# Patient Record
Sex: MALE | Race: BLACK OR AFRICAN AMERICAN | NOT HISPANIC OR LATINO | Employment: PART TIME | URBAN - METROPOLITAN AREA
[De-identification: names, ages, dates, MRNs, and addresses within clinical notes are randomized per-mention and may not be internally consistent; named-entity substitution may affect disease eponyms.]

---

## 2018-02-10 ENCOUNTER — HOSPITAL ENCOUNTER (EMERGENCY)
Facility: HOSPITAL | Age: 68
Discharge: HOME | End: 2018-02-10
Attending: EMERGENCY MEDICINE
Payer: MEDICARE

## 2018-02-10 VITALS
TEMPERATURE: 98 F | DIASTOLIC BLOOD PRESSURE: 67 MMHG | SYSTOLIC BLOOD PRESSURE: 150 MMHG | OXYGEN SATURATION: 100 % | WEIGHT: 220 LBS | RESPIRATION RATE: 19 BRPM | BODY MASS INDEX: 30.8 KG/M2 | HEART RATE: 62 BPM | HEIGHT: 71 IN

## 2018-02-10 DIAGNOSIS — M54.50 LUMBAR BACK PAIN: Primary | ICD-10-CM

## 2018-02-10 PROCEDURE — 63600175 PHARM REV CODE 636 W HCPCS: Performed by: NURSE PRACTITIONER

## 2018-02-10 PROCEDURE — 99283 EMERGENCY DEPT VISIT LOW MDM: CPT | Mod: 25

## 2018-02-10 PROCEDURE — 96372 THER/PROPH/DIAG INJ SC/IM: CPT

## 2018-02-10 RX ORDER — ORPHENADRINE CITRATE 30 MG/ML
60 INJECTION INTRAMUSCULAR; INTRAVENOUS
Status: COMPLETED | OUTPATIENT
Start: 2018-02-10 | End: 2018-02-10

## 2018-02-10 RX ORDER — METHOCARBAMOL 500 MG/1
1000 TABLET, FILM COATED ORAL 3 TIMES DAILY
Qty: 30 TABLET | Refills: 0 | Status: SHIPPED | OUTPATIENT
Start: 2018-02-10 | End: 2018-02-15

## 2018-02-10 RX ORDER — NAPROXEN 500 MG/1
500 TABLET ORAL 2 TIMES DAILY WITH MEALS
Qty: 14 TABLET | Refills: 0 | Status: SHIPPED | OUTPATIENT
Start: 2018-02-10 | End: 2018-02-21 | Stop reason: ALTCHOICE

## 2018-02-10 RX ORDER — KETOROLAC TROMETHAMINE 30 MG/ML
30 INJECTION, SOLUTION INTRAMUSCULAR; INTRAVENOUS
Status: COMPLETED | OUTPATIENT
Start: 2018-02-10 | End: 2018-02-10

## 2018-02-10 RX ADMIN — KETOROLAC TROMETHAMINE 30 MG: 30 INJECTION, SOLUTION INTRAMUSCULAR at 02:02

## 2018-02-10 RX ADMIN — ORPHENADRINE CITRATE 60 MG: 30 INJECTION INTRAMUSCULAR; INTRAVENOUS at 02:02

## 2018-02-10 NOTE — ED PROVIDER NOTES
Encounter Date: 2/10/2018       History     Chief Complaint   Patient presents with    Back Pain     back pain x 1 week. denies recent injury and urinary s/s.      67-year-old male with past medical history of hypertension presents to the ER for right lower back pain for about one week.  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  He denies chest pain, abdominal pain, urinary symptoms, weakness, numbness/tingling, loss of bowel or bladder control, saddle anesthesia, gait disturbance.  He has tried 1 ibuprofen for his pain without relief.  He reports that certain positions and bending forward make his pain worse.  Nothing seems to help.  He denies previous back injury and surgery.  The patient drove himself and was ambulatory into the ED.  No other complaints this time.  No history of cancer or IV drug use.  He denies history of renal disease.      The history is provided by the patient.   Back Pain    This is a new problem. The current episode started several days ago. The problem occurs constantly. The problem has been unchanged. The pain is associated with no known injury. The pain is present in the lumbar spine. The quality of the pain is described as aching. The pain is at a severity of 8/10. The symptoms are aggravated by certain positions, twisting and bending. The pain is the same all the time. Pertinent negatives include no chest pain, no fever, no numbness, no abdominal pain, no bowel incontinence, no perianal numbness, no bladder incontinence, no dysuria, no pelvic pain, no leg pain, no paresthesias, no paresis, no tingling and no weakness. He has tried NSAIDs for the symptoms. The treatment provided no relief.     Review of patient's allergies indicates:  No Known Allergies  Past Medical History:   Diagnosis Date    Hypertension      Past Surgical History:   Procedure Laterality Date    NO PAST SURGERIES       Family History   Problem Relation Age of Onset     Hypertension Mother     Cancer Father      stomach    Hypertension Father     Cancer Sister      breast    Hypertension Brother      Social History   Substance Use Topics    Smoking status: Never Smoker    Smokeless tobacco: Never Used    Alcohol use 1.2 oz/week     2 Cans of beer per week     Review of Systems   Constitutional: Negative for activity change, chills and fever.   HENT: Negative for congestion.    Respiratory: Negative for cough.    Cardiovascular: Negative for chest pain.   Gastrointestinal: Negative for abdominal pain, bowel incontinence, diarrhea and vomiting.   Genitourinary: Negative for bladder incontinence, decreased urine volume, difficulty urinating, dysuria, hematuria and pelvic pain.   Musculoskeletal: Positive for back pain. Negative for gait problem, neck pain and neck stiffness.   Skin: Negative for rash.   Allergic/Immunologic: Negative for immunocompromised state.   Neurological: Negative for tingling, weakness, numbness and paresthesias.   Hematological: Does not bruise/bleed easily.   Psychiatric/Behavioral: Negative for confusion.       Physical Exam     Initial Vitals [02/10/18 1210]   BP Pulse Resp Temp SpO2   (!) 150/67 62 19 97.9 °F (36.6 °C) 100 %      MAP       94.67         Physical Exam    Nursing note and vitals reviewed.  Constitutional: Vital signs are normal. He appears well-developed and well-nourished. He is active and cooperative. He is easily aroused.  Non-toxic appearance. He does not have a sickly appearance. He does not appear ill. No distress.   HENT:   Head: Normocephalic and atraumatic.   Eyes: Conjunctivae are normal.   Neck: Normal range of motion.   Cardiovascular: Normal rate, regular rhythm and normal heart sounds.   Pulses:       Dorsalis pedis pulses are 2+ on the right side, and 2+ on the left side.   Pulmonary/Chest: Effort normal and breath sounds normal.   Abdominal: Soft. Normal appearance and bowel sounds are normal. He exhibits no  distension, no ascites, no pulsatile midline mass and no mass. There is no tenderness. There is no rigidity, no rebound, no guarding and no CVA tenderness.   Musculoskeletal:        Right hip: Normal.        Cervical back: Normal.        Thoracic back: Normal.        Lumbar back: He exhibits tenderness, pain and spasm. He exhibits normal range of motion, no bony tenderness, no swelling, no edema, no deformity, no laceration and normal pulse.        Back:         Right upper leg: Normal.   Neurological: He is alert, oriented to person, place, and time and easily aroused. He has normal strength. No sensory deficit. GCS eye subscore is 4. GCS verbal subscore is 5. GCS motor subscore is 6.   Reflex Scores:       Patellar reflexes are 2+ on the right side and 2+ on the left side.       Achilles reflexes are 2+ on the right side and 2+ on the left side.  SLR POSITIVE on right.  Normal heel-to-toe.  5/5 strength in flexion, external rotation, internal rotation,  and extension of foot and leg.  Pt walks with steady gait.     Skin: Skin is warm, dry and intact. No bruising and no rash noted. No erythema. No pallor.   Psychiatric: He has a normal mood and affect. His speech is normal and behavior is normal. Judgment and thought content normal. Cognition and memory are normal.         ED Course   Procedures  Labs Reviewed - No data to display          Medical Decision Making:   History:   Old Medical Records: I decided to obtain old medical records.  Old Records Summarized: other records.       <> Summary of Records: 9/5/17- creat 1.1  Initial Assessment:   67-year-old male with past medical history of hypertension is here for right lower back pain.  The patient denies trauma.  Denies symptoms of cauda equina.  Patient appears well, nontoxic.  Vitals stable.  He has right paraspinal lumbar tenderness and spasm.  No midline bony tenderness, crepitus, or step-off.  No rash or signs of trauma.  Sensation and strength intact to  bilateral lower extremities.  SLR positive on the right.  DP 2+ bilaterally by palpation.  Differential Diagnosis:   Strain, sprain, spasm, discogenic pain, lumbar radiculopathy, UTI, kidney stone, AAA  ED Management:  IM Toradol, IM Norflex  There is no indication for labs or imaging at this time.  He has no signs or symptoms of cauda equina.  I do not suspect AAA.  I feel the patient's symptoms are due to lumbar radiculopathy.  I reviewed strict return precautions including weakness, numbness/tingling, loss of bowel or bladder control, saddle anesthesia.  He is to follow-up with his PCP within 3 days, return to the ER if his condition changes, progresses, or if there are any concerns.  The patient verbalized understanding, compliance, and agreement with the treatment plan.  Rx Naprosyn and Robaxin.  He reports improvement of pain after IM medications                   ED Course      Clinical Impression:   The encounter diagnosis was Lumbar back pain.                           Ligia Alonso, WENDY  02/10/18 1524

## 2018-02-10 NOTE — DISCHARGE INSTRUCTIONS
Return to the ED if your condition changes, progresses, or if you have any concerns.  No additional Ibuprofen/Motrin while using Naprosyn.

## 2018-06-15 ENCOUNTER — PES CALL (OUTPATIENT)
Dept: ADMINISTRATIVE | Facility: CLINIC | Age: 68
End: 2018-06-15

## 2018-07-03 ENCOUNTER — HOSPITAL ENCOUNTER (EMERGENCY)
Facility: HOSPITAL | Age: 68
Discharge: HOME | End: 2018-07-03
Attending: EMERGENCY MEDICINE
Payer: MEDICARE

## 2018-07-03 VITALS
HEART RATE: 65 BPM | HEIGHT: 71 IN | BODY MASS INDEX: 29.54 KG/M2 | SYSTOLIC BLOOD PRESSURE: 187 MMHG | WEIGHT: 211 LBS | DIASTOLIC BLOOD PRESSURE: 86 MMHG | OXYGEN SATURATION: 97 % | TEMPERATURE: 98 F | RESPIRATION RATE: 16 BRPM

## 2018-07-03 DIAGNOSIS — R60.9 EDEMA, UNSPECIFIED TYPE: ICD-10-CM

## 2018-07-03 DIAGNOSIS — S39.012A BACK STRAIN, INITIAL ENCOUNTER: Primary | ICD-10-CM

## 2018-07-03 LAB
ALBUMIN SERPL BCP-MCNC: 3 G/DL
ALP SERPL-CCNC: 68 U/L
ALT SERPL W/O P-5'-P-CCNC: 8 U/L
ANION GAP SERPL CALC-SCNC: 8 MMOL/L
ANISOCYTOSIS BLD QL SMEAR: SLIGHT
AST SERPL-CCNC: 11 U/L
BASOPHILS # BLD AUTO: 0.02 K/UL
BASOPHILS NFR BLD: 0.3 %
BILIRUB SERPL-MCNC: 0.3 MG/DL
BILIRUB UR QL STRIP: NEGATIVE
BUN SERPL-MCNC: 19 MG/DL
CALCIUM SERPL-MCNC: 9.3 MG/DL
CHLORIDE SERPL-SCNC: 107 MMOL/L
CLARITY UR: CLEAR
CO2 SERPL-SCNC: 24 MMOL/L
COLOR UR: YELLOW
CREAT SERPL-MCNC: 1.1 MG/DL
DIFFERENTIAL METHOD: ABNORMAL
EOSINOPHIL # BLD AUTO: 0.2 K/UL
EOSINOPHIL NFR BLD: 3 %
ERYTHROCYTE [DISTWIDTH] IN BLOOD BY AUTOMATED COUNT: 16.3 %
EST. GFR  (AFRICAN AMERICAN): >60 ML/MIN/1.73 M^2
EST. GFR  (NON AFRICAN AMERICAN): >60 ML/MIN/1.73 M^2
GLUCOSE SERPL-MCNC: 99 MG/DL
GLUCOSE UR QL STRIP: NEGATIVE
HCT VFR BLD AUTO: 28.2 %
HGB BLD-MCNC: 8.7 G/DL
HGB UR QL STRIP: NEGATIVE
HYPOCHROMIA BLD QL SMEAR: ABNORMAL
KETONES UR QL STRIP: NEGATIVE
LEUKOCYTE ESTERASE UR QL STRIP: NEGATIVE
LIPASE SERPL-CCNC: 23 U/L
LYMPHOCYTES # BLD AUTO: 1.9 K/UL
LYMPHOCYTES NFR BLD: 25.5 %
MCH RBC QN AUTO: 22.3 PG
MCHC RBC AUTO-ENTMCNC: 30.9 G/DL
MCV RBC AUTO: 72 FL
MONOCYTES # BLD AUTO: 0.8 K/UL
MONOCYTES NFR BLD: 11.2 %
NEUTROPHILS # BLD AUTO: 4.4 K/UL
NEUTROPHILS NFR BLD: 60 %
NITRITE UR QL STRIP: NEGATIVE
OVALOCYTES BLD QL SMEAR: ABNORMAL
PH UR STRIP: 6 [PH] (ref 5–8)
PLATELET # BLD AUTO: 194 K/UL
PLATELET BLD QL SMEAR: ABNORMAL
PMV BLD AUTO: 8.6 FL
POIKILOCYTOSIS BLD QL SMEAR: SLIGHT
POLYCHROMASIA BLD QL SMEAR: ABNORMAL
POTASSIUM SERPL-SCNC: 4.1 MMOL/L
PROT SERPL-MCNC: 7 G/DL
PROT UR QL STRIP: NEGATIVE
RBC # BLD AUTO: 3.9 M/UL
SODIUM SERPL-SCNC: 139 MMOL/L
SP GR UR STRIP: 1.02 (ref 1–1.03)
URN SPEC COLLECT METH UR: NORMAL
UROBILINOGEN UR STRIP-ACNC: NEGATIVE EU/DL
WBC # BLD AUTO: 7.4 K/UL

## 2018-07-03 PROCEDURE — 83690 ASSAY OF LIPASE: CPT

## 2018-07-03 PROCEDURE — 80053 COMPREHEN METABOLIC PANEL: CPT

## 2018-07-03 PROCEDURE — 96374 THER/PROPH/DIAG INJ IV PUSH: CPT

## 2018-07-03 PROCEDURE — 25000003 PHARM REV CODE 250: Performed by: EMERGENCY MEDICINE

## 2018-07-03 PROCEDURE — 63600175 PHARM REV CODE 636 W HCPCS: Performed by: EMERGENCY MEDICINE

## 2018-07-03 PROCEDURE — 85025 COMPLETE CBC W/AUTO DIFF WBC: CPT

## 2018-07-03 PROCEDURE — 96361 HYDRATE IV INFUSION ADD-ON: CPT

## 2018-07-03 PROCEDURE — 99284 EMERGENCY DEPT VISIT MOD MDM: CPT | Mod: 25

## 2018-07-03 PROCEDURE — 81003 URINALYSIS AUTO W/O SCOPE: CPT

## 2018-07-03 RX ORDER — DIAZEPAM 2 MG/1
2 TABLET ORAL
Status: COMPLETED | OUTPATIENT
Start: 2018-07-03 | End: 2018-07-03

## 2018-07-03 RX ORDER — SODIUM CHLORIDE 9 MG/ML
1000 INJECTION, SOLUTION INTRAVENOUS
Status: COMPLETED | OUTPATIENT
Start: 2018-07-03 | End: 2018-07-03

## 2018-07-03 RX ORDER — CYCLOBENZAPRINE HCL 10 MG
5 TABLET ORAL 3 TIMES DAILY PRN
Qty: 15 TABLET | Refills: 0 | Status: SHIPPED | OUTPATIENT
Start: 2018-07-03 | End: 2018-07-08

## 2018-07-03 RX ORDER — NAPROXEN 500 MG/1
250 TABLET ORAL 2 TIMES DAILY WITH MEALS
Qty: 10 TABLET | Refills: 0 | Status: SHIPPED | OUTPATIENT
Start: 2018-07-03 | End: 2018-09-13

## 2018-07-03 RX ORDER — KETOROLAC TROMETHAMINE 30 MG/ML
30 INJECTION, SOLUTION INTRAMUSCULAR; INTRAVENOUS
Status: DISCONTINUED | OUTPATIENT
Start: 2018-07-03 | End: 2018-07-03

## 2018-07-03 RX ORDER — KETOROLAC TROMETHAMINE 30 MG/ML
15 INJECTION, SOLUTION INTRAMUSCULAR; INTRAVENOUS
Status: COMPLETED | OUTPATIENT
Start: 2018-07-03 | End: 2018-07-03

## 2018-07-03 RX ADMIN — DIAZEPAM 2 MG: 2 TABLET ORAL at 11:07

## 2018-07-03 RX ADMIN — SODIUM CHLORIDE 1000 ML: 0.9 INJECTION, SOLUTION INTRAVENOUS at 11:07

## 2018-07-03 RX ADMIN — KETOROLAC TROMETHAMINE 15 MG: 30 INJECTION, SOLUTION INTRAMUSCULAR at 11:07

## 2018-07-03 NOTE — ED PROVIDER NOTES
Encounter Date: 7/3/2018    SCRIBE #1 NOTE: I, Kell Sarita, am scribing for, and in the presence of,  Dr. Ruffin. I have scribed the entire note.       History     Chief Complaint   Patient presents with    Back Pain     lower back pain radiating to lower abdomen for over one week.  Also, complaints of bilateral feet swelling.  Patient requesting Lyrica for pain     Time seen by provider: 10:04 AM    This is a 68 y.o. male with PMHx of HTN who presents to the ED with a cc of lower back pain since last week. The pain has worsened this week and radiates to his lower abdomen. Pt reports associated leg swelling x 1 week. Pt denies fever, nausea, vomiting, hematuria, dysuria, urinary or bowel incontinence, extremity weakness/numbness, chest pain, or shortness of breath or leg pain associated with leg swelling. Pt states back pain symptoms are exacerbated by sitting, lying down, or lifting either leg. Symptoms are somewhat alleviated by walking. Pt reports no prior history of similar symptoms. 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        The history is provided by the patient.     Review of patient's allergies indicates:  No Known Allergies  Past Medical History:   Diagnosis Date    Hypertension      Past Surgical History:   Procedure Laterality Date    NO PAST SURGERIES       Family History   Problem Relation Age of Onset    Hypertension Mother     Cancer Father         stomach    Hypertension Father     Cancer Sister         breast    Hypertension Brother      Social History   Substance Use Topics    Smoking status: Never Smoker    Smokeless tobacco: Never Used    Alcohol use 1.2 oz/week     2 Cans of beer per week     Review of Systems   Constitutional: Negative.  Negative for fever.   HENT: Negative.  Negative for sore throat.    Eyes: Negative.    Respiratory: Negative.  Negative for cough, chest tightness and shortness of breath.    Cardiovascular: Positive for leg swelling. Negative for chest pain.    Gastrointestinal: Positive for abdominal pain. Negative for diarrhea, nausea and vomiting.   Endocrine: Negative.    Genitourinary: Negative.  Negative for dysuria, flank pain and hematuria.        Negative for urinary or bowel incontinence.   Musculoskeletal: Positive for back pain.   Skin: Negative.  Negative for rash.   Allergic/Immunologic: Negative.    Neurological: Negative.  Negative for weakness.   Hematological: Does not bruise/bleed easily.   Psychiatric/Behavioral: Negative.  Negative for confusion and hallucinations.   All other systems reviewed and are negative.      Physical Exam     Initial Vitals [07/03/18 0917]   BP Pulse Resp Temp SpO2   (!) 187/86 65 16 98.3 °F (36.8 °C) 97 %      MAP       --         Physical Exam    Nursing note and vitals reviewed.  Constitutional: He appears well-developed and well-nourished.   HENT:   Head: Normocephalic and atraumatic.   Eyes: EOM are normal. Pupils are equal, round, and reactive to light.   Neck: Normal range of motion. Neck supple.   Cardiovascular: Normal rate, regular rhythm, normal heart sounds and intact distal pulses.   Pulmonary/Chest: Breath sounds normal. He has no rales.   Abdominal: Soft. Bowel sounds are normal. He exhibits no mass. There is no tenderness.   Musculoskeletal: He exhibits edema.   Back:  (+) LROM with rotation L>R, Positive reproduction of pain with rotation to the left weighted into the right, also reproduction of pain with flexion of right hip and left hip.  Patient with normal gait, normal strength 5/5 upper extremity lower extremity bilaterally  LE's: 1+ pitting edema bilaterally   Neurological: He is alert and oriented to person, place, and time. He has normal strength and normal reflexes.   Skin: Skin is warm. Capillary refill takes less than 2 seconds.   Psychiatric: He has a normal mood and affect. His behavior is normal. Judgment and thought content normal.         ED Course   Procedures  Labs Reviewed   URINALYSIS    CBC W/ AUTO DIFFERENTIAL   COMPREHENSIVE METABOLIC PANEL   LIPASE   URINALYSIS          Imaging Results          CT Abdomen Pelvis  Without Contrast (Final result)  Result time 07/03/18 09:52:31    Final result by Kwesi Gaitan MD (07/03/18 09:52:31)                 Impression:      1. Bilateral renal cysts.  2. Moderate amount of colonic stool burden possibly from constipation.  3. No acute intra-abdominal processes.  4. Spondylotic changes in the lumbar spine as above.      Electronically signed by: Kwesi Gaitan MD  Date:    07/03/2018  Time:    09:52             Narrative:    EXAMINATION:  CT ABDOMEN PELVIS WITHOUT CONTRAST    CLINICAL HISTORY:  Kidney stone, known, follow up;    TECHNIQUE:  Low dose axial images, sagittal and coronal reformations were obtained from the lung bases to the pubic symphysis.    COMPARISON:  None    FINDINGS:  Lung bases are clear.  There is no pleural effusion.  Heart size is within normal limits.    There is limited evaluation of the solid organs of the abdomen without intravenous contrast.  There is no intrahepatic biliary ductal dilatation or hepatic masses.    Noncontrast images of the spleen, the pancreas, the adrenal glands demonstrate no masses.  Gallbladder is partially contracted.  Within the gallbladder no abnormal calcifications to suggest cholelithiasis.    There are at least 2 low-attenuation cortical and subserosal cystic lesions in the left kidney the larger 1 measuring approximately 3.7 cm in its maximum diameter.  Also in the right kidney there are at least 2 cortical low-attenuation cysts noted.  There is no hydronephrosis or renal calculi or abnormal perinephric fluid collections or stranding of the perinephric fat.  Along the expected course of the ureters there are no abnormal calcifications.  Urinary bladder is unremarkable.    There is prostatic calcification is.  No periprostatic masses or infiltration.  There is no ascites.    There is moderate amount of  retained colonic stool.  No definite signs for inflammatory processes of the colon.  In the right lower quadrant the appendix is visualized without definite signs for acute appendicitis.  There is no small bowel obstruction or perforation or ileus.    There is dextroscoliosis of the lumbar spine associated with multilevel spondylotic changes.  Moderately prominent spondylotic osteophytes or spurs associated with DISH in the lumbar spine noted.  No osteoblastic or lytic lesions of the lumbar spine.    There is normal tapering of the abdominal aorta without aneurysmal dilatation.  No retroperitoneal or pelvic adenopathy.  There is nonspecific couple of inguinal nodes noted.  Ischiorectal fossa are symmetric and within normal limits.                              X-Rays:   Independently Interpreted Readings:   Other Readings:  EXAMINATION:  CT ABDOMEN PELVIS WITHOUT CONTRAST    CLINICAL HISTORY:  Kidney stone, known, follow up;    TECHNIQUE:  Low dose axial images, sagittal and coronal reformations were obtained from the lung bases to the pubic symphysis.    COMPARISON:  None    FINDINGS:  Lung bases are clear.  There is no pleural effusion.  Heart size is within normal limits.    There is limited evaluation of the solid organs of the abdomen without intravenous contrast.  There is no intrahepatic biliary ductal dilatation or hepatic masses.    Noncontrast images of the spleen, the pancreas, the adrenal glands demonstrate no masses.  Gallbladder is partially contracted.  Within the gallbladder no abnormal calcifications to suggest cholelithiasis.    There are at least 2 low-attenuation cortical and subserosal cystic lesions in the left kidney the larger 1 measuring approximately 3.7 cm in its maximum diameter.  Also in the right kidney there are at least 2 cortical low-attenuation cysts noted.  There is no hydronephrosis or renal calculi or abnormal perinephric fluid collections or stranding of the perinephric fat.   Along the expected course of the ureters there are no abnormal calcifications.  Urinary bladder is unremarkable.    There is prostatic calcification is.  No periprostatic masses or infiltration.  There is no ascites.    There is moderate amount of retained colonic stool.  No definite signs for inflammatory processes of the colon.  In the right lower quadrant the appendix is visualized without definite signs for acute appendicitis.  There is no small bowel obstruction or perforation or ileus.    There is dextroscoliosis of the lumbar spine associated with multilevel spondylotic changes.  Moderately prominent spondylotic osteophytes or spurs associated with DISH in the lumbar spine noted.  No osteoblastic or lytic lesions of the lumbar spine.    There is normal tapering of the abdominal aorta without aneurysmal dilatation.  No retroperitoneal or pelvic adenopathy.  There is nonspecific couple of inguinal nodes noted.  Ischiorectal fossa are symmetric and within normal limits.  Impression       1. Bilateral renal cysts.  2. Moderate amount of colonic stool burden possibly from constipation.  3. No acute intra-abdominal processes.  4. Spondylotic changes in the lumbar spine as above.          Medical Decision Making:   Initial Assessment:   Time seen by provider: 10:04 AM    This is a 68 y.o. male with PMHx of HTN who presents to the ED with a cc of lower back pain since last week. The pain has worsened this week and radiates to his lower abdomen. Pt reports associated leg swelling x 1 week. Pt denies fever, nausea, vomiting, hematuria, dysuria, urinary or bowel incontinence, chest pain, or shortness of breath. Symptoms are exacerbated by sitting, lying down, or lifting either leg. Symptoms are somewhat alleviated by walking. Pt reports no prior history of similar symptoms. 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      Differential Diagnosis:   Kidney stones  uti  AAA  Cauda equina  Central cord  Clinical Tests:   Lab Tests:  Ordered and Reviewed  Radiological Study: Ordered and Reviewed  ED Management:  Pt given IV Toradol and Valium with resolution of pain.  Pt CT w/o AAA, ureteral stone, and UA normal. Patient also neurovascularly intact, so no longer have concern for central cord or cauda equina.  With patient follow up with primary care provider in 2 days for further evaluation.  Patient also instructed to return to ER for any acute change.  The pt does have mild edema on exam but do the patient not reporting any associated symptoms, or signs such as rales on exam, denying CP, SOB, or previous hx of CHF will have pt follow up with PCP for further evaluation.                      Clinical Impression:     1. Back strain, initial encounter    2. Edema, unspecified type            Disposition:   Disposition: Discharged  Condition: Stable                        Last Ruffin MD  07/03/18 1246       Last Ruffin MD  07/03/18 1321       Last Ruffin MD  07/03/18 1355

## 2018-07-03 NOTE — ED TRIAGE NOTES
Pt presents to ED with C/O R leg pain that radiated to lower back and around waist line. Pt states pain 8/10  Pt states he had taken naproxen and muscle relaxer with no relief. Pt also noted with bilat lower leg swelling and feet. Pedal pulses present, comfort and safety measures provided. Will continue to monitor.

## 2018-09-11 PROBLEM — R19.00 ABDOMINAL MASS: Status: ACTIVE | Noted: 2018-09-11

## 2018-09-11 PROBLEM — R10.9 ABDOMINAL PAIN: Status: ACTIVE | Noted: 2018-09-11

## 2018-09-13 ENCOUNTER — TELEPHONE (OUTPATIENT)
Dept: ENDOSCOPY | Facility: HOSPITAL | Age: 68
End: 2018-09-13

## 2018-09-13 ENCOUNTER — OFFICE VISIT (OUTPATIENT)
Dept: SURGERY | Facility: CLINIC | Age: 68
End: 2018-09-13
Payer: MEDICARE

## 2018-09-13 VITALS
HEIGHT: 71 IN | WEIGHT: 206.81 LBS | SYSTOLIC BLOOD PRESSURE: 163 MMHG | DIASTOLIC BLOOD PRESSURE: 71 MMHG | BODY MASS INDEX: 28.95 KG/M2 | HEART RATE: 80 BPM

## 2018-09-13 DIAGNOSIS — R19.01 RIGHT UPPER QUADRANT ABDOMINAL MASS: ICD-10-CM

## 2018-09-13 DIAGNOSIS — Z12.11 SPECIAL SCREENING FOR MALIGNANT NEOPLASMS, COLON: Primary | ICD-10-CM

## 2018-09-13 DIAGNOSIS — K63.89 COLONIC MASS: Primary | ICD-10-CM

## 2018-09-13 DIAGNOSIS — R10.11 RIGHT UPPER QUADRANT ABDOMINAL PAIN: ICD-10-CM

## 2018-09-13 PROCEDURE — 1101F PT FALLS ASSESS-DOCD LE1/YR: CPT | Mod: CPTII,,, | Performed by: COLON & RECTAL SURGERY

## 2018-09-13 PROCEDURE — 99999 PR PBB SHADOW E&M-EST. PATIENT-LVL V: CPT | Mod: PBBFAC,,, | Performed by: COLON & RECTAL SURGERY

## 2018-09-13 PROCEDURE — 3077F SYST BP >= 140 MM HG: CPT | Mod: CPTII,,, | Performed by: COLON & RECTAL SURGERY

## 2018-09-13 PROCEDURE — 99215 OFFICE O/P EST HI 40 MIN: CPT | Mod: PBBFAC | Performed by: COLON & RECTAL SURGERY

## 2018-09-13 PROCEDURE — 3078F DIAST BP <80 MM HG: CPT | Mod: CPTII,,, | Performed by: COLON & RECTAL SURGERY

## 2018-09-13 PROCEDURE — 99205 OFFICE O/P NEW HI 60 MIN: CPT | Mod: S$PBB,,, | Performed by: COLON & RECTAL SURGERY

## 2018-09-13 RX ORDER — POLYETHYLENE GLYCOL 3350, SODIUM SULFATE ANHYDROUS, SODIUM BICARBONATE, SODIUM CHLORIDE, POTASSIUM CHLORIDE 236; 22.74; 6.74; 5.86; 2.97 G/4L; G/4L; G/4L; G/4L; G/4L
4 POWDER, FOR SOLUTION ORAL ONCE
Qty: 4000 ML | Refills: 0 | Status: SHIPPED | OUTPATIENT
Start: 2018-09-13 | End: 2018-09-14

## 2018-09-13 RX ORDER — METRONIDAZOLE 500 MG/100ML
500 INJECTION, SOLUTION INTRAVENOUS
Status: CANCELLED | OUTPATIENT
Start: 2018-09-13

## 2018-09-13 RX ORDER — MUPIROCIN 20 MG/G
OINTMENT TOPICAL
Status: CANCELLED | OUTPATIENT
Start: 2018-09-13

## 2018-09-13 RX ORDER — SODIUM CHLORIDE 9 MG/ML
INJECTION, SOLUTION INTRAVENOUS CONTINUOUS
Status: CANCELLED | OUTPATIENT
Start: 2018-09-13

## 2018-09-13 RX ORDER — ACETAMINOPHEN 10 MG/ML
1000 INJECTION, SOLUTION INTRAVENOUS
Status: CANCELLED | OUTPATIENT
Start: 2018-09-13 | End: 2018-09-13

## 2018-09-13 RX ORDER — HYDROCODONE BITARTRATE AND ACETAMINOPHEN 5; 325 MG/1; MG/1
1 TABLET ORAL EVERY 6 HOURS PRN
Qty: 20 TABLET | Refills: 0 | Status: ON HOLD | OUTPATIENT
Start: 2018-09-13 | End: 2018-09-18

## 2018-09-13 NOTE — PROGRESS NOTES
Patient ID:  Noah Nichole is a 68 y.o. male     Chief Complaint:   Chief Complaint   Patient presents with    Colon Cancer        HPI: Referred from ED. Had abdominal pain and anemia. FAmily history of colon cancer. Has mass at hepatic flexure on CT scan. No evidence of mets. Never had c-scope.     ROS:        Constitutional: No fever, chills, activity or appetite change.      HENT: No hearing loss, facial swelling, neck pain or stiffness.       Eyes: No discharge, itching and visual disturbance.      Respiratory: No apnea, cough, choking or shortness of breath.       Cardiovascular: No leg swelling or chest pain      Gastrointestinal: No abdominal distention or change in bowel habbits     Genitourinary: No dysuria, frequency or flank pain.      Musculoskeletal: No arthralgias or gait problem.      Neurological: No dizziness, seizures or weakness.      Hematological: No adenopathy.      Psychiatric/Behavioral: No hallucinations or behavioral problems.       PE:    APPEARANCE: Well nourished, well developed, in no acute distress.   CHEST: Lungs clear. Normal respiratory effort.  CARDIOVASCULAR: Normal rhythm. No edema.  ABDOMEN: Soft. No tenderness or masses.  Rectum:  Normal skin, NST, no masses or tenderness    Musculoskeletal: Symmetric, normal range of motion and strength.   Neurological: Alert and oriented to person, place, and time. Normal reflexes.   Skin: Skin is warm and dry.   Psychiatric: Normal mood and affect. Behavior is normal and appropriate.     Impression:    Encounter Diagnosis   Name Primary?    Colonic mass Yes       PLAN: c-scope tomorrow. If mass confirmed will operate Tuesday.

## 2018-09-14 ENCOUNTER — ANESTHESIA (OUTPATIENT)
Dept: ENDOSCOPY | Facility: HOSPITAL | Age: 68
End: 2018-09-14
Payer: MEDICARE

## 2018-09-14 ENCOUNTER — HOSPITAL ENCOUNTER (OUTPATIENT)
Facility: HOSPITAL | Age: 68
Discharge: HOME | End: 2018-09-14
Attending: COLON & RECTAL SURGERY | Admitting: COLON & RECTAL SURGERY
Payer: MEDICARE

## 2018-09-14 ENCOUNTER — TELEPHONE (OUTPATIENT)
Dept: SURGERY | Facility: CLINIC | Age: 68
End: 2018-09-14

## 2018-09-14 ENCOUNTER — ANESTHESIA EVENT (OUTPATIENT)
Dept: ENDOSCOPY | Facility: HOSPITAL | Age: 68
End: 2018-09-14
Payer: MEDICARE

## 2018-09-14 VITALS
TEMPERATURE: 98 F | HEIGHT: 71 IN | OXYGEN SATURATION: 100 % | RESPIRATION RATE: 18 BRPM | SYSTOLIC BLOOD PRESSURE: 155 MMHG | HEART RATE: 75 BPM | DIASTOLIC BLOOD PRESSURE: 78 MMHG | WEIGHT: 205 LBS | BODY MASS INDEX: 28.7 KG/M2

## 2018-09-14 DIAGNOSIS — K63.89 COLONIC MASS: ICD-10-CM

## 2018-09-14 DIAGNOSIS — Z12.11 COLON CANCER SCREENING: ICD-10-CM

## 2018-09-14 DIAGNOSIS — R19.01 RIGHT UPPER QUADRANT ABDOMINAL MASS: Primary | ICD-10-CM

## 2018-09-14 PROCEDURE — 27201089 HC SNARE, DISP (ANY): Performed by: COLON & RECTAL SURGERY

## 2018-09-14 PROCEDURE — 45385 COLONOSCOPY W/LESION REMOVAL: CPT | Mod: 52,GC,, | Performed by: COLON & RECTAL SURGERY

## 2018-09-14 PROCEDURE — 37000008 HC ANESTHESIA 1ST 15 MINUTES: Performed by: COLON & RECTAL SURGERY

## 2018-09-14 PROCEDURE — 45385 COLONOSCOPY W/LESION REMOVAL: CPT | Performed by: COLON & RECTAL SURGERY

## 2018-09-14 PROCEDURE — E9220 PRA ENDO ANESTHESIA: HCPCS | Mod: ,,, | Performed by: NURSE ANESTHETIST, CERTIFIED REGISTERED

## 2018-09-14 PROCEDURE — 37000009 HC ANESTHESIA EA ADD 15 MINS: Performed by: COLON & RECTAL SURGERY

## 2018-09-14 PROCEDURE — 63600175 PHARM REV CODE 636 W HCPCS: Performed by: NURSE ANESTHETIST, CERTIFIED REGISTERED

## 2018-09-14 PROCEDURE — 88305 TISSUE EXAM BY PATHOLOGIST: CPT | Mod: 26,,, | Performed by: PATHOLOGY

## 2018-09-14 PROCEDURE — 25000003 PHARM REV CODE 250: Performed by: COLON & RECTAL SURGERY

## 2018-09-14 PROCEDURE — 88305 TISSUE EXAM BY PATHOLOGIST: CPT | Performed by: PATHOLOGY

## 2018-09-14 RX ORDER — SODIUM CHLORIDE 0.9 % (FLUSH) 0.9 %
3 SYRINGE (ML) INJECTION
Status: DISCONTINUED | OUTPATIENT
Start: 2018-09-14 | End: 2018-09-14 | Stop reason: HOSPADM

## 2018-09-14 RX ORDER — POLYETHYLENE GLYCOL 3350 17 G/17G
17 POWDER, FOR SOLUTION ORAL DAILY
Qty: 10 EACH | Refills: 0 | Status: ON HOLD | OUTPATIENT
Start: 2018-09-14 | End: 2018-09-21 | Stop reason: HOSPADM

## 2018-09-14 RX ORDER — METRONIDAZOLE 500 MG/1
TABLET ORAL
Qty: 3 TABLET | Refills: 0 | Status: ON HOLD | OUTPATIENT
Start: 2018-09-14 | End: 2018-09-18

## 2018-09-14 RX ORDER — PROPOFOL 10 MG/ML
VIAL (ML) INTRAVENOUS CONTINUOUS PRN
Status: DISCONTINUED | OUTPATIENT
Start: 2018-09-14 | End: 2018-09-14

## 2018-09-14 RX ORDER — SODIUM CHLORIDE 9 MG/ML
INJECTION, SOLUTION INTRAVENOUS CONTINUOUS
Status: CANCELLED | OUTPATIENT
Start: 2018-09-14

## 2018-09-14 RX ORDER — BISACODYL 5 MG
5 TABLET, DELAYED RELEASE (ENTERIC COATED) ORAL ONCE
Qty: 2 TABLET | Refills: 0 | COMMUNITY
Start: 2018-09-14 | End: 2018-09-14

## 2018-09-14 RX ORDER — NEOMYCIN SULFATE 500 MG/1
TABLET ORAL
Qty: 6 TABLET | Refills: 0 | Status: ON HOLD | OUTPATIENT
Start: 2018-09-14 | End: 2018-09-18

## 2018-09-14 RX ORDER — PROPOFOL 10 MG/ML
VIAL (ML) INTRAVENOUS
Status: DISCONTINUED | OUTPATIENT
Start: 2018-09-14 | End: 2018-09-14

## 2018-09-14 RX ORDER — SODIUM CHLORIDE 9 MG/ML
INJECTION, SOLUTION INTRAVENOUS CONTINUOUS
Status: DISCONTINUED | OUTPATIENT
Start: 2018-09-14 | End: 2018-09-14 | Stop reason: HOSPADM

## 2018-09-14 RX ORDER — LIDOCAINE HCL/PF 100 MG/5ML
SYRINGE (ML) INTRAVENOUS
Status: DISCONTINUED | OUTPATIENT
Start: 2018-09-14 | End: 2018-09-14

## 2018-09-14 RX ADMIN — PROPOFOL 200 MCG/KG/MIN: 10 INJECTION, EMULSION INTRAVENOUS at 11:09

## 2018-09-14 RX ADMIN — LIDOCAINE HYDROCHLORIDE 50 MG: 20 INJECTION, SOLUTION INTRAVENOUS at 11:09

## 2018-09-14 RX ADMIN — SODIUM CHLORIDE: 0.9 INJECTION, SOLUTION INTRAVENOUS at 11:09

## 2018-09-14 RX ADMIN — PROPOFOL 120 MG: 10 INJECTION, EMULSION INTRAVENOUS at 11:09

## 2018-09-14 NOTE — ANESTHESIA PREPROCEDURE EVALUATION
09/14/2018  Noah Nichole is a 68 y.o., male.  Past Medical History:   Diagnosis Date    Hypertension      Past Surgical History:   Procedure Laterality Date    NO PAST SURGERIES           Anesthesia Evaluation    I have reviewed the Patient Summary Reports.     I have reviewed the Medications.     Review of Systems  Anesthesia Hx:  Denies Hx of Anesthetic complications  Neg history of prior surgery. Denies Family Hx of Anesthesia complications.   Denies Personal Hx of Anesthesia complications.   Cardiovascular:   Exercise tolerance: good        Physical Exam  General:  Well nourished    Airway/Jaw/Neck:  Airway Findings: Mouth Opening: Normal Tongue: Normal  General Airway Assessment: Adult  Mallampati: II  TM Distance: Normal, at least 6 cm         Dental:  DENTAL FINDINGS: Normal   Chest/Lungs:  Chest/Lungs Clear    Heart/Vascular:  Heart Findings: Normal       Mental Status:  Mental Status Findings:  Alert and Oriented         Anesthesia Plan  Type of Anesthesia, risks & benefits discussed:  Anesthesia Type:  general  Patient's Preference:   Intra-op Monitoring Plan: standard ASA monitors  Intra-op Monitoring Plan Comments:   Post Op Pain Control Plan:   Post Op Pain Control Plan Comments:   Induction:   IV  Beta Blocker:  Patient is not currently on a Beta-Blocker (No further documentation required).       Informed Consent: Patient understands risks and agrees with Anesthesia plan.  Questions answered. Anesthesia consent signed with patient.  ASA Score: 2     Day of Surgery Review of History & Physical:    H&P update referred to the provider.         Ready For Surgery From Anesthesia Perspective.

## 2018-09-14 NOTE — ANESTHESIA POSTPROCEDURE EVALUATION
"Anesthesia Post Evaluation    Patient: Noah Nichole    Procedure(s) Performed: Procedure(s) (LRB):  COLONOSCOPY (N/A)    Final Anesthesia Type: general  Patient location during evaluation: PACU  Patient participation: Yes- Able to Participate  Level of consciousness: awake and alert and oriented  Post-procedure vital signs: reviewed and stable  Pain management: adequate  Airway patency: patent  PONV status at discharge: No PONV  Anesthetic complications: no      Cardiovascular status: blood pressure returned to baseline  Respiratory status: unassisted  Hydration status: euvolemic  Follow-up not needed.        Visit Vitals  BP (!) 155/78 (BP Location: Left arm, Patient Position: Lying)   Pulse 75   Temp 36.7 °C (98.1 °F)   Resp 18   Ht 5' 11" (1.803 m)   Wt 93 kg (205 lb)   SpO2 100%   BMI 28.59 kg/m²       Pain/Mary Score: Pain Assessment Performed: Yes (9/14/2018 12:44 PM)  Presence of Pain: denies (9/14/2018 12:44 PM)  Mary Score: 8 (9/14/2018 12:12 PM)        "

## 2018-09-14 NOTE — TELEPHONE ENCOUNTER
----- Message from Keshawn Gutierrez sent at 9/14/2018 11:53 AM CDT -----  MRN#__[id number]_, Surgery is not cleared for __9/18_______.  In the event the procedure is not been cleared, is the surgery deemed Medically Urgent (the MD will carry on with the surgery without the authorization)?      If the surgery is deemed Non Medically Urgent will the MD cancel or reschedule the surgery until authorization has been obtained?    Thanks

## 2018-09-14 NOTE — TRANSFER OF CARE
"Anesthesia Transfer of Care Note    Patient: Noah Nichole    Procedure(s) Performed: Procedure(s) (LRB):  COLONOSCOPY (N/A)    Patient location: GI    Anesthesia Type: general    Transport from OR: Transported from OR on room air with adequate spontaneous ventilation    Post pain: adequate analgesia    Post assessment: no apparent anesthetic complications    Post vital signs: stable    Level of consciousness: awake    Nausea/Vomiting: no nausea/vomiting    Complications: none    Transfer of care protocol was followed      Last vitals:   Visit Vitals  /77 (BP Location: Left arm, Patient Position: Lying)   Pulse 62   Temp 36.6 °C (97.9 °F) (Temporal)   Resp 18   Ht 5' 11" (1.803 m)   Wt 93 kg (205 lb)   SpO2 100%   BMI 28.59 kg/m²     "

## 2018-09-14 NOTE — DISCHARGE INSTRUCTIONS
Colonoscopy     A camera attached to a flexible tube with a viewing lens is used to take video pictures.     Colonoscopy is a test to view the inside of your lower digestive tract (colon and rectum). Sometimes it can show the last part of the small intestine (ileum). During the test, small pieces of tissue may be removed for testing. This is called a biopsy. Small growths, such as polyps, may also be removed.   Why is colonoscopy done?  The test is done to help look for colon cancer. And it can help find the source of abdominal pain, bleeding, and changes in bowel habits. It may be needed once a year, depending on factors such as your:  · Age  · Health history  · Family health history  · Symptoms  · Results from any prior colonoscopy  Risks and possible complications  These include:  · Bleeding               · A puncture or tear in the colon   · Risks of anesthesia  · A cancer lesion not being seen  Getting ready   To prepare for the test:  · Talk with your healthcare provider about the risks of the test (see below). Also ask your healthcare provider about alternatives to the test.  · Tell your healthcare provider about any medicines you take. Also tell him or her about any health conditions you may have.  · Make sure your rectum and colon are empty for the test. Follow the diet and bowel prep instructions exactly. If you dont, the test may need to be rescheduled.  · Plan for a friend or family member to drive you home after the test.     Colonoscopy provides an inside view of the entire colon.     You may discuss the results with your doctor right away or at a future visit.  During the test   The test is usually done in the hospital on an outpatient basis. This means you go home the same day. The procedure takes about 30 minutes. During that time:  · You are given relaxing (sedating) medicine through an IV line. You may be drowsy, or fully asleep.  · The healthcare provider will first give you a physical exam to  check for anal and rectal problems.  · Then the anus is lubricated and the scope inserted.  · If you are awake, you may have a feeling similar to needing to have a bowel movement. You may also feel pressure as air is pumped into the colon. Its OK to pass gas during the procedure.  · Biopsy, polyp removal, or other treatments may be done during the test.  After the test   You may have gas right after the test. It can help to try to pass it to help prevent later bloating. Your healthcare provider may discuss the results with you right away. Or you may need to schedule a follow-up visit to talk about the results. After the test, you can go back to your normal eating and other activities. You may be tired from the sedation and need to rest for a few hours.  Date Last Reviewed: 11/1/2016 © 2000-2017 [employer]. [address] [place] [address], [place] [address]. All rights reserved. This information is not intended as a substitute for professional medical care. Always follow your healthcare professional's instructions.

## 2018-09-14 NOTE — H&P (VIEW-ONLY)
Endoscopy H&P    Procedure : Colonoscopy      Abnormal imaging/colonic mass      Past Medical History:   Diagnosis Date    Hypertension              Review of Systems -ROS:  GENERAL: No fever, chills, fatigability or weight loss.  CHEST: Denies AN, cyanosis, wheezing, cough and sputum production.  CARDIOVASCULAR: Denies chest pain, PND, orthopnea or reduced exercise tolerance.   Musculoskeletal ROS: negative for - gait disturbance or joint pain  Neurological ROS: negative for - confusion or memory loss        Physical Exam:  General: well developed, well nourished, no distress  Head: normocephalic  Neck: supple, symmetrical, trachea midline  Lungs:  clear to auscultation bilaterally and normal respiratory effort  Heart: regular rate and rhythm, S1, S2 normal, no murmur, rub or gallop and regular rate and rhythm  Abdomen: soft, non-tender non-distented; bowel sounds normal; no masses,  no organomegaly  Extremities: no cyanosis or edema, or clubbing       Moderate Sedation (choice): Mallampati Score 1    ASA : II    IMP: Abnormal imaging/colonic mass    Plan: Colonoscopy with Moderate sedation.  I have explained the procedure including indications, alternatives, expected outcomes and potential complications. The patient appears to understand and gives informed consent. The patient is medically ready for surgery.

## 2018-09-14 NOTE — [employer] PATIENT INSTRUCTIONS
Discharge Summary/Instructions after an Endoscopic Procedure  Patient Name: Noah Nichole  Patient MRN: [id number]  Patient YOB: 1950  Friday, September 14, 2018  Adrian Cheung MD  RESTRICTIONS:  During your procedure today, you received medications for sedation.  These   medications may affect your judgment, balance and coordination.  Therefore,   for 24 hours, you have the following restrictions:   - DO NOT drive a car, operate machinery, make legal/financial decisions,   sign important papers or drink alcohol.    ACTIVITY:  Today: no heavy lifting, straining or running due to procedural   sedation/anesthesia.  The following day: return to full activity including work.  DIET:  Eat and drink normally unless instructed otherwise.     TREATMENT FOR COMMON SIDE EFFECTS:  - Mild abdominal pain, nausea, belching, bloating or excessive gas:  rest,   eat lightly and use a heating pad.  - Sore Throat: treat with throat lozenges and/or gargle with warm salt   water.  - Because air was used during the procedure, expelling large amounts of air   from your rectum or belching is normal.  - If a bowel prep was taken, you may not have a bowel movement for 1-3 days.    This is normal.  SYMPTOMS TO WATCH FOR AND REPORT TO YOUR PHYSICIAN:  1. Abdominal pain or bloating, other than gas cramps.  2. Chest pain.  3. Back pain.  4. Signs of infection such as: chills or fever occurring within 24 hours   after the procedure.  5. Rectal bleeding, which would show as bright red, maroon, or black stools.   (A tablespoon of blood from the rectum is not serious, especially if   hemorrhoids are present.)  6. Vomiting.  7. Weakness or dizziness.  GO DIRECTLY TO THE NEAREST EMERGENCY ROOM IF YOU HAVE ANY OF THE FOLLOWING:      Difficulty breathing              Chills and/or fever over 101 F   Persistent vomiting and/or vomiting blood   Severe abdominal pain   Severe chest pain   Black, tarry stools   Bleeding- more than one  tablespoon   Any other symptom or condition that you feel may need urgent attention  Your doctor recommends these additional instructions:  If any biopsies were taken, your doctors clinic will contact you in 1 to 2   weeks with any results.  - Discharge patient to home (ambulatory).   - Await pathology results.   - Refer to a surgeon.   - Repeat colonoscopy is recommended.  The colonoscopy date will be   determined after pathology results from today's exam become available for   review.  For questions, problems or results please call your physician - Adrian Cheung MD at Work:  [phone].  [facility], EMERGENCY ROOM PHONE NUMBER: [phone]  IF A COMPLICATION OR EMERGENCY SITUATION ARISES AND YOU ARE UNABLE TO REACH   YOUR PHYSICIAN - GO DIRECTLY TO THE EMERGENCY ROOM.  Adrian Cheung MD  9/14/2018 12:07:02 PM  This report has been verified and signed electronically.  [employer]

## 2018-09-17 ENCOUNTER — ANESTHESIA EVENT (OUTPATIENT)
Dept: SURGERY | Facility: HOSPITAL | Age: 68
DRG: 331 | End: 2018-09-17
Payer: MEDICARE

## 2018-09-17 ENCOUNTER — TELEPHONE (OUTPATIENT)
Dept: SURGERY | Facility: CLINIC | Age: 68
End: 2018-09-17

## 2018-09-17 NOTE — ANESTHESIA PREPROCEDURE EVALUATION
[facility]  Anesthesia Pre-Operative Evaluation         Patient Name: Noah Nichole  YOB: 1950  MRN: [id number]    SUBJECTIVE:     Pre-operative evaluation for Procedure(s) (LRB):  HEMICOLECTOMY, RIGHT, extended (N/A)     09/17/2018    Noah Nichole is a 68 y.o. male w/ a significant PMHx of HTN who was referred to the colorectal clinic from the ED for abdominal pain and anemia. Has mass at hepatic flexure on CT scan. 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    Patient now presents for the above procedure(s).      LDA: None documented.    Prev airway: None documented.    Drips: None documented.    Patient Active Problem List   Diagnosis    HTN (hypertension)    Abdominal pain    Abdominal mass    Colonic mass    Colon cancer screening       Review of patient's allergies indicates:  No Known Allergies    Current Outpatient Medications:  No current facility-administered medications for this encounter.     Current Outpatient Medications:     ferrous sulfate 324 mg (65 mg iron) TbEC, Take 1 tablet (325 mg total) by mouth once daily., Disp: 100 tablet, Rfl: 3    lisinopril-hydrochlorothiazide (PRINZIDE,ZESTORETIC) 20-12.5 mg per tablet, Take 1 tablet by mouth once daily., Disp: 90 tablet, Rfl: 3    metroNIDAZOLE (FLAGYL) 500 MG tablet, On the day before your surgery, take 1 tablet (500 mg) by mouth at 1pm, 2pm and 11pm., Disp: 3 tablet, Rfl: 0    neomycin (MYCIFRADIN) 500 mg Tab, On the day before your surgery, take 2 tablets (1,000 mg) by mouth at 1pm, 2pm and 11pm., Disp: 6 tablet, Rfl: 0    polyethylene glycol (GLYCOLAX) 17 gram PwPk, Take 17 g by mouth once daily. Take 8 capfuls in 64oz of clear liquids, Disp: 10 each, Rfl: 0    HYDROcodone-acetaminophen (NORCO) 5-325 mg per tablet, Take 1 tablet by mouth every 6 (six) hours as needed for Pain., Disp: 20 tablet, Rfl: 0    Past Surgical History:   Procedure Laterality Date     COLONOSCOPY N/A 9/14/2018    Procedure: COLONOSCOPY;  Surgeon: Adrian Cheung MD;  [facility]  Service: Endoscopy;  Laterality: N/A;  pt ate at 1:30 on 9/13/18-Dr Cheung informed and he stated ok to do colonoscopy.    COLONOSCOPY N/A 9/14/2018    [facility] by [facility]    NO PAST SURGERIES         Social History     Socioeconomic History    Marital status: Single     Spouse name: Not on file    Number of children: Not on file    Years of education: Not on file    Highest education level: Not on file   Social Needs    Financial resource strain: Not on file    Food insecurity - worry: Not on file    Food insecurity - inability: Not on file    Transportation needs - medical: Not on file    Transportation needs - non-medical: Not on file   Occupational History    Not on file   Tobacco Use    Smoking status: Never Smoker    Smokeless tobacco: Never Used   Substance and Sexual Activity    Alcohol use: Yes     Alcohol/week: 1.2 oz     Types: 2 Cans of beer per week    Drug use: No    Sexual activity: Not on file   Other Topics Concern    Not on file   Social History Narrative    Not on file       OBJECTIVE:     Vital Signs Range (Last 24H):         Significant Labs:  Lab Results   Component Value Date    WBC 8.19 09/11/2018    HGB 8.3 (L) 09/11/2018    HCT 27.2 (L) 09/11/2018     09/11/2018    CHOL 177 05/04/2018    TRIG 66 05/04/2018    HDL 38 (L) 05/04/2018    ALT 21 09/11/2018    AST 17 09/11/2018     09/11/2018    K 4.2 09/11/2018     09/11/2018    CREATININE 0.91 09/11/2018    BUN 18 09/11/2018    CO2 24 09/11/2018    PSA 1.1 05/11/2015       Diagnostic Studies:   CT Abdomen Pelvis  Without Contrast  Mass originating off the hepatic flexure of the colon consistent with neoplasm.      EKG:  Vent. Rate : 058 BPM     Atrial Rate : 058 BPM     P-R Int : 210 ms          QRS Dur : 112 ms      QT Int : 448 ms       P-R-T Axes : 068 028  "064 degrees     QTc Int : 439 ms    Sinus bradycardia with marked sinus arrythmia with 1st degree A-V block  Incomplete right bundle branch block  Borderline Abnormal ECG  No previous ECGs available  Confirmed by Jes Goldstein MD ([id number]) on 9/11/2018 1:32:38 PM     2D ECHO:  No results found for this or any previous visit.     Last 3 sets of Vitals    Vitals - 1 value per visit 9/13/2018 9/14/2018 9/18/2018   SYSTOLIC 163 155 136   DIASTOLIC 71 78 63   PULSE 80 75 66   TEMPERATURE - 98.1 98.4   RESPIRATIONS - 18 18   SPO2 - 100 -   Weight (lb) 206.79 205 203   Weight (kg) 93.8 92.987 92.08   HEIGHT 5' 11" 5' 11" 5' 11"   BODY MASS INDEX 28.84 28.59 28.31   VISIT REPORT - - -   Waist Measurements - - -   Pain Score  0 - -           ASSESSMENT/PLAN:       Anesthesia Evaluation    I have reviewed the Patient Summary Reports.     I have reviewed the Medications.     Review of Systems  Anesthesia Hx:  No problems with previous Anesthesia  Neg history of prior surgery.  Denies Personal Hx of Anesthesia complications.   Social:  Social Alcohol Use, Non-Smoker    Hematology/Oncology:         -- Anemia:   EENT/Dental:EENT/Dental Normal   Cardiovascular:   Exercise tolerance: good Hypertension    Pulmonary:  Pulmonary Normal    Renal/:  Renal/ Normal     Hepatic/GI:  Hepatic/GI Normal    Musculoskeletal:  Musculoskeletal Normal    Neurological:  Neurology Normal    Endocrine:  Endocrine Normal        Physical Exam  General:  Well nourished    Airway/Jaw/Neck:  Airway Findings: Mouth Opening: Small, but > 3cm Tongue: Large  General Airway Assessment: Adult  Mallampati: III  TM Distance: Normal, at least 6 cm  Jaw/Neck Findings:  Neck ROM: Normal ROM     Eyes/Ears/Nose:  EYES/EARS/NOSE FINDINGS: Normal   Dental:  Dental Findings: upper front caps    Chest/Lungs:  Chest/Lungs Findings: Clear to auscultation, Normal Respiratory Rate     Heart/Vascular:  Heart Findings: Rate: Normal  Rhythm: Regular Rhythm    "   Musculoskeletal:  Musculoskeletal Findings: Normal    Mental Status:  Mental Status Findings:  Cooperative, Alert and Oriented         Anesthesia Plan  Type of Anesthesia, risks & benefits discussed:  Anesthesia Type:  general  Patient's Preference: same   Intra-op Monitoring Plan: standard ASA monitors and arterial line  Intra-op Monitoring Plan Comments:   Post Op Pain Control Plan: multimodal analgesia, IV/PO Opioids PRN and per primary service following discharge from PACU  Post Op Pain Control Plan Comments:   Induction:   IV  Beta Blocker:  Patient is not currently on a Beta-Blocker (No further documentation required).       Informed Consent: Patient understands risks and agrees with Anesthesia plan.  Questions answered. Anesthesia consent signed with patient.  ASA Score: 2     Day of Surgery Review of History & Physical:    H&P update referred to the surgeon.     Anesthesia Plan Notes: Plan for arterial line for Hct monitoring.  H&H starting low.  Will make sure type and screened.         Ready For Surgery From Anesthesia Perspective.

## 2018-09-17 NOTE — TELEPHONE ENCOUNTER
----- Message from Cat Newell sent at 9/17/2018 10:44 AM CDT -----  Contact: Pt:[phone]  .Needs Advice    Reason for call:Pt states he would like to know if he has to drink the prep whole solution.        Communication PreferencePt:[phone]:    Additional Information:

## 2018-09-17 NOTE — TELEPHONE ENCOUNTER
----- Message from Cat Newell sent at 9/17/2018  8:19 AM CDT -----  Contact: Pt:[phone]  .Needs Advice    Reason for call:Pt called and states he has some questions in regards to his surgery on tomorrow.         Communication Preference:Pt:[phone]    Additional Information:

## 2018-09-17 NOTE — TELEPHONE ENCOUNTER
----- Message from Holland Monroy sent at 9/17/2018 10:20 AM CDT -----  Contact: SELF  Pt is requesting a call back at [phone] he has questions and concerns about his upcoming visit.

## 2018-09-17 NOTE — PRE-PROCEDURE INSTRUCTIONS
PreOp Instructions given:   - Verbal medication information (what to hold and what to take)   - NPO guidelines   - Arrival place directions given; time to be given the day before procedure by the   Surgeon's Office   - Bathing with antibacterial soap   - Don't wear any jewelry or bring any valuables AM of surgery   - No makeup or moisturizer to face   - No perfume/cologne, powder, lotions or aftershave   Pt. verbalized understanding.   Denies any family history of side effects or issues with anesthesia or sedation.    THIS WILL BE PT'S 1ST SURGERY

## 2018-09-18 ENCOUNTER — ANESTHESIA (OUTPATIENT)
Dept: SURGERY | Facility: HOSPITAL | Age: 68
DRG: 331 | End: 2018-09-18
Payer: MEDICARE

## 2018-09-18 ENCOUNTER — HOSPITAL ENCOUNTER (INPATIENT)
Facility: HOSPITAL | Age: 68
LOS: 4 days | Discharge: HOME | DRG: 331 | End: 2018-09-22
Attending: COLON & RECTAL SURGERY | Admitting: COLON & RECTAL SURGERY
Payer: MEDICARE

## 2018-09-18 DIAGNOSIS — R19.01 RIGHT UPPER QUADRANT ABDOMINAL MASS: ICD-10-CM

## 2018-09-18 DIAGNOSIS — R10.11 RIGHT UPPER QUADRANT ABDOMINAL PAIN: ICD-10-CM

## 2018-09-18 DIAGNOSIS — K63.89 COLONIC MASS: Primary | ICD-10-CM

## 2018-09-18 LAB
ABO + RH BLD: NORMAL
BLD GP AB SCN CELLS X3 SERPL QL: NORMAL

## 2018-09-18 PROCEDURE — 27000221 HC OXYGEN, UP TO 24 HOURS

## 2018-09-18 PROCEDURE — 63600175 PHARM REV CODE 636 W HCPCS: Performed by: SURGERY

## 2018-09-18 PROCEDURE — D9220A PRA ANESTHESIA: Mod: ,,, | Performed by: ANESTHESIOLOGY

## 2018-09-18 PROCEDURE — 27201423 OPTIME MED/SURG SUP & DEVICES STERILE SUPPLY: Performed by: COLON & RECTAL SURGERY

## 2018-09-18 PROCEDURE — 25000003 PHARM REV CODE 250: Performed by: COLON & RECTAL SURGERY

## 2018-09-18 PROCEDURE — 36000709 HC OR TIME LEV III EA ADD 15 MIN: Performed by: COLON & RECTAL SURGERY

## 2018-09-18 PROCEDURE — 0DTF0ZZ RESECTION OF RIGHT LARGE INTESTINE, OPEN APPROACH: ICD-10-PCS | Performed by: COLON & RECTAL SURGERY

## 2018-09-18 PROCEDURE — 71000039 HC RECOVERY, EACH ADD'L HOUR: Performed by: COLON & RECTAL SURGERY

## 2018-09-18 PROCEDURE — 88341 IMHCHEM/IMCYTCHM EA ADD ANTB: CPT | Performed by: PATHOLOGY

## 2018-09-18 PROCEDURE — 37000009 HC ANESTHESIA EA ADD 15 MINS: Performed by: COLON & RECTAL SURGERY

## 2018-09-18 PROCEDURE — 86850 RBC ANTIBODY SCREEN: CPT

## 2018-09-18 PROCEDURE — 11000001 HC ACUTE MED/SURG PRIVATE ROOM

## 2018-09-18 PROCEDURE — 88342 IMHCHEM/IMCYTCHM 1ST ANTB: CPT | Mod: 26,,, | Performed by: PATHOLOGY

## 2018-09-18 PROCEDURE — 63600175 PHARM REV CODE 636 W HCPCS: Performed by: STUDENT IN AN ORGANIZED HEALTH CARE EDUCATION/TRAINING PROGRAM

## 2018-09-18 PROCEDURE — 37000008 HC ANESTHESIA 1ST 15 MINUTES: Performed by: COLON & RECTAL SURGERY

## 2018-09-18 PROCEDURE — 25000003 PHARM REV CODE 250: Performed by: SURGERY

## 2018-09-18 PROCEDURE — 44160 REMOVAL OF COLON: CPT | Mod: ,,, | Performed by: COLON & RECTAL SURGERY

## 2018-09-18 PROCEDURE — S0030 INJECTION, METRONIDAZOLE: HCPCS | Performed by: COLON & RECTAL SURGERY

## 2018-09-18 PROCEDURE — C9290 INJ, BUPIVACAINE LIPOSOME: HCPCS | Performed by: COLON & RECTAL SURGERY

## 2018-09-18 PROCEDURE — 94761 N-INVAS EAR/PLS OXIMETRY MLT: CPT

## 2018-09-18 PROCEDURE — 25000003 PHARM REV CODE 250: Performed by: STUDENT IN AN ORGANIZED HEALTH CARE EDUCATION/TRAINING PROGRAM

## 2018-09-18 PROCEDURE — 71000033 HC RECOVERY, INTIAL HOUR: Performed by: COLON & RECTAL SURGERY

## 2018-09-18 PROCEDURE — 86920 COMPATIBILITY TEST SPIN: CPT

## 2018-09-18 PROCEDURE — 88309 TISSUE EXAM BY PATHOLOGIST: CPT | Mod: 26,,, | Performed by: PATHOLOGY

## 2018-09-18 PROCEDURE — S0020 INJECTION, BUPIVICAINE HYDRO: HCPCS | Performed by: COLON & RECTAL SURGERY

## 2018-09-18 PROCEDURE — 36000708 HC OR TIME LEV III 1ST 15 MIN: Performed by: COLON & RECTAL SURGERY

## 2018-09-18 PROCEDURE — 63600175 PHARM REV CODE 636 W HCPCS: Performed by: COLON & RECTAL SURGERY

## 2018-09-18 RX ORDER — SODIUM CHLORIDE 9 MG/ML
INJECTION, SOLUTION INTRAVENOUS CONTINUOUS
Status: DISCONTINUED | OUTPATIENT
Start: 2018-09-18 | End: 2018-09-18

## 2018-09-18 RX ORDER — SODIUM CHLORIDE 0.9 % (FLUSH) 0.9 %
3 SYRINGE (ML) INJECTION
Status: DISCONTINUED | OUTPATIENT
Start: 2018-09-18 | End: 2018-09-18 | Stop reason: HOSPADM

## 2018-09-18 RX ORDER — MIDAZOLAM HYDROCHLORIDE 1 MG/ML
INJECTION, SOLUTION INTRAMUSCULAR; INTRAVENOUS
Status: DISCONTINUED | OUTPATIENT
Start: 2018-09-18 | End: 2018-09-18

## 2018-09-18 RX ORDER — SODIUM CHLORIDE, SODIUM LACTATE, POTASSIUM CHLORIDE, CALCIUM CHLORIDE 600; 310; 30; 20 MG/100ML; MG/100ML; MG/100ML; MG/100ML
INJECTION, SOLUTION INTRAVENOUS CONTINUOUS
Status: DISCONTINUED | OUTPATIENT
Start: 2018-09-18 | End: 2018-09-21

## 2018-09-18 RX ORDER — ROCURONIUM BROMIDE 10 MG/ML
INJECTION, SOLUTION INTRAVENOUS
Status: DISCONTINUED | OUTPATIENT
Start: 2018-09-18 | End: 2018-09-18

## 2018-09-18 RX ORDER — HYDROMORPHONE HYDROCHLORIDE 1 MG/ML
1 INJECTION, SOLUTION INTRAMUSCULAR; INTRAVENOUS; SUBCUTANEOUS EVERY 4 HOURS PRN
Status: DISCONTINUED | OUTPATIENT
Start: 2018-09-18 | End: 2018-09-22 | Stop reason: HOSPADM

## 2018-09-18 RX ORDER — ENOXAPARIN SODIUM 100 MG/ML
40 INJECTION SUBCUTANEOUS EVERY 24 HOURS
Status: DISCONTINUED | OUTPATIENT
Start: 2018-09-19 | End: 2018-09-22 | Stop reason: HOSPADM

## 2018-09-18 RX ORDER — RAMELTEON 8 MG/1
8 TABLET ORAL NIGHTLY PRN
Status: DISCONTINUED | OUTPATIENT
Start: 2018-09-18 | End: 2018-09-22 | Stop reason: HOSPADM

## 2018-09-18 RX ORDER — FENTANYL CITRATE 50 UG/ML
INJECTION, SOLUTION INTRAMUSCULAR; INTRAVENOUS
Status: DISCONTINUED | OUTPATIENT
Start: 2018-09-18 | End: 2018-09-18

## 2018-09-18 RX ORDER — LIDOCAINE HYDROCHLORIDE 10 MG/ML
1 INJECTION, SOLUTION EPIDURAL; INFILTRATION; INTRACAUDAL; PERINEURAL ONCE
Status: DISCONTINUED | OUTPATIENT
Start: 2018-09-18 | End: 2018-09-22 | Stop reason: HOSPADM

## 2018-09-18 RX ORDER — OXYCODONE AND ACETAMINOPHEN 5; 325 MG/1; MG/1
1 TABLET ORAL
Status: DISCONTINUED | OUTPATIENT
Start: 2018-09-18 | End: 2018-09-18

## 2018-09-18 RX ORDER — NEOSTIGMINE METHYLSULFATE 1 MG/ML
INJECTION, SOLUTION INTRAVENOUS
Status: DISCONTINUED | OUTPATIENT
Start: 2018-09-18 | End: 2018-09-18

## 2018-09-18 RX ORDER — ACETAMINOPHEN 10 MG/ML
1000 INJECTION, SOLUTION INTRAVENOUS EVERY 8 HOURS
Status: COMPLETED | OUTPATIENT
Start: 2018-09-18 | End: 2018-09-19

## 2018-09-18 RX ORDER — OXYCODONE HYDROCHLORIDE 5 MG/1
5 TABLET ORAL EVERY 4 HOURS PRN
Status: DISCONTINUED | OUTPATIENT
Start: 2018-09-18 | End: 2018-09-19

## 2018-09-18 RX ORDER — KETOROLAC TROMETHAMINE 15 MG/ML
15 INJECTION, SOLUTION INTRAMUSCULAR; INTRAVENOUS EVERY 8 HOURS
Status: DISCONTINUED | OUTPATIENT
Start: 2018-09-18 | End: 2018-09-19

## 2018-09-18 RX ORDER — ACETAMINOPHEN 10 MG/ML
1000 INJECTION, SOLUTION INTRAVENOUS
Status: COMPLETED | OUTPATIENT
Start: 2018-09-18 | End: 2018-09-18

## 2018-09-18 RX ORDER — DIPHENHYDRAMINE HYDROCHLORIDE 50 MG/ML
12.5 INJECTION INTRAMUSCULAR; INTRAVENOUS EVERY 6 HOURS PRN
Status: DISCONTINUED | OUTPATIENT
Start: 2018-09-18 | End: 2018-09-22 | Stop reason: HOSPADM

## 2018-09-18 RX ORDER — PROPOFOL 10 MG/ML
VIAL (ML) INTRAVENOUS
Status: DISCONTINUED | OUTPATIENT
Start: 2018-09-18 | End: 2018-09-18

## 2018-09-18 RX ORDER — OXYCODONE HYDROCHLORIDE 5 MG/1
TABLET ORAL
Status: DISPENSED
Start: 2018-09-18 | End: 2018-09-19

## 2018-09-18 RX ORDER — BUPIVACAINE HYDROCHLORIDE 5 MG/ML
INJECTION, SOLUTION EPIDURAL; INTRACAUDAL
Status: DISCONTINUED | OUTPATIENT
Start: 2018-09-18 | End: 2018-09-18 | Stop reason: HOSPADM

## 2018-09-18 RX ORDER — PHENYLEPHRINE HYDROCHLORIDE 10 MG/ML
INJECTION INTRAVENOUS
Status: DISCONTINUED | OUTPATIENT
Start: 2018-09-18 | End: 2018-09-18

## 2018-09-18 RX ORDER — HYDROMORPHONE HYDROCHLORIDE 1 MG/ML
0.2 INJECTION, SOLUTION INTRAMUSCULAR; INTRAVENOUS; SUBCUTANEOUS EVERY 5 MIN PRN
Status: DISCONTINUED | OUTPATIENT
Start: 2018-09-18 | End: 2018-09-18 | Stop reason: HOSPADM

## 2018-09-18 RX ORDER — KETAMINE HYDROCHLORIDE 10 MG/ML
INJECTION, SOLUTION INTRAMUSCULAR; INTRAVENOUS
Status: DISCONTINUED | OUTPATIENT
Start: 2018-09-18 | End: 2018-09-18

## 2018-09-18 RX ORDER — ONDANSETRON 2 MG/ML
8 INJECTION INTRAMUSCULAR; INTRAVENOUS EVERY 8 HOURS PRN
Status: DISCONTINUED | OUTPATIENT
Start: 2018-09-18 | End: 2018-09-22 | Stop reason: HOSPADM

## 2018-09-18 RX ORDER — PANTOPRAZOLE SODIUM 40 MG/10ML
40 INJECTION, POWDER, LYOPHILIZED, FOR SOLUTION INTRAVENOUS DAILY
Status: DISCONTINUED | OUTPATIENT
Start: 2018-09-19 | End: 2018-09-22 | Stop reason: HOSPADM

## 2018-09-18 RX ORDER — IPRATROPIUM BROMIDE AND ALBUTEROL SULFATE 2.5; .5 MG/3ML; MG/3ML
3 SOLUTION RESPIRATORY (INHALATION) EVERY 6 HOURS PRN
Status: DISCONTINUED | OUTPATIENT
Start: 2018-09-18 | End: 2018-09-22 | Stop reason: HOSPADM

## 2018-09-18 RX ORDER — OXYCODONE HYDROCHLORIDE 10 MG/1
10 TABLET ORAL EVERY 4 HOURS PRN
Status: DISCONTINUED | OUTPATIENT
Start: 2018-09-18 | End: 2018-09-19

## 2018-09-18 RX ORDER — ACETAMINOPHEN 325 MG/1
650 TABLET ORAL EVERY 4 HOURS PRN
Status: DISCONTINUED | OUTPATIENT
Start: 2018-09-18 | End: 2018-09-19

## 2018-09-18 RX ORDER — HYDROMORPHONE HYDROCHLORIDE 1 MG/ML
INJECTION, SOLUTION INTRAMUSCULAR; INTRAVENOUS; SUBCUTANEOUS
Status: DISPENSED
Start: 2018-09-18 | End: 2018-09-19

## 2018-09-18 RX ORDER — ONDANSETRON 2 MG/ML
4 INJECTION INTRAMUSCULAR; INTRAVENOUS ONCE AS NEEDED
Status: DISCONTINUED | OUTPATIENT
Start: 2018-09-18 | End: 2018-09-18 | Stop reason: HOSPADM

## 2018-09-18 RX ORDER — FERROUS SULFATE 325(65) MG
325 TABLET, DELAYED RELEASE (ENTERIC COATED) ORAL DAILY
Status: DISCONTINUED | OUTPATIENT
Start: 2018-09-19 | End: 2018-09-22 | Stop reason: HOSPADM

## 2018-09-18 RX ORDER — CEFAZOLIN SODIUM 1 G/3ML
INJECTION, POWDER, FOR SOLUTION INTRAMUSCULAR; INTRAVENOUS
Status: DISCONTINUED | OUTPATIENT
Start: 2018-09-18 | End: 2018-09-18

## 2018-09-18 RX ORDER — LIDOCAINE HCL/PF 100 MG/5ML
SYRINGE (ML) INTRAVENOUS
Status: DISCONTINUED | OUTPATIENT
Start: 2018-09-18 | End: 2018-09-18

## 2018-09-18 RX ORDER — GLYCOPYRROLATE 0.2 MG/ML
INJECTION INTRAMUSCULAR; INTRAVENOUS
Status: DISCONTINUED | OUTPATIENT
Start: 2018-09-18 | End: 2018-09-18

## 2018-09-18 RX ORDER — ONDANSETRON 2 MG/ML
INJECTION INTRAMUSCULAR; INTRAVENOUS
Status: DISCONTINUED | OUTPATIENT
Start: 2018-09-18 | End: 2018-09-18

## 2018-09-18 RX ORDER — METRONIDAZOLE 500 MG/100ML
500 INJECTION, SOLUTION INTRAVENOUS
Status: COMPLETED | OUTPATIENT
Start: 2018-09-18 | End: 2018-09-18

## 2018-09-18 RX ORDER — MUPIROCIN 20 MG/G
OINTMENT TOPICAL
Status: DISCONTINUED | OUTPATIENT
Start: 2018-09-18 | End: 2018-09-18

## 2018-09-18 RX ADMIN — NEOSTIGMINE METHYLSULFATE 5 MG: 1 INJECTION INTRAVENOUS at 02:09

## 2018-09-18 RX ADMIN — Medication 0.2 MG: at 03:09

## 2018-09-18 RX ADMIN — ACETAMINOPHEN 1000 MG: 10 INJECTION, SOLUTION INTRAVENOUS at 11:09

## 2018-09-18 RX ADMIN — ACETAMINOPHEN 1000 MG: 10 INJECTION, SOLUTION INTRAVENOUS at 09:09

## 2018-09-18 RX ADMIN — SODIUM CHLORIDE, SODIUM LACTATE, POTASSIUM CHLORIDE, AND CALCIUM CHLORIDE: 600; 310; 30; 20 INJECTION, SOLUTION INTRAVENOUS at 03:09

## 2018-09-18 RX ADMIN — AMPICILLIN AND SULBACTAM 3 G: 2; 1 INJECTION, POWDER, FOR SOLUTION INTRAVENOUS at 09:09

## 2018-09-18 RX ADMIN — PHENYLEPHRINE HYDROCHLORIDE 100 MCG: 10 INJECTION INTRAVENOUS at 01:09

## 2018-09-18 RX ADMIN — Medication 0.2 MG: at 04:09

## 2018-09-18 RX ADMIN — ACETAMINOPHEN 1000 MG: 10 INJECTION, SOLUTION INTRAVENOUS at 04:09

## 2018-09-18 RX ADMIN — METRONIDAZOLE 500 MG: 500 SOLUTION INTRAVENOUS at 01:09

## 2018-09-18 RX ADMIN — PROPOFOL 130 MG: 10 INJECTION, EMULSION INTRAVENOUS at 01:09

## 2018-09-18 RX ADMIN — ONDANSETRON 4 MG: 2 INJECTION INTRAMUSCULAR; INTRAVENOUS at 02:09

## 2018-09-18 RX ADMIN — SODIUM CHLORIDE, SODIUM GLUCONATE, SODIUM ACETATE, POTASSIUM CHLORIDE, MAGNESIUM CHLORIDE, SODIUM PHOSPHATE, DIBASIC, AND POTASSIUM PHOSPHATE: .53; .5; .37; .037; .03; .012; .00082 INJECTION, SOLUTION INTRAVENOUS at 01:09

## 2018-09-18 RX ADMIN — GLYCOPYRROLATE 0.6 MG: 0.2 INJECTION, SOLUTION INTRAMUSCULAR; INTRAVENOUS at 02:09

## 2018-09-18 RX ADMIN — FENTANYL CITRATE 100 MCG: 50 INJECTION, SOLUTION INTRAMUSCULAR; INTRAVENOUS at 01:09

## 2018-09-18 RX ADMIN — LIDOCAINE HYDROCHLORIDE 100 MG: 20 INJECTION, SOLUTION INTRAVENOUS at 01:09

## 2018-09-18 RX ADMIN — CEFAZOLIN 2 G: 330 INJECTION, POWDER, FOR SOLUTION INTRAMUSCULAR; INTRAVENOUS at 01:09

## 2018-09-18 RX ADMIN — ROCURONIUM BROMIDE 50 MG: 10 INJECTION, SOLUTION INTRAVENOUS at 01:09

## 2018-09-18 RX ADMIN — MIDAZOLAM HYDROCHLORIDE 2 MG: 1 INJECTION, SOLUTION INTRAMUSCULAR; INTRAVENOUS at 12:09

## 2018-09-18 RX ADMIN — OXYCODONE HYDROCHLORIDE 10 MG: 10 TABLET ORAL at 11:09

## 2018-09-18 RX ADMIN — KETOROLAC TROMETHAMINE 15 MG: 15 INJECTION, SOLUTION INTRAMUSCULAR; INTRAVENOUS at 09:09

## 2018-09-18 RX ADMIN — IBUPROFEN 800 MG: 800 INJECTION INTRAVENOUS at 11:09

## 2018-09-18 RX ADMIN — KETAMINE HYDROCHLORIDE 20 MG: 10 INJECTION, SOLUTION INTRAMUSCULAR; INTRAVENOUS at 01:09

## 2018-09-18 RX ADMIN — OXYCODONE HYDROCHLORIDE 10 MG: 10 TABLET ORAL at 07:09

## 2018-09-18 RX ADMIN — MUPIROCIN: 20 OINTMENT TOPICAL at 11:09

## 2018-09-18 RX ADMIN — AMPICILLIN AND SULBACTAM 3 G: 2; 1 INJECTION, POWDER, FOR SOLUTION INTRAVENOUS at 04:09

## 2018-09-18 RX ADMIN — OXYCODONE HYDROCHLORIDE 10 MG: 10 TABLET ORAL at 03:09

## 2018-09-18 NOTE — INTERVAL H&P NOTE
The patient has been examined and the H&P has been reviewed:    I concur with the findings and no changes have occurred since H&P was written.    Anesthesia/Surgery risks, benefits and alternative options discussed and understood by patient/family.          Active Hospital Problems    Diagnosis  POA    Colonic mass [K63.9]  Yes      Resolved Hospital Problems   No resolved problems to display.

## 2018-09-18 NOTE — PLAN OF CARE
Problem: Patient Care Overview  Goal: Plan of Care Review  Outcome: Ongoing (interventions implemented as appropriate)  Vital signs stable. Afebrile. Drowsy, oriented and following commands. Pain controlled with PRN and scheduled pain meds. Denies nausea. Surgical site remains well approximated with dermabond and CARLOS with no drainage. Jackson intact and draining. POC reviewed with pt and all questions/concerns addressed.

## 2018-09-18 NOTE — BRIEF OP NOTE
[facility]  Colon and Rectal Surgery  Operative Note    SUMMARY     Date of Procedure: 9/18/2018     Procedure: Procedure(s) (LRB):  HEMICOLECTOMY, RIGHT, extended (N/A)     Surgeon(s) and Role:     * Adrian Cheung MD - Primary     * Lavinia Head MD - Resident - Assisting        Pre-Operative Diagnosis: Right upper quadrant abdominal mass [R19.01]  Right upper quadrant abdominal pain [R10.11]  Colonic mass [K63.9]    Post-Operative Diagnosis: Post-Op Diagnosis Codes:     * Right upper quadrant abdominal mass [R19.01]     * Right upper quadrant abdominal pain [R10.11]     * Colonic mass [K63.9]    Anesthesia: General, Exparel 266 mg, Marcaine 0.25% (75 mg) Saline 10 cc preperironeal injection      Technical Procedures Used: STSFETE LC 75 Blue  Description of the Findings of the Procedure: Tumor proximal ascending colon  Significant Surgical Tasks Conducted by the Assistant(s), if Applicable: n/a    Complications: No    Estimated Blood Loss (EBL): 100 mL           Implants: * No implants in log *    Specimens:   Specimen (12h ago, onward)    Start     Ordered    09/18/18 1417  Specimen to Pathology - Surgery  Once     Comments:  1. RIGHT COLON AND TERMINAL ILIUM     Start Status   09/18/18 1417 Collected (09/18/18 1416)       09/18/18 1416           Condition: Good    Disposition: PACU - hemodynamically stable.    Attestation: I was present and scrubbed for the entire procedure.

## 2018-09-18 NOTE — NURSING TRANSFER
Nursing Transfer Note      9/18/2018     Transfer to 505    Transfer via bed    Transfer with iv pole    Transported by pct x2    Medicines sent: na    Chart send with patient: yes    Notified: family    Patient reassessed at: 9/18/18  @ 5468

## 2018-09-18 NOTE — TRANSFER OF CARE
"Anesthesia Transfer of Care Note    Patient: Noah Nichole    Procedure(s) Performed: Procedure(s) (LRB):  HEMICOLECTOMY, RIGHT, extended (N/A)    Patient location: PACU    Anesthesia Type: general    Transport from OR: Transported from OR on 6-10 L/min O2 by face mask with adequate spontaneous ventilation    Post pain: adequate analgesia    Post assessment: no apparent anesthetic complications and tolerated procedure well    Post vital signs: stable    Level of consciousness: awake, alert and oriented    Nausea/Vomiting: no nausea/vomiting    Complications: none    Transfer of care protocol was followed      Last vitals:   Visit Vitals  /63 (BP Location: Right arm, Patient Position: Lying)   Pulse 66   Temp 36.9 °C (98.4 °F) (Oral)   Resp 18   Ht 5' 11" (1.803 m)   Wt 92.1 kg (203 lb)   BMI 28.31 kg/m²     "

## 2018-09-19 LAB
ALBUMIN SERPL BCP-MCNC: 2.5 G/DL
ALP SERPL-CCNC: 57 U/L
ALT SERPL W/O P-5'-P-CCNC: 9 U/L
ANION GAP SERPL CALC-SCNC: 8 MMOL/L
AST SERPL-CCNC: 9 U/L
BASOPHILS # BLD AUTO: 0.01 K/UL
BASOPHILS NFR BLD: 0.1 %
BILIRUB SERPL-MCNC: 0.3 MG/DL
BLD PROD TYP BPU: NORMAL
BLD PROD TYP BPU: NORMAL
BLOOD UNIT EXPIRATION DATE: NORMAL
BLOOD UNIT EXPIRATION DATE: NORMAL
BLOOD UNIT TYPE CODE: 5100
BLOOD UNIT TYPE CODE: 5100
BLOOD UNIT TYPE: NORMAL
BLOOD UNIT TYPE: NORMAL
BUN SERPL-MCNC: 14 MG/DL
CALCIUM SERPL-MCNC: 8.4 MG/DL
CHLORIDE SERPL-SCNC: 106 MMOL/L
CO2 SERPL-SCNC: 23 MMOL/L
CODING SYSTEM: NORMAL
CODING SYSTEM: NORMAL
CREAT SERPL-MCNC: 1.1 MG/DL
DIFFERENTIAL METHOD: ABNORMAL
DISPENSE STATUS: NORMAL
DISPENSE STATUS: NORMAL
EOSINOPHIL # BLD AUTO: 0 K/UL
EOSINOPHIL NFR BLD: 0 %
ERYTHROCYTE [DISTWIDTH] IN BLOOD BY AUTOMATED COUNT: 16.8 %
EST. GFR  (AFRICAN AMERICAN): >60 ML/MIN/1.73 M^2
EST. GFR  (NON AFRICAN AMERICAN): >60 ML/MIN/1.73 M^2
GLUCOSE SERPL-MCNC: 113 MG/DL
HCT VFR BLD AUTO: 24.1 %
HGB BLD-MCNC: 7.4 G/DL
IMM GRANULOCYTES # BLD AUTO: 0.03 K/UL
IMM GRANULOCYTES NFR BLD AUTO: 0.3 %
LYMPHOCYTES # BLD AUTO: 1.1 K/UL
LYMPHOCYTES NFR BLD: 10.6 %
MAGNESIUM SERPL-MCNC: 1.9 MG/DL
MCH RBC QN AUTO: 21.3 PG
MCHC RBC AUTO-ENTMCNC: 30.7 G/DL
MCV RBC AUTO: 70 FL
MONOCYTES # BLD AUTO: 0.7 K/UL
MONOCYTES NFR BLD: 6.2 %
NEUTROPHILS # BLD AUTO: 8.7 K/UL
NEUTROPHILS NFR BLD: 82.8 %
NRBC BLD-RTO: 0 /100 WBC
PHOSPHATE SERPL-MCNC: 4.5 MG/DL
PLATELET # BLD AUTO: 297 K/UL
PMV BLD AUTO: 10.4 FL
POTASSIUM SERPL-SCNC: 4.1 MMOL/L
PROT SERPL-MCNC: 5.9 G/DL
RBC # BLD AUTO: 3.47 M/UL
SODIUM SERPL-SCNC: 137 MMOL/L
TRANS ERYTHROCYTES VOL PATIENT: NORMAL ML
TRANS ERYTHROCYTES VOL PATIENT: NORMAL ML
WBC # BLD AUTO: 10.45 K/UL

## 2018-09-19 PROCEDURE — C9113 INJ PANTOPRAZOLE SODIUM, VIA: HCPCS | Performed by: SURGERY

## 2018-09-19 PROCEDURE — 25000003 PHARM REV CODE 250: Performed by: SURGERY

## 2018-09-19 PROCEDURE — 25000003 PHARM REV CODE 250: Performed by: NURSE PRACTITIONER

## 2018-09-19 PROCEDURE — 97165 OT EVAL LOW COMPLEX 30 MIN: CPT

## 2018-09-19 PROCEDURE — 63600175 PHARM REV CODE 636 W HCPCS: Performed by: SURGERY

## 2018-09-19 PROCEDURE — 80053 COMPREHEN METABOLIC PANEL: CPT

## 2018-09-19 PROCEDURE — 25000003 PHARM REV CODE 250: Performed by: STUDENT IN AN ORGANIZED HEALTH CARE EDUCATION/TRAINING PROGRAM

## 2018-09-19 PROCEDURE — G8987 SELF CARE CURRENT STATUS: HCPCS | Mod: CK

## 2018-09-19 PROCEDURE — 63600175 PHARM REV CODE 636 W HCPCS: Performed by: STUDENT IN AN ORGANIZED HEALTH CARE EDUCATION/TRAINING PROGRAM

## 2018-09-19 PROCEDURE — 85025 COMPLETE CBC W/AUTO DIFF WBC: CPT

## 2018-09-19 PROCEDURE — 84100 ASSAY OF PHOSPHORUS: CPT

## 2018-09-19 PROCEDURE — 83735 ASSAY OF MAGNESIUM: CPT

## 2018-09-19 PROCEDURE — G8988 SELF CARE GOAL STATUS: HCPCS | Mod: CI

## 2018-09-19 PROCEDURE — 36415 COLL VENOUS BLD VENIPUNCTURE: CPT

## 2018-09-19 PROCEDURE — 11000001 HC ACUTE MED/SURG PRIVATE ROOM

## 2018-09-19 RX ORDER — OXYCODONE HYDROCHLORIDE 5 MG/1
5 TABLET ORAL
Status: DISCONTINUED | OUTPATIENT
Start: 2018-09-19 | End: 2018-09-22 | Stop reason: HOSPADM

## 2018-09-19 RX ORDER — OXYCODONE HYDROCHLORIDE 10 MG/1
10 TABLET ORAL
Status: DISCONTINUED | OUTPATIENT
Start: 2018-09-19 | End: 2018-09-22 | Stop reason: HOSPADM

## 2018-09-19 RX ORDER — OXYCODONE HYDROCHLORIDE 10 MG/1
10 TABLET ORAL EVERY 4 HOURS PRN
Status: DISCONTINUED | OUTPATIENT
Start: 2018-09-19 | End: 2018-09-19

## 2018-09-19 RX ORDER — ACETAMINOPHEN 500 MG
1000 TABLET ORAL EVERY 8 HOURS
Status: DISCONTINUED | OUTPATIENT
Start: 2018-09-19 | End: 2018-09-22 | Stop reason: HOSPADM

## 2018-09-19 RX ORDER — OXYCODONE HYDROCHLORIDE 5 MG/1
5 TABLET ORAL EVERY 4 HOURS PRN
Status: DISCONTINUED | OUTPATIENT
Start: 2018-09-19 | End: 2018-09-19

## 2018-09-19 RX ADMIN — SODIUM CHLORIDE, SODIUM LACTATE, POTASSIUM CHLORIDE, AND CALCIUM CHLORIDE: 600; 310; 30; 20 INJECTION, SOLUTION INTRAVENOUS at 02:09

## 2018-09-19 RX ADMIN — ONDANSETRON 8 MG: 2 INJECTION INTRAMUSCULAR; INTRAVENOUS at 04:09

## 2018-09-19 RX ADMIN — IBUPROFEN 800 MG: 800 INJECTION INTRAVENOUS at 02:09

## 2018-09-19 RX ADMIN — OXYCODONE HYDROCHLORIDE 5 MG: 5 TABLET ORAL at 09:09

## 2018-09-19 RX ADMIN — ENOXAPARIN SODIUM 40 MG: 100 INJECTION SUBCUTANEOUS at 04:09

## 2018-09-19 RX ADMIN — ACETAMINOPHEN 1000 MG: 500 TABLET ORAL at 09:09

## 2018-09-19 RX ADMIN — OXYCODONE HYDROCHLORIDE 15 MG: 10 TABLET ORAL at 01:09

## 2018-09-19 RX ADMIN — OXYCODONE HYDROCHLORIDE 10 MG: 10 TABLET ORAL at 03:09

## 2018-09-19 RX ADMIN — PANTOPRAZOLE SODIUM 40 MG: 40 INJECTION, POWDER, FOR SOLUTION INTRAVENOUS at 09:09

## 2018-09-19 RX ADMIN — ACETAMINOPHEN 1000 MG: 10 INJECTION, SOLUTION INTRAVENOUS at 01:09

## 2018-09-19 RX ADMIN — KETOROLAC TROMETHAMINE 15 MG: 15 INJECTION, SOLUTION INTRAMUSCULAR; INTRAVENOUS at 05:09

## 2018-09-19 RX ADMIN — ACETAMINOPHEN 1000 MG: 10 INJECTION, SOLUTION INTRAVENOUS at 05:09

## 2018-09-19 RX ADMIN — IBUPROFEN 800 MG: 800 INJECTION INTRAVENOUS at 09:09

## 2018-09-19 RX ADMIN — OXYCODONE HYDROCHLORIDE 15 MG: 5 TABLET ORAL at 09:09

## 2018-09-19 RX ADMIN — AMPICILLIN AND SULBACTAM 3 G: 2; 1 INJECTION, POWDER, FOR SOLUTION INTRAVENOUS at 03:09

## 2018-09-19 RX ADMIN — FERROUS SULFATE TAB EC 325 MG (65 MG FE EQUIVALENT) 325 MG: 325 (65 FE) TABLET DELAYED RESPONSE at 09:09

## 2018-09-19 NOTE — ANESTHESIA POSTPROCEDURE EVALUATION
"Anesthesia Post Evaluation    Patient: Noah Nichole    Procedure(s) Performed: Procedure(s) (LRB):  HEMICOLECTOMY, RIGHT, extended (N/A)    Final Anesthesia Type: general  Patient location during evaluation: PACU  Patient participation: Yes- Able to Participate  Level of consciousness: awake and alert  Post-procedure vital signs: reviewed and stable  Pain management: adequate  Airway patency: patent  PONV status at discharge: No PONV  Anesthetic complications: no      Cardiovascular status: blood pressure returned to baseline  Respiratory status: unassisted, spontaneous ventilation and room air  Hydration status: euvolemic          Visit Vitals  BP (!) 167/78   Pulse (!) 57   Temp 37.2 °C (99 °F)   Resp 20   Ht 5' 11" (1.803 m)   Wt 95.3 kg (210 lb 1.6 oz)   SpO2 97%   BMI 29.30 kg/m²       Pain/Mary Score: Pain Assessment Performed: Yes (9/19/2018  6:00 AM)  Presence of Pain: complains of pain/discomfort (9/18/2018  7:51 PM)  Pain Rating Prior to Med Admin: 8 (9/19/2018  5:27 AM)  Pain Rating Post Med Admin: 5 (9/19/2018  4:00 AM)  Mary Score: 9 (9/18/2018  5:30 PM)        "

## 2018-09-19 NOTE — PT/OT/SLP EVAL
Occupational Therapy   Evaluation    Name: Noah Nichole  MRN: [id number]  Admitting Diagnosis:  Colonic mass 1 Day Post-Op    Recommendations:     Discharge Recommendations: home health OT(possibly home no needs)  Discharge Equipment Recommendations:  none  Barriers to discharge:  None    History:     Occupational Profile:  Living Environment: Pt lives alone in trailer with 4STE and LHR. Home has tub/shower.  Previous level of function: PTA, pt reports independence with all ADL and IADL, including driving. Pt was not using any AD for ambulation or ADL  Roles and Routines: father, brother  Equipment Used at Home:  none  Assistance upon Discharge: Pt reports daughter is able to assist as needed following d/c and brother can drive him around    Past Medical History:   Diagnosis Date    Hypertension        Past Surgical History:   Procedure Laterality Date    COLONOSCOPY N/A 9/14/2018    Procedure: COLONOSCOPY;  Surgeon: Adrian Cheung MD;  Location: [facility] ([address] [place] [address]);  Service: Endoscopy;  Laterality: N/A;  pt ate at 1:30 on 9/13/18-Dr Cheung informed and he stated ok to do colonoscopy.    COLONOSCOPY N/A 9/14/2018    Performed by Adrian Cheung MD at [facility] ([facility])    HEMICOLECTOMY, RIGHT, extended N/A 9/18/2018    Performed by Adrian Cheung MD at [facility] OR [address] [place] [address]    NO PAST SURGERIES      RIGHT HEMICOLECTOMY N/A 9/18/2018    Procedure: HEMICOLECTOMY, RIGHT, extended;  Surgeon: Adrian Cheung MD;  Location: [facility] OR [address] [place] [address];  Service: Colon and Rectal;  Laterality: N/A;       Subjective     Chief Complaint: pain, dizziness  Patient/Family Comments/goals: go home    Pain/Comfort:  · Pain Rating 1: 7/10  · Location - Side 1: Bilateral  · Location - Orientation 1: generalized  · Location 1: abdomen(at sx site)  · Pain Addressed 1: Reposition, Distraction, Cessation of Activity  · Pain Rating Post-Intervention 1: 7/10    Patients cultural, spiritual, Evangelical conflicts given the current situation: none  reported    Objective:     Communicated with: RN prior to session.  Patient found all lines intact and call button in reach and peripheral IV, SCD, telemetry upon OT entry to room.    General Precautions: Standard, fall   Orthopedic Precautions:N/A   Braces: N/A     Occupational Performance:    Bed Mobility:    · Patient completed Supine to Sit with stand by assistance, with side rail and elevated HOB    Functional Mobility/Transfers:  · Patient completed Sit <> Stand Transfer with contact guard assistance  with  no assistive device; pt demo 1 LOB with initial standing requiring min A to correct   · Patient completed Bed <> Chair Transfer using Step Transfer technique with contact guard assistance with no assistive device  · Functional Mobility: Pt took 3 steps bed>chair with CGA using no AD; functional mobility limited by pt c/o dizziness and nausea    Activities of Daily Living:  · Upper Body Dressing: minimum assistance to don backward gown while seated EOB 2/2 lines  · Toileting: contact guard assistance for attempted use of urinal in standing    Cognitive/Visual Perceptual:  Cognitive/Psychosocial Skills:     -       Oriented to: Person, Place and Situation   -       Follows Commands/attention:Follows multistep  commands  -       Communication: clear/fluent  -       Memory: No Deficits noted  -       Safety awareness/insight to disability: intact   -       Mood/Affect/Coping skills/emotional control: Appropriate to situation  Visual/Perceptual:      -Intact vision and hearing    Physical Exam:  Balance:    -       good sitting balance; fair standing balance  Postural examination/scapula alignment:    -       No postural abnormalities identified  Skin integrity: Visible skin intact  Edema:  None noted  Sensation:    -       Intact UE and LE  Dominant hand:    -       R hand  Upper Extremity Range of Motion:     -       Right Upper Extremity: WFL   -       Left Upper Extremity: WFL  Upper Extremity Strength:    -  "      Right Upper Extremity: WFL  -       Left Upper Extremity: WFL    Strength:    -       Right Upper Extremity: WFL   -       Left Upper Extremity: WFL   Fine Motor Coordination:    -       Intact  Gross motor coordination:   WFL    AMPAC 6 Click ADL:  AMPAC Total Score: 19    Treatment & Education:  Pt educated on role of OT/POC  Pt educated on importance of ambulation/UIC  White board/communication board updated  Education:    Patient left up in chair with all lines intact and call button in reach    Assessment:     Noah Nichole is a 68 y.o. male with a medical diagnosis of Colonic mass.  He presents with the following performance deficits affecting function: weakness, impaired endurance, impaired sensation, impaired balance, impaired functional mobilty, gait instability, impaired self care skills, pain.      Rehab Prognosis: Good; patient would benefit from acute skilled OT services to address these deficits and reach maximum level of function.         Clinical Decision Makin.  OT Low:  "Pt evaluation falls under low complexity for evaluation coding due to performance deficits noted in 1-3 areas as stated above and 0 co-morbities affecting current functional status. Data obtained from problem focused assessments. No modifications or assistance was required for completion of evaluation. Only brief occupational profile and history review completed."     Plan:     Patient to be seen 3 x/week to address the above listed problems via self-care/home management, therapeutic activities, therapeutic exercises  · Plan of Care Expires: 10/19/18  · Plan of Care Reviewed with: patient    This Plan of care has been discussed with the patient who was involved in its development and understands and is in agreement with the identified goals and treatment plan    GOALS:   Multidisciplinary Problems     Occupational Therapy Goals        Problem: Occupational Therapy Goal    Goal Priority Disciplines Outcome " Interventions   Occupational Therapy Goal     OT, PT/OT Ongoing (interventions implemented as appropriate)    Description:  Goals to be met by: 9/26/18     Patient will increase functional independence with ADLs by performing:    UE Dressing with Set-up Assistance.  LE Dressing with Set-up Assistance.  Grooming while standing at sink with Supervision.  Toileting from toilet with Supervision for hygiene and clothing management.   Toilet transfer to toilet with Supervision.                      Time Tracking:     OT Date of Treatment: 09/19/18  OT Start Time: 1403  OT Stop Time: 1420  OT Total Time (min): 17 min    Billable Minutes:Evaluation 17    Carmelita Trejo OT  9/19/2018

## 2018-09-19 NOTE — PROGRESS NOTES
[facility]  Colorectal Surgery  Progress Note    Patient Name: Noah Nichole  MRN: [id number]  Admission Date: 9/18/2018  Hospital Length of Stay: 1 days  Attending Physician: Adrian Cheung MD    Subjective:     Interval History: s/p right colectomy. Pain not adequately controlled but otherwise no acute complaints.    Post-Op Info:  Procedure(s) (LRB):  HEMICOLECTOMY, RIGHT, extended (N/A)   1 Day Post-Op      Medications:  Continuous Infusions:   lactated ringers 125 mL/hr at 09/19/18 0229     Scheduled Meds:   acetaminophen  1,000 mg Intravenous Q8H    acetaminophen  1,000 mg Oral Q8H    enoxaparin  40 mg Subcutaneous Daily    ferrous sulfate  325 mg Oral Daily    ibuprofen  800 mg Intravenous Q8H    lidocaine (PF) 10 mg/ml (1%)  1 mL Other Once    pantoprazole  40 mg Intravenous Daily     PRN Meds:   albuterol-ipratropium    diphenhydrAMINE    HYDROmorphone    ondansetron    oxyCODONE    oxyCODONE    oxyCODONE    promethazine (PHENERGAN) IVPB    ramelteon        Objective:     Vital Signs (Most Recent):  Temp: 99 °F (37.2 °C) (09/19/18 0420)  Pulse: 72 (09/19/18 0420)  Resp: 20 (09/19/18 0420)  BP: (!) 167/78 (09/19/18 0420)  SpO2: 97 % (09/19/18 0420) Vital Signs (24h Range):  Temp:  [97.2 °F (36.2 °C)-99 °F (37.2 °C)] 99 °F (37.2 °C)  Pulse:  [49-72] 72  Resp:  [10-25] 20  SpO2:  [95 %-100 %] 97 %  BP: (109-175)/(59-78) 167/78     Intake/Output - Last 3 Shifts       09/17 0700 - 09/18 0659 09/18 0700 - 09/19 0659    I.V. (mL/kg)  1920.4 (20.2)    Total Intake(mL/kg)  1920.4 (20.2)    Urine (mL/kg/hr)  875    Blood  100    Total Output  975    Net  +945.4                Physical Exam   Constitutional: He is oriented to person, place, and time. He appears well-developed and well-nourished.   HENT:   Head: Normocephalic and atraumatic.   Eyes: Conjunctivae are normal.   Cardiovascular: Normal rate and regular rhythm.   Pulmonary/Chest: Effort normal. No respiratory distress.    Abdominal: Soft. He exhibits no distension and no mass. There is tenderness. There is no guarding.   Incision c/d/i with dermabond   Neurological: He is alert and oriented to person, place, and time.   Skin: Skin is warm and dry. He is not diaphoretic.     Significant Labs:  BMP (Last 3 Results): No results for input(s): GLU, NA, K, CL, CO2, BUN, CREATININE, CALCIUM, MG in the last 168 hours.  CBC (Last 3 Results): No results for input(s): WBC, RBC, HGB, HCT, PLT, MCV, MCH, MCHC in the last 168 hours.    Significant Diagnostics:  None    Assessment/Plan:     * Colonic mass    1 Day Post-Op right colectomy    Clears, lower IVF  Pain control as needed with standing tylenol/ibuprofen  OOB/ambulate  PT  Incentive spirometry                Jose Newman MD  Colorectal Surgery  [facility]

## 2018-09-19 NOTE — NURSING
Pt c/o flores catheter discomfort. Paged surgery and was told team was making rounds and would be up on floor soon. Explained to patient and daughter- verbalized understanding.

## 2018-09-19 NOTE — SUBJECTIVE & OBJECTIVE
Subjective:     Interval History: s/p right colectomy. Pain not adequately controlled but otherwise no acute complaints.    Post-Op Info:  Procedure(s) (LRB):  HEMICOLECTOMY, RIGHT, extended (N/A)   1 Day Post-Op      Medications:  Continuous Infusions:   lactated ringers 125 mL/hr at 09/19/18 0229     Scheduled Meds:   acetaminophen  1,000 mg Intravenous Q8H    acetaminophen  1,000 mg Oral Q8H    enoxaparin  40 mg Subcutaneous Daily    ferrous sulfate  325 mg Oral Daily    ibuprofen  800 mg Intravenous Q8H    lidocaine (PF) 10 mg/ml (1%)  1 mL Other Once    pantoprazole  40 mg Intravenous Daily     PRN Meds:   albuterol-ipratropium    diphenhydrAMINE    HYDROmorphone    ondansetron    oxyCODONE    oxyCODONE    oxyCODONE    promethazine (PHENERGAN) IVPB    ramelteon        Objective:     Vital Signs (Most Recent):  Temp: 99 °F (37.2 °C) (09/19/18 0420)  Pulse: 72 (09/19/18 0420)  Resp: 20 (09/19/18 0420)  BP: (!) 167/78 (09/19/18 0420)  SpO2: 97 % (09/19/18 0420) Vital Signs (24h Range):  Temp:  [97.2 °F (36.2 °C)-99 °F (37.2 °C)] 99 °F (37.2 °C)  Pulse:  [49-72] 72  Resp:  [10-25] 20  SpO2:  [95 %-100 %] 97 %  BP: (109-175)/(59-78) 167/78     Intake/Output - Last 3 Shifts       09/17 0700 - 09/18 0659 09/18 0700 - 09/19 0659    I.V. (mL/kg)  1920.4 (20.2)    Total Intake(mL/kg)  1920.4 (20.2)    Urine (mL/kg/hr)  875    Blood  100    Total Output  975    Net  +945.4                Physical Exam   Constitutional: He is oriented to person, place, and time. He appears well-developed and well-nourished.   HENT:   Head: Normocephalic and atraumatic.   Eyes: Conjunctivae are normal.   Cardiovascular: Normal rate and regular rhythm.   Pulmonary/Chest: Effort normal. No respiratory distress.   Abdominal: Soft. He exhibits no distension and no mass. There is tenderness. There is no guarding.   Incision c/d/i with dermabond   Neurological: He is alert and oriented to person, place, and time.   Skin: Skin  is warm and dry. He is not diaphoretic.     Significant Labs:  BMP (Last 3 Results): No results for input(s): GLU, NA, K, CL, CO2, BUN, CREATININE, CALCIUM, MG in the last 168 hours.  CBC (Last 3 Results): No results for input(s): WBC, RBC, HGB, HCT, PLT, MCV, MCH, MCHC in the last 168 hours.    Significant Diagnostics:  None

## 2018-09-19 NOTE — PLAN OF CARE
Nicki Leyva NP    Payor: [employer] / Plan: [facility] HEALTH / Product Type: Medicare Advantage /       [facility] - [place], [place] - [address]  [address]  [place] [address]  Phone: [phone] Fax: [phone]    [facility], [place] - [address]  [address]  [place] [address]  Phone: [phone] Fax: [phone]    [facility] - [place], [place] - [address] AT [facility] [address]  [address]  [place] [address]  Phone: [phone] Fax: [phone]       09/19/18 1018   Discharge Assessment   Assessment Type Discharge Planning Assessment   Confirmed/corrected address and phone number on facesheet? Yes   Assessment information obtained from? Patient   Communicated expected length of stay with patient/caregiver yes   Prior to hospitilization cognitive status: Alert/Oriented   Prior to hospitalization functional status: Independent   Current cognitive status: Alert/Oriented   Current Functional Status: Independent   Lives With alone   Able to Return to Prior Arrangements yes   Is patient able to care for self after discharge? Yes   Patient's perception of discharge disposition home or selfcare   Readmission Within The Last 30 Days no previous admission in last 30 days   Patient currently being followed by outpatient case management? No   Patient currently receives any other outside agency services? No   Equipment Currently Used at Home none   Do you have any problems affording any of your prescribed medications? TBD   Is the patient taking medications as prescribed? yes   Does the patient have transportation home? Yes   Transportation Available family or friend will provide   Dialysis Name and Scheduled days n/a   Discharge Plan A Home   Discharge Plan B Home;Home with family;Home Health   Patient/Family In Agreement With Plan yes

## 2018-09-19 NOTE — ASSESSMENT & PLAN NOTE
1 Day Post-Op right colectomy    Clears, lower IVF  Pain control as needed with standing tylenol/ibuprofen  OOB/ambulate  PT  Incentive spirometry

## 2018-09-19 NOTE — OP NOTE
DATE OF PROCEDURE:  09/18/2018    ATTENDING SURGEON:  Adrian Cheung M.D.    ASSISTANT:  Lavinia Head M.D. (RES).    PREOPERATIVE DIAGNOSIS:  Colon mass.    POSTOPERATIVE DIAGNOSIS:  Colon mass.    PROCEDURE:  Extended right hemicolectomy.    ANESTHESIA:  General.    BLOOD LOSS:  100 mL.    IV FLUIDS:  Two liters of crystalloid.    DRAINS:  None.    SPECIMENS:  Right colon and terminal ileum.    COMPLICATIONS:  None.    INDICATIONS:  This is a pleasant 68-year-old gentleman who was found to have a   right colon mass on the CAT scan.  This led to a colonoscopy, which confirmed   colon mass in the ascending colon.  Colonoscopy was performed several days ago   with final pathology results still pending.  Otherwise, there were no signs of   metastatic disease and we proceeded to the Operating Room for resection today.    OPERATIVE PROCEDURE:  Following an appropriate informed consent, the patient was   taken to the Operating Room where general anesthesia was induced without   complication.  The patient was prepped and draped in the standard sterile   fashion.  The patient received 2 g IV ceftriaxone and 500 mg of IV Flagyl for   preoperative antibiotic prophylaxis.  Following WHO appropriate timeout, a   midline laparotomy incision was made in the central abdomen through the   umbilicus.  Bovie electrocautery was used to divide down through the   subcutaneous fat.  The patient has had no prior abdominal surgery and the   abdomen was entered without difficulty.  First, we surveyed the abdomen.  There   were no signs of metastatic disease or peritoneal implants or ascites.  We   turned our attention towards locating the mass, which was palpably smaller than   was expected based on his CAT scan findings and was in the proximal ascending   colon.  At this point, we turned our attention towards proceeding with   resection.  We began first by mobilizing some lateral attachments along the   white line of Toldt with some minor  adhesions.  Of note, we did use a wound   protector with the assistance of a Yossi retractor.  The small bowel was swept   out of the way and the right colon was mobilized laterally to medially.  The   hepatic flexure was then taken down from the top down using Bovie   electrocautery.  Once this was sufficiently mobilized, we began to take down the   gastrocolic ligament to mobilize the omentum from the transverse colon to   complete our colon mobilization.  Next, we turned our attention towards the   ileocolic pedicle, which was isolated and divided with a 0 chromic tie.  We took   the terminal ileum with a JOSE stapler blue load and took the mesentery with   standard Charmaine clamps and 0 chromic ties.  Continuing mobilization, we did take   the middle colic and right colic vessels as well as the marginal using ties.    The transverse colon was also divided using a JOSE blue load.  The specimen was   handed off the field, right colon and terminal ileum, and attention was turned   towards hemostasis, which was excellent.  We irrigated the abdomen with warm   saline and prepared for our reconstruction.  The transverse colon and ileum were   anastomosed in a standard side-to-side fashion using JOSE blue load stapler.    Common enterotomy was closed using a running 3-0 Vicryl in a whipstitch fashion   followed by a second layered closure using a 4-0 Ethibond suture in a running   Lembert fashion.  This stitch was additionally used to close the mesenteric   defect.  Next, liposomal bupivacaine or Exparel was instilled into the abdomen   for a TAP block intraabdominally prior to closure.  The fascia was then closed   in the standard technique using a running #1 PDS suture from either end.  Wound   was irrigated and then the skin was closed with a running 4-0 Monocryl.    Dermabond was applied to the wound.  The patient was subsequently awakened from   general anesthesia and brought to Recovery Room in stable condition.    Jonatan   was present and scrubbed for the entire procedure and there were no   complications.    DICTATED BY:  Lavinia Head M.D. (RES)      SEB/ROCÍO  dd: 09/18/2018 15:27:19 (CDT)  td: 09/18/2018 20:09:23 (CDT)  Doc ID   #[id number]  Job ID #[id number]    CC:

## 2018-09-19 NOTE — PLAN OF CARE
Problem: Patient Care Overview  Goal: Plan of Care Review  Outcome: Ongoing (interventions implemented as appropriate)  Patient resting in bedside chair comfortably. IV intact and infusing free of infection and irration,  fall precautions maintained no falls noted. Call light in reach bed locked and in lowest position. Non skid socks on while out of bed. Patient instructed to call for assistance. Skin integrity maintained as patient is independent with frequent positioning, C/o pain managed with PRN meds, No other complaints or concerns. Will continue to monitor and follow careplan of care.

## 2018-09-20 LAB
ALBUMIN SERPL BCP-MCNC: 2.5 G/DL
ALP SERPL-CCNC: 56 U/L
ALT SERPL W/O P-5'-P-CCNC: 8 U/L
ANION GAP SERPL CALC-SCNC: 8 MMOL/L
AST SERPL-CCNC: 12 U/L
BASOPHILS # BLD AUTO: 0.04 K/UL
BASOPHILS NFR BLD: 0.5 %
BILIRUB SERPL-MCNC: 0.1 MG/DL
BUN SERPL-MCNC: 12 MG/DL
CALCIUM SERPL-MCNC: 8.9 MG/DL
CHLORIDE SERPL-SCNC: 105 MMOL/L
CO2 SERPL-SCNC: 24 MMOL/L
CREAT SERPL-MCNC: 1.1 MG/DL
DIFFERENTIAL METHOD: ABNORMAL
EOSINOPHIL # BLD AUTO: 0.2 K/UL
EOSINOPHIL NFR BLD: 3.3 %
ERYTHROCYTE [DISTWIDTH] IN BLOOD BY AUTOMATED COUNT: 17 %
EST. GFR  (AFRICAN AMERICAN): >60 ML/MIN/1.73 M^2
EST. GFR  (NON AFRICAN AMERICAN): >60 ML/MIN/1.73 M^2
GLUCOSE SERPL-MCNC: 96 MG/DL
HCT VFR BLD AUTO: 23.9 %
HGB BLD-MCNC: 7.1 G/DL
IMM GRANULOCYTES # BLD AUTO: 0.02 K/UL
IMM GRANULOCYTES NFR BLD AUTO: 0.3 %
LYMPHOCYTES # BLD AUTO: 1.7 K/UL
LYMPHOCYTES NFR BLD: 23.1 %
MAGNESIUM SERPL-MCNC: 1.9 MG/DL
MCH RBC QN AUTO: 21.1 PG
MCHC RBC AUTO-ENTMCNC: 29.7 G/DL
MCV RBC AUTO: 71 FL
MONOCYTES # BLD AUTO: 0.8 K/UL
MONOCYTES NFR BLD: 10.4 %
NEUTROPHILS # BLD AUTO: 4.6 K/UL
NEUTROPHILS NFR BLD: 62.4 %
NRBC BLD-RTO: 0 /100 WBC
PHOSPHATE SERPL-MCNC: 3 MG/DL
PLATELET # BLD AUTO: 255 K/UL
PMV BLD AUTO: 10.2 FL
POTASSIUM SERPL-SCNC: 4 MMOL/L
PROT SERPL-MCNC: 5.8 G/DL
RBC # BLD AUTO: 3.37 M/UL
SODIUM SERPL-SCNC: 137 MMOL/L
WBC # BLD AUTO: 7.33 K/UL

## 2018-09-20 PROCEDURE — 25000003 PHARM REV CODE 250: Performed by: NURSE PRACTITIONER

## 2018-09-20 PROCEDURE — 85025 COMPLETE CBC W/AUTO DIFF WBC: CPT

## 2018-09-20 PROCEDURE — 97161 PT EVAL LOW COMPLEX 20 MIN: CPT

## 2018-09-20 PROCEDURE — 36415 COLL VENOUS BLD VENIPUNCTURE: CPT

## 2018-09-20 PROCEDURE — 83735 ASSAY OF MAGNESIUM: CPT

## 2018-09-20 PROCEDURE — 63600175 PHARM REV CODE 636 W HCPCS: Performed by: SURGERY

## 2018-09-20 PROCEDURE — 63600175 PHARM REV CODE 636 W HCPCS: Performed by: STUDENT IN AN ORGANIZED HEALTH CARE EDUCATION/TRAINING PROGRAM

## 2018-09-20 PROCEDURE — 25000003 PHARM REV CODE 250: Performed by: SURGERY

## 2018-09-20 PROCEDURE — C9113 INJ PANTOPRAZOLE SODIUM, VIA: HCPCS | Performed by: SURGERY

## 2018-09-20 PROCEDURE — 11000001 HC ACUTE MED/SURG PRIVATE ROOM

## 2018-09-20 PROCEDURE — 80053 COMPREHEN METABOLIC PANEL: CPT

## 2018-09-20 PROCEDURE — 84100 ASSAY OF PHOSPHORUS: CPT

## 2018-09-20 PROCEDURE — 25000003 PHARM REV CODE 250: Performed by: STUDENT IN AN ORGANIZED HEALTH CARE EDUCATION/TRAINING PROGRAM

## 2018-09-20 RX ORDER — HYDROCHLOROTHIAZIDE 12.5 MG/1
12.5 TABLET ORAL DAILY
Status: DISCONTINUED | OUTPATIENT
Start: 2018-09-20 | End: 2018-09-22 | Stop reason: HOSPADM

## 2018-09-20 RX ORDER — LISINOPRIL 20 MG/1
20 TABLET ORAL DAILY
Status: DISCONTINUED | OUTPATIENT
Start: 2018-09-20 | End: 2018-09-22 | Stop reason: HOSPADM

## 2018-09-20 RX ADMIN — ACETAMINOPHEN 1000 MG: 500 TABLET ORAL at 09:09

## 2018-09-20 RX ADMIN — OXYCODONE HYDROCHLORIDE 15 MG: 10 TABLET ORAL at 11:09

## 2018-09-20 RX ADMIN — IBUPROFEN 800 MG: 800 INJECTION INTRAVENOUS at 05:09

## 2018-09-20 RX ADMIN — OXYCODONE HYDROCHLORIDE 15 MG: 10 TABLET ORAL at 07:09

## 2018-09-20 RX ADMIN — LISINOPRIL 20 MG: 20 TABLET ORAL at 09:09

## 2018-09-20 RX ADMIN — FERROUS SULFATE TAB EC 325 MG (65 MG FE EQUIVALENT) 325 MG: 325 (65 FE) TABLET DELAYED RESPONSE at 09:09

## 2018-09-20 RX ADMIN — PANTOPRAZOLE SODIUM 40 MG: 40 INJECTION, POWDER, FOR SOLUTION INTRAVENOUS at 09:09

## 2018-09-20 RX ADMIN — OXYCODONE HYDROCHLORIDE 10 MG: 10 TABLET ORAL at 04:09

## 2018-09-20 RX ADMIN — OXYCODONE HYDROCHLORIDE 5 MG: 5 TABLET ORAL at 05:09

## 2018-09-20 RX ADMIN — ENOXAPARIN SODIUM 40 MG: 100 INJECTION SUBCUTANEOUS at 04:09

## 2018-09-20 RX ADMIN — IBUPROFEN 800 MG: 800 INJECTION INTRAVENOUS at 09:09

## 2018-09-20 RX ADMIN — HYDROCHLOROTHIAZIDE 12.5 MG: 12.5 TABLET ORAL at 09:09

## 2018-09-20 RX ADMIN — ACETAMINOPHEN 1000 MG: 500 TABLET ORAL at 01:09

## 2018-09-20 RX ADMIN — ACETAMINOPHEN 1000 MG: 500 TABLET ORAL at 05:09

## 2018-09-20 NOTE — SUBJECTIVE & OBJECTIVE
Subjective:     Interval History: no acute events. Pain controlled. Tolerating clears. Some nausea in AM but resolved    Post-Op Info:  Procedure(s) (LRB):  HEMICOLECTOMY, RIGHT, extended (N/A)   2 Days Post-Op      Medications:  Continuous Infusions:   lactated ringers 50 mL/hr at 09/19/18 0629     Scheduled Meds:   acetaminophen  1,000 mg Oral Q8H    enoxaparin  40 mg Subcutaneous Daily    ferrous sulfate  325 mg Oral Daily    hydroCHLOROthiazide  12.5 mg Oral Daily    ibuprofen  800 mg Intravenous Q8H    lidocaine (PF) 10 mg/ml (1%)  1 mL Other Once    lisinopril  20 mg Oral Daily    pantoprazole  40 mg Intravenous Daily     PRN Meds:   albuterol-ipratropium    diphenhydrAMINE    HYDROmorphone    ondansetron    oxyCODONE    oxyCODONE    oxyCODONE    promethazine (PHENERGAN) IVPB    ramelteon        Objective:     Vital Signs (Most Recent):  Temp: (!) 95.9 °F (35.5 °C) (09/20/18 1213)  Pulse: 66 (09/20/18 1213)  Resp: 19 (09/20/18 1213)  BP: 132/61 (09/20/18 1213)  SpO2: 99 % (09/20/18 1213) Vital Signs (24h Range):  Temp:  [95.9 °F (35.5 °C)-98 °F (36.7 °C)] 95.9 °F (35.5 °C)  Pulse:  [53-69] 66  Resp:  [16-19] 19  SpO2:  [97 %-99 %] 99 %  BP: (128-183)/(61-78) 132/61     Intake/Output - Last 3 Shifts       09/18 0700 - 09/19 0659 09/19 0700 - 09/20 0659 09/20 0700 - 09/21 0659    P.O.  750     I.V. (mL/kg) 1920.4 (20.2)      Total Intake(mL/kg) 1920.4 (20.2) 750 (7.9)     Urine (mL/kg/hr) 875 850 (0.4) 600 (0.9)    Emesis/NG output  0     Blood 100      Total Output 975 850 600    Net +945.4 -100 -600           Emesis Occurrence  1 x           Physical Exam   Constitutional: He is oriented to person, place, and time. He appears well-developed and well-nourished.   HENT:   Head: Normocephalic and atraumatic.   Eyes: Conjunctivae are normal.   Cardiovascular: Normal rate and regular rhythm.   Pulmonary/Chest: Effort normal. No respiratory distress.   Abdominal: Soft. He exhibits no distension  and no mass. There is tenderness. There is no guarding.   Incision c/d/i with dermabond   Neurological: He is alert and oriented to person, place, and time.   Skin: Skin is warm and dry. He is not diaphoretic.     Significant Labs:  BMP (Last 3 Results):   Recent Labs   Lab  09/19/18   0544  09/20/18   0445   GLU  113*  96   NA  137  137   K  4.1  4.0   CL  106  105   CO2  23  24   BUN  14  12   CREATININE  1.1  1.1   CALCIUM  8.4*  8.9   MG  1.9  1.9     CBC (Last 3 Results):   Recent Labs   Lab  09/19/18   0544  09/20/18   0445   WBC  10.45  7.33   RBC  3.47*  3.37*   HGB  7.4*  7.1*   HCT  24.1*  23.9*   PLT  297  255   MCV  70*  71*   MCH  21.3*  21.1*   MCHC  30.7*  29.7*       Significant Diagnostics:  None

## 2018-09-20 NOTE — NURSING
Pt leads kept following off of patient, all leads checked upon shift change is patient on teli monitor

## 2018-09-20 NOTE — PLAN OF CARE
Problem: Fall Risk (Adult)  Goal: Absence of Falls  Patient will demonstrate the desired outcomes by discharge/transition of care.  Outcome: Ongoing (interventions implemented as appropriate)  Pt ambulating inside of his rrom. Gait steady. Pt absent from fall. Safety measures in place.

## 2018-09-21 ENCOUNTER — TELEPHONE (OUTPATIENT)
Dept: ENDOSCOPY | Facility: HOSPITAL | Age: 68
End: 2018-09-21

## 2018-09-21 PROBLEM — R10.9 ABDOMINAL PAIN: Status: RESOLVED | Noted: 2018-09-11 | Resolved: 2018-09-21

## 2018-09-21 PROBLEM — R19.00 ABDOMINAL MASS: Status: RESOLVED | Noted: 2018-09-11 | Resolved: 2018-09-21

## 2018-09-21 PROBLEM — Z12.11 COLON CANCER SCREENING: Status: RESOLVED | Noted: 2018-09-14 | Resolved: 2018-09-21

## 2018-09-21 LAB
ALBUMIN SERPL BCP-MCNC: 2.3 G/DL
ALP SERPL-CCNC: 51 U/L
ALT SERPL W/O P-5'-P-CCNC: 8 U/L
ANION GAP SERPL CALC-SCNC: 9 MMOL/L
AST SERPL-CCNC: 14 U/L
BASOPHILS # BLD AUTO: 0.04 K/UL
BASOPHILS NFR BLD: 0.6 %
BILIRUB SERPL-MCNC: 0.1 MG/DL
BUN SERPL-MCNC: 12 MG/DL
CALCIUM SERPL-MCNC: 8.7 MG/DL
CHLORIDE SERPL-SCNC: 107 MMOL/L
CO2 SERPL-SCNC: 22 MMOL/L
CREAT SERPL-MCNC: 1.2 MG/DL
DIFFERENTIAL METHOD: ABNORMAL
EOSINOPHIL # BLD AUTO: 0.3 K/UL
EOSINOPHIL NFR BLD: 3.9 %
ERYTHROCYTE [DISTWIDTH] IN BLOOD BY AUTOMATED COUNT: 17 %
EST. GFR  (AFRICAN AMERICAN): >60 ML/MIN/1.73 M^2
EST. GFR  (NON AFRICAN AMERICAN): >60 ML/MIN/1.73 M^2
GLUCOSE SERPL-MCNC: 94 MG/DL
HCT VFR BLD AUTO: 23 %
HCT VFR BLD AUTO: 23.1 %
HGB BLD-MCNC: 6.8 G/DL
HGB BLD-MCNC: 7 G/DL
IMM GRANULOCYTES # BLD AUTO: 0.02 K/UL
IMM GRANULOCYTES NFR BLD AUTO: 0.3 %
LYMPHOCYTES # BLD AUTO: 1.6 K/UL
LYMPHOCYTES NFR BLD: 24.6 %
MAGNESIUM SERPL-MCNC: 1.7 MG/DL
MCH RBC QN AUTO: 21.1 PG
MCHC RBC AUTO-ENTMCNC: 29.6 G/DL
MCV RBC AUTO: 71 FL
MONOCYTES # BLD AUTO: 0.8 K/UL
MONOCYTES NFR BLD: 11.8 %
NEUTROPHILS # BLD AUTO: 3.7 K/UL
NEUTROPHILS NFR BLD: 58.8 %
NRBC BLD-RTO: 0 /100 WBC
PHOSPHATE SERPL-MCNC: 3.8 MG/DL
PLATELET # BLD AUTO: 259 K/UL
PMV BLD AUTO: 10.6 FL
POTASSIUM SERPL-SCNC: 3.9 MMOL/L
PROT SERPL-MCNC: 5.5 G/DL
RBC # BLD AUTO: 3.22 M/UL
SODIUM SERPL-SCNC: 138 MMOL/L
WBC # BLD AUTO: 6.33 K/UL

## 2018-09-21 PROCEDURE — 36415 COLL VENOUS BLD VENIPUNCTURE: CPT

## 2018-09-21 PROCEDURE — 99900035 HC TECH TIME PER 15 MIN (STAT)

## 2018-09-21 PROCEDURE — 85014 HEMATOCRIT: CPT

## 2018-09-21 PROCEDURE — 25000003 PHARM REV CODE 250: Performed by: SURGERY

## 2018-09-21 PROCEDURE — 84100 ASSAY OF PHOSPHORUS: CPT

## 2018-09-21 PROCEDURE — 80053 COMPREHEN METABOLIC PANEL: CPT

## 2018-09-21 PROCEDURE — 63600175 PHARM REV CODE 636 W HCPCS: Performed by: STUDENT IN AN ORGANIZED HEALTH CARE EDUCATION/TRAINING PROGRAM

## 2018-09-21 PROCEDURE — 85018 HEMOGLOBIN: CPT

## 2018-09-21 PROCEDURE — G8989 SELF CARE D/C STATUS: HCPCS | Mod: CJ

## 2018-09-21 PROCEDURE — 63600175 PHARM REV CODE 636 W HCPCS: Performed by: SURGERY

## 2018-09-21 PROCEDURE — 85025 COMPLETE CBC W/AUTO DIFF WBC: CPT

## 2018-09-21 PROCEDURE — 97530 THERAPEUTIC ACTIVITIES: CPT

## 2018-09-21 PROCEDURE — 94761 N-INVAS EAR/PLS OXIMETRY MLT: CPT

## 2018-09-21 PROCEDURE — C9113 INJ PANTOPRAZOLE SODIUM, VIA: HCPCS | Performed by: SURGERY

## 2018-09-21 PROCEDURE — 25000003 PHARM REV CODE 250: Performed by: NURSE PRACTITIONER

## 2018-09-21 PROCEDURE — 11000001 HC ACUTE MED/SURG PRIVATE ROOM

## 2018-09-21 PROCEDURE — 83735 ASSAY OF MAGNESIUM: CPT

## 2018-09-21 PROCEDURE — G8987 SELF CARE CURRENT STATUS: HCPCS | Mod: CJ

## 2018-09-21 PROCEDURE — G8988 SELF CARE GOAL STATUS: HCPCS | Mod: CJ

## 2018-09-21 PROCEDURE — 25000003 PHARM REV CODE 250: Performed by: STUDENT IN AN ORGANIZED HEALTH CARE EDUCATION/TRAINING PROGRAM

## 2018-09-21 RX ORDER — OXYCODONE HYDROCHLORIDE 5 MG/1
5 TABLET ORAL
Qty: 30 TABLET | Refills: 0 | Status: SHIPPED | OUTPATIENT
Start: 2018-09-21 | End: 2018-10-01 | Stop reason: SDUPTHER

## 2018-09-21 RX ADMIN — HYDROCHLOROTHIAZIDE 12.5 MG: 12.5 TABLET ORAL at 09:09

## 2018-09-21 RX ADMIN — OXYCODONE HYDROCHLORIDE 15 MG: 10 TABLET ORAL at 09:09

## 2018-09-21 RX ADMIN — ACETAMINOPHEN 1000 MG: 500 TABLET ORAL at 02:09

## 2018-09-21 RX ADMIN — OXYCODONE HYDROCHLORIDE 15 MG: 10 TABLET ORAL at 06:09

## 2018-09-21 RX ADMIN — PANTOPRAZOLE SODIUM 40 MG: 40 INJECTION, POWDER, FOR SOLUTION INTRAVENOUS at 09:09

## 2018-09-21 RX ADMIN — FERROUS SULFATE TAB EC 325 MG (65 MG FE EQUIVALENT) 325 MG: 325 (65 FE) TABLET DELAYED RESPONSE at 09:09

## 2018-09-21 RX ADMIN — ACETAMINOPHEN 1000 MG: 500 TABLET ORAL at 06:09

## 2018-09-21 RX ADMIN — IBUPROFEN 800 MG: 800 INJECTION INTRAVENOUS at 09:09

## 2018-09-21 RX ADMIN — OXYCODONE HYDROCHLORIDE 15 MG: 10 TABLET ORAL at 02:09

## 2018-09-21 RX ADMIN — ACETAMINOPHEN 1000 MG: 500 TABLET ORAL at 09:09

## 2018-09-21 RX ADMIN — IBUPROFEN 800 MG: 800 INJECTION INTRAVENOUS at 02:09

## 2018-09-21 RX ADMIN — LISINOPRIL 20 MG: 20 TABLET ORAL at 09:09

## 2018-09-21 RX ADMIN — IBUPROFEN 800 MG: 800 INJECTION INTRAVENOUS at 06:09

## 2018-09-21 RX ADMIN — ENOXAPARIN SODIUM 40 MG: 100 INJECTION SUBCUTANEOUS at 06:09

## 2018-09-21 NOTE — PLAN OF CARE
Problem: Patient Care Overview  Goal: Plan of Care Review  Outcome: Ongoing (interventions implemented as appropriate)  Patient resting in bed comfortably. Respirations even and unlabored. Bed lock in lowest position. Patient instructed to call for assistance. Midline incision to abdomen with dermabond intact. Complains of pain managed with PRN meds. No other complaints or concerns. Will monitor and follow plan of care.

## 2018-09-21 NOTE — PLAN OF CARE
SHERITA placed call to [facility] to schedule pt's follow up appt with Dr Cheung. Ramana, [facility]  reports [facility] nurse will call pt with appt date and time.

## 2018-09-21 NOTE — PT/OT/SLP PROGRESS
Occupational Therapy   Treatment & Discharge Note    Name: Noah Nichole  MRN: [id number]  Admitting Diagnosis:  Colonic mass  3 Days Post-Op    Recommendations:     Discharge Recommendations: home  Discharge Equipment Recommendations:  none  Barriers to discharge:  None    Subjective     Communicated with: RN prior to session.  Pain/Comfort:  · Pain Rating 1: 0/10  · Pain Addressed 1: Reposition, Distraction, Cessation of Activity  · Pain Rating Post-Intervention 1: 0/10    Patients cultural, spiritual, Sabianist conflicts given the current situation: none reported    Objective:     Patient found with: peripheral IV, SCD, telemetry    General Precautions: Standard, fall   Orthopedic Precautions:N/A   Braces: N/A     Occupational Performance:    Bed Mobility:    · Patient completed Supine to Sit with supervision  · Patient completed Sit to Supine with supervision     Functional Mobility/Transfers:  · Patient completed Sit <> Stand Transfer with supervision  with  no assistive device   · Patient completed Toilet Transfer Step Transfer technique with supervision with  no AD  · Functional Mobility: Pt ambulate 200 ft with supervision using IV pole for support    Activities of Daily Living:  · Lower Body Dressing: supervision to manage B socks while seated EOB    Patient left HOB elevated with all lines intact and call button in reach    [facility] 6 Click:  [facility] Total Score: 22    Treatment & Education:  Pt educated on role of OT/POC  Pt educated on importance of ambulation/UIC  White board/communication board updated  Education:    Assessment:     Noah Nichole is a 68 y.o. male with a medical diagnosis of Colonic mass.  He presents with mobility at supervision level.  Performance deficits affecting function are weakness.      Rehab Prognosis:  Good; patient would no longer benefit from acute skilled OT services      Plan:   Pt to be d/c from acute OT services this date 2/2 current functional status.  · Plan of Care  Reviewed with: patient    This Plan of care has been discussed with the patient who was involved in its development and understands and is in agreement with the identified goals and treatment plan    GOALS:   Multidisciplinary Problems     Occupational Therapy Goals     Not on file          Multidisciplinary Problems (Resolved)        Problem: Occupational Therapy Goal    Goal Priority Disciplines Outcome Interventions   Occupational Therapy Goal   (Resolved)     OT, PT/OT Outcome(s) achieved    Description:  Goals to be met by: 9/26/18     Patient will increase functional independence with ADLs by performing:    UE Dressing with Set-up Assistance. MET  LE Dressing with Set-up Assistance. MET  Grooming while standing at sink with Supervision. MET  Toileting from toilet with Supervision for hygiene and clothing management. MET  Toilet transfer to toilet with Supervision. MET                       Time Tracking:     OT Date of Treatment: 09/21/18  OT Start Time: 1120  OT Stop Time: 1129  OT Total Time (min): 9 min    Billable Minutes:Therapeutic Activity 9    Carmelita Trejo OT  9/21/2018

## 2018-09-21 NOTE — PROGRESS NOTES
[facility]  Colorectal Surgery  Progress Note    Patient Name: Noah Nichole  MRN: [id number]  Admission Date: 9/18/2018  Hospital Length of Stay: 3 days  Attending Physician: Adrian Cheung MD    Subjective:     Interval History: no acute events. Pain controlled. Tolerating clears. Some nausea in AM but resolved    Post-Op Info:  Procedure(s) (LRB):  HEMICOLECTOMY, RIGHT, extended (N/A)   2 Days Post-Op      Medications:  Continuous Infusions:   lactated ringers 50 mL/hr at 09/19/18 0629     Scheduled Meds:   acetaminophen  1,000 mg Oral Q8H    enoxaparin  40 mg Subcutaneous Daily    ferrous sulfate  325 mg Oral Daily    hydroCHLOROthiazide  12.5 mg Oral Daily    ibuprofen  800 mg Intravenous Q8H    lidocaine (PF) 10 mg/ml (1%)  1 mL Other Once    lisinopril  20 mg Oral Daily    pantoprazole  40 mg Intravenous Daily     PRN Meds:   albuterol-ipratropium    diphenhydrAMINE    HYDROmorphone    ondansetron    oxyCODONE    oxyCODONE    oxyCODONE    promethazine (PHENERGAN) IVPB    ramelteon        Objective:     Vital Signs (Most Recent):  Temp: (!) 95.9 °F (35.5 °C) (09/20/18 1213)  Pulse: 66 (09/20/18 1213)  Resp: 19 (09/20/18 1213)  BP: 132/61 (09/20/18 1213)  SpO2: 99 % (09/20/18 1213) Vital Signs (24h Range):  Temp:  [95.9 °F (35.5 °C)-98 °F (36.7 °C)] 95.9 °F (35.5 °C)  Pulse:  [53-69] 66  Resp:  [16-19] 19  SpO2:  [97 %-99 %] 99 %  BP: (128-183)/(61-78) 132/61     Intake/Output - Last 3 Shifts       09/18 0700 - 09/19 0659 09/19 0700 - 09/20 0659 09/20 0700 - 09/21 0659    P.O.  750     I.V. (mL/kg) 1920.4 (20.2)      Total Intake(mL/kg) 1920.4 (20.2) 750 (7.9)     Urine (mL/kg/hr) 875 850 (0.4) 600 (0.9)    Emesis/NG output  0     Blood 100      Total Output 975 850 600    Net +945.4 -100 -600           Emesis Occurrence  1 x           Physical Exam   Constitutional: He is oriented to person, place, and time. He appears well-developed and well-nourished.   HENT:   Head:  Normocephalic and atraumatic.   Eyes: Conjunctivae are normal.   Cardiovascular: Normal rate and regular rhythm.   Pulmonary/Chest: Effort normal. No respiratory distress.   Abdominal: Soft. He exhibits no distension and no mass. There is tenderness. There is no guarding.   Incision c/d/i with dermabond   Neurological: He is alert and oriented to person, place, and time.   Skin: Skin is warm and dry. He is not diaphoretic.     Significant Labs:  BMP (Last 3 Results):   Recent Labs   Lab  09/19/18   0544  09/20/18   0445   GLU  113*  96   NA  137  137   K  4.1  4.0   CL  106  105   CO2  23  24   BUN  14  12   CREATININE  1.1  1.1   CALCIUM  8.4*  8.9   MG  1.9  1.9     CBC (Last 3 Results):   Recent Labs   Lab  09/19/18   0544  09/20/18   0445   WBC  10.45  7.33   RBC  3.47*  3.37*   HGB  7.4*  7.1*   HCT  24.1*  23.9*   PLT  297  255   MCV  70*  71*   MCH  21.3*  21.1*   MCHC  30.7*  29.7*       Significant Diagnostics:  None    Assessment/Plan:     * Colonic mass    1 Day Post-Op right colectomy    Clears, lower IVF  Pain control as needed with standing tylenol/ibuprofen  OOB/ambulate  PT  Incentive spirometry                Jose Newman MD  Colorectal Surgery  [facility]

## 2018-09-21 NOTE — PROGRESS NOTES
[facility]  Colorectal Surgery  Progress Note    Patient Name: Noah Nichole  MRN: [id number]  Admission Date: 9/18/2018  Hospital Length of Stay: 3 days  Attending Physician: Adrian Cheung MD    Subjective:     Interval History: no acute events. Having bowel function. Pain controlled. Tolerating regular diet.    Post-Op Info:  Procedure(s) (LRB):  HEMICOLECTOMY, RIGHT, extended (N/A)   3 Days Post-Op      Medications:  Continuous Infusions:  Scheduled Meds:   acetaminophen  1,000 mg Oral Q8H    enoxaparin  40 mg Subcutaneous Daily    ferrous sulfate  325 mg Oral Daily    hydroCHLOROthiazide  12.5 mg Oral Daily    ibuprofen  800 mg Intravenous Q8H    lidocaine (PF) 10 mg/ml (1%)  1 mL Other Once    lisinopril  20 mg Oral Daily    pantoprazole  40 mg Intravenous Daily     PRN Meds:   albuterol-ipratropium    diphenhydrAMINE    HYDROmorphone    ondansetron    oxyCODONE    oxyCODONE    oxyCODONE    promethazine (PHENERGAN) IVPB    ramelteon        Objective:     Vital Signs (Most Recent):  Temp: 98.2 °F (36.8 °C) (09/21/18 0824)  Pulse: 63 (09/21/18 0824)  Resp: 16 (09/21/18 0824)  BP: 131/63 (09/21/18 0824)  SpO2: 98 % (09/21/18 0824) Vital Signs (24h Range):  Temp:  [95.9 °F (35.5 °C)-98.2 °F (36.8 °C)] 98.2 °F (36.8 °C)  Pulse:  [61-75] 63  Resp:  [16-20] 16  SpO2:  [97 %-99 %] 98 %  BP: (111-152)/(54-71) 131/63     Intake/Output - Last 3 Shifts       09/19 0700 - 09/20 0659 09/20 0700 - 09/21 0659 09/21 0700 - 09/22 0659    P.O. 750 800     I.V. (mL/kg)  1035 (10.9)     IV Piggyback  500     Total Intake(mL/kg) 750 (7.9) 2335 (24.5)     Urine (mL/kg/hr) 850 (0.4) 1500 (0.7)     Emesis/NG output 0      Blood       Total Output 850 1500     Net -100 +835            Emesis Occurrence 1 x            Physical Exam   Constitutional: He is oriented to person, place, and time. He appears well-developed and well-nourished.   HENT:   Head: Normocephalic and atraumatic.   Eyes:  Conjunctivae are normal.   Cardiovascular: Normal rate and regular rhythm.   Pulmonary/Chest: Effort normal. No respiratory distress.   Abdominal: Soft. He exhibits no distension and no mass. There is tenderness. There is no guarding.   Incision c/d/i with dermabond   Neurological: He is alert and oriented to person, place, and time.   Skin: Skin is warm and dry. He is not diaphoretic.     Significant Labs:  BMP (Last 3 Results):   Recent Labs   Lab  09/19/18   0544  09/20/18   0445  09/21/18   0341   GLU  113*  96  94   NA  137  137  138   K  4.1  4.0  3.9   CL  106  105  107   CO2  23  24  22*   BUN  14  12  12   CREATININE  1.1  1.1  1.2   CALCIUM  8.4*  8.9  8.7   MG  1.9  1.9  1.7     CBC (Last 3 Results):   Recent Labs   Lab  09/19/18   0544  09/20/18 0445  09/21/18   0341  09/21/18   0828   WBC  10.45  7.33  6.33   --    RBC  3.47*  3.37*  3.22*   --    HGB  7.4*  7.1*  6.8*  7.0*   HCT  24.1*  23.9*  23.0*  23.1*   PLT  297  255  259   --    MCV  70*  71*  71*   --    MCH  21.3*  21.1*  21.1*   --    MCHC  30.7*  29.7*  29.6*   --        Significant Diagnostics:  None    Assessment/Plan:     * Colonic mass    3 Days Post-Op right colectomy    Continue low residue diet  Pain control as needed with standing tylenol/ibuprofen  Home meds  OOB/ambulate  Incentive spirometry    Dispo: DC home today vs tomorrow              Jose Newman MD  Colorectal Surgery  [facility]       scheduled for Permacath placement

## 2018-09-21 NOTE — PLAN OF CARE
Problem: Occupational Therapy Goal  Goal: Occupational Therapy Goal  Goals to be met by: 9/26/18     Patient will increase functional independence with ADLs by performing:    UE Dressing with Set-up Assistance. MET  LE Dressing with Set-up Assistance. MET  Grooming while standing at sink with Supervision. MET  Toileting from toilet with Supervision for hygiene and clothing management. MET  Toilet transfer to toilet with Supervision. MET     Outcome: Outcome(s) achieved Date Met: 09/21/18  Goals met; pt functioning at supervision level, no longer appropriate for acute OT services    Comments: D/C OT    Carmelita Trejo, OT  9/21/2018

## 2018-09-21 NOTE — SUBJECTIVE & OBJECTIVE
Subjective:     Interval History: no acute events. Having bowel function. Pain controlled. Tolerating regular diet.    Post-Op Info:  Procedure(s) (LRB):  HEMICOLECTOMY, RIGHT, extended (N/A)   3 Days Post-Op      Medications:  Continuous Infusions:  Scheduled Meds:   acetaminophen  1,000 mg Oral Q8H    enoxaparin  40 mg Subcutaneous Daily    ferrous sulfate  325 mg Oral Daily    hydroCHLOROthiazide  12.5 mg Oral Daily    ibuprofen  800 mg Intravenous Q8H    lidocaine (PF) 10 mg/ml (1%)  1 mL Other Once    lisinopril  20 mg Oral Daily    pantoprazole  40 mg Intravenous Daily     PRN Meds:   albuterol-ipratropium    diphenhydrAMINE    HYDROmorphone    ondansetron    oxyCODONE    oxyCODONE    oxyCODONE    promethazine (PHENERGAN) IVPB    ramelteon        Objective:     Vital Signs (Most Recent):  Temp: 98.2 °F (36.8 °C) (09/21/18 0824)  Pulse: 63 (09/21/18 0824)  Resp: 16 (09/21/18 0824)  BP: 131/63 (09/21/18 0824)  SpO2: 98 % (09/21/18 0824) Vital Signs (24h Range):  Temp:  [95.9 °F (35.5 °C)-98.2 °F (36.8 °C)] 98.2 °F (36.8 °C)  Pulse:  [61-75] 63  Resp:  [16-20] 16  SpO2:  [97 %-99 %] 98 %  BP: (111-152)/(54-71) 131/63     Intake/Output - Last 3 Shifts       09/19 0700 - 09/20 0659 09/20 0700 - 09/21 0659 09/21 0700 - 09/22 0659    P.O. 750 800     I.V. (mL/kg)  1035 (10.9)     IV Piggyback  500     Total Intake(mL/kg) 750 (7.9) 2335 (24.5)     Urine (mL/kg/hr) 850 (0.4) 1500 (0.7)     Emesis/NG output 0      Blood       Total Output 850 1500     Net -100 +835            Emesis Occurrence 1 x            Physical Exam   Constitutional: He is oriented to person, place, and time. He appears well-developed and well-nourished.   HENT:   Head: Normocephalic and atraumatic.   Eyes: Conjunctivae are normal.   Cardiovascular: Normal rate and regular rhythm.   Pulmonary/Chest: Effort normal. No respiratory distress.   Abdominal: Soft. He exhibits no distension and no mass. There is tenderness. There is no  guarding.   Incision c/d/i with dermabond   Neurological: He is alert and oriented to person, place, and time.   Skin: Skin is warm and dry. He is not diaphoretic.     Significant Labs:  BMP (Last 3 Results):   Recent Labs   Lab  09/19/18   0544  09/20/18   0445  09/21/18   0341   GLU  113*  96  94   NA  137  137  138   K  4.1  4.0  3.9   CL  106  105  107   CO2  23  24  22*   BUN  14  12  12   CREATININE  1.1  1.1  1.2   CALCIUM  8.4*  8.9  8.7   MG  1.9  1.9  1.7     CBC (Last 3 Results):   Recent Labs   Lab  09/19/18   0544  09/20/18   0445  09/21/18   0341  09/21/18   0828   WBC  10.45  7.33  6.33   --    RBC  3.47*  3.37*  3.22*   --    HGB  7.4*  7.1*  6.8*  7.0*   HCT  24.1*  23.9*  23.0*  23.1*   PLT  297  255  259   --    MCV  70*  71*  71*   --    MCH  21.3*  21.1*  21.1*   --    MCHC  30.7*  29.7*  29.6*   --        Significant Diagnostics:  None

## 2018-09-21 NOTE — ASSESSMENT & PLAN NOTE
3 Days Post-Op right colectomy    Continue low residue diet  Pain control as needed with standing tylenol/ibuprofen  Home meds  OOB/ambulate  Incentive spirometry    Dispo: DC home today vs tomorrow

## 2018-09-21 NOTE — PLAN OF CARE
Problem: Patient Care Overview  Goal: Plan of Care Review  Outcome: Ongoing (interventions implemented as appropriate)  Pt AAOx4, VSS, No falls noted, fall precautions remain.  Pain assessed,  pain controlled with PRN regimen, pt comfortable, frequent rounds made for safety and patient care. SCD's in place. Call light within reach, bed wheels locked, bed in lowest position, side rails ^x2, safety maintained. NADN, Will continue monitor.

## 2018-09-22 VITALS
DIASTOLIC BLOOD PRESSURE: 70 MMHG | RESPIRATION RATE: 16 BRPM | HEIGHT: 71 IN | HEART RATE: 59 BPM | SYSTOLIC BLOOD PRESSURE: 156 MMHG | TEMPERATURE: 98 F | WEIGHT: 210.13 LBS | BODY MASS INDEX: 29.42 KG/M2 | OXYGEN SATURATION: 99 %

## 2018-09-22 PROCEDURE — C9113 INJ PANTOPRAZOLE SODIUM, VIA: HCPCS | Performed by: SURGERY

## 2018-09-22 PROCEDURE — 63600175 PHARM REV CODE 636 W HCPCS: Performed by: SURGERY

## 2018-09-22 PROCEDURE — 63600175 PHARM REV CODE 636 W HCPCS: Performed by: STUDENT IN AN ORGANIZED HEALTH CARE EDUCATION/TRAINING PROGRAM

## 2018-09-22 PROCEDURE — 25000003 PHARM REV CODE 250: Performed by: SURGERY

## 2018-09-22 PROCEDURE — 94761 N-INVAS EAR/PLS OXIMETRY MLT: CPT

## 2018-09-22 PROCEDURE — 25000003 PHARM REV CODE 250: Performed by: NURSE PRACTITIONER

## 2018-09-22 PROCEDURE — 25000003 PHARM REV CODE 250: Performed by: STUDENT IN AN ORGANIZED HEALTH CARE EDUCATION/TRAINING PROGRAM

## 2018-09-22 RX ORDER — ONDANSETRON HYDROCHLORIDE 8 MG/1
8 TABLET, FILM COATED ORAL EVERY 8 HOURS PRN
Qty: 15 TABLET | Refills: 0 | Status: SHIPPED | OUTPATIENT
Start: 2018-09-22 | End: 2018-10-01 | Stop reason: SDUPTHER

## 2018-09-22 RX ADMIN — IBUPROFEN 800 MG: 800 INJECTION INTRAVENOUS at 06:09

## 2018-09-22 RX ADMIN — ACETAMINOPHEN 1000 MG: 500 TABLET ORAL at 06:09

## 2018-09-22 RX ADMIN — HYDROCHLOROTHIAZIDE 12.5 MG: 12.5 TABLET ORAL at 10:09

## 2018-09-22 RX ADMIN — PANTOPRAZOLE SODIUM 40 MG: 40 INJECTION, POWDER, FOR SOLUTION INTRAVENOUS at 10:09

## 2018-09-22 RX ADMIN — FERROUS SULFATE TAB EC 325 MG (65 MG FE EQUIVALENT) 325 MG: 325 (65 FE) TABLET DELAYED RESPONSE at 10:09

## 2018-09-22 RX ADMIN — LISINOPRIL 20 MG: 20 TABLET ORAL at 10:09

## 2018-09-22 NOTE — SUBJECTIVE & OBJECTIVE
Subjective:     Interval History: NAEON. Pain controlled. No BM. + flatus. Loss of appetite. Reports nausea.    Post-Op Info:  Procedure(s) (LRB):  HEMICOLECTOMY, RIGHT, extended (N/A)   4 Days Post-Op      Medications:  Continuous Infusions:  Scheduled Meds:   acetaminophen  1,000 mg Oral Q8H    enoxaparin  40 mg Subcutaneous Daily    ferrous sulfate  325 mg Oral Daily    hydroCHLOROthiazide  12.5 mg Oral Daily    ibuprofen  800 mg Intravenous Q8H    lidocaine (PF) 10 mg/ml (1%)  1 mL Other Once    lisinopril  20 mg Oral Daily    pantoprazole  40 mg Intravenous Daily     PRN Meds:   albuterol-ipratropium    diphenhydrAMINE    HYDROmorphone    ondansetron    oxyCODONE    oxyCODONE    oxyCODONE    promethazine (PHENERGAN) IVPB    ramelteon        Objective:     Vital Signs (Most Recent):  Temp: 98.2 °F (36.8 °C) (09/22/18 0827)  Pulse: 64 (09/22/18 0827)  Resp: 18 (09/22/18 0827)  BP: (!) 146/68 (09/22/18 0827)  SpO2: 98 % (09/22/18 0827) Vital Signs (24h Range):  Temp:  [96.3 °F (35.7 °C)-98.5 °F (36.9 °C)] 98.2 °F (36.8 °C)  Pulse:  [56-74] 64  Resp:  [18] 18  SpO2:  [95 %-98 %] 98 %  BP: (120-153)/(61-74) 146/68     Intake/Output - Last 3 Shifts       09/20 0700 - 09/21 0659 09/21 0700 - 09/22 0659 09/22 0700 - 09/23 0659    P.O. 800      I.V. (mL/kg) 1035 (10.9)      IV Piggyback 500 500     Total Intake(mL/kg) 2335 (24.5) 500 (5.2)     Urine (mL/kg/hr) 1500 (0.7) 1150 (0.5)     Emesis/NG output       Total Output 1500 1150     Net +835 -650                  Physical Exam   Constitutional: He is oriented to person, place, and time. He appears well-developed and well-nourished.   HENT:   Head: Normocephalic and atraumatic.   Eyes: Conjunctivae are normal.   Cardiovascular: Normal rate and regular rhythm.   Pulmonary/Chest: Effort normal. No respiratory distress.   Abdominal: Soft. He exhibits no distension and no mass. There is no tenderness. There is no guarding.   Incision c/d/i with dermabond    Neurological: He is alert and oriented to person, place, and time.   Skin: Skin is warm and dry. He is not diaphoretic.       Significant Labs:  BMP:   Recent Labs   Lab  09/21/18   0341   GLU  94   NA  138   K  3.9   CL  107   CO2  22*   BUN  12   CREATININE  1.2   CALCIUM  8.7   MG  1.7     CBC:   Recent Labs   Lab  09/21/18 0341 09/21/18   0828   WBC  6.33   --    RBC  3.22*   --    HGB  6.8*  7.0*   HCT  23.0*  23.1*   PLT  259   --    MCV  71*   --    MCH  21.1*   --    MCHC  29.6*   --      CRP: No results for input(s): CRP in the last 168 hours.  Prealbumin: No results for input(s): PREALBUMIN in the last 168 hours.    Significant Diagnostics:  None

## 2018-09-22 NOTE — PLAN OF CARE
[facility]    HOME HEALTH ORDERS  FACE TO FACE ENCOUNTER    Patient Name: Noah Nichole  YOB: 1950    PCP: Nicki Leyva NP   PCP Address: [address] / [place] [address]  PCP Phone Number: [phone]  PCP Fax: [phone]    Encounter Date: 09/22/2018    Admit to Home Health    Diagnoses:  Active Hospital Problems    Diagnosis  POA    *Colonic mass [K63.9]  Yes    HTN (hypertension) [I10]  Yes      Resolved Hospital Problems   No resolved problems to display.       Future Appointments   Date Time Provider Department Center   10/10/2018  1:15 PM Adrian Cheung MD [facility] COLON [facility]     Follow-up Information     Adrian Cheung MD In 2 weeks.    Specialty:  Colon and Rectal Surgery  Contact information:  [address]  [facility] [address]  [phone]                     I have seen and examined this patient face to face today. My clinical findings that support the need for the home health skilled services and home bound status are the following:  Medical restrictions requiring assistance of another human to leave home due to  Wound care needs.    Allergies:Review of patient's allergies indicates:  No Known Allergies    Diet: regular diet    Activities: activity as tolerated    Nursing:   SN to complete comprehensive assessment including routine vital signs. Instruct on disease process and s/s of complications to report to MD. Review/verify medication list sent home with the patient at time of discharge  and instruct patient/caregiver as needed. Frequency may be adjusted depending on start of care date.    Notify MD if SBP > 160 or < 90; DBP > 90 or < 50; HR > 120 or < 50; Temp > 101;       CONSULTS:    Aide to provide assistance with personal care, ADLs, and vital signs.    MISCELLANEOUS CARE:  Wound Care Orders:  yes:  Surgical Wound:  Location: Abdomen    Consult ET nurse        Apply the following to wound:   Other: No need for wound application. Check  for leakage and breakdown of incision.     WOUND CARE ORDERS  n/a      Medications: Review discharge medications with patient and family and provide education.      Current Discharge Medication List      START taking these medications    Details   ondansetron (ZOFRAN) 8 MG tablet Take 1 tablet (8 mg total) by mouth every 8 (eight) hours as needed for Nausea.  Qty: 15 tablet, Refills: 0      oxyCODONE (ROXICODONE) 5 MG immediate release tablet Take 1 tablet (5 mg total) by mouth every 3 (three) hours as needed (mild pain 1-3).  Qty: 30 tablet, Refills: 0         CONTINUE these medications which have NOT CHANGED    Details   lisinopril-hydrochlorothiazide (PRINZIDE,ZESTORETIC) 20-12.5 mg per tablet Take 1 tablet by mouth once daily.  Qty: 90 tablet, Refills: 3    Associated Diagnoses: Essential hypertension      ferrous sulfate 324 mg (65 mg iron) TbEC Take 1 tablet (325 mg total) by mouth once daily.  Qty: 100 tablet, Refills: 3    Associated Diagnoses: Microcytic hypochromic anemia         STOP taking these medications       HYDROcodone-acetaminophen (NORCO) 5-325 mg per tablet Comments:   Reason for Stopping:         metroNIDAZOLE (FLAGYL) 500 MG tablet Comments:   Reason for Stopping:         neomycin (MYCIFRADIN) 500 mg Tab Comments:   Reason for Stopping:         polyethylene glycol (GLYCOLAX) 17 gram PwPk Comments:   Reason for Stopping:               I certify that this patient is confined to his home and needs intermittent skilled nursing care.

## 2018-09-22 NOTE — PROGRESS NOTES
[facility]  Colorectal Surgery  Progress Note    Patient Name: Noah Nichole  MRN: [id number]  Admission Date: 9/18/2018  Hospital Length of Stay: 4 days  Attending Physician: Adrian Cheung MD    Subjective:     Interval History: NAEON. Pain controlled. No BM. + flatus. Loss of appetite. Reports nausea.    Post-Op Info:  Procedure(s) (LRB):  HEMICOLECTOMY, RIGHT, extended (N/A)   4 Days Post-Op      Medications:  Continuous Infusions:  Scheduled Meds:   acetaminophen  1,000 mg Oral Q8H    enoxaparin  40 mg Subcutaneous Daily    ferrous sulfate  325 mg Oral Daily    hydroCHLOROthiazide  12.5 mg Oral Daily    ibuprofen  800 mg Intravenous Q8H    lidocaine (PF) 10 mg/ml (1%)  1 mL Other Once    lisinopril  20 mg Oral Daily    pantoprazole  40 mg Intravenous Daily     PRN Meds:   albuterol-ipratropium    diphenhydrAMINE    HYDROmorphone    ondansetron    oxyCODONE    oxyCODONE    oxyCODONE    promethazine (PHENERGAN) IVPB    ramelteon        Objective:     Vital Signs (Most Recent):  Temp: 98.2 °F (36.8 °C) (09/22/18 0827)  Pulse: 64 (09/22/18 0827)  Resp: 18 (09/22/18 0827)  BP: (!) 146/68 (09/22/18 0827)  SpO2: 98 % (09/22/18 0827) Vital Signs (24h Range):  Temp:  [96.3 °F (35.7 °C)-98.5 °F (36.9 °C)] 98.2 °F (36.8 °C)  Pulse:  [56-74] 64  Resp:  [18] 18  SpO2:  [95 %-98 %] 98 %  BP: (120-153)/(61-74) 146/68     Intake/Output - Last 3 Shifts       09/20 0700 - 09/21 0659 09/21 0700 - 09/22 0659 09/22 0700 - 09/23 0659    P.O. 800      I.V. (mL/kg) 1035 (10.9)      IV Piggyback 500 500     Total Intake(mL/kg) 2335 (24.5) 500 (5.2)     Urine (mL/kg/hr) 1500 (0.7) 1150 (0.5)     Emesis/NG output       Total Output 1500 1150     Net +835 -650                  Physical Exam   Constitutional: He is oriented to person, place, and time. He appears well-developed and well-nourished.   HENT:   Head: Normocephalic and atraumatic.   Eyes: Conjunctivae are normal.   Cardiovascular: Normal  rate and regular rhythm.   Pulmonary/Chest: Effort normal. No respiratory distress.   Abdominal: Soft. He exhibits no distension and no mass. There is no tenderness. There is no guarding.   Incision c/d/i with dermabond   Neurological: He is alert and oriented to person, place, and time.   Skin: Skin is warm and dry. He is not diaphoretic.       Significant Labs:  BMP:   Recent Labs   Lab  09/21/18   0341   GLU  94   NA  138   K  3.9   CL  107   CO2  22*   BUN  12   CREATININE  1.2   CALCIUM  8.7   MG  1.7     CBC:   Recent Labs   Lab  09/21/18   0341  09/21/18   0828   WBC  6.33   --    RBC  3.22*   --    HGB  6.8*  7.0*   HCT  23.0*  23.1*   PLT  259   --    MCV  71*   --    MCH  21.1*   --    MCHC  29.6*   --      CRP: No results for input(s): CRP in the last 168 hours.  Prealbumin: No results for input(s): PREALBUMIN in the last 168 hours.    Significant Diagnostics:  None    Assessment/Plan:     * Colonic mass    68 y.o. male 4 Days Post-Op for Procedure(s) (LRB):  HEMICOLECTOMY, RIGHT, extended (N/A)      Continue low residue diet  Pain control as needed with standing tylenol/ibuprofen  Home meds  OOB/ambulate  Incentive spirometry    Dispo: Possible d/c today              Gabino Mari MD  Colorectal Surgery  [facility]

## 2018-09-22 NOTE — DISCHARGE SUMMARY
[facility]  Colorectal Surgery  Discharge Summary      Patient Name: Noah Nichole  MRN: [id number]  Admission Date: 9/18/2018  Hospital Length of Stay: 4 days  Discharge Date and Time:  09/22/2018   Attending Physician: Adrian Cheung MD   Discharging Provider: Gabino Mari MD  Primary Care Provider: Nicki Leyva NP     HPI:     67 yo Male presents with a colonic mass. PMH of HTN.    Procedure(s) (LRB):  HEMICOLECTOMY, RIGHT, extended (N/A)     Hospital Course:     09/18/18 - admitted for surgery, NPO, pain control, IS  09/19/18 - decrease IVF, adv to clear liquid diet, adjust pain meds  09/20/18 - some nausea, continue clears  09/21/18 - having bowel function, pain controlled, tolerating diet low fiber low residue  09/22/18 - tolerating low fiber diet        Significant Diagnostic Studies:   Specimen (12h ago, onward)    None          Pending Diagnostic Studies:     None        Final Active Diagnoses:    Diagnosis Date Noted POA    PRINCIPAL PROBLEM:  Colonic mass [K63.9] 09/13/2018 Yes    HTN (hypertension) [I10] 12/17/2014 Yes      Problems Resolved During this Admission:      Discharged Condition: good    Disposition: Home or Self Care    Follow Up:  Follow-up Information     Adrian Cheung MD In 2 weeks.    Specialty:  Colon and Rectal Surgery  Contact information:  [address] [place] [address] [id number] [phone]                 Patient Instructions:      Diet Adult Regular     Lifting restrictions   Order Comments: May not lift more than 10 lbs for 4 weeks from day of surgery.  No pushing/pulling such as vacuuming or raking.  No straining, avoid constipation, and take stool softeners as described and laxatives as needed.  No driving while on narcotics and until you can react quickly without pain.     Notify your health care provider if you experience any of the following:  temperature >100.4     Notify your health care provider if you experience any of the following:   persistent nausea and vomiting or diarrhea     Notify your health care provider if you experience any of the following:  severe uncontrolled pain     Notify your health care provider if you experience any of the following:  redness, tenderness, or signs of infection (pain, swelling, redness, odor or green/yellow discharge around incision site)     Notify your health care provider if you experience any of the following:  difficulty breathing or increased cough     Notify your health care provider if you experience any of the following:  severe persistent headache     Notify your health care provider if you experience any of the following:  worsening rash     Notify your health care provider if you experience any of the following:  persistent dizziness, light-headedness, or visual disturbances     Notify your health care provider if you experience any of the following:  increased confusion or weakness     No dressing needed   Order Comments: OK to shower. Do not take a bath or swim in a pool until seen at clinic for follow up visit.     Activity as tolerated     Medications:  Reconciled Home Medications:      Medication List      START taking these medications    ondansetron 8 MG tablet  Commonly known as:  ZOFRAN  Take 1 tablet (8 mg total) by mouth every 8 (eight) hours as needed for Nausea.     oxyCODONE 5 MG immediate release tablet  Commonly known as:  ROXICODONE  Take 1 tablet (5 mg total) by mouth every 3 (three) hours as needed (mild pain 1-3).        CONTINUE taking these medications    ferrous sulfate 324 mg (65 mg iron) Tbec  Take 1 tablet (325 mg total) by mouth once daily.     lisinopril-hydrochlorothiazide 20-12.5 mg per tablet  Commonly known as:  PRINZIDE,ZESTORETIC  Take 1 tablet by mouth once daily.        STOP taking these medications    HYDROcodone-acetaminophen 5-325 mg per tablet  Commonly known as:  NORCO     metroNIDAZOLE 500 MG tablet  Commonly known as:  FLAGYL     neomycin 500 mg  Tab  Commonly known as:  MYCIFRADIN     polyethylene glycol 17 gram Pwpk  Commonly known as:  GLYCOLAX            Gabino Mari MD  Colorectal Surgery  [facility]

## 2018-09-24 ENCOUNTER — TELEPHONE (OUTPATIENT)
Dept: SURGERY | Facility: CLINIC | Age: 68
End: 2018-09-24

## 2018-09-24 NOTE — TELEPHONE ENCOUNTER
----- Message from Cat Newell sent at 9/24/2018 12:13 PM CDT -----  Contact: Pt:[phone]  .Needs Advice    Reason for call:Pt would like to know if it is ok to the ointment he received from the hospital on his wound. Pt would like to speak with the nurse.         Communication Preference:Pt:[phone]    Additional Information:

## 2018-09-24 NOTE — PHYSICIAN QUERY
"PT Name: Noah Nichole  MR #: [id number]    Physician Query Form - Hematology Clarification      CDS/: Daniella Taylor RN, CCDS               Contact information:  [email or web address]    This form is a permanent document in the medical record.      Query Date: September 24, 2018    By submitting this query, we are merely seeking further clarification of documentation. Please utilize your independent clinical judgment when addressing the question(s) below.    The Medical record contains the following:   Indicators  Supporting Clinical Findings Location in Medical Record   X "Anemia" documented -- Anemia:  Anesthesia info   X H & H =  HGB 8.3 (L) 09/11/2018     HCT 27.2 (L) 09/11/2018     Hemoglobin 6.8 - 7.4 [range]  Hematocrit  23.0 - 24.1 [range] Anesthesia info      Lab 09/19/18 - 09/21/18    BP =                     HR=      "GI bleeding" documented      Acute bleeding (Non GI site)      Transfusion(s)     X Treatment: Prescription medications   ferrous sulfate     Continue taking these medications   ferrous sulfate Anesthesia info      D/C Summary   X Other:  Colonic mass     68 y.o. male w/ a significant PMHx of HTN who was referred to the colorectal clinic from the ED for abdominal pain and anemia. Has mass at hepatic flexure on CT scan. 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 H & P    Anesthesia info     Provider, please specify diagnosis or diagnoses associated with above clinical findings.    [  ] Iron deficiency anemia    [x  ] Anemia of chronic disease ( Specify chronic disease)      [  ] Neoplastic disease      [ x ] Other (Specify) __liver transplant_____________________________     [  ] Clinically Undetermined     [  ] Other Hematological Diagnosis (please specify): _________________________________    [  ] Clinically Undetermined       Please document in your progress notes daily for the duration of treatment, until resolved, and include in your " discharge summary.

## 2018-09-24 NOTE — TELEPHONE ENCOUNTER
----- Message from Zohra Junior sent at 9/24/2018  2:19 PM CDT -----  Contact: PT   Pt was calling to see if he can eat bananas.    Pt would like a call back at [phone].    Thank You.

## 2018-09-24 NOTE — TELEPHONE ENCOUNTER
Instructed pt that he does not need to put anything on the incision. He can just wash it with soap and water and dry completely.

## 2018-09-26 NOTE — PHYSICIAN QUERY
PT Name: Noah Nichole  MR #: [id number]     Physician Query Form - Diagnosis Clarification      CDS/: Daniella Taylor RN, CCDS               Contact information:  [email or web address]    This form is a permanent document in the medical record.     Query Date: September 26, 2018    By submitting this query, we are merely seeking further clarification of documentation.  Please utilize your independent clinical judgment when addressing the question(s) below.     The medical record contains the following:      Findings Supporting Clinical Information Location in Medical Record                                           liver transplant Surgical History   History Date History Date   No past surgeries  Colonoscopy  9/14/2018     Past surgical history:  Colonoscopy  No past surgeries    Hepatic/GI:  Hepatic/GI Normal      Past medical history:  Hypertension    Colon mass.  Extended right hemicolectomy.    67 yo Male presents with a colonic mass. PMH of HTN.    [x  ] Anemia of chronic disease ( Specify chronic disease)     [ x ] Other (Specify) __liver transplant_____________________________ Anesthesia info        Anesthesia info        Anesthesia info      H & P 09/14        Op note 09/19      Discharge Summary    Query 09/24     Please clarify if the liver transplant diagnosis has been:    x[  ] Ruled Out  [  ] Clinically undetermined  [  ] Other/Clarification of findings (please specify)_______________________________    Please document in your progress notes daily for the duration of treatment, until resolved, and include in your discharge summary.    This was an error the patient did not have a transplant

## 2018-10-01 NOTE — TELEPHONE ENCOUNTER
Spoke with pt and he requested a refill on pain and nausea med. Informed pt I sent request to Dr. Cheung for approval.

## 2018-10-01 NOTE — TELEPHONE ENCOUNTER
----- Message from Daniella Pedersen sent at 10/1/2018  1:27 PM CDT -----  Contact: self - [phone]  Jonatan - has questions re his condition - please call patient at [phone]

## 2018-10-01 NOTE — TELEPHONE ENCOUNTER
----- Message from Cuca Alvarenga sent at 10/1/2018 11:28 AM CDT -----  Contact: self [phone]  Pt called stating he have a couple of questions for Uyen    Contact: self [phone]

## 2018-10-02 ENCOUNTER — TELEPHONE (OUTPATIENT)
Dept: SURGERY | Facility: CLINIC | Age: 68
End: 2018-10-02

## 2018-10-02 RX ORDER — OXYCODONE HYDROCHLORIDE 5 MG/1
5 TABLET ORAL
Qty: 30 TABLET | Refills: 0 | Status: SHIPPED | OUTPATIENT
Start: 2018-10-02 | End: 2018-10-10 | Stop reason: SDUPTHER

## 2018-10-02 RX ORDER — ONDANSETRON HYDROCHLORIDE 8 MG/1
8 TABLET, FILM COATED ORAL EVERY 8 HOURS PRN
Qty: 15 TABLET | Refills: 0 | Status: SHIPPED | OUTPATIENT
Start: 2018-10-02 | End: 2018-11-28

## 2018-10-02 NOTE — TELEPHONE ENCOUNTER
----- Message from Katia Ralf sent at 10/2/2018  9:59 AM CDT -----  Contact: [facility]    Pharmacy Calling    Reason for call:she needs to know if it's for chronic or acute pain and she needs a new Rx because the insurance is going to only cover for every 4 hours and if it's for acute pain in needs to be every 6 hours   Pharmacy Name:Jessie with [facility]   Prescription Name:oxyCODONE (ROXICODONE) 5 MG immediate release tablet  Phone Number:[phone] Information:

## 2018-10-08 ENCOUNTER — TELEPHONE (OUTPATIENT)
Dept: SURGERY | Facility: CLINIC | Age: 68
End: 2018-10-08

## 2018-10-08 NOTE — TELEPHONE ENCOUNTER
Spoke with patient. Requested earlier appointment time on Wednesday. Informed him Dr. Cheung only has afternoon clinic.

## 2018-10-08 NOTE — TELEPHONE ENCOUNTER
----- Message from Daniella Pedersen sent at 10/8/2018  1:25 PM CDT -----  Contact: [phone]  Jonatan - has questions re  His condition - please call patient at

## 2018-10-10 ENCOUNTER — OFFICE VISIT (OUTPATIENT)
Dept: SURGERY | Facility: CLINIC | Age: 68
End: 2018-10-10
Payer: MEDICARE

## 2018-10-10 VITALS
BODY MASS INDEX: 27.21 KG/M2 | SYSTOLIC BLOOD PRESSURE: 146 MMHG | WEIGHT: 195.13 LBS | HEART RATE: 55 BPM | DIASTOLIC BLOOD PRESSURE: 75 MMHG

## 2018-10-10 DIAGNOSIS — K63.89 COLONIC MASS: Primary | ICD-10-CM

## 2018-10-10 DIAGNOSIS — Z85.038 HISTORY OF COLON CANCER, STAGE II: ICD-10-CM

## 2018-10-10 DIAGNOSIS — I10 ESSENTIAL HYPERTENSION: ICD-10-CM

## 2018-10-10 PROCEDURE — 99212 OFFICE O/P EST SF 10 MIN: CPT | Mod: PBBFAC | Performed by: COLON & RECTAL SURGERY

## 2018-10-10 PROCEDURE — 99999 PR PBB SHADOW E&M-EST. PATIENT-LVL II: CPT | Mod: PBBFAC,,, | Performed by: COLON & RECTAL SURGERY

## 2018-10-10 PROCEDURE — 99024 POSTOP FOLLOW-UP VISIT: CPT | Mod: ,,, | Performed by: COLON & RECTAL SURGERY

## 2018-10-10 RX ORDER — OXYCODONE HYDROCHLORIDE 5 MG/1
5 TABLET ORAL
Qty: 24 TABLET | Refills: 0 | Status: SHIPPED | OUTPATIENT
Start: 2018-10-10 | End: 2018-12-11

## 2018-10-10 NOTE — PROGRESS NOTES
Patient ID:  Noah Nichole is a 68 y.o. male     Chief Complaint:   Chief Complaint   Patient presents with    Follow-up        HPI: 9/18/18 Ext right colectomy  Path T3N0  Doing well inreasing activity, bowels close to normal      eferred from ED. Had abdominal pain and anemia. FAmily history of colon cancer. Has mass at hepatic flexure on CT scan. No evidence of mets. Never had c-scope.     ROS:        Constitutional: No fever, chills, activity or appetite change.      HENT: No hearing loss, facial swelling, neck pain or stiffness.       Eyes: No discharge, itching and visual disturbance.      Respiratory: No apnea, cough, choking or shortness of breath.       Cardiovascular: No leg swelling or chest pain      Gastrointestinal: No abdominal distention or change in bowel habbits     Genitourinary: No dysuria, frequency or flank pain.      Musculoskeletal: No arthralgias or gait problem.      Neurological: No dizziness, seizures or weakness.      Hematological: No adenopathy.      Psychiatric/Behavioral: No hallucinations or behavioral problems.       PE:    APPEARANCE: Well nourished, well developed, in no acute distress.   CHEST: Lungs clear. Normal respiratory effort.  CARDIOVASCULAR: Normal rhythm. No edema.  ABDOMEN: Soft. No tenderness or masses.Healed midline incision  Rectum:  Normal skin,   Musculoskeletal: Symmetric, normal range of motion and strength.   Neurological: Alert and oriented to person, place, and time. Normal reflexes.   Skin: Skin is warm and dry.   Psychiatric: Normal mood and affect. Behavior is normal and appropriate.     Impression: Stage 2 colon cancer  PLAN: REsume activity, F?U in [facility] clinic in 3 months. REfil pain meds.

## 2018-10-17 NOTE — PHYSICIAN QUERY
PT Name: Noah Nichole  MR #: [id number]    Physician Query Form - Pathology Findings Clarification     CDS/: Daniella Taylor RN, CCDS              Contact information:  [email or web address]  This form is a permanent document in the medical record.     Query Date: October 17, 2018      By submitting this query, we are merely seeking further clarification of documentation.  Please utilize your independent clinical judgment when addressing the question(s) below.      The medical record contains the following:     Findings Supporting Clinical Information Location in Medical Record             FINAL PATHOLOGIC DIAGNOSIS  RIGHT COLON, TERMINAL ILEUM AND APPENDIX:  Adenocarcinoma, 6.7cm, moderately differentiated Date of procedure:  09/18/2018  Preoperative diagnosis:  Colon mass.  Postoperative diagnosis:  Colon mass.  Procedure:  Extended right hemicolectomy.    Collection Date 09/18/2018  SPECIMEN  1) Right colon and terminal ileum.       Op note 09/19          Pathology report     Please document the clinical significance of the Pathologists findings of FINAL PATHOLOGIC DIAGNOSIS  RIGHT COLON, TERMINAL ILEUM AND APPENDIX:  Adenocarcinoma, 6.7cm, moderately differentiated          [x ] I agree with the Pathology Findings        [  ] I do not agree with the Pathology Findings        [  ] Clinically Insignificant        [  ] Clinically Undetermined        [  ] Other/Clarification of Findings: ______________________________________________    Please document in your progress notes daily for the duration of treatment until resolved and include in your discharge summary.

## 2018-10-18 NOTE — PHYSICIAN QUERY
PT Name: Noah Nichole  MR #: [id number]     Physician Query Form - Documentation Clarification      CDS/: Daniella Taylor RN, CCDS               Contact information:  [email or web address]    This form is a permanent document in the medical record.     Query Date: October 18, 2018    By submitting this query, we are merely seeking further clarification of documentation. Please utilize your independent clinical judgment when addressing the question(s) below.    The Medical record reflects the following:    Supporting Clinical Findings Location in Medical Record   Date of procedure:  09/18/2018  Colon mass.  Extended right hemicolectomy.    Collection Date 09/18/2018    SPECIMEN  1) Right colon and terminal ileum.  FINAL PATHOLOGIC DIAGNOSIS  RIGHT COLON, TERMINAL ILEUM AND APPENDIX:  Adenocarcinoma, 6.7cm, moderately differentiated  Unremarkable appendix  Specimen Terminal ileum, right colon and appendix  Procedure - Right hemicolectomy  Specimen Length - 30 Cm  Tumor Site - Cecum   Op note 09/19        Pathology report                                                                                      Doctor, Please specify diagnosis or diagnoses associated with above clinical findings.  Please clarify site of primary malignancy.    Provider Use Only  Colon cancer, transverse colon                                                                                                                         [  ] Clinically undetermined

## 2018-10-19 ENCOUNTER — TELEPHONE (OUTPATIENT)
Dept: SURGERY | Facility: CLINIC | Age: 68
End: 2018-10-19

## 2018-10-19 NOTE — TELEPHONE ENCOUNTER
----- Message from Gab Pedersen sent at 10/19/2018  9:50 AM CDT -----  Contact: gab carrizales/[facility] nurse - [phone]  Jonatan - has query on chart - please call gab carrizales/[facility] nurse - [phone]

## 2018-10-19 NOTE — TELEPHONE ENCOUNTER
Spoke with Daniella Castañeda.  She would like Dr. Cheung to call her about editing notes that are conflicting in the patient record.

## 2018-10-22 ENCOUNTER — TELEPHONE (OUTPATIENT)
Dept: SURGERY | Facility: CLINIC | Age: 68
End: 2018-10-22

## 2018-10-22 NOTE — TELEPHONE ENCOUNTER
----- Message from Katia Arambula sent at 10/22/2018 11:20 AM CDT -----  Contact: Daniella Castañeda with [facility]#[phone]  Needs Advice    Reason for call:She wants to speak with you about a query on this pt         Communication Preference:call     Additional Information:she states that she send a email Friday with details to his [facility] email

## 2018-10-22 NOTE — TELEPHONE ENCOUNTER
Spoke with Daniella Jo and informed he Dr. Cheung will be out of the office this week but I will send him a message to clarify the query.

## 2018-10-23 ENCOUNTER — TELEPHONE (OUTPATIENT)
Dept: SURGERY | Facility: CLINIC | Age: 68
End: 2018-10-23

## 2018-10-23 NOTE — TELEPHONE ENCOUNTER
Spoke with pt and he wanted to schedule f/u in December instead of January. Appt made and reminder placed in mail.

## 2018-10-23 NOTE — TELEPHONE ENCOUNTER
----- Message from Cat Newell sent at 10/23/2018  2:04 PM CDT -----  Contact: Pt:[phone]  .Needs Advice    Reason for call: Pt called and states he would like to speak with the nurse in regards to scheduling a test. Pt states he is still having abnormal BM's.          Communication Preference:Pt:[phone]    Additional Information:

## 2018-10-31 NOTE — PHYSICIAN QUERY
PT Name: Noah Nichole  MR #: [id number]     Physician Query Form - Documentation Clarification      CDS/: Daniella Taylor RN, CCDS               Contact information:  [email or web address]    This form is a permanent document in the medical record.     Query Date: October 31, 2018    By submitting this query, we are merely seeking further clarification of documentation. Please utilize your independent clinical judgment when addressing the question(s) below.    The Medical record reflects the following:    Supporting Clinical Findings Location in Medical Record   Date of procedure:  09/18/2018  Colon mass.  Extended right hemicolectomy.    Collection Date 09/18/2018    SPECIMEN  1) Right colon and terminal ileum.  FINAL PATHOLOGIC DIAGNOSIS  RIGHT COLON, TERMINAL ILEUM AND APPENDIX:  Adenocarcinoma, 6.7cm, moderately differentiated  Unremarkable appendix  Specimen Terminal ileum, right colon and appendix  Procedure - Right hemicolectomy  Specimen Length - 30 Cm  Tumor Site - Cecum   Op note 09/19        Pathology report                                                                                      Doctor, Please specify diagnosis or diagnoses associated with above clinical findings.  Please clarify site of primary malignancy.    Provider Use Only      The tumor's location was proximal ascending colon. (Brief op note is most accurate)                                                                                                                   [  ] Clinically undetermined

## 2018-11-19 ENCOUNTER — TELEPHONE (OUTPATIENT)
Dept: SURGERY | Facility: CLINIC | Age: 68
End: 2018-11-19

## 2018-11-19 NOTE — TELEPHONE ENCOUNTER
----- Message from Katia Arambula sent at 11/19/2018  2:39 PM CST -----  Contact: pt [phone]  Needs Advice    Reason for call:Pt wants a sooner appt due to swallowing at surgery site         Communication Preference:call    Additional Information:

## 2018-11-28 ENCOUNTER — OFFICE VISIT (OUTPATIENT)
Dept: SURGERY | Facility: CLINIC | Age: 68
End: 2018-11-28
Payer: MEDICARE

## 2018-11-28 ENCOUNTER — TELEPHONE (OUTPATIENT)
Dept: SURGERY | Facility: CLINIC | Age: 68
End: 2018-11-28

## 2018-11-28 VITALS
HEART RATE: 68 BPM | WEIGHT: 201.25 LBS | SYSTOLIC BLOOD PRESSURE: 147 MMHG | DIASTOLIC BLOOD PRESSURE: 68 MMHG | BODY MASS INDEX: 28.07 KG/M2

## 2018-11-28 DIAGNOSIS — D49.89 NEOPLASM OF ABDOMEN: ICD-10-CM

## 2018-11-28 PROCEDURE — 99999 PR PBB SHADOW E&M-EST. PATIENT-LVL II: CPT | Mod: PBBFAC,,, | Performed by: COLON & RECTAL SURGERY

## 2018-11-28 PROCEDURE — 99024 POSTOP FOLLOW-UP VISIT: CPT | Mod: S$GLB,,, | Performed by: COLON & RECTAL SURGERY

## 2018-11-28 RX ORDER — HYDROCODONE BITARTRATE AND ACETAMINOPHEN 5; 325 MG/1; MG/1
1 TABLET ORAL EVERY 6 HOURS PRN
Qty: 40 TABLET | Refills: 0 | Status: SHIPPED | OUTPATIENT
Start: 2018-11-28 | End: 2019-12-01

## 2018-11-28 NOTE — PROGRESS NOTES
Patient ID:  Noah Nichole is a 68 y.o. male     Chief Complaint:   No chief complaint on file.       HPI: 9/18/18 Ext right colectomy  Path T3N0  Doing well inreasing activity, bowels close to normal. IS concerned about wound and desiresa a CT scan      eferred from ED. Had abdominal pain and anemia. Family history of colon cancer. Has mass at hepatic flexure on CT scan. No evidence of mets. Never had c-scope.     ROS:        Constitutional: No fever, chills, activity or appetite change.      HENT: No hearing loss, facial swelling, neck pain or stiffness.       Eyes: No discharge, itching and visual disturbance.      Respiratory: No apnea, cough, choking or shortness of breath.       Cardiovascular: No leg swelling or chest pain      Gastrointestinal: No abdominal distention or change in bowel habbits     Genitourinary: No dysuria, frequency or flank pain.      Musculoskeletal: No arthralgias or gait problem.      Neurological: No dizziness, seizures or weakness.      Hematological: No adenopathy.      Psychiatric/Behavioral: No hallucinations or behavioral problems.       PE:    APPEARANCE: Well nourished, well developed, in no acute distress.   CHEST: Lungs clear. Normal respiratory effort.  CARDIOVASCULAR: Normal rhythm. No edema.  ABDOMEN: Soft. No tenderness or masses.Healed midline incision  Rectum:  Normal skin,   Musculoskeletal: Symmetric, normal range of motion and strength.   Neurological: Alert and oriented to person, place, and time. Normal reflexes.   Skin: Skin is warm and dry.   Psychiatric: Normal mood and affect. Behavior is normal and appropriate.     Impression: Stage 2 colon cancer  PLAN: REsume activity,  CT scan, refil pain meds.

## 2018-11-28 NOTE — TELEPHONE ENCOUNTER
----- Message from Cat Newell sent at 11/28/2018  3:36 PM CST -----  Contact: [facility] :[phone]   .Needs Advice    Reason for call:[facility] called and states she would like to speak with the nurse to get the diagnosis code for the pt RxHYDROcodone-acetaminophen (NORCO) 5-325 mg per tablet         Communication Preference:[facility] :[phone]     Additional Information:

## 2018-11-29 ENCOUNTER — TELEPHONE (OUTPATIENT)
Dept: SURGERY | Facility: CLINIC | Age: 68
End: 2018-11-29

## 2018-11-29 NOTE — TELEPHONE ENCOUNTER
----- Message from Cuca Charles MA sent at 11/29/2018 12:12 PM CST -----  Contact: self  [phone]  Needs Advice    Reason for call:  You were to call my medication in on Wednesday, 11-28-18 and they don't have it.  This is for the HYDROcodone-acetaminophen (NORCO) 5-325 mg per tablet        Communication Preference:  Phone# above    Additional Information:  [facility] [address] [place] [address] AT [facility] [address] [phone] (Phone)  [phone] (Fax)    Please call me when done

## 2018-11-29 NOTE — TELEPHONE ENCOUNTER
Spoke with patient and informed him that [facility] has the information needed to fill his prescription.

## 2018-12-07 ENCOUNTER — TELEPHONE (OUTPATIENT)
Dept: SURGERY | Facility: CLINIC | Age: 68
End: 2018-12-07

## 2018-12-07 NOTE — TELEPHONE ENCOUNTER
----- Message from Cat Newell sent at 12/7/2018  2:19 PM CST -----  Contact: Pt:[phone]  .Needs Advice    Reason for call:Pt called and states he would like to speak with the nurse in regards to having blood in his stool and pt states he had surgery with .        Communication Preference:Pt:[phone]    Additional Information:

## 2018-12-07 NOTE — TELEPHONE ENCOUNTER
Spoke with patient and informed him that CLEOPATRA Reece NP can see him on Tuesday 12/11 at 1:20.

## 2018-12-11 ENCOUNTER — OFFICE VISIT (OUTPATIENT)
Dept: SURGERY | Facility: CLINIC | Age: 68
End: 2018-12-11
Payer: MEDICARE

## 2018-12-11 VITALS
WEIGHT: 202.63 LBS | BODY MASS INDEX: 28.37 KG/M2 | DIASTOLIC BLOOD PRESSURE: 68 MMHG | HEIGHT: 71 IN | SYSTOLIC BLOOD PRESSURE: 159 MMHG | HEART RATE: 56 BPM

## 2018-12-11 DIAGNOSIS — Z85.038 HISTORY OF COLON CANCER, STAGE II: Primary | ICD-10-CM

## 2018-12-11 PROCEDURE — 3077F SYST BP >= 140 MM HG: CPT | Mod: CPTII,S$GLB,, | Performed by: NURSE PRACTITIONER

## 2018-12-11 PROCEDURE — 3078F DIAST BP <80 MM HG: CPT | Mod: CPTII,S$GLB,, | Performed by: NURSE PRACTITIONER

## 2018-12-11 PROCEDURE — 1101F PT FALLS ASSESS-DOCD LE1/YR: CPT | Mod: CPTII,S$GLB,, | Performed by: NURSE PRACTITIONER

## 2018-12-11 PROCEDURE — 99024 POSTOP FOLLOW-UP VISIT: CPT | Mod: S$GLB,,, | Performed by: NURSE PRACTITIONER

## 2018-12-11 PROCEDURE — 99999 PR PBB SHADOW E&M-EST. PATIENT-LVL III: CPT | Mod: PBBFAC,,, | Performed by: NURSE PRACTITIONER

## 2018-12-11 NOTE — PROGRESS NOTES
"Subjective:       Patient ID: Noah Nichole is a 68 y.o. male.    Chief Complaint: No chief complaint on file.    HPI   68 M s/p R hemicolectomy 09/2018 for stage II colon cancer by Dr Cheung who presents for rectal bleeding. Patient reports a "speck" of blood in stool one two separate occasions. Denies any rectal pain, abdominal pain. No longer taking narcotics. Started stool softeners yesterday.       Review of Systems   Constitutional: Negative for appetite change, chills, fatigue, fever and unexpected weight change.   Respiratory: Negative for shortness of breath.    Cardiovascular: Negative for chest pain.   Gastrointestinal: Positive for blood in stool. Negative for abdominal distention, abdominal pain, anal bleeding, constipation, diarrhea, nausea, rectal pain and vomiting.       Objective:      Physical Exam   Constitutional: He is oriented to person, place, and time. He appears well-developed and well-nourished.   Eyes: Conjunctivae and EOM are normal.   Pulmonary/Chest: Effort normal. No respiratory distress.   Abdominal: Soft. He exhibits no distension. There is no tenderness.   Genitourinary: Rectal exam shows no mass and no tenderness.   Genitourinary Comments: Normal perianal skin. eversion of anus revealed no abnormality or fissure, BLADIMIR revealed no masses, blood or stool in vault, normal sphincter tone, anoscopy revealed inflamed hemorrhoids with no bleeding or stigmata of same.     Musculoskeletal: Normal range of motion.   Neurological: He is alert and oriented to person, place, and time.   Skin: Skin is warm and dry.   Psychiatric: He has a normal mood and affect. His behavior is normal.       Assessment:       1. History of colon cancer, stage II        Plan:       Patient reassured  Continue stool softeners, high fiber diet  Repeat colonoscopy 09/2019  rtc if s/s persist or worsen  "

## 2019-03-14 ENCOUNTER — TELEPHONE (OUTPATIENT)
Dept: SURGERY | Facility: HOSPITAL | Age: 69
End: 2019-03-14

## 2019-03-14 NOTE — TELEPHONE ENCOUNTER
Ml on vm that May was timed per Dr. Cheung request but if there are issues I can see him earlier, call office if desired to make appt sooner

## 2019-03-14 NOTE — TELEPHONE ENCOUNTER
----- Message from Elba Garcia sent at 3/14/2019  3:17 PM CDT -----  Contact: pt: [phone]  Needs Advice    Reason for call:pt would like to be see sooner than scheduled 5/1 appt         Communication Preference:pt: [phone]

## 2019-04-29 ENCOUNTER — TELEPHONE (OUTPATIENT)
Dept: SURGERY | Facility: HOSPITAL | Age: 69
End: 2019-04-29

## 2019-04-29 NOTE — TELEPHONE ENCOUNTER
Ml on vm that May 1 appt will have to be cancelled  Asked to please call office for survivroship appt that could any afternoon next week or this Thursday

## 2019-05-02 ENCOUNTER — OFFICE VISIT (OUTPATIENT)
Dept: SURGERY | Facility: CLINIC | Age: 69
End: 2019-05-02
Payer: MEDICARE

## 2019-05-02 DIAGNOSIS — C18.3 MALIGNANT NEOPLASM OF HEPATIC FLEXURE: Primary | ICD-10-CM

## 2019-05-02 PROCEDURE — 99999 PR PBB SHADOW E&M-EST. PATIENT-LVL I: CPT | Mod: PBBFAC,,, | Performed by: NURSE PRACTITIONER

## 2019-05-02 PROCEDURE — 1101F PT FALLS ASSESS-DOCD LE1/YR: CPT | Mod: CPTII,S$GLB,, | Performed by: NURSE PRACTITIONER

## 2019-05-02 PROCEDURE — 99215 OFFICE O/P EST HI 40 MIN: CPT | Mod: S$GLB,,, | Performed by: NURSE PRACTITIONER

## 2019-05-02 PROCEDURE — 99215 PR OFFICE/OUTPT VISIT, EST, LEVL V, 40-54 MIN: ICD-10-PCS | Mod: S$GLB,,, | Performed by: NURSE PRACTITIONER

## 2019-05-02 PROCEDURE — 1101F PR PT FALLS ASSESS DOC 0-1 FALLS W/OUT INJ PAST YR: ICD-10-PCS | Mod: CPTII,S$GLB,, | Performed by: NURSE PRACTITIONER

## 2019-05-02 PROCEDURE — 99999 PR PBB SHADOW E&M-EST. PATIENT-LVL I: ICD-10-PCS | Mod: PBBFAC,,, | Performed by: NURSE PRACTITIONER

## 2019-05-02 NOTE — PROGRESS NOTES
Subjective:        ROS      Objective:      Physical Exam   Assessment:            Plan:           No follow-ups on file.

## 2019-05-14 NOTE — PROGRESS NOTES
69 year old male here for survivorship clinic for a T3NO colon cancer       He was referred from the [facility] 2018, had abd pain and anemia, family hx of colon cancer, CT showed mass at hepatic flexture, FU colonsocpy 9/14/18:    A frond-like/villous, fungating, infiltrative, polypoid and        ulcerated partially obstructing large mass was found at the hepatic        flexure. The mass was circumferential. No bleeding was present. The        polyp was removed with a cold snare. Polyp resection was incomplete.        The resected tissue was retrieved.       The exam was otherwise without abnormality  [facility] for a T3NO colon cancer  Staging CT scan 9/11/18 no evidence of metastatic dx  Last saw Dr. Cheung 11/28/19 doing well, having stools, good apetite   FU CT 11/29/18 no evidence of metastatic dx  Interval removal of large colonic mass with no evidence of residual mass or lymphadenopathy.    Today here is clinic he is doing well, daily soft stools, appetite good, has gained some weight, energy level good  0 score on anxiety chart     ROS:        Constitutional: No fever, chills, activity or appetite change.      HENT: No hearing loss, facial swelling, neck pain or stiffness.       Eyes: No discharge, itching and visual disturbance.      Respiratory: No apnea, cough, choking or shortness of breath.       Cardiovascular: No leg swelling or chest pain      Gastrointestinal: No abdominal distention or change in bowel habbits     Genitourinary: No dysuria, frequency or flank pain.      Musculoskeletal: No arthralgias or gait problem.      Neurological: No dizziness, seizures or weakness.      Hematological: No adenopathy.      Psychiatric/Behavioral: No hallucinations or behavioral problems.         PE:    APPEARANCE: Well nourished, well developed, in no acute distress.   CHEST: Lungs clear. Normal respiratory effort.  CARDIOVASCULAR: Normal rhythm. No edema.  ABDOMEN: Soft. No tenderness or  masses.Healed midline incision  Musculoskeletal: Symmetric, normal range of motion and strength.   Neurological: Alert and oriented to person, place, and time. Normal reflexes.   Skin: Skin is warm and dry.   Psychiatric: Normal mood and affect. Behavior is normal and appropriate.      Impression: Stage 2 colon cancer     Plan  cea today  3.2  Will need fu colonoscopy and CT in Sept 2019  [facility]     Greater then 50 minutes spent on reviewing chart, labs, xyrays, surgery and path report, face to face encounter, H&P, and discussion involving plan for survivorship

## 2019-06-17 ENCOUNTER — TELEPHONE (OUTPATIENT)
Dept: SURGERY | Facility: CLINIC | Age: 69
End: 2019-06-17

## 2019-06-17 DIAGNOSIS — C18.2 MALIGNANT NEOPLASM OF ASCENDING COLON: Primary | ICD-10-CM

## 2019-06-17 NOTE — TELEPHONE ENCOUNTER
----- Message from Carolyn Flanagan NP sent at 6/17/2019  7:40 AM CDT -----  Contact: [phone]  I put an order for a ct scan for this pt, could you please schedule and call him with time, garth   ----- Message -----  From: Lou Golden MA  Sent: 6/14/2019  10:49 AM  To: Carolyn Flanagan NP    Needs Advice    Reason for call: in Sept 2018 pt has a mass removed from his clon, pt states that recently he has had a increase in his weight and gets full fast after eating. He is wanting to know if he can get a test ordered to see if anything is going on internally.        Communication Preference: [phone]    Additional Information: prev pt of Dr Cheung, said that Carolyn has taken over his care.

## 2019-06-17 NOTE — TELEPHONE ENCOUNTER
Spoke with patient.  Informed him of ct scan appointment on Friday 6/21 @ 4:30.  Instructed him to stop eating and drinking by 12:30pm.  [facility] 3:20pm.

## 2019-06-21 ENCOUNTER — HOSPITAL ENCOUNTER (OUTPATIENT)
Dept: RADIOLOGY | Facility: HOSPITAL | Age: 69
Discharge: HOME | End: 2019-06-21
Attending: NURSE PRACTITIONER
Payer: MEDICARE

## 2019-06-21 DIAGNOSIS — C18.2 MALIGNANT NEOPLASM OF ASCENDING COLON: ICD-10-CM

## 2019-06-21 LAB
CREAT SERPL-MCNC: 1.1 MG/DL (ref 0.5–1.4)
SAMPLE: NORMAL

## 2019-06-21 PROCEDURE — 74177 CT ABD & PELVIS W/CONTRAST: CPT | Mod: 26,,, | Performed by: RADIOLOGY

## 2019-06-21 PROCEDURE — 25500020 PHARM REV CODE 255: Performed by: NURSE PRACTITIONER

## 2019-06-21 PROCEDURE — 74177 CT ABDOMEN PELVIS WITH CONTRAST: ICD-10-PCS | Mod: 26,,, | Performed by: RADIOLOGY

## 2019-06-21 PROCEDURE — 74177 CT ABD & PELVIS W/CONTRAST: CPT | Mod: TC

## 2019-06-21 RX ADMIN — IOHEXOL 100 ML: 350 INJECTION, SOLUTION INTRAVENOUS at 05:06

## 2019-06-21 RX ADMIN — IOHEXOL 15 ML: 350 INJECTION, SOLUTION INTRAVENOUS at 04:06

## 2019-06-21 RX ADMIN — IOHEXOL 15 ML: 350 INJECTION, SOLUTION INTRAVENOUS at 03:06

## 2019-07-14 ENCOUNTER — PATIENT MESSAGE (OUTPATIENT)
Dept: ENDOSCOPY | Facility: HOSPITAL | Age: 69
End: 2019-07-14

## 2019-09-10 DIAGNOSIS — Z85.038 HX OF COLON CANCER, STAGE I: Primary | ICD-10-CM

## 2019-09-11 ENCOUNTER — TELEPHONE (OUTPATIENT)
Dept: ENDOSCOPY | Facility: HOSPITAL | Age: 69
End: 2019-09-11

## 2019-09-11 ENCOUNTER — TELEPHONE (OUTPATIENT)
Dept: SURGERY | Facility: HOSPITAL | Age: 69
End: 2019-09-11

## 2019-09-11 DIAGNOSIS — Z12.11 SPECIAL SCREENING FOR MALIGNANT NEOPLASMS, COLON: Primary | ICD-10-CM

## 2019-09-11 RX ORDER — POLYETHYLENE GLYCOL 3350, SODIUM SULFATE ANHYDROUS, SODIUM BICARBONATE, SODIUM CHLORIDE, POTASSIUM CHLORIDE 236; 22.74; 6.74; 5.86; 2.97 G/4L; G/4L; G/4L; G/4L; G/4L
4 POWDER, FOR SOLUTION ORAL ONCE
Qty: 4000 ML | Refills: 0 | Status: SHIPPED | OUTPATIENT
Start: 2019-09-11 | End: 2019-09-11

## 2019-09-11 NOTE — TELEPHONE ENCOUNTER
Order for scope in computer, message sent to schedulers to schedule scope this month with any doctor

## 2019-09-11 NOTE — TELEPHONE ENCOUNTER
----- Message from Zeny Hernandez sent at 9/10/2019  9:46 AM CDT -----  Contact: pt   Patient Requesting Sooner Appointment.     Reason for sooner appt.: pt is calling to speak with the nurse to see when he will be scheduled for a colonoscopy this month pt was told by Dr Cheung the las time he was seen   When is the first available appointment? N/A  Communication Preference: can you please call pt at [phone]  Additional Information: none    [facility]

## 2019-09-23 ENCOUNTER — TELEPHONE (OUTPATIENT)
Dept: SURGERY | Facility: HOSPITAL | Age: 69
End: 2019-09-23

## 2019-09-23 NOTE — TELEPHONE ENCOUNTER
----- Message from Elba Garcia sent at 9/23/2019  1:14 PM CDT -----  Contact: pt: [phone]  Pt would like to speak with Carolyn     Please contact pt: [phone]

## 2019-09-23 NOTE — TELEPHONE ENCOUNTER
Having scope on Oct 11, wanted to let me know he had some diaerrhea once after eating some corn flakes, kirk coates after scope

## 2019-10-02 ENCOUNTER — TELEPHONE (OUTPATIENT)
Dept: FAMILY MEDICINE | Facility: CLINIC | Age: 69
End: 2019-10-02

## 2019-10-02 DIAGNOSIS — D50.9 MICROCYTIC HYPOCHROMIC ANEMIA: ICD-10-CM

## 2019-10-02 DIAGNOSIS — I10 ESSENTIAL HYPERTENSION: ICD-10-CM

## 2019-10-02 RX ORDER — LISINOPRIL AND HYDROCHLOROTHIAZIDE 12.5; 2 MG/1; MG/1
1 TABLET ORAL DAILY
Qty: 90 TABLET | Refills: 4 | Status: SHIPPED | OUTPATIENT
Start: 2019-10-02 | End: 2020-02-05 | Stop reason: SDUPTHER

## 2019-10-02 RX ORDER — LISINOPRIL AND HYDROCHLOROTHIAZIDE 12.5; 2 MG/1; MG/1
1 TABLET ORAL DAILY
Qty: 90 TABLET | Refills: 4 | Status: SHIPPED | OUTPATIENT
Start: 2019-10-02 | End: 2019-10-02 | Stop reason: SDUPTHER

## 2019-10-02 RX ORDER — FERROUS SULFATE 324(65)MG
325 TABLET, DELAYED RELEASE (ENTERIC COATED) ORAL DAILY
Qty: 100 TABLET | Refills: 3 | Status: SHIPPED | OUTPATIENT
Start: 2019-10-02 | End: 2019-12-01

## 2019-10-02 RX ORDER — FERROUS SULFATE 324(65)MG
325 TABLET, DELAYED RELEASE (ENTERIC COATED) ORAL DAILY
Qty: 100 TABLET | Refills: 3 | Status: SHIPPED | OUTPATIENT
Start: 2019-10-02 | End: 2019-10-02 | Stop reason: SDUPTHER

## 2019-10-02 RX ORDER — DICLOFENAC SODIUM 75 MG/1
75 TABLET, DELAYED RELEASE ORAL 2 TIMES DAILY
Qty: 60 TABLET | Refills: 0 | Status: SHIPPED | OUTPATIENT
Start: 2019-10-02 | End: 2019-10-02 | Stop reason: SDUPTHER

## 2019-10-02 RX ORDER — DICLOFENAC SODIUM 75 MG/1
75 TABLET, DELAYED RELEASE ORAL 2 TIMES DAILY
Qty: 60 TABLET | Refills: 0 | Status: SHIPPED | OUTPATIENT
Start: 2019-10-02 | End: 2019-12-01

## 2019-10-02 NOTE — TELEPHONE ENCOUNTER
----- Message from Suha Vidales MA sent at 10/2/2019  9:27 AM CDT -----  Contact: pt      ----- Message -----  From: Allsysa Haq  Sent: 10/2/2019   9:05 AM CDT  To: Latia PISANO [facility]- [phone]- lisinopril= htcz    Pt has a appt but will be out of his meds       Pt - [phone]

## 2019-10-02 NOTE — TELEPHONE ENCOUNTER
Called pt and let him know him medications were sent to Pharmacy for him he verbalized understanding.

## 2019-10-09 ENCOUNTER — TELEPHONE (OUTPATIENT)
Dept: ENDOSCOPY | Facility: HOSPITAL | Age: 69
End: 2019-10-09

## 2019-10-09 DIAGNOSIS — Z12.11 SPECIAL SCREENING FOR MALIGNANT NEOPLASMS, COLON: Primary | ICD-10-CM

## 2019-10-09 RX ORDER — POLYETHYLENE GLYCOL 3350, SODIUM SULFATE ANHYDROUS, SODIUM BICARBONATE, SODIUM CHLORIDE, POTASSIUM CHLORIDE 236; 22.74; 6.74; 5.86; 2.97 G/4L; G/4L; G/4L; G/4L; G/4L
4 POWDER, FOR SOLUTION ORAL ONCE
Qty: 4000 ML | Refills: 0 | Status: SHIPPED | OUTPATIENT
Start: 2019-10-09 | End: 2019-10-09

## 2019-10-11 ENCOUNTER — ANESTHESIA EVENT (OUTPATIENT)
Dept: ENDOSCOPY | Facility: HOSPITAL | Age: 69
End: 2019-10-11
Payer: MEDICARE

## 2019-10-11 ENCOUNTER — HOSPITAL ENCOUNTER (OUTPATIENT)
Facility: HOSPITAL | Age: 69
Discharge: HOME | End: 2019-10-11
Attending: COLON & RECTAL SURGERY | Admitting: COLON & RECTAL SURGERY
Payer: MEDICARE

## 2019-10-11 ENCOUNTER — ANESTHESIA (OUTPATIENT)
Dept: ENDOSCOPY | Facility: HOSPITAL | Age: 69
End: 2019-10-11
Payer: MEDICARE

## 2019-10-11 VITALS
HEIGHT: 71 IN | SYSTOLIC BLOOD PRESSURE: 140 MMHG | WEIGHT: 220 LBS | RESPIRATION RATE: 18 BRPM | TEMPERATURE: 98 F | BODY MASS INDEX: 30.8 KG/M2 | OXYGEN SATURATION: 99 % | HEART RATE: 61 BPM | DIASTOLIC BLOOD PRESSURE: 70 MMHG

## 2019-10-11 DIAGNOSIS — Z85.038 HISTORY OF COLON CANCER: ICD-10-CM

## 2019-10-11 DIAGNOSIS — Z85.038 HISTORY OF COLON CANCER, STAGE II: Primary | ICD-10-CM

## 2019-10-11 LAB — CEA SERPL-MCNC: 2.5 NG/ML (ref 0–5)

## 2019-10-11 PROCEDURE — 88305 TISSUE SPECIMEN TO PATHOLOGY - SURGERY: ICD-10-PCS | Mod: 26,,, | Performed by: PATHOLOGY

## 2019-10-11 PROCEDURE — E9220 PRA ENDO ANESTHESIA: HCPCS | Mod: PT,,, | Performed by: NURSE ANESTHETIST, CERTIFIED REGISTERED

## 2019-10-11 PROCEDURE — 27201012 HC FORCEPS, HOT/COLD, DISP: Performed by: COLON & RECTAL SURGERY

## 2019-10-11 PROCEDURE — 45380 COLONOSCOPY AND BIOPSY: CPT | Performed by: COLON & RECTAL SURGERY

## 2019-10-11 PROCEDURE — 63600175 PHARM REV CODE 636 W HCPCS: Performed by: COLON & RECTAL SURGERY

## 2019-10-11 PROCEDURE — 88305 TISSUE EXAM BY PATHOLOGIST: CPT | Performed by: PATHOLOGY

## 2019-10-11 PROCEDURE — 37000009 HC ANESTHESIA EA ADD 15 MINS: Performed by: COLON & RECTAL SURGERY

## 2019-10-11 PROCEDURE — 63600175 PHARM REV CODE 636 W HCPCS: Performed by: NURSE ANESTHETIST, CERTIFIED REGISTERED

## 2019-10-11 PROCEDURE — 45380 COLONOSCOPY AND BIOPSY: CPT | Mod: PT,,, | Performed by: COLON & RECTAL SURGERY

## 2019-10-11 PROCEDURE — 45380 PR COLONOSCOPY,BIOPSY: ICD-10-PCS | Mod: PT,,, | Performed by: COLON & RECTAL SURGERY

## 2019-10-11 PROCEDURE — 82378 CARCINOEMBRYONIC ANTIGEN: CPT

## 2019-10-11 PROCEDURE — E9220 PRA ENDO ANESTHESIA: ICD-10-PCS | Mod: PT,,, | Performed by: NURSE ANESTHETIST, CERTIFIED REGISTERED

## 2019-10-11 PROCEDURE — 37000008 HC ANESTHESIA 1ST 15 MINUTES: Performed by: COLON & RECTAL SURGERY

## 2019-10-11 RX ORDER — SODIUM CHLORIDE 9 MG/ML
INJECTION, SOLUTION INTRAVENOUS CONTINUOUS
Status: DISCONTINUED | OUTPATIENT
Start: 2019-10-11 | End: 2019-10-11 | Stop reason: HOSPADM

## 2019-10-11 RX ORDER — LIDOCAINE HCL/PF 100 MG/5ML
SYRINGE (ML) INTRAVENOUS
Status: DISCONTINUED | OUTPATIENT
Start: 2019-10-11 | End: 2019-10-11

## 2019-10-11 RX ORDER — PROPOFOL 10 MG/ML
VIAL (ML) INTRAVENOUS CONTINUOUS PRN
Status: DISCONTINUED | OUTPATIENT
Start: 2019-10-11 | End: 2019-10-11

## 2019-10-11 RX ORDER — PROPOFOL 10 MG/ML
VIAL (ML) INTRAVENOUS
Status: DISCONTINUED | OUTPATIENT
Start: 2019-10-11 | End: 2019-10-11

## 2019-10-11 RX ADMIN — PROPOFOL 90 MG: 10 INJECTION, EMULSION INTRAVENOUS at 09:10

## 2019-10-11 RX ADMIN — LIDOCAINE HYDROCHLORIDE 75 MG: 20 INJECTION, SOLUTION INTRAVENOUS at 09:10

## 2019-10-11 RX ADMIN — PROPOFOL 200 MCG/KG/MIN: 10 INJECTION, EMULSION INTRAVENOUS at 09:10

## 2019-10-11 RX ADMIN — SODIUM CHLORIDE: 0.9 INJECTION, SOLUTION INTRAVENOUS at 08:10

## 2019-10-11 NOTE — ANESTHESIA POSTPROCEDURE EVALUATION
Anesthesia Post Evaluation    Patient: Noah Nichole    Procedure(s) Performed: Procedure(s) (LRB):  COLONOSCOPY (N/A)    Final Anesthesia Type: general  Patient location during evaluation: PACU  Patient participation: Yes- Able to Participate  Level of consciousness: awake and alert  Post-procedure vital signs: reviewed and stable  Pain management: adequate  Airway patency: patent  PONV status at discharge: No PONV  Anesthetic complications: no      Cardiovascular status: blood pressure returned to baseline  Respiratory status: unassisted  Hydration status: euvolemic  Follow-up not needed.          Vitals Value Taken Time   /70 10/11/2019  9:57 AM   Temp 36.8 °C (98.2 °F) 10/11/2019  9:37 AM   Pulse 61 10/11/2019  9:57 AM   Resp 18 10/11/2019  9:57 AM   SpO2 99 % 10/11/2019  9:57 AM         Event Time     Out of Recovery 10:16:47          Pain/Mary Score: Mary Score: 10 (10/11/2019  9:57 AM)

## 2019-10-11 NOTE — ANESTHESIA PREPROCEDURE EVALUATION
10/11/2019  Noah Nichole is a 69 y.o., male.    Anesthesia Evaluation    I have reviewed the Patient Summary Reports.     I have reviewed the Medications.     Review of Systems  Anesthesia Hx:  No problems with previous Anesthesia Denies Hx of Anesthetic complications   Denies Personal Hx of Anesthesia complications.   Hematology/Oncology:  Hematology Normal   Oncology Normal     EENT/Dental:EENT/Dental Normal   Cardiovascular:   Exercise tolerance: good Hypertension    Pulmonary:  Pulmonary Normal    Renal/:  Renal/ Normal     Hepatic/GI:  Hepatic/GI Normal    Musculoskeletal:  Musculoskeletal Normal    Neurological:  Neurology Normal    Endocrine:  Endocrine Normal    Dermatological:  Skin Normal    Psych:  Psychiatric Normal           Physical Exam  General:  Well nourished    Airway/Jaw/Neck:  Airway Findings: Mouth Opening: Normal Tongue: Normal  General Airway Assessment: Adult  Mallampati: II  Improves to II with phonation.  TM Distance: Normal, at least 6 cm  Jaw/Neck Findings:  Neck ROM: Normal ROM     Eyes/Ears/Nose:  EYES/EARS/NOSE FINDINGS: Normal   Dental:  Dental Findings: Upper front caps   Chest/Lungs:  Chest/Lungs Clear    Heart/Vascular:  Heart Findings: Normal Heart murmur: negative Vascular Findings: Normal    Abdomen:  Abdomen Findings: Normal    Musculoskeletal:  Musculoskeletal Findings: Normal   Skin:  Skin Findings: Normal    Mental Status:  Mental Status Findings: Normal        Anesthesia Plan  Type of Anesthesia, risks & benefits discussed:  Anesthesia Type:  general  Patient's Preference: General  Intra-op Monitoring Plan: standard ASA monitors  Intra-op Monitoring Plan Comments:   Post Op Pain Control Plan:   Post Op Pain Control Plan Comments:   Induction:   IV  Beta Blocker:  Patient is not currently on a Beta-Blocker (No further documentation required).       Informed  Consent: Patient understands risks and agrees with Anesthesia plan.  Questions answered. Anesthesia consent signed with patient.  ASA Score: 2     Day of Surgery Review of History & Physical:    H&P update referred to the surgeon.         Ready For Surgery From Anesthesia Perspective.

## 2019-10-11 NOTE — DISCHARGE INSTRUCTIONS

## 2019-10-11 NOTE — [employer] PATIENT INSTRUCTIONS
Discharge Summary/Instructions after an Endoscopic Procedure  Patient Name: Noah Nichole  Patient MRN: [id number]  Patient YOB: 1950  Friday, October 11, 2019  Gabino Bonilla MD  RESTRICTIONS:  During your procedure today, you received medications for sedation.  These   medications may affect your judgment, balance and coordination.  Therefore,   for 24 hours, you have the following restrictions:   - DO NOT drive a car, operate machinery, make legal/financial decisions,   sign important papers or drink alcohol.    ACTIVITY:  Today: no heavy lifting, straining or running due to procedural   sedation/anesthesia.  The following day: return to full activity including work.  DIET:  Eat and drink normally unless instructed otherwise.     TREATMENT FOR COMMON SIDE EFFECTS:  - Mild abdominal pain, nausea, belching, bloating or excessive gas:  rest,   eat lightly and use a heating pad.  - Sore Throat: treat with throat lozenges and/or gargle with warm salt   water.  - Because air was used during the procedure, expelling large amounts of air   from your rectum or belching is normal.  - If a bowel prep was taken, you may not have a bowel movement for 1-3 days.    This is normal.  SYMPTOMS TO WATCH FOR AND REPORT TO YOUR PHYSICIAN:  1. Abdominal pain or bloating, other than gas cramps.  2. Chest pain.  3. Back pain.  4. Signs of infection such as: chills or fever occurring within 24 hours   after the procedure.  5. Rectal bleeding, which would show as bright red, maroon, or black stools.   (A tablespoon of blood from the rectum is not serious, especially if   hemorrhoids are present.)  6. Vomiting.  7. Weakness or dizziness.  GO DIRECTLY TO THE NEAREST EMERGENCY ROOM IF YOU HAVE ANY OF THE FOLLOWING:      Difficulty breathing              Chills and/or fever over 101 F   Persistent vomiting and/or vomiting blood   Severe abdominal pain   Severe chest pain   Black, tarry stools   Bleeding- more than one  tablespoon   Any other symptom or condition that you feel may need urgent attention  Your doctor recommends these additional instructions:  If any biopsies were taken, your doctors clinic will contact you in 1 to 2   weeks with any results.  - Discharge patient to home (ambulatory).   - Resume previous diet.   - Continue present medications.   - Await pathology results.   - Repeat colonoscopy in 3 years for surveillance based on personal history   of colon cancer.  For questions, problems or results please call your physician - Gabino Bonilla MD at Work:  [phone].  [facility], EMERGENCY ROOM PHONE NUMBER: [phone]  IF A COMPLICATION OR EMERGENCY SITUATION ARISES AND YOU ARE UNABLE TO REACH   YOUR PHYSICIAN - GO DIRECTLY TO THE EMERGENCY ROOM.  Gabino Bonilla MD  10/11/2019 9:38:18 AM  This report has been verified and signed electronically.  [employer]

## 2019-10-11 NOTE — TRANSFER OF CARE
"Anesthesia Transfer of Care Note    Patient: Noah Nichole    Procedure(s) Performed: Procedure(s) (LRB):  COLONOSCOPY (N/A)    Patient location: PACU    Anesthesia Type: general    Transport from OR: Transported from OR on room air with adequate spontaneous ventilation    Post pain: adequate analgesia    Post assessment: no apparent anesthetic complications    Post vital signs: stable    Level of consciousness: awake    Nausea/Vomiting: no nausea/vomiting    Complications: none    Transfer of care protocol was followed      Last vitals:   Visit Vitals  BP (!) 155/55 (BP Location: Left arm, Patient Position: Lying)   Pulse 64   Temp 36.8 °C (98.2 °F) (Temporal)   Resp 17   Ht 5' 11" (1.803 m)   Wt 99.8 kg (220 lb)   SpO2 99%   BMI 30.68 kg/m²     "

## 2019-10-11 NOTE — H&P
COLONOSCOPY HISTORY AND PHYSICAL EXAM    Procedure : Colonoscopy      INDICATIONS: personal history of colon cancer    Family Hx of CRC: no    Last Colonoscopy:  2018  Findings: R colon cancer (no s/p R colectomy)       Past Medical History:   Diagnosis Date    Anemia     Colon cancer     Hypertension      Sedation Problems: NO  Family History   Problem Relation Age of Onset    Hypertension Mother     Cancer Father         stomach    Hypertension Father     Cancer Sister         breast    Hypertension Brother      Fam Hx of Sedation Problems: NO  Social History     Socioeconomic History    Marital status: Single     Spouse name: Not on file    Number of children: Not on file    Years of education: Not on file    Highest education level: Not on file   Occupational History    Not on file   Social Needs    Financial resource strain: Not on file    Food insecurity:     Worry: Not on file     Inability: Not on file    Transportation needs:     Medical: Not on file     Non-medical: Not on file   Tobacco Use    Smoking status: Never Smoker    Smokeless tobacco: Never Used   Substance and Sexual Activity    Alcohol use: Not Currently    Drug use: No    Sexual activity: Not on file   Lifestyle    Physical activity:     Days per week: Not on file     Minutes per session: Not on file    Stress: Not on file   Relationships    Social connections:     Talks on phone: Not on file     Gets together: Not on file     Attends Islam service: Not on file     Active member of club or organization: Not on file     Attends meetings of clubs or organizations: Not on file     Relationship status: Not on file   Other Topics Concern    Not on file   Social History Narrative    Not on file       Review of Systems - Negative except   Respiratory ROS: no dyspnea  Cardiovascular ROS: no exertional chest pain  Gastrointestinal ROS: NO abdominal discomfort,  NO rectal bleeding  Musculoskeletal ROS: no muscular  "pain  Neurological ROS: no recent stroke    Physical Exam:  BP (!) 176/108   Pulse (!) 57   Temp 97.5 °F (36.4 °C)   Resp 15   Ht 5' 11" (1.803 m)   Wt 99.8 kg (220 lb)   SpO2 100%   BMI 30.68 kg/m²   General: no distress  Head: normocephalic  Mallampati Score   Neck: supple, symmetrical, trachea midline  Lungs:  clear to auscultation bilaterally and normal respiratory effort  Heart: regular rate and rhythm and no murmur  Abdomen: soft, non-tender non-distented; bowel sounds normal; no masses,  no organomegaly  Extremities: no cyanosis or edema, or clubbing    ASA:  III    PLAN  COLONOSCOPY.    SedationPlan :MAC    The details of the procedure, the possible need for biopsy or polypectomy and the potential risks including bleeding, perforation, missed polyps were discussed in detail.    "

## 2019-10-16 ENCOUNTER — OFFICE VISIT (OUTPATIENT)
Dept: FAMILY MEDICINE | Facility: CLINIC | Age: 69
End: 2019-10-16
Payer: MEDICARE

## 2019-10-16 ENCOUNTER — LAB VISIT (OUTPATIENT)
Dept: LAB | Facility: HOSPITAL | Age: 69
End: 2019-10-16
Attending: FAMILY MEDICINE
Payer: MEDICARE

## 2019-10-16 VITALS
DIASTOLIC BLOOD PRESSURE: 76 MMHG | TEMPERATURE: 97 F | SYSTOLIC BLOOD PRESSURE: 132 MMHG | HEIGHT: 71 IN | HEART RATE: 60 BPM | WEIGHT: 216.06 LBS | BODY MASS INDEX: 30.25 KG/M2 | OXYGEN SATURATION: 100 %

## 2019-10-16 DIAGNOSIS — Z23 NEED FOR PROPHYLACTIC VACCINATION AGAINST STREPTOCOCCUS PNEUMONIAE (PNEUMOCOCCUS) AND INFLUENZA: ICD-10-CM

## 2019-10-16 DIAGNOSIS — Z00.00 ROUTINE GENERAL MEDICAL EXAMINATION AT A HEALTH CARE FACILITY: ICD-10-CM

## 2019-10-16 DIAGNOSIS — Z12.5 SCREENING FOR MALIGNANT NEOPLASM OF PROSTATE: ICD-10-CM

## 2019-10-16 DIAGNOSIS — E66.09 CLASS 1 OBESITY DUE TO EXCESS CALORIES WITH SERIOUS COMORBIDITY AND BODY MASS INDEX (BMI) OF 30.0 TO 30.9 IN ADULT: ICD-10-CM

## 2019-10-16 DIAGNOSIS — I10 BENIGN ESSENTIAL HYPERTENSION: ICD-10-CM

## 2019-10-16 DIAGNOSIS — Z79.899 MEDICATION MANAGEMENT: ICD-10-CM

## 2019-10-16 DIAGNOSIS — D50.0 IRON DEFICIENCY ANEMIA DUE TO CHRONIC BLOOD LOSS: ICD-10-CM

## 2019-10-16 DIAGNOSIS — Z01.84 IMMUNITY STATUS TESTING: ICD-10-CM

## 2019-10-16 DIAGNOSIS — Z00.00 ROUTINE GENERAL MEDICAL EXAMINATION AT A HEALTH CARE FACILITY: Primary | ICD-10-CM

## 2019-10-16 DIAGNOSIS — Z85.038 HISTORY OF COLON CANCER, STAGE II: ICD-10-CM

## 2019-10-16 LAB
25(OH)D3+25(OH)D2 SERPL-MCNC: 27 NG/ML (ref 30–96)
ALBUMIN SERPL BCP-MCNC: 3.9 G/DL (ref 3.5–5.2)
ALP SERPL-CCNC: 70 U/L (ref 55–135)
ALT SERPL W/O P-5'-P-CCNC: 13 U/L (ref 10–44)
ANION GAP SERPL CALC-SCNC: 8 MMOL/L (ref 8–16)
AST SERPL-CCNC: 17 U/L (ref 10–40)
BASOPHILS # BLD AUTO: 0.02 K/UL (ref 0–0.2)
BASOPHILS NFR BLD: 0.5 % (ref 0–1.9)
BILIRUB SERPL-MCNC: 0.5 MG/DL (ref 0.1–1)
BUN SERPL-MCNC: 16 MG/DL (ref 8–23)
CALCIUM SERPL-MCNC: 9.6 MG/DL (ref 8.7–10.5)
CHLORIDE SERPL-SCNC: 105 MMOL/L (ref 95–110)
CHOLEST SERPL-MCNC: 195 MG/DL (ref 120–199)
CHOLEST/HDLC SERPL: 4.2 {RATIO} (ref 2–5)
CO2 SERPL-SCNC: 25 MMOL/L (ref 23–29)
COMPLEXED PSA SERPL-MCNC: 1.1 NG/ML (ref 0–4)
CREAT SERPL-MCNC: 1.2 MG/DL (ref 0.5–1.4)
DIFFERENTIAL METHOD: ABNORMAL
EOSINOPHIL # BLD AUTO: 0.1 K/UL (ref 0–0.5)
EOSINOPHIL NFR BLD: 2.3 % (ref 0–8)
ERYTHROCYTE [DISTWIDTH] IN BLOOD BY AUTOMATED COUNT: 13.8 % (ref 11.5–14.5)
EST. GFR  (AFRICAN AMERICAN): >60 ML/MIN/1.73 M^2
EST. GFR  (NON AFRICAN AMERICAN): >60 ML/MIN/1.73 M^2
ESTIMATED AVG GLUCOSE: 123 MG/DL (ref 68–131)
FERRITIN SERPL-MCNC: 33 NG/ML (ref 20–300)
GLUCOSE SERPL-MCNC: 86 MG/DL (ref 70–110)
HBA1C MFR BLD HPLC: 5.9 % (ref 4–5.6)
HCT VFR BLD AUTO: 39.8 % (ref 40–54)
HDLC SERPL-MCNC: 46 MG/DL (ref 40–75)
HDLC SERPL: 23.6 % (ref 20–50)
HGB BLD-MCNC: 12.8 G/DL (ref 14–18)
IRON SERPL-MCNC: 98 UG/DL (ref 45–160)
LDLC SERPL CALC-MCNC: 127.8 MG/DL (ref 63–159)
LYMPHOCYTES # BLD AUTO: 1.7 K/UL (ref 1–4.8)
LYMPHOCYTES NFR BLD: 38 % (ref 18–48)
MAGNESIUM SERPL-MCNC: 2.2 MG/DL (ref 1.6–2.6)
MCH RBC QN AUTO: 26.3 PG (ref 27–31)
MCHC RBC AUTO-ENTMCNC: 32.2 G/DL (ref 32–36)
MCV RBC AUTO: 82 FL (ref 82–98)
MONOCYTES # BLD AUTO: 0.3 K/UL (ref 0.3–1)
MONOCYTES NFR BLD: 7.3 % (ref 4–15)
NEUTROPHILS # BLD AUTO: 2.3 K/UL (ref 1.8–7.7)
NEUTROPHILS NFR BLD: 51.9 % (ref 38–73)
NONHDLC SERPL-MCNC: 149 MG/DL
PLATELET # BLD AUTO: 132 K/UL (ref 150–350)
PMV BLD AUTO: 10.4 FL (ref 9.2–12.9)
POTASSIUM SERPL-SCNC: 3.6 MMOL/L (ref 3.5–5.1)
PROT SERPL-MCNC: 7.6 G/DL (ref 6–8.4)
RBC # BLD AUTO: 4.87 M/UL (ref 4.6–6.2)
SATURATED IRON: 28 % (ref 20–50)
SODIUM SERPL-SCNC: 138 MMOL/L (ref 136–145)
TOTAL IRON BINDING CAPACITY: 354 UG/DL (ref 250–450)
TRANSFERRIN SERPL-MCNC: 239 MG/DL (ref 200–375)
TRIGL SERPL-MCNC: 106 MG/DL (ref 30–150)
TSH SERPL DL<=0.005 MIU/L-ACNC: 1.53 UIU/ML (ref 0.4–4)
VIT B12 SERPL-MCNC: 340 PG/ML (ref 210–950)
WBC # BLD AUTO: 4.4 K/UL (ref 3.9–12.7)

## 2019-10-16 PROCEDURE — 82728 ASSAY OF FERRITIN: CPT

## 2019-10-16 PROCEDURE — 84443 ASSAY THYROID STIM HORMONE: CPT

## 2019-10-16 PROCEDURE — 80053 COMPREHEN METABOLIC PANEL: CPT

## 2019-10-16 PROCEDURE — 90670 PCV13 VACCINE IM: CPT | Mod: S$GLB,,, | Performed by: FAMILY MEDICINE

## 2019-10-16 PROCEDURE — 83036 HEMOGLOBIN GLYCOSYLATED A1C: CPT

## 2019-10-16 PROCEDURE — G0008 ADMIN INFLUENZA VIRUS VAC: HCPCS | Mod: S$GLB,,, | Performed by: FAMILY MEDICINE

## 2019-10-16 PROCEDURE — 80061 LIPID PANEL: CPT

## 2019-10-16 PROCEDURE — G0009 ADMIN PNEUMOCOCCAL VACCINE: HCPCS | Mod: S$GLB,,, | Performed by: FAMILY MEDICINE

## 2019-10-16 PROCEDURE — 99214 PR OFFICE/OUTPT VISIT, EST, LEVL IV, 30-39 MIN: ICD-10-PCS | Mod: 25,S$GLB,, | Performed by: FAMILY MEDICINE

## 2019-10-16 PROCEDURE — 85025 COMPLETE CBC W/AUTO DIFF WBC: CPT

## 2019-10-16 PROCEDURE — 3075F SYST BP GE 130 - 139MM HG: CPT | Mod: CPTII,S$GLB,, | Performed by: FAMILY MEDICINE

## 2019-10-16 PROCEDURE — 3078F DIAST BP <80 MM HG: CPT | Mod: CPTII,S$GLB,, | Performed by: FAMILY MEDICINE

## 2019-10-16 PROCEDURE — 83540 ASSAY OF IRON: CPT

## 2019-10-16 PROCEDURE — G0008 FLU VACCINE - HIGH DOSE (65+) PRESERVATIVE FREE IM: ICD-10-PCS | Mod: S$GLB,,, | Performed by: FAMILY MEDICINE

## 2019-10-16 PROCEDURE — 36415 COLL VENOUS BLD VENIPUNCTURE: CPT

## 2019-10-16 PROCEDURE — 90662 FLU VACCINE - HIGH DOSE (65+) PRESERVATIVE FREE IM: ICD-10-PCS | Mod: S$GLB,,, | Performed by: FAMILY MEDICINE

## 2019-10-16 PROCEDURE — 3075F PR MOST RECENT SYSTOLIC BLOOD PRESS GE 130-139MM HG: ICD-10-PCS | Mod: CPTII,S$GLB,, | Performed by: FAMILY MEDICINE

## 2019-10-16 PROCEDURE — 99999 PR PBB SHADOW E&M-EST. PATIENT-LVL IV: ICD-10-PCS | Mod: PBBFAC,,, | Performed by: FAMILY MEDICINE

## 2019-10-16 PROCEDURE — G0009 PNEUMOCOCCAL CONJUGATE VACCINE 13-VALENT LESS THAN 5YO & GREATER THAN: ICD-10-PCS | Mod: S$GLB,,, | Performed by: FAMILY MEDICINE

## 2019-10-16 PROCEDURE — 3078F PR MOST RECENT DIASTOLIC BLOOD PRESSURE < 80 MM HG: ICD-10-PCS | Mod: CPTII,S$GLB,, | Performed by: FAMILY MEDICINE

## 2019-10-16 PROCEDURE — 99214 OFFICE O/P EST MOD 30 MIN: CPT | Mod: 25,S$GLB,, | Performed by: FAMILY MEDICINE

## 2019-10-16 PROCEDURE — 82607 VITAMIN B-12: CPT

## 2019-10-16 PROCEDURE — 99999 PR PBB SHADOW E&M-EST. PATIENT-LVL IV: CPT | Mod: PBBFAC,,, | Performed by: FAMILY MEDICINE

## 2019-10-16 PROCEDURE — 83735 ASSAY OF MAGNESIUM: CPT

## 2019-10-16 PROCEDURE — 90670 PNEUMOCOCCAL CONJUGATE VACCINE 13-VALENT LESS THAN 5YO & GREATER THAN: ICD-10-PCS | Mod: S$GLB,,, | Performed by: FAMILY MEDICINE

## 2019-10-16 PROCEDURE — 90662 IIV NO PRSV INCREASED AG IM: CPT | Mod: S$GLB,,, | Performed by: FAMILY MEDICINE

## 2019-10-16 PROCEDURE — 84153 ASSAY OF PSA TOTAL: CPT

## 2019-10-16 PROCEDURE — 82306 VITAMIN D 25 HYDROXY: CPT

## 2019-10-16 RX ORDER — POLYETHYLENE GLYCOL-3350 AND ELECTROLYTES 236; 6.74; 5.86; 2.97; 22.74 G/274.31G; G/274.31G; G/274.31G; G/274.31G; G/274.31G
POWDER, FOR SOLUTION ORAL
Refills: 0 | COMMUNITY
Start: 2019-10-09 | End: 2019-10-16

## 2019-10-16 NOTE — PROGRESS NOTES
" Office Visit    Patient Name: Noah Nichole    : 1950  MRN: [id number]    Subjective:  Noah is a 69 y.o. male who presents today for:    Establish Care    Noah Nichole presents today for annual wellness exam and to establish care for chronic conditions that include history of stage II colon cancer(s/p resection 2018 and CEA WNL 10/11/2019), obesity, hypertension, history of iron deficiency anemia (likely associated with colon cancer).    Saw colon surgery 2019:  "Staging CT scan 18 no evidence of metastatic dx.  Last saw Dr. Cheung 19 doing well, having stools, good apetite   FU CT 18 no evidence of metastatic dx  Interval removal of large colonic mass with no evidence of residual mass or lymphadenopathy."    They have been feeling overall well and without acute complaints.  He recently had his 1 year follow-up colonoscopy on .    General lifestyle habits are as follows:  Diet is described as varied and tries to eat some oatmeal but he does eat some regular fried food and drinks some soft drinks and tried to limit salt, exercise is described as fair-- rides his bike a few days per week, sleep is described as fair-- generally feels rested. Weight is up about 20 lb in the last year and he is now similar in weight to where he was a couple of years ago.    Immunizations: NONE ON FILE, FLU SHOT/ PREVNAR 13 today 10/16/2019, advised on SHINGRIX & Tdap through pharmacy    Screening Tests: COLONOSCOPY 10/11/2019: Repeat colonoscopy in 3 years for surveillance Based on personal history of colon cancer.    Eye/Dental: EYE- DUE AND REFERRAL PLACED, DENTAL- due and advised to look into low-cost dental providers.      Past Medical History  Past Medical History:   Diagnosis Date    Anemia     Benign essential hypertension 2014    History of colon cancer, stage II 10/10/2018    Iron deficiency anemia due to chronic blood loss 10/16/2019       Past Surgical " History  Past Surgical History:   Procedure Laterality Date    COLON SURGERY  09/18/2018    COLONOSCOPY N/A 9/14/2018    Procedure: COLONOSCOPY;  Surgeon: Adrian Cheung MD;  [facility]  Service: Endoscopy;  Laterality: N/A;  pt ate at 1:30 on 9/13/18-Dr Cheung informed and he stated ok to do colonoscopy.    COLONOSCOPY N/A 10/11/2019    Procedure: COLONOSCOPY;  Surgeon: Gabino Bonilla MD;  [facility]  Service: Endoscopy;  Laterality: N/A;    NO PAST SURGERIES      RIGHT HEMICOLECTOMY N/A 9/18/2018    Procedure: HEMICOLECTOMY, RIGHT, extended;  Surgeon: Adrian Cheung MD;  [facility];  Service: Colon and Rectal;  Laterality: N/A;       Family History  Family History   Problem Relation Age of Onset    Hypertension Mother     Cancer Father         stomach    Hypertension Father     Cancer Sister         breast    Hypertension Brother        Social History  Social History     Socioeconomic History    Marital status: Single     Spouse name: Not on file    Number of children: Not on file    Years of education: Not on file    Highest education level: Not on file   Occupational History    Not on file   Social Needs    Financial resource strain: Not on file    Food insecurity:     Worry: Not on file     Inability: Not on file    Transportation needs:     Medical: Not on file     Non-medical: Not on file   Tobacco Use    Smoking status: Never Smoker    Smokeless tobacco: Never Used   Substance and Sexual Activity    Alcohol use: Not Currently    Drug use: No    Sexual activity: Not on file   Lifestyle    Physical activity:     Days per week: Not on file     Minutes per session: Not on file    Stress: Not on file   Relationships    Social connections:     Talks on phone: Not on file     Gets together: Not on file     Attends Zoroastrian service: Not on file     Active member of club or organization: Not on file     Attends meetings of clubs or  "organizations: Not on file     Relationship status: Not on file   Other Topics Concern    Not on file   Social History Narrative    Not on file       Current Medications  Medications reviewed and updated.     Allergies   Review of patient's allergies indicates:  No Known Allergies    Review of Systems (Pertinent positives)  Review of Systems   Constitutional: Positive for unexpected weight change (gaining weight with feeling better following colon cancer treatment).   HENT: Negative for dental problem and trouble swallowing.    Eyes: Negative for visual disturbance.   Respiratory: Negative for shortness of breath.    Cardiovascular: Positive for leg swelling. Negative for chest pain and palpitations.   Gastrointestinal: Negative for blood in stool, constipation and diarrhea.   Genitourinary: Negative for difficulty urinating and dysuria.   Allergic/Immunologic: Negative for environmental allergies.   Neurological: Negative for dizziness and light-headedness.   Psychiatric/Behavioral: Negative for dysphoric mood and sleep disturbance.       /76   Pulse 60   Temp 97.4 °F (36.3 °C)   Ht 5' 11" (1.803 m)   Wt 98 kg (216 lb 0.8 oz)   SpO2 100%   BMI 30.13 kg/m²     Physical Exam   Constitutional: He is oriented to person, place, and time. He appears well-developed and well-nourished. No distress.   HENT:   Head: Normocephalic and atraumatic.   Right Ear: External ear normal.   Left Ear: External ear normal.   Nose: Nose normal.   Mouth/Throat: Oropharynx is clear and moist. No oropharyngeal exudate.   Eyes: Conjunctivae are normal. No scleral icterus.   Neck: No tracheal deviation present. No thyromegaly present.   Cardiovascular: Normal rate, regular rhythm, normal heart sounds and intact distal pulses.   Pulmonary/Chest: Effort normal and breath sounds normal.   Abdominal: Soft. Bowel sounds are normal. He exhibits no distension and no mass. There is no tenderness. No hernia.   Musculoskeletal: Normal " range of motion.   Lymphadenopathy:     He has no cervical adenopathy.   Neurological: He is alert and oriented to person, place, and time. He has normal reflexes.   Skin: Skin is warm and dry. No rash noted.   Psychiatric: He has a normal mood and affect.   Vitals reviewed.        Assessment/Plan:  Noah Nichole is a 69 y.o. male who presents today for :    Noah was seen today for establish care.    Diagnoses and all orders for this visit:    Routine general medical examination at a health care facility  Comments:  HEALTH MAINTENANCE REVIEWED: PREVNAR 13/FLU SHOT GIVEN, TDAP/SHINGRIX ADVISED THRU PHARMACY. C-SCOPE UTD, PSA ORDERED. ADVISED ON DIET/EXERCISE/SLEEP/EYE/DENTAL  Orders:  -     Hemoglobin A1c; Future  -     Comprehensive metabolic panel; Future  -     Lipid panel; Future  -     CBC auto differential; Future  -     TSH; Future  -     PSA, Screening; Future  -     Vitamin D; Future  -     Ferritin; Future  -     Iron and TIBC; Future  -     Vitamin B12; Future  -     Magnesium; Future  -     Influenza - High Dose (65+) (PF) (IM)  -     (In Office Administered) Pneumococcal Conjugate Vaccine (13 Valent) (IM)    Class 1 obesity due to excess calories with serious comorbidity and body mass index (BMI) of 30.0 to 30.9 in adult    Benign essential hypertension  Comments:  Controlled on lisinopril 20/HCTZ 12.5, ongoing monitoring, labs today to assess kidney function and electrolytes  Orders:  -     Hemoglobin A1c; Future  -     Comprehensive metabolic panel; Future  -     Lipid panel; Future  -     CBC auto differential; Future  -     TSH; Future  -     Magnesium; Future  -     Ambulatory referral to Optometry    History of colon cancer, stage II  Comments:  Colonoscopy, CEA level recently unremarkable, ongoing monitoring through colorectal.  Needs follow-up colonoscopy in 3 years    Iron deficiency anemia due to chronic blood loss  Comments:  Associated with history of colon cancer, has been taking some  oral iron as tolerated, check iron studies/CBC with labs  Orders:  -     CBC auto differential; Future  -     Ferritin; Future  -     Iron and TIBC; Future  -     Vitamin B12; Future    Medication management  -     Hemoglobin A1c; Future  -     Comprehensive metabolic panel; Future  -     Lipid panel; Future  -     CBC auto differential; Future  -     TSH; Future  -     PSA, Screening; Future  -     Vitamin D; Future  -     Ferritin; Future  -     Iron and TIBC; Future  -     Vitamin B12; Future  -     Magnesium; Future    Screening for malignant neoplasm of prostate  -     PSA, Screening; Future    Need for prophylactic vaccination against Streptococcus pneumoniae (pneumococcus) and influenza  -     Influenza - High Dose (65+) (PF) (IM)  -     (In Office Administered) Pneumococcal Conjugate Vaccine (13 Valent) (IM)    Immunity status testing  Comments:  He presumably had chickenpox but is not sure so will confirm with antibody testing prior to him getting the shingles vaccine  Orders:  -     VARICELLA ZOSTER ANTIBODY, IGG; Future            ICD-10-CM ICD-9-CM    1. Routine general medical examination at a health care facility Z00.00 V70.0 Hemoglobin A1c      Comprehensive metabolic panel      Lipid panel      CBC auto differential      TSH      PSA, Screening      Vitamin D      Ferritin      Iron and TIBC      Vitamin B12      Magnesium      Influenza - High Dose (65+) (PF) (IM)      (In Office Administered) Pneumococcal Conjugate Vaccine (13 Valent) (IM)    HEALTH MAINTENANCE REVIEWED: PREVNAR 13/FLU SHOT GIVEN, TDAP/SHINGRIX ADVISED THRU PHARMACY. C-SCOPE UTD, PSA ORDERED. ADVISED ON DIET/EXERCISE/SLEEP/EYE/DENTAL   2. Class 1 obesity due to excess calories with serious comorbidity and body mass index (BMI) of 30.0 to 30.9 in adult E66.09 278.00     Z68.30 V85.30    3. Benign essential hypertension I10 401.1 Hemoglobin A1c      Comprehensive metabolic panel      Lipid panel      CBC auto differential      TSH       Magnesium      Ambulatory referral to Optometry    Controlled on lisinopril 20/HCTZ 12.5, ongoing monitoring, labs today to assess kidney function and electrolytes   4. History of colon cancer, stage II Z85.038 V10.05     Colonoscopy, CEA level recently unremarkable, ongoing monitoring through colorectal.  Needs follow-up colonoscopy in 3 years   5. Iron deficiency anemia due to chronic blood loss D50.0 280.0 CBC auto differential      Ferritin      Iron and TIBC      Vitamin B12    Associated with history of colon cancer, has been taking some oral iron as tolerated, check iron studies/CBC with labs   6. Medication management Z79.899 V58.69 Hemoglobin A1c      Comprehensive metabolic panel      Lipid panel      CBC auto differential      TSH      PSA, Screening      Vitamin D      Ferritin      Iron and TIBC      Vitamin B12      Magnesium   7. Screening for malignant neoplasm of prostate Z12.5 V76.44 PSA, Screening   8. Need for prophylactic vaccination against Streptococcus pneumoniae (pneumococcus) and influenza Z23 V06.6 Influenza - High Dose (65+) (PF) (IM)      (In Office Administered) Pneumococcal Conjugate Vaccine (13 Valent) (IM)   9. Immunity status testing Z01.84 V72.61 VARICELLA ZOSTER ANTIBODY, IGG    He presumably had chickenpox but is not sure so will confirm with antibody testing prior to him getting the shingles vaccine       Patient Instructions   ASK YOUR PHARMACY ABOUT   1) 10 YEAR TDAP TETANUS BOOSTER   2) SHINGRIX SHINGLES VACCINE        Follow up in about 6 months (around 4/16/2020) for to follow up on lab results, return as needed for new concerns.

## 2019-10-17 ENCOUNTER — TELEPHONE (OUTPATIENT)
Dept: FAMILY MEDICINE | Facility: CLINIC | Age: 69
End: 2019-10-17

## 2019-10-17 DIAGNOSIS — D50.0 IRON DEFICIENCY ANEMIA DUE TO CHRONIC BLOOD LOSS: Primary | ICD-10-CM

## 2019-10-17 DIAGNOSIS — I10 BENIGN ESSENTIAL HYPERTENSION: ICD-10-CM

## 2019-10-17 DIAGNOSIS — E55.9 VITAMIN D DEFICIENCY: ICD-10-CM

## 2019-10-17 NOTE — TELEPHONE ENCOUNTER
----- Message from Grace Jeffery MD sent at 10/17/2019  8:46 AM CDT -----  Noah- your labs overall look very good. I would advised getting some vitamin D--1,000 IU daily to take with breakfast since your level is a little low. Your blood counts have improved significantly with treatment of your cancer and also taking the iron pill. Your levels are not quite normal, so I would advise taking the iron pill for another 6 months. ##My STAFF will contact you to schedule repeat labs for in 6 months, with an office visit to follow to review your labs and check in. Otherwise no concerns, dr Jeffery

## 2019-10-18 ENCOUNTER — TELEPHONE (OUTPATIENT)
Dept: ENDOSCOPY | Facility: HOSPITAL | Age: 69
End: 2019-10-18

## 2019-11-06 ENCOUNTER — OFFICE VISIT (OUTPATIENT)
Dept: OPTOMETRY | Facility: CLINIC | Age: 69
End: 2019-11-06
Payer: MEDICARE

## 2019-11-06 DIAGNOSIS — H25.13 NUCLEAR SCLEROSIS OF BOTH EYES: Primary | ICD-10-CM

## 2019-11-06 DIAGNOSIS — H52.4 BILATERAL PRESBYOPIA: ICD-10-CM

## 2019-11-06 PROCEDURE — 99999 PR PBB SHADOW E&M-EST. PATIENT-LVL II: CPT | Mod: PBBFAC,,, | Performed by: OPTOMETRIST

## 2019-11-06 PROCEDURE — 99999 PR PBB SHADOW E&M-EST. PATIENT-LVL II: ICD-10-PCS | Mod: PBBFAC,,, | Performed by: OPTOMETRIST

## 2019-11-06 PROCEDURE — 92004 COMPRE OPH EXAM NEW PT 1/>: CPT | Mod: S$GLB,,, | Performed by: OPTOMETRIST

## 2019-11-06 PROCEDURE — 92004 PR EYE EXAM, NEW PATIENT,COMPREHESV: ICD-10-PCS | Mod: S$GLB,,, | Performed by: OPTOMETRIST

## 2019-11-06 NOTE — PROGRESS NOTES
HPI     AUBREY never.  Wears OTC readers, unsure of strenght and didn't bring today.    Distance seems fine without glasses.  Patient hasn't noticed any vision   changes. Patient defers refraction today. Not using any drops.      Last edited by Kendra Li on 11/6/2019  9:14 AM. (History)            Assessment /Plan     For exam results, see Encounter Report.    Nuclear sclerosis of both eyes    Bilateral presbyopia      1. Educated pt on presence of cataracts and effects on vision. No surgery at this time. Recheck in one year.  2. Cont with OTC readers.

## 2019-11-06 NOTE — LETTER
November 6, 2019      Grace Jeffery MD  [address]  [address]  [place] [address]           [facility]  [address] [facility].  [place] [address]  Phone: [phone]  Fax: [phone]          Patient: Noah Nichole   MR Number: [id number]   YOB: 1950   Date of Visit: 11/6/2019       Dear Dr. Grace Jeffery:    Thank you for referring Noah Nichole to me for evaluation. Attached you will find relevant portions of my assessment and plan of care.    If you have questions, please do not hesitate to call me. I look forward to following Noah Nichole along with you.    Sincerely,    Jhonatan Craig, OD    Enclosure  CC:  No Recipients    If you would like to receive this communication electronically, please contact [email or web address] or [phone] to request more information on [facility] access.    For providers and/or their staff who would like to refer a patient to [facility], please contact us through our one-stop-shop provider referral line, [facility], at [phone].    If you feel you have received this communication in error or would no longer like to receive these types of communications, please e-mail [email or web address]

## 2019-11-29 ENCOUNTER — TELEPHONE (OUTPATIENT)
Dept: FAMILY MEDICINE | Facility: CLINIC | Age: 69
End: 2019-11-29

## 2019-11-29 NOTE — TELEPHONE ENCOUNTER
----- Message from Gila Long sent at 11/29/2019  7:30 AM CST -----  Contact: Daughter Jeanne [phone]  Type:  Sooner Appointment Request     Caller is requesting a sooner appointment.  Caller declined first available appointment listed below.  Caller will not accept being placed on the waitlist and is requesting a message be sent to doctor.  Name of Caller: pt's daughter   When is the first available appointment?  01-15-20  Symptoms or Reason: Cough and sore throat   Would the patient rather a call back or a response via [facility]? Call back  Best Call Back Number: [phone]  Additional Information:

## 2019-12-01 ENCOUNTER — HOSPITAL ENCOUNTER (EMERGENCY)
Facility: HOSPITAL | Age: 69
Discharge: HOME | End: 2019-12-01
Attending: EMERGENCY MEDICINE
Payer: MEDICARE

## 2019-12-01 VITALS
SYSTOLIC BLOOD PRESSURE: 186 MMHG | TEMPERATURE: 98 F | BODY MASS INDEX: 30.38 KG/M2 | WEIGHT: 217 LBS | HEART RATE: 67 BPM | HEIGHT: 71 IN | DIASTOLIC BLOOD PRESSURE: 84 MMHG | OXYGEN SATURATION: 97 % | RESPIRATION RATE: 20 BRPM

## 2019-12-01 DIAGNOSIS — R05.9 COUGHING: ICD-10-CM

## 2019-12-01 DIAGNOSIS — R04.0 EPISTAXIS: Primary | ICD-10-CM

## 2019-12-01 LAB
ALBUMIN SERPL BCP-MCNC: 3.8 G/DL (ref 3.5–5.2)
ALP SERPL-CCNC: 76 U/L (ref 55–135)
ALT SERPL W/O P-5'-P-CCNC: 26 U/L (ref 10–44)
ANION GAP SERPL CALC-SCNC: 8 MMOL/L (ref 8–16)
AST SERPL-CCNC: 25 U/L (ref 10–40)
BASOPHILS # BLD AUTO: 0.04 K/UL (ref 0–0.2)
BASOPHILS NFR BLD: 0.7 % (ref 0–1.9)
BILIRUB SERPL-MCNC: 0.4 MG/DL (ref 0.1–1)
BNP SERPL-MCNC: 27 PG/ML (ref 0–99)
BUN SERPL-MCNC: 13 MG/DL (ref 8–23)
CALCIUM SERPL-MCNC: 9.5 MG/DL (ref 8.7–10.5)
CHLORIDE SERPL-SCNC: 106 MMOL/L (ref 95–110)
CO2 SERPL-SCNC: 27 MMOL/L (ref 23–29)
CREAT SERPL-MCNC: 1.1 MG/DL (ref 0.5–1.4)
DIFFERENTIAL METHOD: ABNORMAL
EOSINOPHIL # BLD AUTO: 0.2 K/UL (ref 0–0.5)
EOSINOPHIL NFR BLD: 3.8 % (ref 0–8)
ERYTHROCYTE [DISTWIDTH] IN BLOOD BY AUTOMATED COUNT: 13.5 % (ref 11.5–14.5)
EST. GFR  (AFRICAN AMERICAN): >60 ML/MIN/1.73 M^2
EST. GFR  (NON AFRICAN AMERICAN): >60 ML/MIN/1.73 M^2
GLUCOSE SERPL-MCNC: 109 MG/DL (ref 70–110)
HCT VFR BLD AUTO: 39.7 % (ref 40–54)
HGB BLD-MCNC: 12.8 G/DL (ref 14–18)
LYMPHOCYTES # BLD AUTO: 1.9 K/UL (ref 1–4.8)
LYMPHOCYTES NFR BLD: 33 % (ref 18–48)
MCH RBC QN AUTO: 26.1 PG (ref 27–31)
MCHC RBC AUTO-ENTMCNC: 32.2 G/DL (ref 32–36)
MCV RBC AUTO: 81 FL (ref 82–98)
MONOCYTES # BLD AUTO: 0.8 K/UL (ref 0.3–1)
MONOCYTES NFR BLD: 14.7 % (ref 4–15)
NEUTROPHILS # BLD AUTO: 2.7 K/UL (ref 1.8–7.7)
NEUTROPHILS NFR BLD: 47.8 % (ref 38–73)
PLATELET # BLD AUTO: 165 K/UL (ref 150–350)
PMV BLD AUTO: 10.3 FL (ref 9.2–12.9)
POTASSIUM SERPL-SCNC: 3.9 MMOL/L (ref 3.5–5.1)
PROT SERPL-MCNC: 7.8 G/DL (ref 6–8.4)
RBC # BLD AUTO: 4.91 M/UL (ref 4.6–6.2)
SODIUM SERPL-SCNC: 141 MMOL/L (ref 136–145)
TROPONIN I SERPL DL<=0.01 NG/ML-MCNC: <0.006 NG/ML (ref 0–0.03)
WBC # BLD AUTO: 5.72 K/UL (ref 3.9–12.7)

## 2019-12-01 PROCEDURE — 93005 ELECTROCARDIOGRAM TRACING: CPT

## 2019-12-01 PROCEDURE — 85025 COMPLETE CBC W/AUTO DIFF WBC: CPT

## 2019-12-01 PROCEDURE — 93010 EKG 12-LEAD: ICD-10-PCS | Mod: ,,, | Performed by: INTERNAL MEDICINE

## 2019-12-01 PROCEDURE — 25000003 PHARM REV CODE 250: Performed by: EMERGENCY MEDICINE

## 2019-12-01 PROCEDURE — 80053 COMPREHEN METABOLIC PANEL: CPT

## 2019-12-01 PROCEDURE — 84484 ASSAY OF TROPONIN QUANT: CPT

## 2019-12-01 PROCEDURE — 83880 ASSAY OF NATRIURETIC PEPTIDE: CPT

## 2019-12-01 PROCEDURE — 99285 EMERGENCY DEPT VISIT HI MDM: CPT | Mod: 25

## 2019-12-01 PROCEDURE — 93010 ELECTROCARDIOGRAM REPORT: CPT | Mod: ,,, | Performed by: INTERNAL MEDICINE

## 2019-12-01 RX ORDER — LISINOPRIL 10 MG/1
10 TABLET ORAL DAILY
Status: DISCONTINUED | OUTPATIENT
Start: 2019-12-01 | End: 2019-12-01 | Stop reason: HOSPADM

## 2019-12-01 RX ORDER — OXYMETAZOLINE HCL 0.05 %
1 SPRAY, NON-AEROSOL (ML) NASAL
Status: COMPLETED | OUTPATIENT
Start: 2019-12-01 | End: 2019-12-01

## 2019-12-01 RX ORDER — CODEINE PHOSPHATE AND GUAIFENESIN 10; 100 MG/5ML; MG/5ML
5 SOLUTION ORAL
Status: COMPLETED | OUTPATIENT
Start: 2019-12-01 | End: 2019-12-01

## 2019-12-01 RX ORDER — AMOXICILLIN AND CLAVULANATE POTASSIUM 562.5; 437.5; 62.5 MG/1; MG/1; MG/1
2 TABLET, FILM COATED, EXTENDED RELEASE ORAL 2 TIMES DAILY
Qty: 28 TABLET | Refills: 0 | Status: SHIPPED | OUTPATIENT
Start: 2019-12-01 | End: 2019-12-08

## 2019-12-01 RX ORDER — CODEINE PHOSPHATE AND GUAIFENESIN 10; 100 MG/5ML; MG/5ML
5 SOLUTION ORAL EVERY 6 HOURS PRN
Qty: 236 ML | Refills: 0 | Status: SHIPPED | OUTPATIENT
Start: 2019-12-01 | End: 2019-12-09 | Stop reason: SDUPTHER

## 2019-12-01 RX ADMIN — OXYMETAZOLINE HYDROCHLORIDE 1 SPRAY: 0.05 SPRAY NASAL at 11:12

## 2019-12-01 RX ADMIN — OXYMETAZOLINE HCL 1 SPRAY: 0.05 SPRAY NASAL at 01:12

## 2019-12-01 RX ADMIN — GUAIFENESIN AND CODEINE PHOSPHATE 5 ML: 100; 10 SOLUTION ORAL at 11:12

## 2019-12-01 RX ADMIN — LISINOPRIL 10 MG: 10 TABLET ORAL at 02:12

## 2019-12-01 NOTE — ED NOTES
Patient c/o nose bleed and high bp. Patient reports that he has not taken his bp medication today. Reports nose bleed for several days in conjunction with congestion. Cough productive with red tinged. Patient inhales rough through nose then spits out mucous.

## 2019-12-01 NOTE — ED NOTES
Gave daughter afrin bottle after administering to bilateral nare. Instructed on usage. Patient and family verbalize correct understanding of usage and not to use more than twice daily.

## 2019-12-01 NOTE — ED TRIAGE NOTES
Pt presents to ED with complaints of a nosebleed. Pt states that the nosebleed began around 0900 this am and has not stopped. Pt states that he has been having nosebleeds off and on over the past few days, but they have not lasted this long. Pt states he was recently diagnosed with pneumonia. Pt states he has been taking OTC medications for his pneumonia symptoms. PT denies fever, SOB, N/V/D. Pt denies pain at this time.     APPEARANCE: Alert, oriented and in no acute distress.  CARDIAC: Normal rate and rhythm, no murmur heard.   PERIPHERAL VASCULAR: peripheral pulses present. Normal cap refill. No edema. Warm to touch.    RESPIRATORY:Normal rate and effort, breath sounds clear bilaterally throughout chest. Respirations are equal and unlabored no obvious signs of distress.  GASTRO: soft, bowel sounds normal, no tenderness, no abdominal distention.  MUSC: Full ROM. No bony tenderness or soft tissue tenderness. No obvious deformity.  SKIN: Skin is warm and dry, normal skin turgor, mucous membranes moist.  NEURO: 5/5 strength major flexors/extensors bilaterally. Sensory intact to light touch bilaterally. [place] coma scale: eyes open spontaneously-4, oriented & converses-5, obeys commands-6. No neurological abnormalities.   MENTAL STATUS: awake, alert and aware of environment.  EYE: PERRL, both eyes: pupils brisk and reactive to light. Normal size.  ENT: EARS: no obvious drainage. NOSE: Active bleeding out of right nare

## 2019-12-01 NOTE — ED PROVIDER NOTES
Encounter Date: 12/1/2019    SCRIBE #1 NOTE: I, Kwabena Davila, am scribing for, and in the presence of,  Dr. Delaney. I have scribed the entire note.       History     Chief Complaint   Patient presents with    Epistaxis     Seen at [facility] on Friday with URI symptoms - diagnosed with pneumonia and placed on Z-pack which he has been taking since Friday. States since Friday, has been having intermittent nose bleeds - heavier today with continued cough. Reports green sputum. Denies fever. Presents awake, alert,oriented. c/o right sided chest pain with cough.      Time seen by provider: 10:41 AM    This is a 70 y/o male with PMHx of HTN who presents with epistaxis. Family reports the patient began with URI-like symptoms 6 days ago with a productive cough with green sputum and rhinorrhea. He then developed chest pain with his cough and was seen at [place] ED 2 days ago, and was discharged on Z-Kobe after reportedly being diagnosed with pneumonia. Shortly after being discharged, he began to have a right-sided nosebleed and was re-evaluated at that time; he was then discharged following resolution of his nosebleed. However, 45 minutes ago the patient began to have another nosebleed from both nares. Of note, he has also been taking Theraflu, Tylenol, and using nasal spray for his symptoms.  Admits to blowing his nose frequently.  Patient denies any fever, myalgias, SOB, abdominal pain, nausea, or vomiting. He is not currently taking any anticoagulants.    The history is provided by the patient and a relative.     Review of patient's allergies indicates:  No Known Allergies  Past Medical History:   Diagnosis Date    Anemia     Benign essential hypertension 12/17/2014    History of colon cancer, stage II 10/10/2018    Iron deficiency anemia due to chronic blood loss 10/16/2019     Past Surgical History:   Procedure Laterality Date    COLON SURGERY  09/18/2018    COLONOSCOPY N/A 9/14/2018    Procedure:  COLONOSCOPY;  Surgeon: Adrian Cheung MD;  Location: [facility] (4TH FLR);  Service: Endoscopy;  Laterality: N/A;  pt ate at 1:30 on 9/13/18-Dr Cheung informed and he stated ok to do colonoscopy.    COLONOSCOPY N/A 10/11/2019    Procedure: COLONOSCOPY;  Surgeon: Gabino Bonilla MD;  Location: [facility] (4TH FLR);  Service: Endoscopy;  Laterality: N/A;    NO PAST SURGERIES      RIGHT HEMICOLECTOMY N/A 9/18/2018    Procedure: HEMICOLECTOMY, RIGHT, extended;  Surgeon: Adrian Cheung MD;  [facility];  Service: Colon and Rectal;  Laterality: N/A;     Family History   Problem Relation Age of Onset    Hypertension Mother     Cancer Father         stomach    Hypertension Father     Diabetes Sister     Hypertension Brother     Glaucoma Brother     Diabetes Brother     Cataracts Brother     Cancer Sister     Amblyopia Neg Hx     Blindness Neg Hx     Macular degeneration Neg Hx     Retinal detachment Neg Hx     Strabismus Neg Hx     Stroke Neg Hx     Thyroid disease Neg Hx      Social History     Tobacco Use    Smoking status: Never Smoker    Smokeless tobacco: Never Used   Substance Use Topics    Alcohol use: Not Currently    Drug use: No     Review of Systems   Constitutional: Negative for chills and fever.   HENT: Positive for congestion, nosebleeds and rhinorrhea. Negative for sore throat.    Eyes: Negative for redness.   Respiratory: Positive for cough. Negative for shortness of breath.    Cardiovascular: Positive for chest pain.   Gastrointestinal: Negative for abdominal pain, diarrhea, nausea and vomiting.   Genitourinary: Negative for dysuria and hematuria.   Musculoskeletal: Negative for back pain.   Skin: Negative for rash.   Neurological: Negative for headaches.       Physical Exam     Initial Vitals [12/01/19 1054]   BP Pulse Resp Temp SpO2   (!) 198/120 71 14 98.7 °F (37.1 °C) 95 %      MAP       --         Physical Exam    Nursing note and vitals reviewed.  Constitutional:  He appears well-developed and well-nourished. He is not diaphoretic. No distress.   HENT:   Head: Normocephalic and atraumatic.   Right Ear: External ear normal.   Left Ear: External ear normal.   Mouth/Throat: Oropharynx is clear and moist.   Blood in the right nare; no active bleeding  Posterior oropharynx clear   Eyes: EOM are normal.   Neck: Normal range of motion. Neck supple.   No meningismus   Cardiovascular: Normal rate, regular rhythm and normal heart sounds.   No murmur heard.  Pulmonary/Chest: Breath sounds normal. No respiratory distress. He has no wheezes. He has no rhonchi. He has no rales.   Abdominal: Soft. There is no tenderness.   Musculoskeletal: Normal range of motion. He exhibits no edema or tenderness.   Neurological: He is alert and oriented to person, place, and time. GCS score is 15. GCS eye subscore is 4. GCS verbal subscore is 5. GCS motor subscore is 6.   Skin: Skin is warm and dry. Capillary refill takes less than 2 seconds.   Psychiatric: He has a normal mood and affect.         ED Course   Procedures  Labs Reviewed   CBC W/ AUTO DIFFERENTIAL - Abnormal; Notable for the following components:       Result Value    Hemoglobin 12.8 (*)     Hematocrit 39.7 (*)     Mean Corpuscular Volume 81 (*)     Mean Corpuscular Hemoglobin 26.1 (*)     All other components within normal limits   COMPREHENSIVE METABOLIC PANEL   TROPONIN I   B-TYPE NATRIURETIC PEPTIDE          Imaging Results          X-Ray Chest PA And Lateral (Final result)  Result time 12/01/19 11:26:38    Final result by Aicha Lund MD (12/01/19 11:26:38)                 Impression:      Minimal scarring or atelectasis left lung base, not significantly changed from the prior recent study.      Electronically signed by: Aicha Lund MD  Date:    12/01/2019  Time:    11:26             Narrative:    EXAMINATION:  XR CHEST PA AND LATERAL    CLINICAL HISTORY:  Cough    TECHNIQUE:  PA and lateral views of the chest were  performed.    COMPARISON:  11/29/2019    FINDINGS:  The cardiac silhouette is normal in size.  The pulmonary vascularity is normal.  Minimal scarring or atelectasis left lung base.  No focal airspace disease.  No pleural effusion.  No pneumothorax.  The osseous structures appear normal.                                 Medical Decision Making:   Initial Assessment:   This is a 69 y.o. male who presents with complaint of nosebleeds, also with chest pain.  Differential Diagnosis:   PNA, viral illness, strep pharyngitis, sinus infection  Independently Interpreted Test(s):   I have ordered and independently interpreted EKG Reading(s) - see prior notes  Clinical Tests:   Lab Tests: Ordered and Reviewed  Radiological Study: Ordered and Reviewed  Medical Tests: Ordered and Reviewed  ED Management:    On re-evaluation, the patient's status has improved.  After complete ED evaluation, clinical impression is most consistent with epistaxis, cough.  - CXR unchanged.  - EKG without acute ischemia.  - H&H stable, plts wnl  - CMP wnl  - cardiac enzymes negative    PCP follow-up within 2-3 days was recommended.    After taking into careful account the patient's history, physical exam findings, as well as empirical and objective data obtained throughout ED workup, I feel no emergent medical condition has been identified. No further evaluation or admission was felt to be required, and the patient is stable for discharge from the ED. The patient and any additional family present were updated with test results, overall clinical impression, and recommended further plan of care, including discharge instructions as provided and outpatient follow-up for continued evaluation and management as needed. All questions were answered. The patient expressed understanding and agreed with current plan for discharge and follow-up plan of care. Strict ED return precautions were provided, including return/worsening of current symptoms, new symptoms, or  any other concerns.                     ED Course as of Dec 01 1419   Sun Dec 01, 2019   1108 BP(!): 198/120 [LD]   1108 Temp: 98.7 °F (37.1 °C) [LD]   1108 Pulse: 71 [LD]   1108 Resp: 14 [LD]   1108 SpO2: 95 % [LD]   1116 EKG with NSR with sinus arrhythmia, rate of 66 bpm.  Incomplete RBBB.  No STEMI.  Similar to previous EKG from 9/11/18    [LD]   1130 Epistaxis has resolved    [LD]   1130 Epistaxis has resovled    [LD]   1141 Hemoglobin(!): 12.8 [LD]   1142 Hematocrit(!): 39.7 [LD]   1142 Platelets: 165 [LD]   1142 BNP: 27 [LD]   1158 BP improved    [LD]   1331 Patient has returned stating that his nose has begun bleeding again    [LD]   1418 Epistaxis has resolved again.  No active bleeding on exam.  Counseled patient and family extensively.  Will given contact information for ENT.     [LD]      ED Course User Index  [LD] Sarita Delaney MD                Clinical Impression:       ICD-10-CM ICD-9-CM   1. Epistaxis R04.0 784.7   2. Coughing R05 786.2     Disposition:   Disposition: Discharged  Condition: Stable       I, Sarita Delaney,  personally performed the services described in this documentation. All medical record entries made by the scribe were at my direction and in my presence.  I have reviewed the chart and agree that the record reflects my personal performance and is accurate and complete. Sarita Delaney M.D. 12:02 PM12/01/2019       Sarita Delaney MD  12/01/19 1202       Sarita Delaney MD  12/01/19 9131

## 2019-12-02 ENCOUNTER — OFFICE VISIT (OUTPATIENT)
Dept: FAMILY MEDICINE | Facility: CLINIC | Age: 69
End: 2019-12-02
Payer: MEDICARE

## 2019-12-02 VITALS
WEIGHT: 218.5 LBS | OXYGEN SATURATION: 98 % | SYSTOLIC BLOOD PRESSURE: 130 MMHG | HEART RATE: 65 BPM | HEIGHT: 71 IN | BODY MASS INDEX: 30.59 KG/M2 | TEMPERATURE: 98 F | DIASTOLIC BLOOD PRESSURE: 72 MMHG

## 2019-12-02 DIAGNOSIS — J06.9 URI WITH COUGH AND CONGESTION: ICD-10-CM

## 2019-12-02 DIAGNOSIS — I10 BENIGN ESSENTIAL HYPERTENSION: ICD-10-CM

## 2019-12-02 DIAGNOSIS — J18.9 COMMUNITY ACQUIRED PNEUMONIA, UNSPECIFIED LATERALITY: Primary | ICD-10-CM

## 2019-12-02 DIAGNOSIS — R04.0 BLEEDING FROM THE NOSE: ICD-10-CM

## 2019-12-02 DIAGNOSIS — D50.0 IRON DEFICIENCY ANEMIA DUE TO CHRONIC BLOOD LOSS: ICD-10-CM

## 2019-12-02 DIAGNOSIS — E55.9 VITAMIN D DEFICIENCY: ICD-10-CM

## 2019-12-02 DIAGNOSIS — I16.0 HYPERTENSIVE URGENCY: ICD-10-CM

## 2019-12-02 DIAGNOSIS — Z85.038 HISTORY OF COLON CANCER, STAGE II: ICD-10-CM

## 2019-12-02 PROCEDURE — 3078F DIAST BP <80 MM HG: CPT | Mod: CPTII,S$GLB,, | Performed by: FAMILY MEDICINE

## 2019-12-02 PROCEDURE — 99214 OFFICE O/P EST MOD 30 MIN: CPT | Mod: S$GLB,,, | Performed by: FAMILY MEDICINE

## 2019-12-02 PROCEDURE — 99214 PR OFFICE/OUTPT VISIT, EST, LEVL IV, 30-39 MIN: ICD-10-PCS | Mod: S$GLB,,, | Performed by: FAMILY MEDICINE

## 2019-12-02 PROCEDURE — 3075F PR MOST RECENT SYSTOLIC BLOOD PRESS GE 130-139MM HG: ICD-10-PCS | Mod: CPTII,S$GLB,, | Performed by: FAMILY MEDICINE

## 2019-12-02 PROCEDURE — 3078F PR MOST RECENT DIASTOLIC BLOOD PRESSURE < 80 MM HG: ICD-10-PCS | Mod: CPTII,S$GLB,, | Performed by: FAMILY MEDICINE

## 2019-12-02 PROCEDURE — 99499 UNLISTED E&M SERVICE: CPT | Mod: S$GLB,,, | Performed by: FAMILY MEDICINE

## 2019-12-02 PROCEDURE — 1126F AMNT PAIN NOTED NONE PRSNT: CPT | Mod: S$GLB,,, | Performed by: FAMILY MEDICINE

## 2019-12-02 PROCEDURE — 1159F MED LIST DOCD IN RCRD: CPT | Mod: S$GLB,,, | Performed by: FAMILY MEDICINE

## 2019-12-02 PROCEDURE — 99999 PR PBB SHADOW E&M-EST. PATIENT-LVL IV: CPT | Mod: PBBFAC,,, | Performed by: FAMILY MEDICINE

## 2019-12-02 PROCEDURE — 1159F PR MEDICATION LIST DOCUMENTED IN MEDICAL RECORD: ICD-10-PCS | Mod: S$GLB,,, | Performed by: FAMILY MEDICINE

## 2019-12-02 PROCEDURE — 1101F PT FALLS ASSESS-DOCD LE1/YR: CPT | Mod: CPTII,S$GLB,, | Performed by: FAMILY MEDICINE

## 2019-12-02 PROCEDURE — 1101F PR PT FALLS ASSESS DOC 0-1 FALLS W/OUT INJ PAST YR: ICD-10-PCS | Mod: CPTII,S$GLB,, | Performed by: FAMILY MEDICINE

## 2019-12-02 PROCEDURE — 99999 PR PBB SHADOW E&M-EST. PATIENT-LVL IV: ICD-10-PCS | Mod: PBBFAC,,, | Performed by: FAMILY MEDICINE

## 2019-12-02 PROCEDURE — 1126F PR PAIN SEVERITY QUANTIFIED, NO PAIN PRESENT: ICD-10-PCS | Mod: S$GLB,,, | Performed by: FAMILY MEDICINE

## 2019-12-02 PROCEDURE — 99499 RISK ADDL DX/OHS AUDIT: ICD-10-PCS | Mod: S$GLB,,, | Performed by: FAMILY MEDICINE

## 2019-12-02 PROCEDURE — 3075F SYST BP GE 130 - 139MM HG: CPT | Mod: CPTII,S$GLB,, | Performed by: FAMILY MEDICINE

## 2019-12-02 NOTE — PATIENT INSTRUCTIONS
"Advised on SHINGRIX & Tdap (TDaP) through pharmacy    Safe medications for coughs and colds include Nasal Sprays and also REGULAR MUCINEX OR MUCINEX DM but not MUCINEX"D" or and DECONGESTANT/ D products.    Claritin/zyrtex or allegra are fine to dry up drip but not "D"     Also avoid excess NSAIDS like aleve/advil/ motrin. Tylenol fine.   "

## 2019-12-02 NOTE — PROGRESS NOTES
Office Visit    Patient Name: Noah Nichole    : 1950  MRN: [id number]    Subjective:  Noah is a 69 y.o. male who presents today for:    Follow-up    70 yo patient of [facility] who established care with me on 10/16/19 for chronic conditions that include history of stage II colon cancer(s/p resection 2018 and CEA WNL 10/11/2019), obesity, hypertension, history of iron deficiency anemia (likely associated with colon cancer) her for ER follow up of Pneumonia, elevated blood pressure and recurrent nose bleeding.     Cough and congestion improving with completion of the Azithromycin and Augmentin, and the guaifenesin codeine is helping his cough. Saline and Afrin is helping his nose bleeds.  He is using the saline regularly and the Afrin just 2 sprays once daily as advised.  CXR yesterday: Minimal scarring or atelectasis left lung base    Labs 10/16/2019 overall unremarkable except for mildly low vitamin D and mild anemia.  Had repeat labs in the ER 2019 and those were remarkable for ongoing very mild anemia, unremarkable CMP/troponin/BNP.     His sleep was better last night with a cough syrup and his p.o. intake is also improving.  He is not taking any medications currently other than completing his antibiotics, using the Afrin nose spray and taking the p.r.n. cough syrup.  He believes that his elevated blood pressures in the ER were likely secondary to taking way too many cough and cold medications as he was unaware that this could raise his pressure.    Past Medical History  Past Medical History:   Diagnosis Date    Anemia     Benign essential hypertension 2014    History of colon cancer, stage II 10/10/2018    Iron deficiency anemia due to chronic blood loss 10/16/2019       Past Surgical History  Past Surgical History:   Procedure Laterality Date    COLON SURGERY  2018    COLONOSCOPY N/A 2018    Procedure: COLONOSCOPY;  Surgeon: Adrian Cheung MD;  Location: [address]  [facility]);  Service: Endoscopy;  Laterality: N/A;  pt ate at 1:30 on 9/13/18-Dr Cheung informed and he stated ok to do colonoscopy.    COLONOSCOPY N/A 10/11/2019    Procedure: COLONOSCOPY;  Surgeon: Gabino Bonilla MD;  Location: [facility] (4TH FLR);  Service: Endoscopy;  Laterality: N/A;    NO PAST SURGERIES      RIGHT HEMICOLECTOMY N/A 9/18/2018    Procedure: HEMICOLECTOMY, RIGHT, extended;  Surgeon: Adrian Cheung MD;  [address]  Service: Colon and Rectal;  Laterality: N/A;       Family History  Family History   Problem Relation Age of Onset    Hypertension Mother     Cancer Father         stomach    Hypertension Father     Diabetes Sister     Hypertension Brother     Glaucoma Brother     Diabetes Brother     Cataracts Brother     Cancer Sister     Amblyopia Neg Hx     Blindness Neg Hx     Macular degeneration Neg Hx     Retinal detachment Neg Hx     Strabismus Neg Hx     Stroke Neg Hx     Thyroid disease Neg Hx        Social History  Social History     Socioeconomic History    Marital status: Single     Spouse name: Not on file    Number of children: Not on file    Years of education: Not on file    Highest education level: Not on file   Occupational History    Not on file   Social Needs    Financial resource strain: Not on file    Food insecurity:     Worry: Not on file     Inability: Not on file    Transportation needs:     Medical: Not on file     Non-medical: Not on file   Tobacco Use    Smoking status: Never Smoker    Smokeless tobacco: Never Used   Substance and Sexual Activity    Alcohol use: Not Currently    Drug use: No    Sexual activity: Not on file   Lifestyle    Physical activity:     Days per week: Not on file     Minutes per session: Not on file    Stress: Not on file   Relationships    Social connections:     Talks on phone: Not on file     Gets together: Not on file     Attends [employer] service: Not on file     Active member of club or  "organization: Not on file     Attends meetings of clubs or organizations: Not on file     Relationship status: Not on file   Other Topics Concern    Not on file   Social History Narrative    Not on file       Current Medications  Medications reviewed and updated.     Allergies   Review of patient's allergies indicates:  No Known Allergies    Review of Systems (Pertinent positives)  Review of Systems   Constitutional: Negative for chills and fever.   HENT: Positive for congestion, nosebleeds (resolving) and rhinorrhea. Negative for sinus pressure.    Respiratory: Positive for cough. Negative for shortness of breath.    Cardiovascular: Negative for chest pain.   Neurological: Negative for dizziness and light-headedness.       /72   Pulse 65   Temp 98.1 °F (36.7 °C)   Ht 5' 11" (1.803 m)   Wt 99.1 kg (218 lb 7.6 oz)   SpO2 98%   BMI 30.47 kg/m²     Physical Exam   Constitutional: He is oriented to person, place, and time. He appears well-developed and well-nourished. No distress.   HENT:   Head: Normocephalic and atraumatic.   Right Ear: Tympanic membrane is not erythematous and not bulging.   Left Ear: Tympanic membrane is not erythematous and not bulging.   Nose: Mucosal edema present. No rhinorrhea. Epistaxis: no active, some dried blood visualized in right nare.   Mouth/Throat: Posterior oropharyngeal erythema (mild) present. No oropharyngeal exudate or posterior oropharyngeal edema.   Cardiovascular: Normal rate, regular rhythm and normal heart sounds.   Pulmonary/Chest: Effort normal and breath sounds normal.   Musculoskeletal: He exhibits no edema.   Neurological: He is alert and oriented to person, place, and time.   Psychiatric: He has a normal mood and affect.         Assessment/Plan:  Noah Nichole is a 69 y.o. male who presents today for :    Noah was seen today for follow-up.    Diagnoses and all orders for this visit:    Community acquired pneumonia, unspecified " laterality  Comments:  Vitals stable on room air, respiratory status improving following Z-Pack and Augmentin courses. CXR 12/1/19: Minimal scarring or atelectasis left lung base    URI with cough and congestion  Comments:  Continue wife a Nissen codeine cough syrup as cough continues to resolve.  Given list of safe cough/cold meds to take with high blood pressure    Bleeding from the nose  Comments:  Resolved, continue daily saline.  Afrin for another 1-2 days then as needed for nose bleeds/congestion.  Counseled on rebound congestion if prolonged use    Benign essential hypertension  Comments:  Very elevated blood pressure in ER likely secondary to D congestion/NSAIDs, his level today is normal on his Zestoretic 20/12.5    Hypertensive urgency    Iron deficiency anemia due to chronic blood loss  Comments:  Advised to continue iron supplement a few times per week until recheck CBC in April 2020 to see if anemia has resolved    Vitamin D deficiency  Comments:  advised on 1,000 IU daily supplement    History of colon cancer, stage II            ICD-10-CM ICD-9-CM    1. Community acquired pneumonia, unspecified laterality J18.9 486     Vitals stable on room air, respiratory status improving following Z-Pack and Augmentin courses. CXR 12/1/19: Minimal scarring or atelectasis left lung base   2. URI with cough and congestion J06.9 465.9     Continue wife a Nissen codeine cough syrup as cough continues to resolve.  Given list of safe cough/cold meds to take with high blood pressure   3. Bleeding from the nose R04.0 784.7     Resolved, continue daily saline.  Afrin for another 1-2 days then as needed for nose bleeds/congestion.  Counseled on rebound congestion if prolonged use   4. Benign essential hypertension I10 401.1     Very elevated blood pressure in ER likely secondary to D congestion/NSAIDs, his level today is normal on his Zestoretic 20/12.5   5. Hypertensive urgency I16.0 401.9    6. Iron deficiency anemia  "due to chronic blood loss D50.0 280.0     Advised to continue iron supplement a few times per week until recheck CBC in April 2020 to see if anemia has resolved   7. Vitamin D deficiency E55.9 268.9     advised on 1,000 IU daily supplement   8. History of colon cancer, stage II Z85.038 V10.05        Patient Instructions   Advised on SHINGRIX & Tdap (TDaP) through pharmacy    Safe medications for coughs and colds include Nasal Sprays and also REGULAR MUCINEX OR MUCINEX DM but not MUCINEX"D" or and DECONGESTANT/ D products.    Claritin/zyrtex or allegra are fine to dry up drip but not "D"     Also avoid excess NSAIDS like aleve/advil/ motrin. Tylenol fine.         Follow up for return as needed for new concerns.  "

## 2019-12-09 ENCOUNTER — TELEPHONE (OUTPATIENT)
Dept: FAMILY MEDICINE | Facility: CLINIC | Age: 69
End: 2019-12-09

## 2019-12-09 DIAGNOSIS — J06.9 URI WITH COUGH AND CONGESTION: Primary | ICD-10-CM

## 2019-12-09 RX ORDER — CODEINE PHOSPHATE AND GUAIFENESIN 10; 100 MG/5ML; MG/5ML
5 SOLUTION ORAL EVERY 6 HOURS PRN
Qty: 236 ML | Refills: 0 | Status: SHIPPED | OUTPATIENT
Start: 2019-12-09 | End: 2019-12-19

## 2019-12-09 NOTE — TELEPHONE ENCOUNTER
His previous x-ray did not really look bad, and I do not feel like he needs another one.  If he still feeling sick or having a persistent cough?  If he is overall feeling well but just having a lingering cough, he may benefit from a round of steroids to help this resolve.  Please find out more information about why he is wanting a repeat chest x-ray.  Thank you

## 2019-12-09 NOTE — TELEPHONE ENCOUNTER
Returned pt phone call, he wanted to take the xray to see how he was doing, he stated he is feeling good he is still having the cough, he said that the medication was helping him, he is requesting for a refill.

## 2019-12-09 NOTE — TELEPHONE ENCOUNTER
----- Message from Suha Vidales MA sent at 12/9/2019  1:30 PM CST -----  Contact: Self      ----- Message -----  From: Paul Garduno, Patient Care Assistant  Sent: 12/9/2019  12:07 PM CST  To: Latia PISANO Staff    Pt states he finished his antibiotics and would like to schedule another x ray     Please advise, pt can be reached at [phone]

## 2020-01-06 ENCOUNTER — TELEPHONE (OUTPATIENT)
Dept: FAMILY MEDICINE | Facility: CLINIC | Age: 70
End: 2020-01-06

## 2020-01-06 NOTE — TELEPHONE ENCOUNTER
----- Message from Stacie Denis sent at 1/6/2020  1:23 PM CST -----  Contact: [phone]/self  Patient requesting to have a medication called in for cold/sinus/cough. He is requesting antibiotics and cough syrup. [facility]. Please advise.

## 2020-02-05 DIAGNOSIS — I10 ESSENTIAL HYPERTENSION: ICD-10-CM

## 2020-02-05 RX ORDER — LISINOPRIL AND HYDROCHLOROTHIAZIDE 12.5; 2 MG/1; MG/1
1 TABLET ORAL DAILY
Qty: 90 TABLET | Refills: 4 | Status: ON HOLD | OUTPATIENT
Start: 2020-02-05 | End: 2020-04-10 | Stop reason: SDUPTHER

## 2020-02-05 NOTE — TELEPHONE ENCOUNTER
----- Message from Deann Vaughn sent at 2/5/2020 11:55 AM CST -----  Contact: Noah  Patient Requesting Sooner Appointment.     Reason for sooner appt.:Pt is trying to reach to get scheduled for a f/u appt in order to receive continued usage for Pressure medicine.   When is the first available appointment?N/A  Communication Preference:[phone]  Additional Information:

## 2020-03-25 ENCOUNTER — TELEPHONE (OUTPATIENT)
Dept: FAMILY MEDICINE | Facility: CLINIC | Age: 70
End: 2020-03-25

## 2020-03-25 ENCOUNTER — CLINICAL SUPPORT (OUTPATIENT)
Dept: INTERNAL MEDICINE | Facility: CLINIC | Age: 70
End: 2020-03-25
Payer: MEDICARE

## 2020-03-25 DIAGNOSIS — B34.9 VIRAL SYNDROME: Primary | ICD-10-CM

## 2020-03-25 DIAGNOSIS — B34.9 VIRAL SYNDROME: ICD-10-CM

## 2020-03-25 PROCEDURE — U0002 COVID-19 LAB TEST NON-CDC: HCPCS

## 2020-03-25 NOTE — TELEPHONE ENCOUNTER
I am suspicious for COVID -19.  I have placed an order for him to obtain a drive-through test.  Please advise him on supportive care-Tylenol-rest-fluids and schedule him for the drive-through testing.      Please give ER precautions if he develops shortness of breath, thank you

## 2020-03-25 NOTE — TELEPHONE ENCOUNTER
Patient called c/o a dry cough, runny nose, headache and fever.  I asked patient if he was able to check his temperature with a thermometer, patient stated no, but he does experience sweats and a bitterness in mouth.  His symptoms started on Monday.  Does not suspect that he has came into with anyone positive for COVID-19.  He's been taking Tylenol for symptoms.  Offered to schedule virtual visit, but patient has a flip phone and no access to smart phone or tablet.  Please advise.

## 2020-03-27 ENCOUNTER — TELEPHONE (OUTPATIENT)
Dept: FAMILY MEDICINE | Facility: CLINIC | Age: 70
End: 2020-03-27

## 2020-03-27 LAB — SARS-COV-2 RNA RESP QL NAA+PROBE: DETECTED

## 2020-03-27 NOTE — TELEPHONE ENCOUNTER
----- Message from Lexii Carlos sent at 3/27/2020  2:52 PM CDT -----  Contact: Self [phone]  TEST RESULTS:   Patient would like to get test results.  Name of test (lab, mammo, etc.): [id number]  Date of test: 3-24-20

## 2020-03-30 ENCOUNTER — TELEPHONE (OUTPATIENT)
Dept: FAMILY MEDICINE | Facility: CLINIC | Age: 70
End: 2020-03-30

## 2020-03-30 DIAGNOSIS — J06.9 URI WITH COUGH AND CONGESTION: Primary | ICD-10-CM

## 2020-03-30 RX ORDER — CODEINE PHOSPHATE AND GUAIFENESIN 10; 100 MG/5ML; MG/5ML
5 SOLUTION ORAL EVERY 4 HOURS PRN
Qty: 236 ML | Refills: 0 | Status: ON HOLD | OUTPATIENT
Start: 2020-03-30 | End: 2020-04-10 | Stop reason: HOSPADM

## 2020-03-30 NOTE — TELEPHONE ENCOUNTER
Called patient regarding COVID-19 results.  Patient verbalized understanding.   Patient states that he feels generally fine.  He did state that he's experiencing loose stool and cough.  His temperature has ranged from 97.0 to 99.5.  Also stated that his appetite is very minimal.  Patient is requesting cough medication with codeine.  Please advise.

## 2020-03-30 NOTE — TELEPHONE ENCOUNTER
----- Message from Lexii Carlos sent at 3/30/2020  8:03 AM CDT -----  Contact: Self -[phone]  Patient is calling to get orders to have an x-ray done on his chest due to he is still coughing and would like cough medication sent to the pharmacy. [facility] [address] - [place], [place] - [address] [facility] [phone] (Phone)  [phone] (Fax)

## 2020-04-03 ENCOUNTER — TELEPHONE (OUTPATIENT)
Dept: FAMILY MEDICINE | Facility: CLINIC | Age: 70
End: 2020-04-03

## 2020-04-03 NOTE — TELEPHONE ENCOUNTER
"Received hand off from Iliana with regards to patient's daughter asking for medication.  Patient's daughter, Barbara, was advised that Dr Jeffery was not in clinic but that a message would be sent to her requesting cough medication for patient's cough.  Barbara said that she 'better hear back from Dr Jeffery by Monday otherwise she was coming and raising hell'".  She also wanted medication prescribed to increase patient's appetite and antibiotics.  Explained to patient that he is Covid positive and antibiotics are not recommended for this.  Also advised that loss of appetite and taste are symptoms of Covid.  This is a virus and it has to run it's course.  Advised that if patient has SOB, to take him to the ER.  Patient's daughter said he was fine.  Patient's daughter was belligerent the whole time demanding xray and medications.  Reiterated that a message would be sent in to Dr Jeffery.  Patient's daughter continually threatened to come in to 'raise hell' if she does not hear form someone on Monday.  Patient's daughter told that we do not tolerate threats here and security will be called in the event that she comes to cause trouble.  Patient advised that we would contact patient on Monday.   "

## 2020-04-03 NOTE — TELEPHONE ENCOUNTER
Spoke to pt's daughter, Magnolia and stated that pt tested positive for the Coronavirus. Daughter is asking for antibiotics to be called in today for pt for his cough. Magnolia was informed that Dr. Jeffery is gone for the day. Magnolia stated that she is not burying her father and Dr. Jeffery better call something in for her dad or she will come to this hospital and raise hell. Pt's daughter made multiple threats to myself on the phone. Daughter stated that Dr. Jeffery has until Monday to send him in antibiotics. Daughter was then put on hold and transferred her the Supervisor, Violette.

## 2020-04-03 NOTE — TELEPHONE ENCOUNTER
----- Message from Mitchel Pierre sent at 4/3/2020  2:53 PM CDT -----  Contact: daughter [phone]  Patient called in requesting to speak with you. Patient prefers to speak with a nurse. The patient is having several issues. Patient is positive for Coronavirus. Please call and advise.

## 2020-04-04 ENCOUNTER — HOSPITAL ENCOUNTER (EMERGENCY)
Facility: HOSPITAL | Age: 70
Discharge: HOME | End: 2020-04-04
Attending: EMERGENCY MEDICINE
Payer: MEDICARE

## 2020-04-04 VITALS
DIASTOLIC BLOOD PRESSURE: 58 MMHG | TEMPERATURE: 100 F | WEIGHT: 218 LBS | SYSTOLIC BLOOD PRESSURE: 127 MMHG | RESPIRATION RATE: 18 BRPM | BODY MASS INDEX: 29.53 KG/M2 | HEIGHT: 72 IN | HEART RATE: 89 BPM | OXYGEN SATURATION: 96 %

## 2020-04-04 DIAGNOSIS — B34.9 VIRAL SYNDROME: Primary | ICD-10-CM

## 2020-04-04 DIAGNOSIS — R50.9 FEVER, UNSPECIFIED FEVER CAUSE: ICD-10-CM

## 2020-04-04 DIAGNOSIS — R05.9 COUGH: ICD-10-CM

## 2020-04-04 PROCEDURE — 99284 EMERGENCY DEPT VISIT MOD MDM: CPT | Mod: 25

## 2020-04-04 PROCEDURE — 25000003 PHARM REV CODE 250: Performed by: NURSE PRACTITIONER

## 2020-04-04 RX ORDER — ACETAMINOPHEN 500 MG
500 TABLET ORAL EVERY 6 HOURS PRN
Qty: 30 TABLET | Refills: 0 | Status: SHIPPED | OUTPATIENT
Start: 2020-04-04 | End: 2022-09-13

## 2020-04-04 RX ORDER — ACETAMINOPHEN 500 MG
1000 TABLET ORAL
Status: COMPLETED | OUTPATIENT
Start: 2020-04-04 | End: 2020-04-04

## 2020-04-04 RX ORDER — ONDANSETRON 4 MG/1
8 TABLET, FILM COATED ORAL EVERY 8 HOURS PRN
Qty: 20 TABLET | Refills: 0 | Status: SHIPPED | OUTPATIENT
Start: 2020-04-04 | End: 2021-02-19 | Stop reason: ALTCHOICE

## 2020-04-04 RX ORDER — AZITHROMYCIN 250 MG/1
250 TABLET, FILM COATED ORAL DAILY
Qty: 6 TABLET | Refills: 0 | Status: ON HOLD | OUTPATIENT
Start: 2020-04-04 | End: 2020-04-10 | Stop reason: HOSPADM

## 2020-04-04 RX ORDER — ALBUTEROL SULFATE 90 UG/1
1-2 AEROSOL, METERED RESPIRATORY (INHALATION) EVERY 6 HOURS PRN
Qty: 18 G | Refills: 0 | Status: SHIPPED | OUTPATIENT
Start: 2020-04-04 | End: 2021-02-19 | Stop reason: ALTCHOICE

## 2020-04-04 RX ORDER — ALBUTEROL SULFATE 90 UG/1
2 AEROSOL, METERED RESPIRATORY (INHALATION) EVERY 6 HOURS PRN
Status: DISCONTINUED | OUTPATIENT
Start: 2020-04-04 | End: 2020-04-04 | Stop reason: HOSPADM

## 2020-04-04 RX ORDER — BENZONATATE 100 MG/1
100 CAPSULE ORAL 3 TIMES DAILY PRN
Qty: 20 CAPSULE | Refills: 0 | Status: SHIPPED | OUTPATIENT
Start: 2020-04-04 | End: 2020-04-14

## 2020-04-04 RX ADMIN — ACETAMINOPHEN 1000 MG: 500 TABLET, FILM COATED ORAL at 03:04

## 2020-04-04 NOTE — ED NOTES
Pt c/o fever and poor appetite x1 week. Pt tested positive for COVID-19 on Tuesday, so has been on home quarantine since then. Reports he was not started on any new medications on Tuesday. Also c/o cough and fatigue. Denies pain. Denies SOB. Respirations unlabored. Pt is febrile, and states he last took tylenol this morning.     APPEARANCE: Alert, oriented and in no acute distress.  HEENT: Speaks without hoarseness.  CARDIAC: Normal rate and rhythm.    PERIPHERAL VASCULAR: peripheral pulses present. Normal cap refill. No edema. Warm to touch.    RESPIRATORY:Normal rate and effort. Respirations are equal and unlabored no obvious signs of distress.  GASTRO: Denies abdominal pain. Denies nausea, vomiting, or diarrhea. C/o poor appetite and decreased PO intake  : voids spontaneously and without difficulty.   MUSC: Full ROM. No obvious deformity. In wheelchair at triage, due to generalized weakness.  SKIN: Skin is warm and dry, without discoloration. Mucous membranes moist.  NEURO: Pt is awake, alert, aware of environment. No neurologic deficits noted.

## 2020-04-04 NOTE — ED NOTES
Pt concerned about his lack of appetite. Instructed to take zofran and then have a small snack at home. Instructed to continue making sure he is staying hydrated with fluids. Verbalizes understanding and acceptance of this plan.

## 2020-04-04 NOTE — TELEPHONE ENCOUNTER
Had extensive conversations with patient, daughter and son.    Patient sounds mildly winded over the phone, and daughter reports she feels he is starting to have breathing difficulty. Having diarrhea so many times per day that his urine is dark.     Advise to go to the ER for evaluation--oxygen/vitals assessment, labs to check kidney function/elecrolytes and likely chest xray.     His son Sharad Jones will bring him to the ER shortly.       Grace Jeffery MD

## 2020-04-04 NOTE — ED PROVIDER NOTES
CHIEF COMPLAINT:   Chief Complaint   Patient presents with    Fever     c/o fever and poor appetite x1 week. Pt tested positive for COVID-19 on Tuesday, so has been on home quarantine. Also c/o cough and fatigue.        HISTORY OF PRESENT ILLNESS: Noah Nichole who is a 70 y.o. presents to the emergency department today with complaint of fever (had taken tylenol this am) and cough. States poor appetite for 1 week. Worsening fatigue.    Of note: patient positive results for Covid-19, on Tuesday.     REVIEW OF SYSTEMS:  Constitutional: +fever, +chills.  Eyes: No discharge. No pain.  HENT: +nasal congestion; No sore throat.   Cardiovascular: No chest pain, no palpitations.  Respiratory: +cough, +shortness of breath.  Gastrointestinal: diarrhea; No abdominal pain, no vomiting.   Genitourinary: No hematuria, dysuria, urgency.  Musculoskeletal: No back pain.  Skin: No rashes, no lesions.  Neurological: No headache, no focal weakness.    Otherwise remaining ROS negative     ALLERGIES REVIEWED  MEDICATIONS REVIEWED  PMH/PSH/SOC/FH REVIEWED     The history is provided by the patient.    Nursing/Ancillary staff note reviewed.        PHYSICAL EXAM:  VS reviewed  This emergency department encounter was performed via a HIPAA-compliant telemedicine application, in concert with a tele-presenter in the room. A face to face evaluation was available to the patient in the case it was deemed necessary by me or the Emergency Department staff.   Nursing Notes and Triage Vitals reviewed by me.  Telemedicine exam performed via [employer]     Constitutional: Well developed, well nourished, no apparent distress.  HENT: Normocephalic, atraumatic. Nose: No rhinorrhea or discharge noted. Pharynx: Mucous membranes moist, no erythema per tele-presenter. No tenderness to frontal/maxillary sinuses on exam by tele-presenter.  Eyes: No conjunctival injection, pallor or icterus. No discharge.  Neck: Normal range of motion. No cervical  "adenopathy palpated per patient self-exam.  Respiratory: No respiratory distress or conversational dyspnea. No accessory muscle use or retractions.  Cardiovascular: No perioral cyanosis, cap refill < 2 sec on patient self-exam.  Musculoskeletal: No gross deformity or limited range of motion of all major joints.  Integument: Warm and dry. No rash.  Neurologic: Alert and oriented x3, GCS 15. Moving all extremities. No aphasia.   Psychiatric: Affect normal. Mood normal. Normal behavior.      Patient was seen in the Emergency Department with symptoms consistent with a viral respiratory illness. The patient was tested for flu and was negative. Chest xray "Clear lungs with no obvious acute infiltrate". Vital signs do not indicate sepsis, hypoxia nor respiratory distress, and in my professional opinion the patient is well enough for discharge home. The patient was provided with discharge instructions on self-care and how to quarantine at home. I reinforced this advice and the dangers to family and public with failure to comply. We will proceed with symptomatic treatment. The patient was also given a return to work note, if applicable. Return precautions discussed with the patient. The patient expressed understanding to my instructions.     Vitals:    04/04/20 1725   BP: (!) 127/58   Pulse: 89   Resp: 18   Temp: (!) 100.4 °F (38 °C)          Past Medical History:   Diagnosis Date    Anemia     Benign essential hypertension 12/17/2014    History of colon cancer, stage II 10/10/2018    Iron deficiency anemia due to chronic blood loss 10/16/2019         Past Surgical History:   Procedure Laterality Date    COLON SURGERY  09/18/2018    COLONOSCOPY N/A 9/14/2018    Procedure: COLONOSCOPY;  Surgeon: Adrian Cheung MD;  Location: [address];  Service: Endoscopy;  Laterality: N/A;  pt ate at 1:30 on 9/13/18-Dr Cheung informed and he stated ok to do colonoscopy.    COLONOSCOPY N/A 10/11/2019    Procedure: COLONOSCOPY;  " Surgeon: Gabino Bonilla MD;  Location: [facility] (4TH FLR);  Service: Endoscopy;  Laterality: N/A;    NO PAST SURGERIES      RIGHT HEMICOLECTOMY N/A 9/18/2018    Procedure: HEMICOLECTOMY, RIGHT, extended;  Surgeon: Adrian Cheung MD;  Location: [facility] OR 2ND FLR;  Service: Colon and Rectal;  Laterality: N/A;                ED COURSE:     Patient presenting with general illness symptoms; appears well and nontoxic. Exam grossly unremarkable at this time.    DIFFERENTIAL DIAGNOSIS: After history and physical exam a differential diagnosis was considered, but was not limited to,   Sepsis, meningitis, otitis media/external, nasal polyp, bacterial sinusitis, allergic rhinitis, influenza, COVID19, bacterial/viral pharyngitis, bacterial/viral pneumonia.    ED management:       IMPRESSION  The primary encounter diagnosis was Viral syndrome. Diagnoses of Fever, unspecified fever cause and Cough were also pertinent to this visit. Strict instructions to follow up with primary care physician or reference provided for further assessment and evaluation. Given instructions to return for any acute symptoms and verbalized understanding of this medical plan.                                   WENDY Kolb  04/04/20 1728       WENDY Kolb  04/04/20 1812

## 2020-04-04 NOTE — DISCHARGE INSTRUCTIONS
"Given the current circumstances in the community regarding corona virus, you should presumptively treat yourself as if you do have Coronavirus / Covid 19 and quarantine yourself at home.  Please read the and follow the instructions of the discharge paperwork reguarding "Commonly asked Questions About Home Quarantine" provided by the CDC.  If you have significantly worsening shortness of breath, difficulty breathing, severe chest pain, return to the emergency room.  Take over-the-counter medications and any other prescriptions provided for symptoms of fever, cough, cold, etc..    Please call and f/u with your PCP via telemedicine in 1-2 days     "

## 2020-04-07 ENCOUNTER — TELEPHONE (OUTPATIENT)
Dept: SURGERY | Facility: CLINIC | Age: 70
End: 2020-04-07

## 2020-04-08 PROBLEM — E86.0 DEHYDRATION: Status: ACTIVE | Noted: 2020-04-08

## 2020-04-09 ENCOUNTER — TELEPHONE (OUTPATIENT)
Dept: FAMILY MEDICINE | Facility: CLINIC | Age: 70
End: 2020-04-09

## 2020-04-09 PROBLEM — E87.5 HYPERKALEMIA: Status: ACTIVE | Noted: 2020-04-09

## 2020-04-09 PROBLEM — A09 DIARRHEA OF INFECTIOUS ORIGIN: Status: ACTIVE | Noted: 2020-04-09

## 2020-04-09 PROBLEM — N17.9 AKI (ACUTE KIDNEY INJURY): Status: ACTIVE | Noted: 2020-04-09

## 2020-04-09 PROBLEM — U07.1 COVID-19 VIRUS DETECTED: Status: ACTIVE | Noted: 2020-04-09

## 2020-04-09 NOTE — TELEPHONE ENCOUNTER
----- Message from Felipe Lopez sent at 4/9/2020 12:14 PM CDT -----  Contact: Lily doris [place] / [phone]  Patient would like to speak with your office regarding scheduling an hospital f/u , pt will be discharged tomorrow. Please Advise.

## 2020-04-10 PROBLEM — E87.5 HYPERKALEMIA: Status: RESOLVED | Noted: 2020-04-09 | Resolved: 2020-04-10

## 2020-04-16 ENCOUNTER — OFFICE VISIT (OUTPATIENT)
Dept: FAMILY MEDICINE | Facility: CLINIC | Age: 70
End: 2020-04-16
Attending: FAMILY MEDICINE
Payer: MEDICARE

## 2020-04-16 DIAGNOSIS — D50.0 IRON DEFICIENCY ANEMIA DUE TO CHRONIC BLOOD LOSS: ICD-10-CM

## 2020-04-16 DIAGNOSIS — U07.1 COVID-19 VIRUS DETECTED: ICD-10-CM

## 2020-04-16 DIAGNOSIS — I10 BENIGN ESSENTIAL HYPERTENSION: Primary | ICD-10-CM

## 2020-04-16 DIAGNOSIS — E55.9 VITAMIN D DEFICIENCY: ICD-10-CM

## 2020-04-16 DIAGNOSIS — C18.2 MALIGNANT NEOPLASM OF ASCENDING COLON: ICD-10-CM

## 2020-04-16 DIAGNOSIS — Z85.038 HISTORY OF COLON CANCER, STAGE II: ICD-10-CM

## 2020-04-16 PROBLEM — N17.9 AKI (ACUTE KIDNEY INJURY): Status: RESOLVED | Noted: 2020-04-09 | Resolved: 2020-04-16

## 2020-04-16 PROBLEM — A09 DIARRHEA OF INFECTIOUS ORIGIN: Status: RESOLVED | Noted: 2020-04-09 | Resolved: 2020-04-16

## 2020-04-16 PROCEDURE — 99442 PR PHYSICIAN TELEPHONE EVALUATION 11-20 MIN: CPT | Mod: 95,,, | Performed by: FAMILY MEDICINE

## 2020-04-16 PROCEDURE — 99442 PR PHYSICIAN TELEPHONE EVALUATION 11-20 MIN: ICD-10-PCS | Mod: 95,,, | Performed by: FAMILY MEDICINE

## 2020-04-16 NOTE — PROGRESS NOTES
The patient location is: home  The chief complaint leading to consultation is: hospital DC follow up  Visit type: audio only  Total time spent with patient: 15 min  Each patient to whom he or she provides medical services by telemedicine is:  (1) informed of the relationship between the physician and patient and the respective role of any other health care provider with respect to management of the patient; and (2) notified that he or she may decline to receive medical services by telemedicine and may withdraw from such care at any time.    Notes: 70 yr old pleasant male with colon cancer, HTN, and other co morbidities presents to virtual audio only visit for post hospital discharge follow up. No new complaints today. He was admitted a week ago for LOU, diarrhea. He stayed for 2 days and received fluids and his LOU resolved. He is sent home after that. Of note, his COVID test came back positive.    Active Ambulatory Problems     Diagnosis Date Noted    Benign essential hypertension 12/17/2014    History of colon cancer, stage II 10/10/2018    Iron deficiency anemia due to chronic blood loss 10/16/2019    Vitamin D deficiency 10/17/2019    COVID-19 virus detected 04/09/2020    Malignant neoplasm of ascending colon 04/16/2020     Resolved Ambulatory Problems     Diagnosis Date Noted    Abdominal mass 09/11/2018    Colonic mass 09/13/2018    LOU (acute kidney injury) 04/09/2020    Diarrhea of infectious origin 04/09/2020    Hyperkalemia 04/09/2020     Past Medical History:   Diagnosis Date    Anemia      Past Surgical History:   Procedure Laterality Date    COLON SURGERY  09/18/2018    COLONOSCOPY N/A 9/14/2018    Procedure: COLONOSCOPY;  Surgeon: Adrian Cheung MD;  Location: [address];  Service: Endoscopy;  Laterality: N/A;  pt ate at 1:30 on 9/13/18-Dr Cheung informed and he stated ok to do colonoscopy.    COLONOSCOPY N/A 10/11/2019    Procedure: COLONOSCOPY;  Surgeon: Gabino Bonilla MD;   Location: [facility] ENDO (4TH FLR);  Service: Endoscopy;  Laterality: N/A;    NO PAST SURGERIES      RIGHT HEMICOLECTOMY N/A 9/18/2018    Procedure: HEMICOLECTOMY, RIGHT, extended;  Surgeon: Adrian Cheung MD;  Location: [facility] OR 2ND FLR;  Service: Colon and Rectal;  Laterality: N/A;     Family History   Problem Relation Age of Onset    Hypertension Mother     Cancer Father         stomach    Hypertension Father     Diabetes Sister     Hypertension Brother     Glaucoma Brother     Diabetes Brother     Cataracts Brother     Cancer Sister     Amblyopia Neg Hx     Blindness Neg Hx     Macular degeneration Neg Hx     Retinal detachment Neg Hx     Strabismus Neg Hx     Stroke Neg Hx     Thyroid disease Neg Hx      Social History     Socioeconomic History    Marital status: Single     Spouse name: Not on file    Number of children: Not on file    Years of education: Not on file    Highest education level: Not on file   Occupational History    Not on file   Social Needs    Financial resource strain: Not on file    Food insecurity:     Worry: Not on file     Inability: Not on file    Transportation needs:     Medical: Not on file     Non-medical: Not on file   Tobacco Use    Smoking status: Never Smoker    Smokeless tobacco: Never Used   Substance and Sexual Activity    Alcohol use: Not Currently    Drug use: No    Sexual activity: Not on file   Lifestyle    Physical activity:     Days per week: Not on file     Minutes per session: Not on file    Stress: Not on file   Relationships    Social connections:     Talks on phone: Not on file     Gets together: Not on file     Attends Cheondoism service: Not on file     Active member of club or organization: Not on file     Attends meetings of clubs or organizations: Not on file     Relationship status: Not on file   Other Topics Concern    Not on file   Social History Narrative    Not on file     Review of patient's allergies indicates:  No Known  Allergies  Current Outpatient Medications on File Prior to Visit   Medication Sig Dispense Refill    acetaminophen (TYLENOL) 500 MG tablet Take 1 tablet (500 mg total) by mouth every 6 (six) hours as needed for Pain or Temperature greater than. 30 tablet 0    albuterol (PROVENTIL/VENTOLIN HFA) 90 mcg/actuation inhaler Inhale 1-2 puffs into the lungs every 6 (six) hours as needed for Wheezing. Rescue 18 g 0    lisinopriL-hydrochlorothiazide (PRINZIDE,ZESTORETIC) 20-12.5 mg per tablet Take 1 tablet by mouth once daily. Hold for one week and restart on 4/17/20      loperamide (IMODIUM) 2 mg capsule Take 1 capsule (2 mg total) by mouth 4 (four) times daily as needed for Diarrhea. 30 capsule 0    ondansetron (ZOFRAN) 4 MG tablet Take 2 tablets (8 mg total) by mouth every 8 (eight) hours as needed for Nausea. 20 tablet 0     No current facility-administered medications on file prior to visit.      No vitals or PE done    Diagnoses and all orders for this visit:    Benign essential hypertension    COVID-19 virus detected    Iron deficiency anemia due to chronic blood loss    Vitamin D deficiency    History of colon cancer, stage II    Malignant neoplasm of ascending colon      [facility] DC follow up  -doing well  -precautions given to monitor symptoms    Spent adequate time in obtaining history and explaining differentials    RTC prn worsening

## 2020-04-22 ENCOUNTER — TELEPHONE (OUTPATIENT)
Dept: FAMILY MEDICINE | Facility: CLINIC | Age: 70
End: 2020-04-22

## 2020-04-22 NOTE — TELEPHONE ENCOUNTER
I called patient to reschedule his appointment with Dr. Jeffery. Appointment changed to a audio visit.

## 2020-04-24 ENCOUNTER — OFFICE VISIT (OUTPATIENT)
Dept: FAMILY MEDICINE | Facility: CLINIC | Age: 70
End: 2020-04-24
Payer: MEDICARE

## 2020-04-24 DIAGNOSIS — Z85.038 HISTORY OF COLON CANCER, STAGE II: ICD-10-CM

## 2020-04-24 DIAGNOSIS — U07.1 COVID-19 VIRUS DETECTED: Primary | ICD-10-CM

## 2020-04-24 DIAGNOSIS — D50.0 IRON DEFICIENCY ANEMIA DUE TO CHRONIC BLOOD LOSS: ICD-10-CM

## 2020-04-24 DIAGNOSIS — I10 BENIGN ESSENTIAL HYPERTENSION: ICD-10-CM

## 2020-04-24 PROCEDURE — 99442 PR PHYSICIAN TELEPHONE EVALUATION 11-20 MIN: CPT | Mod: 95,,, | Performed by: FAMILY MEDICINE

## 2020-04-24 PROCEDURE — 99442 PR PHYSICIAN TELEPHONE EVALUATION 11-20 MIN: ICD-10-PCS | Mod: 95,,, | Performed by: FAMILY MEDICINE

## 2020-04-24 NOTE — PROGRESS NOTES
" Office Visit    Patient Name: Noah Nichole    : 1950  MRN: [id number]    Subjective:  Noah is a 70 y.o. male who presents today for:    No chief complaint on file.    The patient location is: THEIR HOME  The chief complaint leading to consultation is: COVID F/U (hospitalized for dehydration with LOU), HTN follow up  Visit type: audio only  Total time spent with patient: START 1047 AM END 1101 am, 14 MINUTES  Each patient to whom he or she provides medical services by telemedicine is:  (1) informed of the relationship between the physician and patient and the respective role of any other health care provider with respect to management of the patient; and (2) notified that he or she may decline to receive medical services by telemedicine and may withdraw from such care at any time.    Admission Date: 2020  Discharge Date and Time:  04/10/2020     PER DR HARDEN'S VIRTUAL VISIT NOTE 20: "70 yr old pleasant male with colon cancer, HTN, and other co morbidities presents to virtual audio only visit for post hospital discharge follow up. No new complaints today. He was admitted a week ago for LOU, diarrhea. He stayed for 2 days and received fluids and his LOU resolved. He is sent home after that. Of note, his COVID test came back positive."     Discharge Labs 4/10/20 showed return to normal EGFR greater than 60, previous low of 38.7 with normal electrolytes.  CBC with slight decrease in hemoglobin from previous baseline of 12+ range down to 11+ range.  Otherwise normal values on CBC, has orders for repeat CBC with iron studies to be done at next lab draw prior to his appointment with me.    HE WAS INSTRUCTED TO NOT TAKE HIS BLOOD PRESSURE MEDICATION UPON DISCHARGE SECONDARY TO CONCERNS FOR LOU AND LOWER BLOOD PRESSURE READINGS.    HAS BEEN BACK ON BP MEDICATION 20/12.5 FOR ABOUT 1 WEEK AND IS FEELING WELL ON IT-- DENIES CHEST PAIN, SHORTNESS OF BREATH, COUGH, DIZZINESS, BLURRY VISION.  HE SAYS HIS HOME " READINGS HAVE BEEN FINE, THOUGH HE CANNOT REMEMBER ANY OF WHAT THEY HAVE BEEN.  HE TOOK HIS BLOOD PRESSURE WHILE ON THE PHONE WITH ME AND IT WAS HIGH /110.  HE IS ADAMANT THAT NONE OF HIS OTHER READINGS HAVE BEEN THIS HIGH, BUT HE ACKNOWLEDGES THAT HE NEEDS TO WRITE THEM DOWN.  IN THE HOSPITAL IT WAS REPORTED THAT HIS BLOOD PRESSURES WERE LOW NORMAL OFF OF BLOOD PRESSURE MEDICATIONS AT THE TIME OF DISCHARGE.     NOW POOPING NORMALLY-- NORMAL BM THIS MORNING, EATING MORE & APPETITE AND ENERGY LEVEL IMPROVING.  NORMAL URINATION.      Past Medical History  Past Medical History:   Diagnosis Date    Anemia     Benign essential hypertension 12/17/2014    History of colon cancer, stage II 10/10/2018    Iron deficiency anemia due to chronic blood loss 10/16/2019       Past Surgical History  Past Surgical History:   Procedure Laterality Date    COLON SURGERY  09/18/2018    COLONOSCOPY N/A 9/14/2018    Procedure: COLONOSCOPY;  Surgeon: Adrian Cheung MD;  [facility]  Service: Endoscopy;  Laterality: N/A;  pt ate at 1:30 on 9/13/18-Dr Cheung informed and he stated ok to do colonoscopy.    COLONOSCOPY N/A 10/11/2019    Procedure: COLONOSCOPY;  Surgeon: Gabino Bonilla MD;  [facility]  Service: Endoscopy;  Laterality: N/A;    NO PAST SURGERIES      RIGHT HEMICOLECTOMY N/A 9/18/2018    Procedure: HEMICOLECTOMY, RIGHT, extended;  Surgeon: Adrian Cheung MD;  [address];  Service: Colon and Rectal;  Laterality: N/A;       Family History  Family History   Problem Relation Age of Onset    Hypertension Mother     Cancer Father         stomach    Hypertension Father     Diabetes Sister     Hypertension Brother     Glaucoma Brother     Diabetes Brother     Cataracts Brother     Cancer Sister     Amblyopia Neg Hx     Blindness Neg Hx     Macular degeneration Neg Hx     Retinal detachment Neg Hx     Strabismus Neg Hx     Stroke Neg Hx     Thyroid  disease Neg Hx        Social History  Social History     Socioeconomic History    Marital status: Single     Spouse name: Not on file    Number of children: Not on file    Years of education: Not on file    Highest education level: Not on file   Occupational History    Not on file   Social Needs    Financial resource strain: Not on file    Food insecurity:     Worry: Not on file     Inability: Not on file    Transportation needs:     Medical: Not on file     Non-medical: Not on file   Tobacco Use    Smoking status: Never Smoker    Smokeless tobacco: Never Used   Substance and Sexual Activity    Alcohol use: Not Currently    Drug use: No    Sexual activity: Not on file   Lifestyle    Physical activity:     Days per week: Not on file     Minutes per session: Not on file    Stress: Not on file   Relationships    Social connections:     Talks on phone: Not on file     Gets together: Not on file     Attends Jainism service: Not on file     Active member of club or organization: Not on file     Attends meetings of clubs or organizations: Not on file     Relationship status: Not on file   Other Topics Concern    Not on file   Social History Narrative    Not on file       Current Medications  Medications reviewed and updated.     Allergies   Review of patient's allergies indicates:  No Known Allergies    Review of Systems (Pertinent positives)  Review of Systems   Constitutional: Positive for unexpected weight change.   Respiratory: Negative for cough and shortness of breath.    Cardiovascular: Negative for chest pain.   Gastrointestinal: Negative for constipation, diarrhea, nausea and vomiting.   Genitourinary: Negative for difficulty urinating.   Neurological: Negative for dizziness and headaches.       There were no vitals taken for this visit.    Physical Exam      Assessment/Plan:  Noah Nichole is a 70 y.o. male who presents today for :    Diagnoses and all orders for this visit:    COVID-19  virus detected  Comments:  FEELS LIKE HE IS NOW FARILY FULLY RECOVERED-- D/C EGFR > 60, BM/URINATION NORMAL, PO NORMAL & STAMINA IMPROVING.     History of colon cancer, stage II  Comments:  10/2019-- COLONOSCOPY, REPEAT 3 YEARS ADVISED, NEEDS HEME/ONC FOLLOW UP    Iron deficiency anemia due to chronic blood loss  Comments:  CHECK CBC/IRON STUDIES WITH LABS IN 3 MONTHS, NO CONCERNS REPORTED FOR BLOOD LOSS    Benign essential hypertension  Comments:  now back on Lisinopril/ HCTZ 20/12.5, BP ELEVATED DURING VISIT TODAY, WILL KEEP A HOME LOG & REVIEW AT VIRTUAL AUDIO VISIT IN 1 WEEK            ICD-10-CM ICD-9-CM    1. COVID-19 virus detected U07.1      FEELS LIKE HE IS NOW FARILY FULLY RECOVERED-- D/C EGFR > 60, BM/URINATION NORMAL, PO NORMAL & STAMINA IMPROVING.    2. History of colon cancer, stage II Z85.038 V10.05     10/2019-- COLONOSCOPY, REPEAT 3 YEARS ADVISED, NEEDS HEME/ONC FOLLOW UP   3. Iron deficiency anemia due to chronic blood loss D50.0 280.0     CHECK CBC/IRON STUDIES WITH LABS IN 3 MONTHS, NO CONCERNS REPORTED FOR BLOOD LOSS   4. Benign essential hypertension I10 401.1     now back on Lisinopril/ HCTZ 20/12.5, BP ELEVATED DURING VISIT TODAY, WILL KEEP A HOME LOG & REVIEW AT VIRTUAL AUDIO VISIT IN 1 WEEK       There are no Patient Instructions on file for this visit.      Follow up for to follow up on lab results, return as needed for new concerns.

## 2020-05-01 ENCOUNTER — OFFICE VISIT (OUTPATIENT)
Dept: FAMILY MEDICINE | Facility: CLINIC | Age: 70
End: 2020-05-01
Payer: MEDICARE

## 2020-05-01 DIAGNOSIS — D50.0 IRON DEFICIENCY ANEMIA DUE TO CHRONIC BLOOD LOSS: ICD-10-CM

## 2020-05-01 DIAGNOSIS — I10 BENIGN ESSENTIAL HYPERTENSION: Primary | ICD-10-CM

## 2020-05-01 DIAGNOSIS — Z85.038 HISTORY OF COLON CANCER, STAGE II: ICD-10-CM

## 2020-05-01 DIAGNOSIS — E66.3 OVERWEIGHT (BMI 25.0-29.9): ICD-10-CM

## 2020-05-01 DIAGNOSIS — U07.1 COVID-19 VIRUS DETECTED: ICD-10-CM

## 2020-05-01 PROCEDURE — 99442 PR PHYSICIAN TELEPHONE EVALUATION 11-20 MIN: CPT | Mod: 95,,, | Performed by: FAMILY MEDICINE

## 2020-05-01 PROCEDURE — 99442 PR PHYSICIAN TELEPHONE EVALUATION 11-20 MIN: ICD-10-PCS | Mod: 95,,, | Performed by: FAMILY MEDICINE

## 2020-05-01 RX ORDER — LISINOPRIL AND HYDROCHLOROTHIAZIDE 12.5; 2 MG/1; MG/1
1 TABLET ORAL DAILY
Qty: 90 TABLET | Refills: 4
Start: 2020-05-01 | End: 2021-02-11

## 2020-05-01 NOTE — PROGRESS NOTES
Office Visit    Patient Name: Noah Nichole    : 1950  MRN: [id number]    Subjective:  Noah is a 70 y.o. male who presents today for:    No chief complaint on file.    The patient location is: THEIR HOME  The chief complaint leading to consultation is: HTN follow up (ELEVATED BPS NOTED AT TIME OF VIRTUAL VISIT 20 FOR COVID F/U-- hospitalized for dehydration with LOU)  Visit type: audio only  Total time spent with patient: START 159 PM  PM  , TOTAL 11 MINUTES  Each patient to whom he or she provides medical services by telemedicine is:  (1) informed of the relationship between the physician and patient and the respective role of any other health care provider with respect to management of the patient; and (2) notified that he or she may decline to receive medical services by telemedicine and may withdraw from such care at any time.     69 yo AA male patient of mine with h/o colon cancer, HTN, recent hospitalization -4/10/20 for COVID-19 with LOU, who presents today for virtual AUDIO visit for follow up of his BP.    At time of his previous virtual visit w/ me on  he was doing well and had resumed his Lisinopril 20/HCTZ 12.5 post hospital discharge after it was temporarily held during admission 2/2 borderline low BPs in setting of LOU.    He was feeling well but BP at time of virtual visit was 170/110. As he was asymptomatic he was instructed to continue Lisinopril 20/HCTZ 12.5 as prescribed and to record his readings for review.    Today he reports: 147/84, 153/85, 134/76, 129/75, 141/85 over last few days. NO NSAIDS, DECONGESTANTS     Discharge Labs 4/10/20 showed return to normal EGFR greater than 60, previous low of 38.7 with normal electrolytes.  CBC with slight decrease in hemoglobin from previous baseline of 12+ range down to 11+ range.  Otherwise normal values on CBC, has orders for repeat CBC with iron studies to be done at next lab draw prior to his appointment with me this  July.      HE WAS INSTRUCTED TO NOT TAKE HIS BLOOD PRESSURE MEDICATION UPON DISCHARGE SECONDARY TO CONCERNS FOR LOU AND LOWER BLOOD PRESSURE READINGS.     DENIES CHEST PAIN, SHORTNESS OF BREATH, DIZZINESS, BLURRY VISION, NO HEADACHES   NOW POOPING NORMALLY-- NORMAL BM THIS MORNING, EATING MORE & APPETITE AND ENERGY LEVEL IMPROVING.  NORMAL URINATION.        Past Medical History  Past Medical History:   Diagnosis Date    Anemia     Benign essential hypertension 12/17/2014    History of colon cancer, stage II 10/10/2018    Iron deficiency anemia due to chronic blood loss 10/16/2019       Past Surgical History  Past Surgical History:   Procedure Laterality Date    COLON SURGERY  09/18/2018    COLONOSCOPY N/A 9/14/2018    Procedure: COLONOSCOPY;  Surgeon: Adrian Cheung MD;  Location: [facility] (4TH FLR);  Service: Endoscopy;  Laterality: N/A;  pt ate at 1:30 on 9/13/18-Dr Cheung informed and he stated ok to do colonoscopy.    COLONOSCOPY N/A 10/11/2019    Procedure: COLONOSCOPY;  Surgeon: Gabino Bonilla MD;  [facility]  Service: Endoscopy;  Laterality: N/A;    NO PAST SURGERIES      RIGHT HEMICOLECTOMY N/A 9/18/2018    Procedure: HEMICOLECTOMY, RIGHT, extended;  Surgeon: Adrian Cheung MD;  [facility];  Service: Colon and Rectal;  Laterality: N/A;       Family History  Family History   Problem Relation Age of Onset    Hypertension Mother     Cancer Father         stomach    Hypertension Father     Diabetes Sister     Hypertension Brother     Glaucoma Brother     Diabetes Brother     Cataracts Brother     Cancer Sister     Amblyopia Neg Hx     Blindness Neg Hx     Macular degeneration Neg Hx     Retinal detachment Neg Hx     Strabismus Neg Hx     Stroke Neg Hx     Thyroid disease Neg Hx        Social History  Social History     Socioeconomic History    Marital status: Single     Spouse name: Not on file    Number of children: Not on file    Years of  education: Not on file    Highest education level: Not on file   Occupational History    Not on file   Social Needs    Financial resource strain: Not on file    Food insecurity:     Worry: Not on file     Inability: Not on file    Transportation needs:     Medical: Not on file     Non-medical: Not on file   Tobacco Use    Smoking status: Never Smoker    Smokeless tobacco: Never Used   Substance and Sexual Activity    Alcohol use: Not Currently    Drug use: No    Sexual activity: Not on file   Lifestyle    Physical activity:     Days per week: Not on file     Minutes per session: Not on file    Stress: Not on file   Relationships    Social connections:     Talks on phone: Not on file     Gets together: Not on file     Attends Gnosticism service: Not on file     Active member of club or organization: Not on file     Attends meetings of clubs or organizations: Not on file     Relationship status: Not on file   Other Topics Concern    Not on file   Social History Narrative    Not on file       Current Medications  Medications reviewed and updated.     Allergies   Review of patient's allergies indicates:  No Known Allergies    Review of Systems (Pertinent positives)  Review of Systems   Eyes: Negative for visual disturbance.   Respiratory: Negative for shortness of breath.    Cardiovascular: Negative for chest pain and leg swelling.   Gastrointestinal: Negative for diarrhea, nausea and vomiting.   Genitourinary: Negative for difficulty urinating.   Neurological: Negative for dizziness, light-headedness and headaches.       There were no vitals taken for this visit.    Physical Exam      Assessment/Plan:  Noah Nichole is a 70 y.o. male who presents today for :    Diagnoses and all orders for this visit:    Benign essential hypertension  Comments:  recent readings around 140/90 or less. continue LISINOPRIL 20/HCTZ 12.5, BP LOG & F/U 7/20 FOR LABS OV AS SCHEDULED, SOONER IF BP CONSISTENTLY  >140/90  Orders:  -     lisinopriL-hydrochlorothiazide (PRINZIDE,ZESTORETIC) 20-12.5 mg per tablet; Take 1 tablet by mouth once daily. Hold for one week and restart on 4/17/20    Overweight (BMI 25.0-29.9)  Comments:  COUNSELLED ON LOW SALT DIET, REGULAR AEROBIC EXERCISE AND GENERAL WEIGHT MANAGEMENT    COVID-19 virus detected  Comments:  RECOVERED, BMS BACK TO NORMAL, URINATING REGULARLY, APPETITE AND ENERGY LEVEL BACK TO BASELINE    History of colon cancer, stage II  Comments:  has heme/onc F/U this summer, colonosocpy UTD    Iron deficiency anemia due to chronic blood loss  Comments:  has labs pending for this July prior to OV            ICD-10-CM ICD-9-CM    1. Benign essential hypertension I10 401.1 lisinopriL-hydrochlorothiazide (PRINZIDE,ZESTORETIC) 20-12.5 mg per tablet    recent readings around 140/90 or less. continue LISINOPRIL 20/HCTZ 12.5, BP LOG & F/U 7/20 FOR LABS OV AS SCHEDULED, SOONER IF BP CONSISTENTLY >140/90   2. Overweight (BMI 25.0-29.9) E66.3 278.02     COUNSELLED ON LOW SALT DIET, REGULAR AEROBIC EXERCISE AND GENERAL WEIGHT MANAGEMENT   3. COVID-19 virus detected U07.1      RECOVERED, BMS BACK TO NORMAL, URINATING REGULARLY, APPETITE AND ENERGY LEVEL BACK TO BASELINE   4. History of colon cancer, stage II Z85.038 V10.05     has heme/onc F/U this summer, colonosocpy UTD   5. Iron deficiency anemia due to chronic blood loss D50.0 280.0     has labs pending for this July prior to OV       There are no Patient Instructions on file for this visit.      Follow up in about 2 months (around 7/1/2020) for to follow up on lab results, return as needed for new concerns.

## 2020-07-06 ENCOUNTER — PATIENT OUTREACH (OUTPATIENT)
Dept: ADMINISTRATIVE | Facility: OTHER | Age: 70
End: 2020-07-06

## 2020-07-10 ENCOUNTER — PATIENT OUTREACH (OUTPATIENT)
Dept: ADMINISTRATIVE | Facility: HOSPITAL | Age: 70
End: 2020-07-10

## 2020-07-13 ENCOUNTER — LAB VISIT (OUTPATIENT)
Dept: LAB | Facility: HOSPITAL | Age: 70
End: 2020-07-13
Payer: MEDICARE

## 2020-07-13 ENCOUNTER — OFFICE VISIT (OUTPATIENT)
Dept: SURGERY | Facility: CLINIC | Age: 70
End: 2020-07-13
Payer: MEDICARE

## 2020-07-13 VITALS
WEIGHT: 218.06 LBS | HEIGHT: 71 IN | DIASTOLIC BLOOD PRESSURE: 70 MMHG | SYSTOLIC BLOOD PRESSURE: 164 MMHG | BODY MASS INDEX: 30.53 KG/M2 | HEART RATE: 55 BPM

## 2020-07-13 DIAGNOSIS — Z85.038 HX OF COLON CANCER, STAGE I: Primary | ICD-10-CM

## 2020-07-13 DIAGNOSIS — Z85.038 HX OF COLON CANCER, STAGE I: ICD-10-CM

## 2020-07-13 LAB — CEA SERPL-MCNC: 2.7 NG/ML (ref 0–5)

## 2020-07-13 PROCEDURE — 3008F PR BODY MASS INDEX (BMI) DOCUMENTED: ICD-10-PCS | Mod: CPTII,S$GLB,, | Performed by: NURSE PRACTITIONER

## 2020-07-13 PROCEDURE — 3077F PR MOST RECENT SYSTOLIC BLOOD PRESSURE >= 140 MM HG: ICD-10-PCS | Mod: CPTII,S$GLB,, | Performed by: NURSE PRACTITIONER

## 2020-07-13 PROCEDURE — 1101F PT FALLS ASSESS-DOCD LE1/YR: CPT | Mod: CPTII,S$GLB,, | Performed by: NURSE PRACTITIONER

## 2020-07-13 PROCEDURE — 99215 OFFICE O/P EST HI 40 MIN: CPT | Mod: S$GLB,,, | Performed by: NURSE PRACTITIONER

## 2020-07-13 PROCEDURE — 3077F SYST BP >= 140 MM HG: CPT | Mod: CPTII,S$GLB,, | Performed by: NURSE PRACTITIONER

## 2020-07-13 PROCEDURE — 99999 PR PBB SHADOW E&M-EST. PATIENT-LVL III: CPT | Mod: PBBFAC,,, | Performed by: NURSE PRACTITIONER

## 2020-07-13 PROCEDURE — 1126F PR PAIN SEVERITY QUANTIFIED, NO PAIN PRESENT: ICD-10-PCS | Mod: S$GLB,,, | Performed by: NURSE PRACTITIONER

## 2020-07-13 PROCEDURE — 3078F DIAST BP <80 MM HG: CPT | Mod: CPTII,S$GLB,, | Performed by: NURSE PRACTITIONER

## 2020-07-13 PROCEDURE — 1101F PR PT FALLS ASSESS DOC 0-1 FALLS W/OUT INJ PAST YR: ICD-10-PCS | Mod: CPTII,S$GLB,, | Performed by: NURSE PRACTITIONER

## 2020-07-13 PROCEDURE — 82378 CARCINOEMBRYONIC ANTIGEN: CPT

## 2020-07-13 PROCEDURE — 1126F AMNT PAIN NOTED NONE PRSNT: CPT | Mod: S$GLB,,, | Performed by: NURSE PRACTITIONER

## 2020-07-13 PROCEDURE — 1159F PR MEDICATION LIST DOCUMENTED IN MEDICAL RECORD: ICD-10-PCS | Mod: S$GLB,,, | Performed by: NURSE PRACTITIONER

## 2020-07-13 PROCEDURE — 99999 PR PBB SHADOW E&M-EST. PATIENT-LVL III: ICD-10-PCS | Mod: PBBFAC,,, | Performed by: NURSE PRACTITIONER

## 2020-07-13 PROCEDURE — 3078F PR MOST RECENT DIASTOLIC BLOOD PRESSURE < 80 MM HG: ICD-10-PCS | Mod: CPTII,S$GLB,, | Performed by: NURSE PRACTITIONER

## 2020-07-13 PROCEDURE — 1159F MED LIST DOCD IN RCRD: CPT | Mod: S$GLB,,, | Performed by: NURSE PRACTITIONER

## 2020-07-13 PROCEDURE — 3008F BODY MASS INDEX DOCD: CPT | Mod: CPTII,S$GLB,, | Performed by: NURSE PRACTITIONER

## 2020-07-13 PROCEDURE — 99215 PR OFFICE/OUTPT VISIT, EST, LEVL V, 40-54 MIN: ICD-10-PCS | Mod: S$GLB,,, | Performed by: NURSE PRACTITIONER

## 2020-07-13 PROCEDURE — 36415 COLL VENOUS BLD VENIPUNCTURE: CPT

## 2020-07-13 NOTE — LETTER
July 16, 2020      Grace Jeffery MD  [address]  [address]  [facility] [address]           [facility]  [address]  [facility] [address]  Phone: [phone]          Patient: Noah Nichole   MR Number: [id number]   YOB: 1950   Date of Visit: 7/13/2020       Dear Dr. Grace Jeffery:    Thank you for referring Noah Nichole to me for evaluation. Attached you will find relevant portions of my assessment and plan of care.    If you have questions, please do not hesitate to call me. I look forward to following Noah Nichole along with you.    Sincerely,    Craolyn Flanagan, MELANIE    Enclosure  CC:  No Recipients    If you would like to receive this communication electronically, please contact [email or web address] or [phone] to request more information on [employer] access.    For providers and/or their staff who would like to refer a patient to [facility], please contact us through our one-stop-shop provider referral line, [facility] , at [phone].    If you feel you have received this communication in error or would no longer like to receive these types of communications, please e-mail [email or web address]

## 2020-07-13 NOTE — PROGRESS NOTES
70 year old male here for survivorship clinic for a T3NO colon cancer        He was referred from the [facility] 2018, had abd pain and anemia, family hx of colon cancer, CT showed mass at hepatic flexture, FU colonsocpy 9/14/18:    A frond-like/villous, fungating, infiltrative, polypoid and        ulcerated partially obstructing large mass was found at the hepatic        flexure. The mass was circumferential. No bleeding was present. The        polyp was removed with a cold snare. Polyp resection was incomplete.        The resected tissue was retrieved.       The exam was otherwise without abnormality  [facility] 9[facility]18[facility] for a T3NO colon cancer  Staging CT scan 9/11/18 no evidence of metastatic dx  Last saw Dr. Cheung 11/28/19 doing well, having stools, good apetite   FU CT 11/29/18 no evidence of metastatic dx  Interval removal of large colonic mass with no evidence of residual mass or lymphadenopathy.  CT 6/17/19 no metastatic dx  Colonoscopy 10/17171:  The perianal and digital rectal examinations were normal.        There was evidence of a prior side-to-side ileo-colonic anastomosis        in the transverse colon. This was patent and was characterized by        healthy appearing mucosa. The anastomosis was traversed.        A 3 mm polyp was found in the transverse colon at the anastomosis.        The polyp was sessile. The polyp was removed with a jumbo cold        forceps. Resection and retrieval were complete.        A 4 mm polyp was found in the rectum. The polyp was sessile. The        polyp was removed with a jumbo cold forceps. Resection and retrieval        were complete.        The exam was otherwise without abnormality on direct and        retroflexion views.     All ttissue negative, fu 3 years for scope    CEA 10/11/19 2.5  7/13/20  2.7     Today here is clinic he is doing well, daily soft stools, appetite good, has gained some weight, energy level good, had covid in May, still recovering  but feels he is better on daily basis  0 score on anxiety chart , EGOG 0     ROS:        Constitutional: No fever, chills, activity or appetite change.      HENT: No hearing loss, facial swelling, neck pain or stiffness.       Eyes: No discharge, itching and visual disturbance.      Respiratory: No apnea, cough, choking or shortness of breath.       Cardiovascular: No leg swelling or chest pain      Gastrointestinal: No abdominal distention or change in bowel habbits     Genitourinary: No dysuria, frequency or flank pain.      Musculoskeletal: No arthralgias or gait problem.      Neurological: No dizziness, seizures or weakness.      Hematological: No adenopathy.      Psychiatric/Behavioral: No hallucinations or behavioral problems.         PE:    APPEARANCE: Well nourished, well developed, in no acute distress.   CHEST: Lungs clear. Normal respiratory effort.  CARDIOVASCULAR: Normal rhythm. No edema.  ABDOMEN: Soft. No tenderness or masses.Healed midline incision  Musculoskeletal: Symmetric, normal range of motion and strength.   Neurological: Alert and oriented to person, place, and time. Normal reflexes.   Skin: Skin is warm and dry.   Psychiatric: Normal mood and affect. Behavior is normal and appropriate.      Impression: Stage 2 colon cancer     Plan  cea today  2.7  Reviewed ct and colonoscopy results   [facility]  FU colonscopy in 2022      Greater then 50 minutes spent on reviewing chart, labs, xyrays, surgery and path report, face to face encounter, H&P, and discussion involving plan for survivorship

## 2020-07-17 ENCOUNTER — LAB VISIT (OUTPATIENT)
Dept: LAB | Facility: HOSPITAL | Age: 70
End: 2020-07-17
Attending: FAMILY MEDICINE
Payer: MEDICARE

## 2020-07-17 DIAGNOSIS — E55.9 VITAMIN D DEFICIENCY: ICD-10-CM

## 2020-07-17 DIAGNOSIS — I10 BENIGN ESSENTIAL HYPERTENSION: ICD-10-CM

## 2020-07-17 DIAGNOSIS — D50.0 IRON DEFICIENCY ANEMIA DUE TO CHRONIC BLOOD LOSS: ICD-10-CM

## 2020-07-17 LAB
25(OH)D3+25(OH)D2 SERPL-MCNC: 45 NG/ML (ref 30–96)
ALBUMIN SERPL BCP-MCNC: 3.7 G/DL (ref 3.5–5.2)
ALP SERPL-CCNC: 69 U/L (ref 55–135)
ALT SERPL W/O P-5'-P-CCNC: 23 U/L (ref 10–44)
ANION GAP SERPL CALC-SCNC: 7 MMOL/L (ref 8–16)
AST SERPL-CCNC: 21 U/L (ref 10–40)
BASOPHILS # BLD AUTO: 0.03 K/UL (ref 0–0.2)
BASOPHILS NFR BLD: 0.7 % (ref 0–1.9)
BILIRUB SERPL-MCNC: 0.3 MG/DL (ref 0.1–1)
BUN SERPL-MCNC: 14 MG/DL (ref 8–23)
CALCIUM SERPL-MCNC: 9.4 MG/DL (ref 8.7–10.5)
CHLORIDE SERPL-SCNC: 105 MMOL/L (ref 95–110)
CHOLEST SERPL-MCNC: 207 MG/DL (ref 120–199)
CHOLEST/HDLC SERPL: 4 {RATIO} (ref 2–5)
CO2 SERPL-SCNC: 28 MMOL/L (ref 23–29)
CREAT SERPL-MCNC: 1.1 MG/DL (ref 0.5–1.4)
DIFFERENTIAL METHOD: ABNORMAL
EOSINOPHIL # BLD AUTO: 0.1 K/UL (ref 0–0.5)
EOSINOPHIL NFR BLD: 3 % (ref 0–8)
ERYTHROCYTE [DISTWIDTH] IN BLOOD BY AUTOMATED COUNT: 13.3 % (ref 11.5–14.5)
EST. GFR  (AFRICAN AMERICAN): >60 ML/MIN/1.73 M^2
EST. GFR  (NON AFRICAN AMERICAN): >60 ML/MIN/1.73 M^2
FERRITIN SERPL-MCNC: 59 NG/ML (ref 20–300)
GLUCOSE SERPL-MCNC: 102 MG/DL (ref 70–110)
HCT VFR BLD AUTO: 39.3 % (ref 40–54)
HDLC SERPL-MCNC: 52 MG/DL (ref 40–75)
HDLC SERPL: 25.1 % (ref 20–50)
HGB BLD-MCNC: 12.8 G/DL (ref 14–18)
IMM GRANULOCYTES # BLD AUTO: 0.01 K/UL (ref 0–0.04)
IMM GRANULOCYTES NFR BLD AUTO: 0.2 % (ref 0–0.5)
IRON SERPL-MCNC: 144 UG/DL (ref 45–160)
LDLC SERPL CALC-MCNC: 136.6 MG/DL (ref 63–159)
LYMPHOCYTES # BLD AUTO: 1.9 K/UL (ref 1–4.8)
LYMPHOCYTES NFR BLD: 42 % (ref 18–48)
MCH RBC QN AUTO: 26.8 PG (ref 27–31)
MCHC RBC AUTO-ENTMCNC: 32.6 G/DL (ref 32–36)
MCV RBC AUTO: 82 FL (ref 82–98)
MONOCYTES # BLD AUTO: 0.4 K/UL (ref 0.3–1)
MONOCYTES NFR BLD: 8.9 % (ref 4–15)
NEUTROPHILS # BLD AUTO: 2.1 K/UL (ref 1.8–7.7)
NEUTROPHILS NFR BLD: 45.2 % (ref 38–73)
NONHDLC SERPL-MCNC: 155 MG/DL
NRBC BLD-RTO: 0 /100 WBC
PLATELET # BLD AUTO: 171 K/UL (ref 150–350)
PMV BLD AUTO: 10.9 FL (ref 9.2–12.9)
POTASSIUM SERPL-SCNC: 3.9 MMOL/L (ref 3.5–5.1)
PROT SERPL-MCNC: 7.6 G/DL (ref 6–8.4)
RBC # BLD AUTO: 4.78 M/UL (ref 4.6–6.2)
SATURATED IRON: 44 % (ref 20–50)
SODIUM SERPL-SCNC: 140 MMOL/L (ref 136–145)
TOTAL IRON BINDING CAPACITY: 329 UG/DL (ref 250–450)
TRANSFERRIN SERPL-MCNC: 222 MG/DL (ref 200–375)
TRIGL SERPL-MCNC: 92 MG/DL (ref 30–150)
WBC # BLD AUTO: 4.6 K/UL (ref 3.9–12.7)

## 2020-07-17 PROCEDURE — 80061 LIPID PANEL: CPT

## 2020-07-17 PROCEDURE — 80053 COMPREHEN METABOLIC PANEL: CPT

## 2020-07-17 PROCEDURE — 83540 ASSAY OF IRON: CPT

## 2020-07-17 PROCEDURE — 36415 COLL VENOUS BLD VENIPUNCTURE: CPT

## 2020-07-17 PROCEDURE — 82306 VITAMIN D 25 HYDROXY: CPT

## 2020-07-17 PROCEDURE — 85025 COMPLETE CBC W/AUTO DIFF WBC: CPT

## 2020-07-17 PROCEDURE — 82728 ASSAY OF FERRITIN: CPT

## 2020-07-24 ENCOUNTER — OFFICE VISIT (OUTPATIENT)
Dept: FAMILY MEDICINE | Facility: CLINIC | Age: 70
End: 2020-07-24
Payer: MEDICARE

## 2020-07-24 VITALS
HEIGHT: 71 IN | DIASTOLIC BLOOD PRESSURE: 84 MMHG | OXYGEN SATURATION: 98 % | SYSTOLIC BLOOD PRESSURE: 138 MMHG | HEART RATE: 58 BPM | TEMPERATURE: 98 F | BODY MASS INDEX: 30.65 KG/M2 | WEIGHT: 218.94 LBS

## 2020-07-24 DIAGNOSIS — R73.09 ELEVATED HEMOGLOBIN A1C: ICD-10-CM

## 2020-07-24 DIAGNOSIS — Z12.5 SCREENING FOR MALIGNANT NEOPLASM OF PROSTATE: ICD-10-CM

## 2020-07-24 DIAGNOSIS — E55.9 VITAMIN D DEFICIENCY: ICD-10-CM

## 2020-07-24 DIAGNOSIS — D50.0 IRON DEFICIENCY ANEMIA DUE TO CHRONIC BLOOD LOSS: ICD-10-CM

## 2020-07-24 DIAGNOSIS — Z23 NEED FOR SHINGLES VACCINE: ICD-10-CM

## 2020-07-24 DIAGNOSIS — Z86.16 HISTORY OF 2019 NOVEL CORONAVIRUS DISEASE (COVID-19): ICD-10-CM

## 2020-07-24 DIAGNOSIS — Z79.899 MEDICATION MANAGEMENT: ICD-10-CM

## 2020-07-24 DIAGNOSIS — Z85.038 HISTORY OF COLON CANCER, STAGE II: ICD-10-CM

## 2020-07-24 DIAGNOSIS — I10 BENIGN ESSENTIAL HYPERTENSION: Primary | ICD-10-CM

## 2020-07-24 PROCEDURE — 99999 PR PBB SHADOW E&M-EST. PATIENT-LVL IV: ICD-10-PCS | Mod: PBBFAC,,, | Performed by: FAMILY MEDICINE

## 2020-07-24 PROCEDURE — 3008F PR BODY MASS INDEX (BMI) DOCUMENTED: ICD-10-PCS | Mod: CPTII,S$GLB,, | Performed by: FAMILY MEDICINE

## 2020-07-24 PROCEDURE — 3079F PR MOST RECENT DIASTOLIC BLOOD PRESSURE 80-89 MM HG: ICD-10-PCS | Mod: CPTII,S$GLB,, | Performed by: FAMILY MEDICINE

## 2020-07-24 PROCEDURE — 1126F PR PAIN SEVERITY QUANTIFIED, NO PAIN PRESENT: ICD-10-PCS | Mod: S$GLB,,, | Performed by: FAMILY MEDICINE

## 2020-07-24 PROCEDURE — 3075F SYST BP GE 130 - 139MM HG: CPT | Mod: CPTII,S$GLB,, | Performed by: FAMILY MEDICINE

## 2020-07-24 PROCEDURE — 1101F PT FALLS ASSESS-DOCD LE1/YR: CPT | Mod: CPTII,S$GLB,, | Performed by: FAMILY MEDICINE

## 2020-07-24 PROCEDURE — 99499 RISK ADDL DX/OHS AUDIT: ICD-10-PCS | Mod: S$GLB,,, | Performed by: FAMILY MEDICINE

## 2020-07-24 PROCEDURE — 1159F PR MEDICATION LIST DOCUMENTED IN MEDICAL RECORD: ICD-10-PCS | Mod: S$GLB,,, | Performed by: FAMILY MEDICINE

## 2020-07-24 PROCEDURE — 3008F BODY MASS INDEX DOCD: CPT | Mod: CPTII,S$GLB,, | Performed by: FAMILY MEDICINE

## 2020-07-24 PROCEDURE — 99214 OFFICE O/P EST MOD 30 MIN: CPT | Mod: S$GLB,,, | Performed by: FAMILY MEDICINE

## 2020-07-24 PROCEDURE — 3079F DIAST BP 80-89 MM HG: CPT | Mod: CPTII,S$GLB,, | Performed by: FAMILY MEDICINE

## 2020-07-24 PROCEDURE — 1101F PR PT FALLS ASSESS DOC 0-1 FALLS W/OUT INJ PAST YR: ICD-10-PCS | Mod: CPTII,S$GLB,, | Performed by: FAMILY MEDICINE

## 2020-07-24 PROCEDURE — 99214 PR OFFICE/OUTPT VISIT, EST, LEVL IV, 30-39 MIN: ICD-10-PCS | Mod: S$GLB,,, | Performed by: FAMILY MEDICINE

## 2020-07-24 PROCEDURE — 99499 UNLISTED E&M SERVICE: CPT | Mod: S$GLB,,, | Performed by: FAMILY MEDICINE

## 2020-07-24 PROCEDURE — 3075F PR MOST RECENT SYSTOLIC BLOOD PRESS GE 130-139MM HG: ICD-10-PCS | Mod: CPTII,S$GLB,, | Performed by: FAMILY MEDICINE

## 2020-07-24 PROCEDURE — 1126F AMNT PAIN NOTED NONE PRSNT: CPT | Mod: S$GLB,,, | Performed by: FAMILY MEDICINE

## 2020-07-24 PROCEDURE — 1159F MED LIST DOCD IN RCRD: CPT | Mod: S$GLB,,, | Performed by: FAMILY MEDICINE

## 2020-07-24 PROCEDURE — 99999 PR PBB SHADOW E&M-EST. PATIENT-LVL IV: CPT | Mod: PBBFAC,,, | Performed by: FAMILY MEDICINE

## 2020-07-24 NOTE — PATIENT INSTRUCTIONS
ADVISE LOOKING INTO NEW 2-PART, NON-LIVE SHINGRIX SHINGLES VACCINE THROUGH YOUR LOCAL PHARMACY.

## 2020-07-24 NOTE — PROGRESS NOTES
Office Visit    Patient Name: Noah Nichole    : 1950  MRN: [id number]    Subjective:  Noah is a 70 y.o. male who presents today for:    Follow-up    69 yo AA male patient of mine with h/o colon cancer, HTN, recent hospitalization -4/10/20 for COVID-19 with LOU, who presents today for follow up of chronic conditions.    Labs drawn prior to office visit 2020 a remarkable for normal CMP with continued normalization of renal function following episode of acute renal failure secondary to his COVID-19 infection.    Saw Colorectal surgery nurse practitioner Panchito 2020 with plan as follows:  His CBC is unremarkable with overall resolution of prior anemia-hematocrit is 39.3 and iron studies are normal, vitamin-D level is 45 and LDL is 136-not on statin.  cea today  2.7  Reviewed ct and colonoscopy results   Return to [facility] clinic in 6 months  FU colonscopy in      Riding bike about 3-4 times per week in neighborhood and cutting grass.  He reports good exercise tolerance without any cardiopulmonary symptoms associated with exertion.  He is eating too many sweets and having regular exercise.   Unfortunately in the last 3 months he has regained the 30 lb he lost when he was sick with COVID -19.  His most recent A1c was 5.9.10/16/19      Past Medical History  Past Medical History:   Diagnosis Date    Anemia     Benign essential hypertension 2014    History of colon cancer, stage II 10/10/2018    Iron deficiency anemia due to chronic blood loss 10/16/2019       Past Surgical History  Past Surgical History:   Procedure Laterality Date    COLON SURGERY  2018    COLONOSCOPY N/A 2018    Procedure: COLONOSCOPY;  Surgeon: Adrian Cheung MD;  Location: [address];  Service: Endoscopy;  Laterality: N/A;  pt ate at 1:30 on 18-Dr Cheung informed and he stated ok to do colonoscopy.    COLONOSCOPY N/A 10/11/2019    Procedure: COLONOSCOPY;  Surgeon: Gabino PENNY  MD Chad;  Location: [facility] (4TH FLR);  Service: Endoscopy;  Laterality: N/A;    NO PAST SURGERIES      RIGHT HEMICOLECTOMY N/A 9/18/2018    Procedure: HEMICOLECTOMY, RIGHT, extended;  Surgeon: Adrian Cheung MD;  [facility];  Service: Colon and Rectal;  Laterality: N/A;       Family History  Family History   Problem Relation Age of Onset    Hypertension Mother     Cancer Father         stomach    Hypertension Father     Diabetes Sister     Hypertension Brother     Glaucoma Brother     Diabetes Brother     Cataracts Brother     Cancer Sister     Amblyopia Neg Hx     Blindness Neg Hx     Macular degeneration Neg Hx     Retinal detachment Neg Hx     Strabismus Neg Hx     Stroke Neg Hx     Thyroid disease Neg Hx        Social History  Social History     Socioeconomic History    Marital status: Single     Spouse name: Not on file    Number of children: Not on file    Years of education: Not on file    Highest education level: Not on file   Occupational History    Not on file   Social Needs    Financial resource strain: Not on file    Food insecurity     Worry: Not on file     Inability: Not on file    Transportation needs     Medical: Not on file     Non-medical: Not on file   Tobacco Use    Smoking status: Never Smoker    Smokeless tobacco: Never Used   Substance and Sexual Activity    Alcohol use: Not Currently    Drug use: No    Sexual activity: Not on file   Lifestyle    Physical activity     Days per week: Not on file     Minutes per session: Not on file    Stress: Not on file   Relationships    Social connections     Talks on phone: Not on file     Gets together: Not on file     Attends Baptism service: Not on file     Active member of club or organization: Not on file     Attends meetings of clubs or organizations: Not on file     Relationship status: Not on file   Other Topics Concern    Not on file   Social History Narrative    Not on file  "      Current Medications  Medications reviewed and updated.     Allergies   Review of patient's allergies indicates:  No Known Allergies    Review of Systems (Pertinent positives)  Review of Systems   Constitutional: Positive for appetite change (increased). Negative for activity change and unexpected weight change.   Eyes: Negative for visual disturbance.   Respiratory: Negative for shortness of breath.    Cardiovascular: Negative for chest pain and leg swelling.   Neurological: Negative for dizziness, light-headedness and headaches.       /84   Pulse (!) 58   Temp 98.4 °F (36.9 °C)   Ht 5' 11" (1.803 m)   Wt 99.3 kg (218 lb 14.7 oz)   SpO2 98%   BMI 30.53 kg/m²     Physical Exam  Vitals signs reviewed.   Constitutional:       General: He is not in acute distress.     Appearance: He is well-developed.   HENT:      Head: Normocephalic and atraumatic.   Cardiovascular:      Rate and Rhythm: Normal rate and regular rhythm.      Heart sounds: Normal heart sounds.   Pulmonary:      Effort: Pulmonary effort is normal.      Breath sounds: Normal breath sounds.   Musculoskeletal:      Right lower leg: No edema.      Left lower leg: No edema.   Neurological:      General: No focal deficit present.      Mental Status: He is alert and oriented to person, place, and time.   Psychiatric:         Mood and Affect: Mood normal.         Behavior: Behavior normal.           Assessment/Plan:  Noah Nichole is a 70 y.o. male who presents today for :    Noah was seen today for follow-up.    Diagnoses and all orders for this visit:    Benign essential hypertension  Comments:  Controlled on Zestoretic 20/12.5, ongoing monitoring  Orders:  -     Hemoglobin A1C; Future  -     Lipid Panel; Future  -     Comprehensive metabolic panel; Future  -     CBC auto differential; Future    History of colon cancer, stage II  Comments:  Up-to-date with survivorship clinic visits, recently seen earlier this month with normal CEA, due " for repeat colonoscopy in 2022    History of 2019 novel coronavirus disease (COVID-19)  Comments:  Tested positive for [id number]-had a GI manifestation of the virus, complicated by dehydration and acute renal failure, fortunately EGFR now normal    Iron deficiency anemia due to chronic blood loss  Comments:  resolved off iron supplement and status post treatment for colon cancer, ongoing monitoring  Orders:  -     CBC auto differential; Future    Vitamin D deficiency  Comments:  continue vit d, vit C and centrum, level now normal    Medication management  -     Hemoglobin A1C; Future  -     Lipid Panel; Future  -     Comprehensive metabolic panel; Future  -     CBC auto differential; Future  -     PSA, Screening; Future    Screening for malignant neoplasm of prostate  -     PSA, Screening; Future    Elevated hemoglobin A1c  Comments:  Discussed his increasing weight, borderline elevation in A1c, necessary lifestyle changes to improve his metabolic profile, recheck A1c 6 months  Orders:  -     PSA, Screening; Future    Need for shingles vaccine            ICD-10-CM ICD-9-CM    1. Benign essential hypertension  I10 401.1 Hemoglobin A1C      Lipid Panel      Comprehensive metabolic panel      CBC auto differential    Controlled on Zestoretic 20/12.5, ongoing monitoring   2. History of colon cancer, stage II  Z85.038 V10.05     Up-to-date with survivorship clinic visits, recently seen earlier this month with normal CEA, due for repeat colonoscopy in 2022   3. History of 2019 novel coronavirus disease (COVID-19)  Z86.19      Tested positive for [id number]-had a GI manifestation of the virus, complicated by dehydration and acute renal failure, fortunately EGFR now normal   4. Iron deficiency anemia due to chronic blood loss  D50.0 280.0 CBC auto differential    resolved off iron supplement and status post treatment for colon cancer, ongoing monitoring   5. Vitamin D deficiency  E55.9 268.9     continue vit d, vit C  and centrum, level now normal   6. Medication management  Z79.899 V58.69 Hemoglobin A1C      Lipid Panel      Comprehensive metabolic panel      CBC auto differential      PSA, Screening   7. Screening for malignant neoplasm of prostate  Z12.5 V76.44 PSA, Screening   8. Elevated hemoglobin A1c  R73.09 790.29 PSA, Screening    Discussed his increasing weight, borderline elevation in A1c, necessary lifestyle changes to improve his metabolic profile, recheck A1c 6 months   9. Need for shingles vaccine  Z23 V04.89        Patient Instructions   ADVISE LOOKING INTO NEW 2-PART, NON-LIVE SHINGRIX SHINGLES VACCINE THROUGH YOUR LOCAL PHARMACY.             Follow up in about 6 months (around 1/24/2021) for to follow up on lab results.

## 2021-01-15 ENCOUNTER — TELEPHONE (OUTPATIENT)
Dept: FAMILY MEDICINE | Facility: CLINIC | Age: 71
End: 2021-01-15

## 2021-01-27 ENCOUNTER — LAB VISIT (OUTPATIENT)
Dept: LAB | Facility: HOSPITAL | Age: 71
End: 2021-01-27
Attending: FAMILY MEDICINE
Payer: MEDICARE

## 2021-01-27 DIAGNOSIS — Z12.5 SCREENING FOR MALIGNANT NEOPLASM OF PROSTATE: ICD-10-CM

## 2021-01-27 DIAGNOSIS — I10 BENIGN ESSENTIAL HYPERTENSION: ICD-10-CM

## 2021-01-27 DIAGNOSIS — Z79.899 MEDICATION MANAGEMENT: ICD-10-CM

## 2021-01-27 DIAGNOSIS — R73.09 ELEVATED HEMOGLOBIN A1C: ICD-10-CM

## 2021-01-27 DIAGNOSIS — D50.0 IRON DEFICIENCY ANEMIA DUE TO CHRONIC BLOOD LOSS: ICD-10-CM

## 2021-01-27 LAB
ALBUMIN SERPL BCP-MCNC: 3.8 G/DL (ref 3.5–5.2)
ALP SERPL-CCNC: 66 U/L (ref 55–135)
ALT SERPL W/O P-5'-P-CCNC: 16 U/L (ref 10–44)
ANION GAP SERPL CALC-SCNC: 8 MMOL/L (ref 8–16)
AST SERPL-CCNC: 21 U/L (ref 10–40)
BASOPHILS # BLD AUTO: 0.04 K/UL (ref 0–0.2)
BASOPHILS NFR BLD: 0.8 % (ref 0–1.9)
BILIRUB SERPL-MCNC: 0.4 MG/DL (ref 0.1–1)
BUN SERPL-MCNC: 19 MG/DL (ref 8–23)
CALCIUM SERPL-MCNC: 9.1 MG/DL (ref 8.7–10.5)
CHLORIDE SERPL-SCNC: 105 MMOL/L (ref 95–110)
CHOLEST SERPL-MCNC: 190 MG/DL (ref 120–199)
CHOLEST/HDLC SERPL: 4.8 {RATIO} (ref 2–5)
CO2 SERPL-SCNC: 26 MMOL/L (ref 23–29)
COMPLEXED PSA SERPL-MCNC: 0.96 NG/ML (ref 0–4)
CREAT SERPL-MCNC: 1.1 MG/DL (ref 0.5–1.4)
DIFFERENTIAL METHOD: ABNORMAL
EOSINOPHIL # BLD AUTO: 0.2 K/UL (ref 0–0.5)
EOSINOPHIL NFR BLD: 3 % (ref 0–8)
ERYTHROCYTE [DISTWIDTH] IN BLOOD BY AUTOMATED COUNT: 13 % (ref 11.5–14.5)
EST. GFR  (AFRICAN AMERICAN): >60 ML/MIN/1.73 M^2
EST. GFR  (NON AFRICAN AMERICAN): >60 ML/MIN/1.73 M^2
ESTIMATED AVG GLUCOSE: 126 MG/DL (ref 68–131)
GLUCOSE SERPL-MCNC: 105 MG/DL (ref 70–110)
HBA1C MFR BLD HPLC: 6 % (ref 4–5.6)
HCT VFR BLD AUTO: 37.9 % (ref 40–54)
HDLC SERPL-MCNC: 40 MG/DL (ref 40–75)
HDLC SERPL: 21.1 % (ref 20–50)
HGB BLD-MCNC: 12.6 G/DL (ref 14–18)
IMM GRANULOCYTES # BLD AUTO: 0.01 K/UL (ref 0–0.04)
IMM GRANULOCYTES NFR BLD AUTO: 0.2 % (ref 0–0.5)
LDLC SERPL CALC-MCNC: 134 MG/DL (ref 63–159)
LYMPHOCYTES # BLD AUTO: 2.1 K/UL (ref 1–4.8)
LYMPHOCYTES NFR BLD: 41.3 % (ref 18–48)
MCH RBC QN AUTO: 26.7 PG (ref 27–31)
MCHC RBC AUTO-ENTMCNC: 33.2 G/DL (ref 32–36)
MCV RBC AUTO: 80 FL (ref 82–98)
MONOCYTES # BLD AUTO: 0.6 K/UL (ref 0.3–1)
MONOCYTES NFR BLD: 11 % (ref 4–15)
NEUTROPHILS # BLD AUTO: 2.2 K/UL (ref 1.8–7.7)
NEUTROPHILS NFR BLD: 43.7 % (ref 38–73)
NONHDLC SERPL-MCNC: 150 MG/DL
NRBC BLD-RTO: 0 /100 WBC
PLATELET # BLD AUTO: 170 K/UL (ref 150–350)
PMV BLD AUTO: 10.4 FL (ref 9.2–12.9)
POTASSIUM SERPL-SCNC: 4 MMOL/L (ref 3.5–5.1)
PROT SERPL-MCNC: 7.4 G/DL (ref 6–8.4)
RBC # BLD AUTO: 4.72 M/UL (ref 4.6–6.2)
SODIUM SERPL-SCNC: 139 MMOL/L (ref 136–145)
TRIGL SERPL-MCNC: 80 MG/DL (ref 30–150)
WBC # BLD AUTO: 5.08 K/UL (ref 3.9–12.7)

## 2021-01-27 PROCEDURE — 36415 COLL VENOUS BLD VENIPUNCTURE: CPT

## 2021-01-27 PROCEDURE — 80061 LIPID PANEL: CPT

## 2021-01-27 PROCEDURE — 84153 ASSAY OF PSA TOTAL: CPT

## 2021-01-27 PROCEDURE — 85025 COMPLETE CBC W/AUTO DIFF WBC: CPT

## 2021-01-27 PROCEDURE — 80053 COMPREHEN METABOLIC PANEL: CPT

## 2021-01-27 PROCEDURE — 83036 HEMOGLOBIN GLYCOSYLATED A1C: CPT

## 2021-02-10 DIAGNOSIS — I10 BENIGN ESSENTIAL HYPERTENSION: ICD-10-CM

## 2021-02-11 RX ORDER — LISINOPRIL AND HYDROCHLOROTHIAZIDE 12.5; 2 MG/1; MG/1
1 TABLET ORAL DAILY
Qty: 90 TABLET | Refills: 4 | Status: SHIPPED | OUTPATIENT
Start: 2021-02-11 | End: 2021-04-26 | Stop reason: ALTCHOICE

## 2021-02-11 RX ORDER — LISINOPRIL AND HYDROCHLOROTHIAZIDE 12.5; 2 MG/1; MG/1
TABLET ORAL
Qty: 90 TABLET | Refills: 4 | Status: SHIPPED | OUTPATIENT
Start: 2021-02-11 | End: 2021-02-11 | Stop reason: SDUPTHER

## 2021-02-12 ENCOUNTER — IMMUNIZATION (OUTPATIENT)
Dept: INTERNAL MEDICINE | Facility: CLINIC | Age: 71
End: 2021-02-12
Payer: MEDICARE

## 2021-02-12 DIAGNOSIS — Z23 NEED FOR VACCINATION: Primary | ICD-10-CM

## 2021-02-12 PROCEDURE — 91301 COVID-19, MRNA, LNP-S, PF, 100 MCG/0.5 ML DOSE VACCINE: CPT | Mod: S$GLB,,, | Performed by: FAMILY MEDICINE

## 2021-02-12 PROCEDURE — 0011A COVID-19, MRNA, LNP-S, PF, 100 MCG/0.5 ML DOSE VACCINE: ICD-10-PCS | Mod: CV19,S$GLB,, | Performed by: FAMILY MEDICINE

## 2021-02-12 PROCEDURE — 0011A COVID-19, MRNA, LNP-S, PF, 100 MCG/0.5 ML DOSE VACCINE: CPT | Mod: CV19,S$GLB,, | Performed by: FAMILY MEDICINE

## 2021-02-12 PROCEDURE — 91301 COVID-19, MRNA, LNP-S, PF, 100 MCG/0.5 ML DOSE VACCINE: ICD-10-PCS | Mod: S$GLB,,, | Performed by: FAMILY MEDICINE

## 2021-02-19 ENCOUNTER — OFFICE VISIT (OUTPATIENT)
Dept: FAMILY MEDICINE | Facility: CLINIC | Age: 71
End: 2021-02-19
Payer: MEDICARE

## 2021-02-19 VITALS
OXYGEN SATURATION: 97 % | SYSTOLIC BLOOD PRESSURE: 134 MMHG | HEIGHT: 71 IN | BODY MASS INDEX: 31.05 KG/M2 | WEIGHT: 221.81 LBS | HEART RATE: 82 BPM | DIASTOLIC BLOOD PRESSURE: 86 MMHG

## 2021-02-19 DIAGNOSIS — Z86.16 HISTORY OF 2019 NOVEL CORONAVIRUS DISEASE (COVID-19): ICD-10-CM

## 2021-02-19 DIAGNOSIS — R73.03 PREDIABETES: ICD-10-CM

## 2021-02-19 DIAGNOSIS — Z28.21 INFLUENZA VACCINATION DECLINED: ICD-10-CM

## 2021-02-19 DIAGNOSIS — E55.9 VITAMIN D DEFICIENCY: ICD-10-CM

## 2021-02-19 DIAGNOSIS — Z87.448 HISTORY OF ACUTE RENAL FAILURE: ICD-10-CM

## 2021-02-19 DIAGNOSIS — Z85.038 HISTORY OF COLON CANCER, STAGE II: ICD-10-CM

## 2021-02-19 DIAGNOSIS — M54.50 CHRONIC MIDLINE LOW BACK PAIN WITHOUT SCIATICA: ICD-10-CM

## 2021-02-19 DIAGNOSIS — Z91.89 CANDIDATE FOR STATIN THERAPY DUE TO RISK OF FUTURE CARDIOVASCULAR EVENT: ICD-10-CM

## 2021-02-19 DIAGNOSIS — Z79.899 MEDICATION MANAGEMENT: ICD-10-CM

## 2021-02-19 DIAGNOSIS — E66.09 CLASS 1 OBESITY DUE TO EXCESS CALORIES WITH SERIOUS COMORBIDITY AND BODY MASS INDEX (BMI) OF 30.0 TO 30.9 IN ADULT: ICD-10-CM

## 2021-02-19 DIAGNOSIS — G89.29 CHRONIC MIDLINE LOW BACK PAIN WITHOUT SCIATICA: ICD-10-CM

## 2021-02-19 DIAGNOSIS — I10 BENIGN ESSENTIAL HYPERTENSION: Primary | ICD-10-CM

## 2021-02-19 DIAGNOSIS — D50.0 IRON DEFICIENCY ANEMIA DUE TO CHRONIC BLOOD LOSS: ICD-10-CM

## 2021-02-19 PROBLEM — C18.2 MALIGNANT NEOPLASM OF ASCENDING COLON: Status: RESOLVED | Noted: 2020-04-16 | Resolved: 2021-02-19

## 2021-02-19 PROBLEM — E66.811 CLASS 1 OBESITY DUE TO EXCESS CALORIES WITH SERIOUS COMORBIDITY AND BODY MASS INDEX (BMI) OF 30.0 TO 30.9 IN ADULT: Status: ACTIVE | Noted: 2021-02-19

## 2021-02-19 PROCEDURE — 3075F PR MOST RECENT SYSTOLIC BLOOD PRESS GE 130-139MM HG: ICD-10-PCS | Mod: CPTII,S$GLB,, | Performed by: FAMILY MEDICINE

## 2021-02-19 PROCEDURE — 3079F PR MOST RECENT DIASTOLIC BLOOD PRESSURE 80-89 MM HG: ICD-10-PCS | Mod: CPTII,S$GLB,, | Performed by: FAMILY MEDICINE

## 2021-02-19 PROCEDURE — 3288F PR FALLS RISK ASSESSMENT DOCUMENTED: ICD-10-PCS | Mod: CPTII,S$GLB,, | Performed by: FAMILY MEDICINE

## 2021-02-19 PROCEDURE — 3008F BODY MASS INDEX DOCD: CPT | Mod: CPTII,S$GLB,, | Performed by: FAMILY MEDICINE

## 2021-02-19 PROCEDURE — 3079F DIAST BP 80-89 MM HG: CPT | Mod: CPTII,S$GLB,, | Performed by: FAMILY MEDICINE

## 2021-02-19 PROCEDURE — 1101F PT FALLS ASSESS-DOCD LE1/YR: CPT | Mod: CPTII,S$GLB,, | Performed by: FAMILY MEDICINE

## 2021-02-19 PROCEDURE — 99214 PR OFFICE/OUTPT VISIT, EST, LEVL IV, 30-39 MIN: ICD-10-PCS | Mod: S$GLB,,, | Performed by: FAMILY MEDICINE

## 2021-02-19 PROCEDURE — 99999 PR PBB SHADOW E&M-EST. PATIENT-LVL IV: CPT | Mod: PBBFAC,,, | Performed by: FAMILY MEDICINE

## 2021-02-19 PROCEDURE — 1159F MED LIST DOCD IN RCRD: CPT | Mod: S$GLB,,, | Performed by: FAMILY MEDICINE

## 2021-02-19 PROCEDURE — 1126F AMNT PAIN NOTED NONE PRSNT: CPT | Mod: S$GLB,,, | Performed by: FAMILY MEDICINE

## 2021-02-19 PROCEDURE — 99214 OFFICE O/P EST MOD 30 MIN: CPT | Mod: S$GLB,,, | Performed by: FAMILY MEDICINE

## 2021-02-19 PROCEDURE — 1101F PR PT FALLS ASSESS DOC 0-1 FALLS W/OUT INJ PAST YR: ICD-10-PCS | Mod: CPTII,S$GLB,, | Performed by: FAMILY MEDICINE

## 2021-02-19 PROCEDURE — 1126F PR PAIN SEVERITY QUANTIFIED, NO PAIN PRESENT: ICD-10-PCS | Mod: S$GLB,,, | Performed by: FAMILY MEDICINE

## 2021-02-19 PROCEDURE — 99499 RISK ADDL DX/OHS AUDIT: ICD-10-PCS | Mod: S$GLB,,, | Performed by: FAMILY MEDICINE

## 2021-02-19 PROCEDURE — 3008F PR BODY MASS INDEX (BMI) DOCUMENTED: ICD-10-PCS | Mod: CPTII,S$GLB,, | Performed by: FAMILY MEDICINE

## 2021-02-19 PROCEDURE — 3075F SYST BP GE 130 - 139MM HG: CPT | Mod: CPTII,S$GLB,, | Performed by: FAMILY MEDICINE

## 2021-02-19 PROCEDURE — 3288F FALL RISK ASSESSMENT DOCD: CPT | Mod: CPTII,S$GLB,, | Performed by: FAMILY MEDICINE

## 2021-02-19 PROCEDURE — 99499 UNLISTED E&M SERVICE: CPT | Mod: S$GLB,,, | Performed by: FAMILY MEDICINE

## 2021-02-19 PROCEDURE — 99999 PR PBB SHADOW E&M-EST. PATIENT-LVL IV: ICD-10-PCS | Mod: PBBFAC,,, | Performed by: FAMILY MEDICINE

## 2021-02-19 PROCEDURE — 1159F PR MEDICATION LIST DOCUMENTED IN MEDICAL RECORD: ICD-10-PCS | Mod: S$GLB,,, | Performed by: FAMILY MEDICINE

## 2021-02-22 ENCOUNTER — TELEPHONE (OUTPATIENT)
Dept: SURGERY | Facility: CLINIC | Age: 71
End: 2021-02-22

## 2021-02-22 ENCOUNTER — TELEPHONE (OUTPATIENT)
Dept: FAMILY MEDICINE | Facility: CLINIC | Age: 71
End: 2021-02-22

## 2021-02-22 DIAGNOSIS — M54.50 CHRONIC BILATERAL LOW BACK PAIN WITHOUT SCIATICA: Primary | ICD-10-CM

## 2021-02-22 DIAGNOSIS — G89.29 CHRONIC BILATERAL LOW BACK PAIN WITHOUT SCIATICA: Primary | ICD-10-CM

## 2021-02-23 RX ORDER — MELOXICAM 15 MG/1
15 TABLET ORAL DAILY PRN
Qty: 30 TABLET | Refills: 2 | Status: SHIPPED | OUTPATIENT
Start: 2021-02-23 | End: 2022-10-07 | Stop reason: ALTCHOICE

## 2021-03-01 ENCOUNTER — LAB VISIT (OUTPATIENT)
Dept: LAB | Facility: HOSPITAL | Age: 71
End: 2021-03-01
Payer: MEDICARE

## 2021-03-01 ENCOUNTER — OFFICE VISIT (OUTPATIENT)
Dept: SURGERY | Facility: CLINIC | Age: 71
End: 2021-03-01
Payer: MEDICARE

## 2021-03-01 VITALS — BODY MASS INDEX: 30.59 KG/M2 | WEIGHT: 219.38 LBS

## 2021-03-01 DIAGNOSIS — Z85.038 HISTORY OF COLON CANCER, STAGE II: Primary | ICD-10-CM

## 2021-03-01 DIAGNOSIS — D50.0 IRON DEFICIENCY ANEMIA DUE TO CHRONIC BLOOD LOSS: ICD-10-CM

## 2021-03-01 DIAGNOSIS — Z85.038 HISTORY OF COLON CANCER, STAGE II: ICD-10-CM

## 2021-03-01 LAB — CEA SERPL-MCNC: 2.4 NG/ML (ref 0–5)

## 2021-03-01 PROCEDURE — 99999 PR PBB SHADOW E&M-EST. PATIENT-LVL I: CPT | Mod: PBBFAC,,, | Performed by: NURSE PRACTITIONER

## 2021-03-01 PROCEDURE — 99999 PR PBB SHADOW E&M-EST. PATIENT-LVL I: ICD-10-PCS | Mod: PBBFAC,,, | Performed by: NURSE PRACTITIONER

## 2021-03-01 PROCEDURE — 36415 COLL VENOUS BLD VENIPUNCTURE: CPT

## 2021-03-01 PROCEDURE — 3008F BODY MASS INDEX DOCD: CPT | Mod: CPTII,S$GLB,, | Performed by: NURSE PRACTITIONER

## 2021-03-01 PROCEDURE — 1159F MED LIST DOCD IN RCRD: CPT | Mod: S$GLB,,, | Performed by: NURSE PRACTITIONER

## 2021-03-01 PROCEDURE — 3008F PR BODY MASS INDEX (BMI) DOCUMENTED: ICD-10-PCS | Mod: CPTII,S$GLB,, | Performed by: NURSE PRACTITIONER

## 2021-03-01 PROCEDURE — 82378 CARCINOEMBRYONIC ANTIGEN: CPT

## 2021-03-01 PROCEDURE — 1159F PR MEDICATION LIST DOCUMENTED IN MEDICAL RECORD: ICD-10-PCS | Mod: S$GLB,,, | Performed by: NURSE PRACTITIONER

## 2021-03-01 PROCEDURE — 99215 PR OFFICE/OUTPT VISIT, EST, LEVL V, 40-54 MIN: ICD-10-PCS | Mod: S$GLB,,, | Performed by: NURSE PRACTITIONER

## 2021-03-01 PROCEDURE — 99215 OFFICE O/P EST HI 40 MIN: CPT | Mod: S$GLB,,, | Performed by: NURSE PRACTITIONER

## 2021-03-12 ENCOUNTER — IMMUNIZATION (OUTPATIENT)
Dept: INTERNAL MEDICINE | Facility: CLINIC | Age: 71
End: 2021-03-12
Payer: MEDICARE

## 2021-03-12 DIAGNOSIS — Z23 NEED FOR VACCINATION: Primary | ICD-10-CM

## 2021-03-12 PROCEDURE — 0012A COVID-19, MRNA, LNP-S, PF, 100 MCG/0.5 ML DOSE VACCINE: CPT | Mod: CV19,S$GLB,, | Performed by: FAMILY MEDICINE

## 2021-03-12 PROCEDURE — 0012A COVID-19, MRNA, LNP-S, PF, 100 MCG/0.5 ML DOSE VACCINE: ICD-10-PCS | Mod: CV19,S$GLB,, | Performed by: FAMILY MEDICINE

## 2021-03-12 PROCEDURE — 91301 COVID-19, MRNA, LNP-S, PF, 100 MCG/0.5 ML DOSE VACCINE: ICD-10-PCS | Mod: S$GLB,,, | Performed by: FAMILY MEDICINE

## 2021-03-12 PROCEDURE — 91301 COVID-19, MRNA, LNP-S, PF, 100 MCG/0.5 ML DOSE VACCINE: CPT | Mod: S$GLB,,, | Performed by: FAMILY MEDICINE

## 2021-04-26 ENCOUNTER — OFFICE VISIT (OUTPATIENT)
Dept: FAMILY MEDICINE | Facility: CLINIC | Age: 71
End: 2021-04-26
Payer: MEDICARE

## 2021-04-26 VITALS
OXYGEN SATURATION: 99 % | HEIGHT: 71 IN | DIASTOLIC BLOOD PRESSURE: 80 MMHG | BODY MASS INDEX: 30.31 KG/M2 | HEART RATE: 68 BPM | WEIGHT: 216.5 LBS | SYSTOLIC BLOOD PRESSURE: 160 MMHG

## 2021-04-26 DIAGNOSIS — D50.0 IRON DEFICIENCY ANEMIA DUE TO CHRONIC BLOOD LOSS: ICD-10-CM

## 2021-04-26 DIAGNOSIS — Z85.038 HISTORY OF COLON CANCER, STAGE II: ICD-10-CM

## 2021-04-26 DIAGNOSIS — E55.9 VITAMIN D DEFICIENCY: ICD-10-CM

## 2021-04-26 DIAGNOSIS — Z23 NEED FOR 23-POLYVALENT PNEUMOCOCCAL POLYSACCHARIDE VACCINE: ICD-10-CM

## 2021-04-26 DIAGNOSIS — Z86.16 HISTORY OF 2019 NOVEL CORONAVIRUS DISEASE (COVID-19): ICD-10-CM

## 2021-04-26 DIAGNOSIS — R73.03 PREDIABETES: ICD-10-CM

## 2021-04-26 DIAGNOSIS — I10 BENIGN ESSENTIAL HYPERTENSION: Primary | ICD-10-CM

## 2021-04-26 DIAGNOSIS — E66.09 CLASS 1 OBESITY DUE TO EXCESS CALORIES WITH SERIOUS COMORBIDITY AND BODY MASS INDEX (BMI) OF 30.0 TO 30.9 IN ADULT: ICD-10-CM

## 2021-04-26 PROCEDURE — 90732 PNEUMOCOCCAL POLYSACCHARIDE VACCINE 23-VALENT =>2YO SQ IM: ICD-10-PCS | Mod: S$GLB,,, | Performed by: FAMILY MEDICINE

## 2021-04-26 PROCEDURE — 99999 PR PBB SHADOW E&M-EST. PATIENT-LVL IV: CPT | Mod: PBBFAC,,, | Performed by: FAMILY MEDICINE

## 2021-04-26 PROCEDURE — 3077F PR MOST RECENT SYSTOLIC BLOOD PRESSURE >= 140 MM HG: ICD-10-PCS | Mod: CPTII,S$GLB,, | Performed by: FAMILY MEDICINE

## 2021-04-26 PROCEDURE — 1126F AMNT PAIN NOTED NONE PRSNT: CPT | Mod: S$GLB,,, | Performed by: FAMILY MEDICINE

## 2021-04-26 PROCEDURE — 3077F SYST BP >= 140 MM HG: CPT | Mod: CPTII,S$GLB,, | Performed by: FAMILY MEDICINE

## 2021-04-26 PROCEDURE — 1101F PT FALLS ASSESS-DOCD LE1/YR: CPT | Mod: CPTII,S$GLB,, | Performed by: FAMILY MEDICINE

## 2021-04-26 PROCEDURE — 99499 UNLISTED E&M SERVICE: CPT | Mod: S$GLB,,, | Performed by: FAMILY MEDICINE

## 2021-04-26 PROCEDURE — 3288F PR FALLS RISK ASSESSMENT DOCUMENTED: ICD-10-PCS | Mod: CPTII,S$GLB,, | Performed by: FAMILY MEDICINE

## 2021-04-26 PROCEDURE — 3079F DIAST BP 80-89 MM HG: CPT | Mod: CPTII,S$GLB,, | Performed by: FAMILY MEDICINE

## 2021-04-26 PROCEDURE — 1159F PR MEDICATION LIST DOCUMENTED IN MEDICAL RECORD: ICD-10-PCS | Mod: S$GLB,,, | Performed by: FAMILY MEDICINE

## 2021-04-26 PROCEDURE — 99214 OFFICE O/P EST MOD 30 MIN: CPT | Mod: 25,S$GLB,, | Performed by: FAMILY MEDICINE

## 2021-04-26 PROCEDURE — 1126F PR PAIN SEVERITY QUANTIFIED, NO PAIN PRESENT: ICD-10-PCS | Mod: S$GLB,,, | Performed by: FAMILY MEDICINE

## 2021-04-26 PROCEDURE — 3008F PR BODY MASS INDEX (BMI) DOCUMENTED: ICD-10-PCS | Mod: CPTII,S$GLB,, | Performed by: FAMILY MEDICINE

## 2021-04-26 PROCEDURE — 99214 PR OFFICE/OUTPT VISIT, EST, LEVL IV, 30-39 MIN: ICD-10-PCS | Mod: 25,S$GLB,, | Performed by: FAMILY MEDICINE

## 2021-04-26 PROCEDURE — 99499 RISK ADDL DX/OHS AUDIT: ICD-10-PCS | Mod: S$GLB,,, | Performed by: FAMILY MEDICINE

## 2021-04-26 PROCEDURE — G0009 ADMIN PNEUMOCOCCAL VACCINE: HCPCS | Mod: S$GLB,,, | Performed by: FAMILY MEDICINE

## 2021-04-26 PROCEDURE — 3288F FALL RISK ASSESSMENT DOCD: CPT | Mod: CPTII,S$GLB,, | Performed by: FAMILY MEDICINE

## 2021-04-26 PROCEDURE — 1159F MED LIST DOCD IN RCRD: CPT | Mod: S$GLB,,, | Performed by: FAMILY MEDICINE

## 2021-04-26 PROCEDURE — 3008F BODY MASS INDEX DOCD: CPT | Mod: CPTII,S$GLB,, | Performed by: FAMILY MEDICINE

## 2021-04-26 PROCEDURE — 99999 PR PBB SHADOW E&M-EST. PATIENT-LVL IV: ICD-10-PCS | Mod: PBBFAC,,, | Performed by: FAMILY MEDICINE

## 2021-04-26 PROCEDURE — 90732 PPSV23 VACC 2 YRS+ SUBQ/IM: CPT | Mod: S$GLB,,, | Performed by: FAMILY MEDICINE

## 2021-04-26 PROCEDURE — 3079F PR MOST RECENT DIASTOLIC BLOOD PRESSURE 80-89 MM HG: ICD-10-PCS | Mod: CPTII,S$GLB,, | Performed by: FAMILY MEDICINE

## 2021-04-26 PROCEDURE — 1101F PR PT FALLS ASSESS DOC 0-1 FALLS W/OUT INJ PAST YR: ICD-10-PCS | Mod: CPTII,S$GLB,, | Performed by: FAMILY MEDICINE

## 2021-04-26 PROCEDURE — G0009 PNEUMOCOCCAL POLYSACCHARIDE VACCINE 23-VALENT =>2YO SQ IM: ICD-10-PCS | Mod: S$GLB,,, | Performed by: FAMILY MEDICINE

## 2021-04-26 RX ORDER — LOSARTAN POTASSIUM AND HYDROCHLOROTHIAZIDE 12.5; 1 MG/1; MG/1
1 TABLET ORAL DAILY
Qty: 90 TABLET | Refills: 3 | Status: SHIPPED | OUTPATIENT
Start: 2021-04-26 | End: 2022-04-06 | Stop reason: SDUPTHER

## 2021-05-10 ENCOUNTER — CLINICAL SUPPORT (OUTPATIENT)
Dept: FAMILY MEDICINE | Facility: CLINIC | Age: 71
End: 2021-05-10
Payer: MEDICARE

## 2021-05-10 VITALS — SYSTOLIC BLOOD PRESSURE: 143 MMHG | DIASTOLIC BLOOD PRESSURE: 64 MMHG

## 2021-07-01 ENCOUNTER — PATIENT MESSAGE (OUTPATIENT)
Dept: ADMINISTRATIVE | Facility: OTHER | Age: 71
End: 2021-07-01

## 2021-07-04 ENCOUNTER — HOSPITAL ENCOUNTER (EMERGENCY)
Facility: HOSPITAL | Age: 71
Discharge: HOME | End: 2021-07-04
Attending: EMERGENCY MEDICINE
Payer: MEDICARE

## 2021-07-04 VITALS
TEMPERATURE: 98 F | BODY MASS INDEX: 30.52 KG/M2 | HEART RATE: 53 BPM | DIASTOLIC BLOOD PRESSURE: 92 MMHG | WEIGHT: 218 LBS | OXYGEN SATURATION: 97 % | SYSTOLIC BLOOD PRESSURE: 211 MMHG | HEIGHT: 71 IN | RESPIRATION RATE: 16 BRPM

## 2021-07-04 DIAGNOSIS — K62.5 RECTAL BLEEDING: Primary | ICD-10-CM

## 2021-07-04 LAB
ALBUMIN SERPL BCP-MCNC: 3.7 G/DL (ref 3.5–5.2)
ALP SERPL-CCNC: 66 U/L (ref 55–135)
ALT SERPL W/O P-5'-P-CCNC: 15 U/L (ref 10–44)
ANION GAP SERPL CALC-SCNC: 10 MMOL/L (ref 8–16)
AST SERPL-CCNC: 19 U/L (ref 10–40)
BASOPHILS # BLD AUTO: 0.03 K/UL (ref 0–0.2)
BASOPHILS NFR BLD: 0.6 % (ref 0–1.9)
BILIRUB SERPL-MCNC: 0.3 MG/DL (ref 0.1–1)
BUN SERPL-MCNC: 19 MG/DL (ref 8–23)
CALCIUM SERPL-MCNC: 8.4 MG/DL (ref 8.7–10.5)
CHLORIDE SERPL-SCNC: 109 MMOL/L (ref 95–110)
CO2 SERPL-SCNC: 22 MMOL/L (ref 23–29)
CREAT SERPL-MCNC: 1 MG/DL (ref 0.5–1.4)
DIFFERENTIAL METHOD: ABNORMAL
EOSINOPHIL # BLD AUTO: 0.2 K/UL (ref 0–0.5)
EOSINOPHIL NFR BLD: 3.3 % (ref 0–8)
ERYTHROCYTE [DISTWIDTH] IN BLOOD BY AUTOMATED COUNT: 13.6 % (ref 11.5–14.5)
EST. GFR  (AFRICAN AMERICAN): >60 ML/MIN/1.73 M^2
EST. GFR  (NON AFRICAN AMERICAN): >60 ML/MIN/1.73 M^2
GLUCOSE SERPL-MCNC: 98 MG/DL (ref 70–110)
HCT VFR BLD AUTO: 36.1 % (ref 40–54)
HGB BLD-MCNC: 12 G/DL (ref 14–18)
IMM GRANULOCYTES # BLD AUTO: 0.03 K/UL (ref 0–0.04)
IMM GRANULOCYTES NFR BLD AUTO: 0.6 % (ref 0–0.5)
INR PPP: 1 (ref 0.8–1.2)
LYMPHOCYTES # BLD AUTO: 2 K/UL (ref 1–4.8)
LYMPHOCYTES NFR BLD: 39.2 % (ref 18–48)
MCH RBC QN AUTO: 26.6 PG (ref 27–31)
MCHC RBC AUTO-ENTMCNC: 33.2 G/DL (ref 32–36)
MCV RBC AUTO: 80 FL (ref 82–98)
MONOCYTES # BLD AUTO: 0.5 K/UL (ref 0.3–1)
MONOCYTES NFR BLD: 9.1 % (ref 4–15)
NEUTROPHILS # BLD AUTO: 2.5 K/UL (ref 1.8–7.7)
NEUTROPHILS NFR BLD: 47.2 % (ref 38–73)
NRBC BLD-RTO: 0 /100 WBC
OB PNL STL: NEGATIVE
PLATELET # BLD AUTO: 147 K/UL (ref 150–450)
PMV BLD AUTO: 10.3 FL (ref 9.2–12.9)
POTASSIUM SERPL-SCNC: 3.7 MMOL/L (ref 3.5–5.1)
PROT SERPL-MCNC: 6.6 G/DL (ref 6–8.4)
PROTHROMBIN TIME: 10.5 SEC (ref 9–12.5)
RBC # BLD AUTO: 4.51 M/UL (ref 4.6–6.2)
SODIUM SERPL-SCNC: 141 MMOL/L (ref 136–145)
WBC # BLD AUTO: 5.18 K/UL (ref 3.9–12.7)

## 2021-07-04 PROCEDURE — 99283 EMERGENCY DEPT VISIT LOW MDM: CPT

## 2021-07-04 PROCEDURE — 85025 COMPLETE CBC W/AUTO DIFF WBC: CPT | Performed by: EMERGENCY MEDICINE

## 2021-07-04 PROCEDURE — 80053 COMPREHEN METABOLIC PANEL: CPT | Performed by: EMERGENCY MEDICINE

## 2021-07-04 PROCEDURE — 82272 OCCULT BLD FECES 1-3 TESTS: CPT | Performed by: EMERGENCY MEDICINE

## 2021-07-04 PROCEDURE — 85610 PROTHROMBIN TIME: CPT | Performed by: EMERGENCY MEDICINE

## 2021-08-14 ENCOUNTER — LAB VISIT (OUTPATIENT)
Dept: LAB | Facility: HOSPITAL | Age: 71
End: 2021-08-14
Attending: FAMILY MEDICINE
Payer: MEDICARE

## 2021-08-14 DIAGNOSIS — Z87.448 HISTORY OF ACUTE RENAL FAILURE: ICD-10-CM

## 2021-08-14 DIAGNOSIS — D50.0 IRON DEFICIENCY ANEMIA DUE TO CHRONIC BLOOD LOSS: ICD-10-CM

## 2021-08-14 DIAGNOSIS — Z79.899 MEDICATION MANAGEMENT: ICD-10-CM

## 2021-08-14 DIAGNOSIS — R73.03 PREDIABETES: ICD-10-CM

## 2021-08-14 DIAGNOSIS — E55.9 VITAMIN D DEFICIENCY: ICD-10-CM

## 2021-08-14 DIAGNOSIS — I10 BENIGN ESSENTIAL HYPERTENSION: ICD-10-CM

## 2021-08-14 DIAGNOSIS — Z85.038 HISTORY OF COLON CANCER, STAGE II: ICD-10-CM

## 2021-08-14 LAB
25(OH)D3+25(OH)D2 SERPL-MCNC: 53 NG/ML (ref 30–96)
ALBUMIN SERPL BCP-MCNC: 3.7 G/DL (ref 3.5–5.2)
ALP SERPL-CCNC: 64 U/L (ref 55–135)
ALT SERPL W/O P-5'-P-CCNC: 15 U/L (ref 10–44)
ANION GAP SERPL CALC-SCNC: 8 MMOL/L (ref 8–16)
AST SERPL-CCNC: 19 U/L (ref 10–40)
BASOPHILS # BLD AUTO: 0.04 K/UL (ref 0–0.2)
BASOPHILS NFR BLD: 0.7 % (ref 0–1.9)
BILIRUB SERPL-MCNC: 0.4 MG/DL (ref 0.1–1)
BUN SERPL-MCNC: 27 MG/DL (ref 8–23)
CALCIUM SERPL-MCNC: 9.2 MG/DL (ref 8.7–10.5)
CHLORIDE SERPL-SCNC: 106 MMOL/L (ref 95–110)
CHOLEST SERPL-MCNC: 196 MG/DL (ref 120–199)
CHOLEST/HDLC SERPL: 4.5 {RATIO} (ref 2–5)
CO2 SERPL-SCNC: 27 MMOL/L (ref 23–29)
CREAT SERPL-MCNC: 1.2 MG/DL (ref 0.5–1.4)
DIFFERENTIAL METHOD: ABNORMAL
EOSINOPHIL # BLD AUTO: 0.2 K/UL (ref 0–0.5)
EOSINOPHIL NFR BLD: 2.9 % (ref 0–8)
ERYTHROCYTE [DISTWIDTH] IN BLOOD BY AUTOMATED COUNT: 13.8 % (ref 11.5–14.5)
EST. GFR  (AFRICAN AMERICAN): >60 ML/MIN/1.73 M^2
EST. GFR  (NON AFRICAN AMERICAN): >60 ML/MIN/1.73 M^2
ESTIMATED AVG GLUCOSE: 128 MG/DL (ref 68–131)
FERRITIN SERPL-MCNC: 75 NG/ML (ref 20–300)
GLUCOSE SERPL-MCNC: 110 MG/DL (ref 70–110)
HBA1C MFR BLD: 6.1 % (ref 4–5.6)
HCT VFR BLD AUTO: 37.9 % (ref 40–54)
HDLC SERPL-MCNC: 44 MG/DL (ref 40–75)
HDLC SERPL: 22.4 % (ref 20–50)
HGB BLD-MCNC: 12.2 G/DL (ref 14–18)
IMM GRANULOCYTES # BLD AUTO: 0.01 K/UL (ref 0–0.04)
IMM GRANULOCYTES NFR BLD AUTO: 0.2 % (ref 0–0.5)
IRON SERPL-MCNC: 78 UG/DL (ref 45–160)
LDLC SERPL CALC-MCNC: 137.8 MG/DL (ref 63–159)
LYMPHOCYTES # BLD AUTO: 2.2 K/UL (ref 1–4.8)
LYMPHOCYTES NFR BLD: 39.5 % (ref 18–48)
MAGNESIUM SERPL-MCNC: 2.1 MG/DL (ref 1.6–2.6)
MCH RBC QN AUTO: 26.1 PG (ref 27–31)
MCHC RBC AUTO-ENTMCNC: 32.2 G/DL (ref 32–36)
MCV RBC AUTO: 81 FL (ref 82–98)
MONOCYTES # BLD AUTO: 0.6 K/UL (ref 0.3–1)
MONOCYTES NFR BLD: 11.4 % (ref 4–15)
NEUTROPHILS # BLD AUTO: 2.5 K/UL (ref 1.8–7.7)
NEUTROPHILS NFR BLD: 45.3 % (ref 38–73)
NONHDLC SERPL-MCNC: 152 MG/DL
NRBC BLD-RTO: 0 /100 WBC
PLATELET # BLD AUTO: 158 K/UL (ref 150–450)
PMV BLD AUTO: 10.6 FL (ref 9.2–12.9)
POTASSIUM SERPL-SCNC: 4.4 MMOL/L (ref 3.5–5.1)
PROT SERPL-MCNC: 7.2 G/DL (ref 6–8.4)
RBC # BLD AUTO: 4.68 M/UL (ref 4.6–6.2)
SATURATED IRON: 26 % (ref 20–50)
SODIUM SERPL-SCNC: 141 MMOL/L (ref 136–145)
TOTAL IRON BINDING CAPACITY: 302 UG/DL (ref 250–450)
TRANSFERRIN SERPL-MCNC: 204 MG/DL (ref 200–375)
TRIGL SERPL-MCNC: 71 MG/DL (ref 30–150)
TSH SERPL DL<=0.005 MIU/L-ACNC: 2.28 UIU/ML (ref 0.4–4)
VIT B12 SERPL-MCNC: 417 PG/ML (ref 210–950)
WBC # BLD AUTO: 5.59 K/UL (ref 3.9–12.7)

## 2021-08-14 PROCEDURE — 82607 VITAMIN B-12: CPT | Performed by: FAMILY MEDICINE

## 2021-08-14 PROCEDURE — 84466 ASSAY OF TRANSFERRIN: CPT | Performed by: FAMILY MEDICINE

## 2021-08-14 PROCEDURE — 82306 VITAMIN D 25 HYDROXY: CPT | Performed by: FAMILY MEDICINE

## 2021-08-14 PROCEDURE — 84443 ASSAY THYROID STIM HORMONE: CPT | Performed by: FAMILY MEDICINE

## 2021-08-14 PROCEDURE — 83735 ASSAY OF MAGNESIUM: CPT | Performed by: FAMILY MEDICINE

## 2021-08-14 PROCEDURE — 80053 COMPREHEN METABOLIC PANEL: CPT | Performed by: FAMILY MEDICINE

## 2021-08-14 PROCEDURE — 85025 COMPLETE CBC W/AUTO DIFF WBC: CPT | Performed by: FAMILY MEDICINE

## 2021-08-14 PROCEDURE — 80061 LIPID PANEL: CPT | Performed by: FAMILY MEDICINE

## 2021-08-14 PROCEDURE — 83036 HEMOGLOBIN GLYCOSYLATED A1C: CPT | Performed by: FAMILY MEDICINE

## 2021-08-14 PROCEDURE — 36415 COLL VENOUS BLD VENIPUNCTURE: CPT | Performed by: FAMILY MEDICINE

## 2021-08-14 PROCEDURE — 82728 ASSAY OF FERRITIN: CPT | Performed by: FAMILY MEDICINE

## 2021-10-06 ENCOUNTER — OFFICE VISIT (OUTPATIENT)
Dept: FAMILY MEDICINE | Facility: CLINIC | Age: 71
End: 2021-10-06
Payer: MEDICARE

## 2021-10-06 VITALS
SYSTOLIC BLOOD PRESSURE: 148 MMHG | HEIGHT: 71 IN | OXYGEN SATURATION: 98 % | HEART RATE: 80 BPM | WEIGHT: 223.75 LBS | DIASTOLIC BLOOD PRESSURE: 76 MMHG | BODY MASS INDEX: 31.32 KG/M2

## 2021-10-06 DIAGNOSIS — Z23 NEED FOR DIPHTHERIA-TETANUS-PERTUSSIS (TDAP) VACCINE: ICD-10-CM

## 2021-10-06 DIAGNOSIS — Z85.038 HISTORY OF COLON CANCER, STAGE II: ICD-10-CM

## 2021-10-06 DIAGNOSIS — Z86.16 HISTORY OF 2019 NOVEL CORONAVIRUS DISEASE (COVID-19): ICD-10-CM

## 2021-10-06 DIAGNOSIS — I10 BENIGN ESSENTIAL HYPERTENSION: ICD-10-CM

## 2021-10-06 DIAGNOSIS — E55.9 VITAMIN D DEFICIENCY: ICD-10-CM

## 2021-10-06 DIAGNOSIS — Z23 NEEDS FLU SHOT: ICD-10-CM

## 2021-10-06 DIAGNOSIS — D50.0 IRON DEFICIENCY ANEMIA DUE TO CHRONIC BLOOD LOSS: ICD-10-CM

## 2021-10-06 DIAGNOSIS — E66.09 CLASS 1 OBESITY DUE TO EXCESS CALORIES WITH SERIOUS COMORBIDITY AND BODY MASS INDEX (BMI) OF 31.0 TO 31.9 IN ADULT: ICD-10-CM

## 2021-10-06 DIAGNOSIS — Z00.00 ROUTINE GENERAL MEDICAL EXAMINATION AT A HEALTH CARE FACILITY: Primary | ICD-10-CM

## 2021-10-06 DIAGNOSIS — Z23 NEED FOR SHINGLES VACCINE: ICD-10-CM

## 2021-10-06 DIAGNOSIS — R73.03 PREDIABETES: ICD-10-CM

## 2021-10-06 PROCEDURE — 3077F SYST BP >= 140 MM HG: CPT | Mod: CPTII,S$GLB,, | Performed by: FAMILY MEDICINE

## 2021-10-06 PROCEDURE — 1126F PR PAIN SEVERITY QUANTIFIED, NO PAIN PRESENT: ICD-10-PCS | Mod: CPTII,S$GLB,, | Performed by: FAMILY MEDICINE

## 2021-10-06 PROCEDURE — 99999 PR PBB SHADOW E&M-EST. PATIENT-LVL IV: CPT | Mod: PBBFAC,,, | Performed by: FAMILY MEDICINE

## 2021-10-06 PROCEDURE — 3288F PR FALLS RISK ASSESSMENT DOCUMENTED: ICD-10-PCS | Mod: CPTII,S$GLB,, | Performed by: FAMILY MEDICINE

## 2021-10-06 PROCEDURE — 99499 RISK ADDL DX/OHS AUDIT: ICD-10-PCS | Mod: S$PBB,,, | Performed by: FAMILY MEDICINE

## 2021-10-06 PROCEDURE — 3288F FALL RISK ASSESSMENT DOCD: CPT | Mod: CPTII,S$GLB,, | Performed by: FAMILY MEDICINE

## 2021-10-06 PROCEDURE — 4010F PR ACE/ARB THEARPY RXD/TAKEN: ICD-10-PCS | Mod: CPTII,S$GLB,, | Performed by: FAMILY MEDICINE

## 2021-10-06 PROCEDURE — 99999 PR PBB SHADOW E&M-EST. PATIENT-LVL IV: ICD-10-PCS | Mod: PBBFAC,,, | Performed by: FAMILY MEDICINE

## 2021-10-06 PROCEDURE — 3044F PR MOST RECENT HEMOGLOBIN A1C LEVEL <7.0%: ICD-10-PCS | Mod: CPTII,S$GLB,, | Performed by: FAMILY MEDICINE

## 2021-10-06 PROCEDURE — 1101F PR PT FALLS ASSESS DOC 0-1 FALLS W/OUT INJ PAST YR: ICD-10-PCS | Mod: CPTII,S$GLB,, | Performed by: FAMILY MEDICINE

## 2021-10-06 PROCEDURE — 3078F PR MOST RECENT DIASTOLIC BLOOD PRESSURE < 80 MM HG: ICD-10-PCS | Mod: CPTII,S$GLB,, | Performed by: FAMILY MEDICINE

## 2021-10-06 PROCEDURE — 3008F BODY MASS INDEX DOCD: CPT | Mod: CPTII,S$GLB,, | Performed by: FAMILY MEDICINE

## 2021-10-06 PROCEDURE — 3008F PR BODY MASS INDEX (BMI) DOCUMENTED: ICD-10-PCS | Mod: CPTII,S$GLB,, | Performed by: FAMILY MEDICINE

## 2021-10-06 PROCEDURE — 1159F PR MEDICATION LIST DOCUMENTED IN MEDICAL RECORD: ICD-10-PCS | Mod: CPTII,S$GLB,, | Performed by: FAMILY MEDICINE

## 2021-10-06 PROCEDURE — 3077F PR MOST RECENT SYSTOLIC BLOOD PRESSURE >= 140 MM HG: ICD-10-PCS | Mod: CPTII,S$GLB,, | Performed by: FAMILY MEDICINE

## 2021-10-06 PROCEDURE — 4010F ACE/ARB THERAPY RXD/TAKEN: CPT | Mod: CPTII,S$GLB,, | Performed by: FAMILY MEDICINE

## 2021-10-06 PROCEDURE — 99214 PR OFFICE/OUTPT VISIT, EST, LEVL IV, 30-39 MIN: ICD-10-PCS | Mod: S$GLB,,, | Performed by: FAMILY MEDICINE

## 2021-10-06 PROCEDURE — 1160F RVW MEDS BY RX/DR IN RCRD: CPT | Mod: CPTII,S$GLB,, | Performed by: FAMILY MEDICINE

## 2021-10-06 PROCEDURE — 1126F AMNT PAIN NOTED NONE PRSNT: CPT | Mod: CPTII,S$GLB,, | Performed by: FAMILY MEDICINE

## 2021-10-06 PROCEDURE — 1101F PT FALLS ASSESS-DOCD LE1/YR: CPT | Mod: CPTII,S$GLB,, | Performed by: FAMILY MEDICINE

## 2021-10-06 PROCEDURE — 99214 OFFICE O/P EST MOD 30 MIN: CPT | Mod: S$GLB,,, | Performed by: FAMILY MEDICINE

## 2021-10-06 PROCEDURE — 3044F HG A1C LEVEL LT 7.0%: CPT | Mod: CPTII,S$GLB,, | Performed by: FAMILY MEDICINE

## 2021-10-06 PROCEDURE — 99499 UNLISTED E&M SERVICE: CPT | Mod: S$PBB,,, | Performed by: FAMILY MEDICINE

## 2021-10-06 PROCEDURE — 1160F PR REVIEW ALL MEDS BY PRESCRIBER/CLIN PHARMACIST DOCUMENTED: ICD-10-PCS | Mod: CPTII,S$GLB,, | Performed by: FAMILY MEDICINE

## 2021-10-06 PROCEDURE — 1159F MED LIST DOCD IN RCRD: CPT | Mod: CPTII,S$GLB,, | Performed by: FAMILY MEDICINE

## 2021-10-06 PROCEDURE — 3078F DIAST BP <80 MM HG: CPT | Mod: CPTII,S$GLB,, | Performed by: FAMILY MEDICINE

## 2021-10-06 RX ORDER — ZOSTER VACCINE RECOMBINANT, ADJUVANTED 50 MCG/0.5
KIT INTRAMUSCULAR
COMMUNITY
Start: 2021-04-28 | End: 2022-04-06 | Stop reason: ALTCHOICE

## 2021-10-25 ENCOUNTER — TELEPHONE (OUTPATIENT)
Dept: FAMILY MEDICINE | Facility: CLINIC | Age: 71
End: 2021-10-25
Payer: MEDICARE

## 2021-10-26 ENCOUNTER — TELEPHONE (OUTPATIENT)
Dept: FAMILY MEDICINE | Facility: CLINIC | Age: 71
End: 2021-10-26
Payer: MEDICARE

## 2021-10-27 VITALS — SYSTOLIC BLOOD PRESSURE: 122 MMHG | DIASTOLIC BLOOD PRESSURE: 79 MMHG

## 2022-02-24 NOTE — PLAN OF CARE
Problem: Occupational Therapy Goal  Goal: Occupational Therapy Goal  Goals to be met by: 9/26/18     Patient will increase functional independence with ADLs by performing:    UE Dressing with Set-up Assistance.  LE Dressing with Set-up Assistance.  Grooming while standing at sink with Supervision.  Toileting from toilet with Supervision for hygiene and clothing management.   Toilet transfer to toilet with Supervision.    Outcome: Ongoing (interventions implemented as appropriate)  Eval completed; goals established    Comments: Initiate OT CLEVE Trejo OT  9/19/2018     28.5

## 2022-03-30 ENCOUNTER — LAB VISIT (OUTPATIENT)
Dept: LAB | Facility: HOSPITAL | Age: 72
End: 2022-03-30
Attending: FAMILY MEDICINE
Payer: MEDICARE

## 2022-03-30 DIAGNOSIS — R73.03 PREDIABETES: ICD-10-CM

## 2022-03-30 DIAGNOSIS — I10 BENIGN ESSENTIAL HYPERTENSION: ICD-10-CM

## 2022-03-30 DIAGNOSIS — Z00.00 ROUTINE GENERAL MEDICAL EXAMINATION AT A HEALTH CARE FACILITY: ICD-10-CM

## 2022-03-30 LAB
ALBUMIN SERPL BCP-MCNC: 3.7 G/DL (ref 3.5–5.2)
ALP SERPL-CCNC: 65 U/L (ref 55–135)
ALT SERPL W/O P-5'-P-CCNC: 18 U/L (ref 10–44)
ANION GAP SERPL CALC-SCNC: 7 MMOL/L (ref 8–16)
AST SERPL-CCNC: 19 U/L (ref 10–40)
BILIRUB SERPL-MCNC: 0.5 MG/DL (ref 0.1–1)
BUN SERPL-MCNC: 19 MG/DL (ref 8–23)
CALCIUM SERPL-MCNC: 9.4 MG/DL (ref 8.7–10.5)
CHLORIDE SERPL-SCNC: 106 MMOL/L (ref 95–110)
CHOLEST SERPL-MCNC: 189 MG/DL (ref 120–199)
CHOLEST/HDLC SERPL: 4.2 {RATIO} (ref 2–5)
CO2 SERPL-SCNC: 26 MMOL/L (ref 23–29)
CREAT SERPL-MCNC: 1.2 MG/DL (ref 0.5–1.4)
EST. GFR  (AFRICAN AMERICAN): >60 ML/MIN/1.73 M^2
EST. GFR  (NON AFRICAN AMERICAN): >60 ML/MIN/1.73 M^2
ESTIMATED AVG GLUCOSE: 131 MG/DL (ref 68–131)
GLUCOSE SERPL-MCNC: 106 MG/DL (ref 70–110)
HBA1C MFR BLD: 6.2 % (ref 4–5.6)
HDLC SERPL-MCNC: 45 MG/DL (ref 40–75)
HDLC SERPL: 23.8 % (ref 20–50)
LDLC SERPL CALC-MCNC: 123.4 MG/DL (ref 63–159)
NONHDLC SERPL-MCNC: 144 MG/DL
POTASSIUM SERPL-SCNC: 4.1 MMOL/L (ref 3.5–5.1)
PROT SERPL-MCNC: 7.3 G/DL (ref 6–8.4)
SODIUM SERPL-SCNC: 139 MMOL/L (ref 136–145)
TRIGL SERPL-MCNC: 103 MG/DL (ref 30–150)

## 2022-03-30 PROCEDURE — 80053 COMPREHEN METABOLIC PANEL: CPT | Performed by: FAMILY MEDICINE

## 2022-03-30 PROCEDURE — 83036 HEMOGLOBIN GLYCOSYLATED A1C: CPT | Performed by: FAMILY MEDICINE

## 2022-03-30 PROCEDURE — 36415 COLL VENOUS BLD VENIPUNCTURE: CPT | Performed by: FAMILY MEDICINE

## 2022-03-30 PROCEDURE — 80061 LIPID PANEL: CPT | Performed by: FAMILY MEDICINE

## 2022-04-06 ENCOUNTER — TELEPHONE (OUTPATIENT)
Dept: SURGERY | Facility: CLINIC | Age: 72
End: 2022-04-06
Payer: MEDICARE

## 2022-04-06 ENCOUNTER — OFFICE VISIT (OUTPATIENT)
Dept: FAMILY MEDICINE | Facility: CLINIC | Age: 72
End: 2022-04-06
Payer: MEDICARE

## 2022-04-06 VITALS
WEIGHT: 222.69 LBS | OXYGEN SATURATION: 98 % | BODY MASS INDEX: 31.18 KG/M2 | HEIGHT: 71 IN | HEART RATE: 63 BPM | SYSTOLIC BLOOD PRESSURE: 130 MMHG | DIASTOLIC BLOOD PRESSURE: 68 MMHG

## 2022-04-06 DIAGNOSIS — I10 BENIGN ESSENTIAL HYPERTENSION: ICD-10-CM

## 2022-04-06 DIAGNOSIS — Z85.038 HISTORY OF COLON CANCER, STAGE II: ICD-10-CM

## 2022-04-06 DIAGNOSIS — Z79.899 MEDICATION MANAGEMENT: ICD-10-CM

## 2022-04-06 DIAGNOSIS — E66.09 CLASS 1 OBESITY DUE TO EXCESS CALORIES WITH SERIOUS COMORBIDITY AND BODY MASS INDEX (BMI) OF 31.0 TO 31.9 IN ADULT: ICD-10-CM

## 2022-04-06 DIAGNOSIS — E55.9 VITAMIN D DEFICIENCY: ICD-10-CM

## 2022-04-06 DIAGNOSIS — R73.03 PREDIABETES: Primary | ICD-10-CM

## 2022-04-06 DIAGNOSIS — Z86.16 HISTORY OF 2019 NOVEL CORONAVIRUS DISEASE (COVID-19): ICD-10-CM

## 2022-04-06 PROCEDURE — 3078F DIAST BP <80 MM HG: CPT | Mod: CPTII,S$GLB,, | Performed by: FAMILY MEDICINE

## 2022-04-06 PROCEDURE — 1160F RVW MEDS BY RX/DR IN RCRD: CPT | Mod: CPTII,S$GLB,, | Performed by: FAMILY MEDICINE

## 2022-04-06 PROCEDURE — 99214 OFFICE O/P EST MOD 30 MIN: CPT | Mod: S$GLB,,, | Performed by: FAMILY MEDICINE

## 2022-04-06 PROCEDURE — 99999 PR PBB SHADOW E&M-EST. PATIENT-LVL IV: ICD-10-PCS | Mod: PBBFAC,,, | Performed by: FAMILY MEDICINE

## 2022-04-06 PROCEDURE — 3008F BODY MASS INDEX DOCD: CPT | Mod: CPTII,S$GLB,, | Performed by: FAMILY MEDICINE

## 2022-04-06 PROCEDURE — 99999 PR PBB SHADOW E&M-EST. PATIENT-LVL IV: CPT | Mod: PBBFAC,,, | Performed by: FAMILY MEDICINE

## 2022-04-06 PROCEDURE — 3044F PR MOST RECENT HEMOGLOBIN A1C LEVEL <7.0%: ICD-10-PCS | Mod: CPTII,S$GLB,, | Performed by: FAMILY MEDICINE

## 2022-04-06 PROCEDURE — 1126F PR PAIN SEVERITY QUANTIFIED, NO PAIN PRESENT: ICD-10-PCS | Mod: CPTII,S$GLB,, | Performed by: FAMILY MEDICINE

## 2022-04-06 PROCEDURE — 1126F AMNT PAIN NOTED NONE PRSNT: CPT | Mod: CPTII,S$GLB,, | Performed by: FAMILY MEDICINE

## 2022-04-06 PROCEDURE — 1159F MED LIST DOCD IN RCRD: CPT | Mod: CPTII,S$GLB,, | Performed by: FAMILY MEDICINE

## 2022-04-06 PROCEDURE — 3075F PR MOST RECENT SYSTOLIC BLOOD PRESS GE 130-139MM HG: ICD-10-PCS | Mod: CPTII,S$GLB,, | Performed by: FAMILY MEDICINE

## 2022-04-06 PROCEDURE — 3044F HG A1C LEVEL LT 7.0%: CPT | Mod: CPTII,S$GLB,, | Performed by: FAMILY MEDICINE

## 2022-04-06 PROCEDURE — 1160F PR REVIEW ALL MEDS BY PRESCRIBER/CLIN PHARMACIST DOCUMENTED: ICD-10-PCS | Mod: CPTII,S$GLB,, | Performed by: FAMILY MEDICINE

## 2022-04-06 PROCEDURE — 3078F PR MOST RECENT DIASTOLIC BLOOD PRESSURE < 80 MM HG: ICD-10-PCS | Mod: CPTII,S$GLB,, | Performed by: FAMILY MEDICINE

## 2022-04-06 PROCEDURE — 1159F PR MEDICATION LIST DOCUMENTED IN MEDICAL RECORD: ICD-10-PCS | Mod: CPTII,S$GLB,, | Performed by: FAMILY MEDICINE

## 2022-04-06 PROCEDURE — 99214 PR OFFICE/OUTPT VISIT, EST, LEVL IV, 30-39 MIN: ICD-10-PCS | Mod: S$GLB,,, | Performed by: FAMILY MEDICINE

## 2022-04-06 PROCEDURE — 3008F PR BODY MASS INDEX (BMI) DOCUMENTED: ICD-10-PCS | Mod: CPTII,S$GLB,, | Performed by: FAMILY MEDICINE

## 2022-04-06 PROCEDURE — 3075F SYST BP GE 130 - 139MM HG: CPT | Mod: CPTII,S$GLB,, | Performed by: FAMILY MEDICINE

## 2022-04-06 RX ORDER — METFORMIN HYDROCHLORIDE 500 MG/1
500 TABLET, EXTENDED RELEASE ORAL
Qty: 90 TABLET | Refills: 4 | Status: SHIPPED | OUTPATIENT
Start: 2022-04-06 | End: 2023-01-23 | Stop reason: SDUPTHER

## 2022-04-06 RX ORDER — CLOSTRIDIUM TETANI TOXOID ANTIGEN (FORMALDEHYDE INACTIVATED), CORYNEBACTERIUM DIPHTHERIAE TOXOID ANTIGEN (FORMALDEHYDE INACTIVATED), BORDETELLA PERTUSSIS TOXOID ANTIGEN (GLUTARALDEHYDE INACTIVATED), BORDETELLA PERTUSSIS FILAMENTOUS HEMAGGLUTININ ANTIGEN (FORMALDEHYDE INACTIVATED), BORDETELLA PERTUSSIS PERTACTIN ANTIGEN, AND BORDETELLA PERTUSSIS FIMBRIAE 2/3 ANTIGEN 5; 2; 2.5; 5; 3; 5 [LF]/.5ML; [LF]/.5ML; UG/.5ML; UG/.5ML; UG/.5ML; UG/.5ML
INJECTION, SUSPENSION INTRAMUSCULAR
COMMUNITY
Start: 2021-10-09 | End: 2022-04-06 | Stop reason: ALTCHOICE

## 2022-04-06 RX ORDER — LOSARTAN POTASSIUM AND HYDROCHLOROTHIAZIDE 12.5; 1 MG/1; MG/1
1 TABLET ORAL DAILY
Qty: 90 TABLET | Refills: 3 | Status: SHIPPED | OUTPATIENT
Start: 2022-04-06 | End: 2023-05-24 | Stop reason: SDUPTHER

## 2022-04-06 RX ORDER — INFLUENZA A VIRUS A/VICTORIA/2570/2019 IVR-215 (H1N1) ANTIGEN (FORMALDEHYDE INACTIVATED), INFLUENZA A VIRUS A/TASMANIA/503/2020 IVR-221 (H3N2) ANTIGEN (FORMALDEHYDE INACTIVATED), INFLUENZA B VIRUS B/PHUKET/3073/2013 ANTIGEN (FORMALDEHYDE INACTIVATED), AND INFLUENZA B VIRUS B/WASHINGTON/02/2019 ANTIGEN (FORMALDEHYDE INACTIVATED) 60; 60; 60; 60 UG/.7ML; UG/.7ML; UG/.7ML; UG/.7ML
INJECTION, SUSPENSION INTRAMUSCULAR
COMMUNITY
Start: 2021-10-23 | End: 2022-04-06 | Stop reason: ALTCHOICE
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
## 2022-04-11 ENCOUNTER — OFFICE VISIT (OUTPATIENT)
Dept: SURGERY | Facility: CLINIC | Age: 72
End: 2022-04-11
Payer: MEDICARE

## 2022-04-11 VITALS
DIASTOLIC BLOOD PRESSURE: 75 MMHG | BODY MASS INDEX: 31.06 KG/M2 | SYSTOLIC BLOOD PRESSURE: 151 MMHG | HEART RATE: 60 BPM | WEIGHT: 222.69 LBS

## 2022-04-11 DIAGNOSIS — Z85.038 HISTORY OF COLON CANCER, STAGE II: ICD-10-CM

## 2022-04-11 PROCEDURE — 1159F MED LIST DOCD IN RCRD: CPT | Mod: CPTII,S$GLB,, | Performed by: NURSE PRACTITIONER

## 2022-04-11 PROCEDURE — 3044F PR MOST RECENT HEMOGLOBIN A1C LEVEL <7.0%: ICD-10-PCS | Mod: CPTII,S$GLB,, | Performed by: NURSE PRACTITIONER

## 2022-04-11 PROCEDURE — 99999 PR PBB SHADOW E&M-EST. PATIENT-LVL II: ICD-10-PCS | Mod: PBBFAC,,, | Performed by: NURSE PRACTITIONER

## 2022-04-11 PROCEDURE — 99214 OFFICE O/P EST MOD 30 MIN: CPT | Mod: S$GLB,,, | Performed by: NURSE PRACTITIONER

## 2022-04-11 PROCEDURE — 3077F SYST BP >= 140 MM HG: CPT | Mod: CPTII,S$GLB,, | Performed by: NURSE PRACTITIONER

## 2022-04-11 PROCEDURE — 3008F PR BODY MASS INDEX (BMI) DOCUMENTED: ICD-10-PCS | Mod: CPTII,S$GLB,, | Performed by: NURSE PRACTITIONER

## 2022-04-11 PROCEDURE — 3078F DIAST BP <80 MM HG: CPT | Mod: CPTII,S$GLB,, | Performed by: NURSE PRACTITIONER

## 2022-04-11 PROCEDURE — 99999 PR PBB SHADOW E&M-EST. PATIENT-LVL II: CPT | Mod: PBBFAC,,, | Performed by: NURSE PRACTITIONER

## 2022-04-11 PROCEDURE — 3078F PR MOST RECENT DIASTOLIC BLOOD PRESSURE < 80 MM HG: ICD-10-PCS | Mod: CPTII,S$GLB,, | Performed by: NURSE PRACTITIONER

## 2022-04-11 PROCEDURE — 1159F PR MEDICATION LIST DOCUMENTED IN MEDICAL RECORD: ICD-10-PCS | Mod: CPTII,S$GLB,, | Performed by: NURSE PRACTITIONER

## 2022-04-11 PROCEDURE — 99214 PR OFFICE/OUTPT VISIT, EST, LEVL IV, 30-39 MIN: ICD-10-PCS | Mod: S$GLB,,, | Performed by: NURSE PRACTITIONER

## 2022-04-11 PROCEDURE — 3008F BODY MASS INDEX DOCD: CPT | Mod: CPTII,S$GLB,, | Performed by: NURSE PRACTITIONER

## 2022-04-11 PROCEDURE — 3077F PR MOST RECENT SYSTOLIC BLOOD PRESSURE >= 140 MM HG: ICD-10-PCS | Mod: CPTII,S$GLB,, | Performed by: NURSE PRACTITIONER

## 2022-04-11 PROCEDURE — 3044F HG A1C LEVEL LT 7.0%: CPT | Mod: CPTII,S$GLB,, | Performed by: NURSE PRACTITIONER

## 2022-04-11 NOTE — PROGRESS NOTES
72 year old male here for survivorship clinic for a T3NO colon cancer        He was referred from the ER in 2018, had abd pain and anemia, family hx of colon cancer, CT showed mass at hepatic flexture, FU colonsocpy 9/14/18:    A frond-like/villous, fungating, infiltrative, polypoid and        ulcerated partially obstructing large mass was found at the hepatic        flexure. The mass was circumferential. No bleeding was present. The        polyp was removed with a cold snare. Polyp resection was incomplete.        The resected tissue was retrieved.       The exam was otherwise without abnormality  [facility] surgery 9/18/18 [facility] for a T3NO colon cancer  Staging CT scan 9/11/18 no evidence of metastatic dx  Last saw Dr. Cheung 11/28/19 doing well, having stools, good apetite   FU CT 11/29/18 no evidence of metastatic dx  Interval removal of large colonic mass with no evidence of residual mass or lymphadenopathy.  CT 6/17/19 no metastatic dx  Colonoscopy 10/85215:  The perianal and digital rectal examinations were normal.        There was evidence of a prior side-to-side ileo-colonic anastomosis        in the transverse colon. This was patent and was characterized by        healthy appearing mucosa. The anastomosis was traversed.        A 3 mm polyp was found in the transverse colon at the anastomosis.        The polyp was sessile. The polyp was removed with a jumbo cold        forceps. Resection and retrieval were complete.        A 4 mm polyp was found in the rectum. The polyp was sessile. The        polyp was removed with a jumbo cold forceps. Resection and retrieval        were complete.        The exam was otherwise without abnormality on direct and        retroflexion views.      All ttissue negative, fu 3 years for scope     CEA 10/11/19 2.5  7/13/20  2.7  3/1/2021  2.4     Today here is clinic he is doing well,  No further issues with constipation since he has increased his intake of water and stools  become softer, now having daily soft stools, appetite good, has gained some weight, energy level good, had covid in May 2019, feels fully recover, has a part time construction job  0 score on anxiety chart , EGOG 0     ROS:        Constitutional: No fever, chills, activity or appetite change.      HENT: No hearing loss, facial swelling, neck pain or stiffness.       Eyes: No discharge, itching and visual disturbance.      Respiratory: No apnea, cough, choking or shortness of breath.       Cardiovascular: No leg swelling or chest pain      Gastrointestinal: No abdominal distention or change in bowel habbits     Genitourinary: No dysuria, frequency or flank pain.      Musculoskeletal: No arthralgias or gait problem.      Neurological: No dizziness, seizures or weakness.      Hematological: No adenopathy.      Psychiatric/Behavioral: No hallucinations or behavioral problems.         PE:    APPEARANCE: Well nourished, well developed, in no acute distress.   CHEST: Lungs clear. Normal respiratory effort.  CARDIOVASCULAR: Normal rhythm. No edema.  ABDOMEN: Soft. No tenderness or masses.Healed midline incision  Musculoskeletal: Symmetric, normal range of motion and strength.   Neurological: Alert and oriented to person, place, and time. Normal reflexes.   Skin: Skin is warm and dry.   Psychiatric: Normal mood and affect. Behavior is normal and appropriate.      Impression: Stage 2 colon cancer     Plan  cea today    Reviewed ct and colonoscopy results   Return to [facility] clinic in 12 months  FU colonscopy in 2022   Schedule colonoscopy      Greater then 50 minutes spent on reviewing chart, labs, xyrays, surgery and path report, face to face encounter, H&P, and discussion involving plan for survivorship

## 2022-04-18 ENCOUNTER — PATIENT MESSAGE (OUTPATIENT)
Dept: ENDOSCOPY | Facility: HOSPITAL | Age: 72
End: 2022-04-18
Payer: MEDICARE

## 2022-04-18 DIAGNOSIS — Z12.11 COLON CANCER SCREENING: Primary | ICD-10-CM

## 2022-04-18 RX ORDER — SODIUM, POTASSIUM,MAG SULFATES 17.5-3.13G
1 SOLUTION, RECONSTITUTED, ORAL ORAL DAILY
Qty: 1 KIT | Refills: 0 | Status: SHIPPED | OUTPATIENT
Start: 2022-04-18 | End: 2022-04-20

## 2022-04-21 ENCOUNTER — ANESTHESIA EVENT (OUTPATIENT)
Dept: ENDOSCOPY | Facility: HOSPITAL | Age: 72
End: 2022-04-21
Payer: MEDICARE

## 2022-04-21 ENCOUNTER — HOSPITAL ENCOUNTER (OUTPATIENT)
Facility: HOSPITAL | Age: 72
Discharge: HOME | End: 2022-04-21
Attending: COLON & RECTAL SURGERY | Admitting: COLON & RECTAL SURGERY
Payer: MEDICARE

## 2022-04-21 ENCOUNTER — ANESTHESIA (OUTPATIENT)
Dept: ENDOSCOPY | Facility: HOSPITAL | Age: 72
End: 2022-04-21
Payer: MEDICARE

## 2022-04-21 VITALS
SYSTOLIC BLOOD PRESSURE: 151 MMHG | RESPIRATION RATE: 17 BRPM | OXYGEN SATURATION: 99 % | HEIGHT: 71 IN | DIASTOLIC BLOOD PRESSURE: 80 MMHG | TEMPERATURE: 98 F | BODY MASS INDEX: 31.08 KG/M2 | HEART RATE: 61 BPM | WEIGHT: 222 LBS

## 2022-04-21 DIAGNOSIS — Z85.038 PERSONAL HISTORY OF COLON CANCER: ICD-10-CM

## 2022-04-21 LAB — POCT GLUCOSE: 108 MG/DL (ref 70–110)

## 2022-04-21 PROCEDURE — G0105 COLORECTAL SCRN; HI RISK IND: HCPCS | Mod: ,,, | Performed by: COLON & RECTAL SURGERY

## 2022-04-21 PROCEDURE — E9220 PRA ENDO ANESTHESIA: HCPCS | Mod: ,,, | Performed by: NURSE ANESTHETIST, CERTIFIED REGISTERED

## 2022-04-21 PROCEDURE — 37000009 HC ANESTHESIA EA ADD 15 MINS: Performed by: COLON & RECTAL SURGERY

## 2022-04-21 PROCEDURE — 82962 GLUCOSE BLOOD TEST: CPT | Performed by: COLON & RECTAL SURGERY

## 2022-04-21 PROCEDURE — E9220 PRA ENDO ANESTHESIA: ICD-10-PCS | Mod: ,,, | Performed by: NURSE ANESTHETIST, CERTIFIED REGISTERED

## 2022-04-21 PROCEDURE — 37000008 HC ANESTHESIA 1ST 15 MINUTES: Performed by: COLON & RECTAL SURGERY

## 2022-04-21 PROCEDURE — G0105 COLORECTAL SCRN; HI RISK IND: ICD-10-PCS | Mod: ,,, | Performed by: COLON & RECTAL SURGERY

## 2022-04-21 PROCEDURE — 25000003 PHARM REV CODE 250: Performed by: NURSE ANESTHETIST, CERTIFIED REGISTERED

## 2022-04-21 PROCEDURE — G0105 COLORECTAL SCRN; HI RISK IND: HCPCS | Performed by: COLON & RECTAL SURGERY

## 2022-04-21 PROCEDURE — 63600175 PHARM REV CODE 636 W HCPCS: Performed by: NURSE ANESTHETIST, CERTIFIED REGISTERED

## 2022-04-21 RX ORDER — LIDOCAINE HCL/PF 100 MG/5ML
SYRINGE (ML) INTRAVENOUS
Status: DISCONTINUED | OUTPATIENT
Start: 2022-04-21 | End: 2022-04-21

## 2022-04-21 RX ORDER — PROPOFOL 10 MG/ML
VIAL (ML) INTRAVENOUS CONTINUOUS PRN
Status: DISCONTINUED | OUTPATIENT
Start: 2022-04-21 | End: 2022-04-21

## 2022-04-21 RX ORDER — SODIUM CHLORIDE 9 MG/ML
INJECTION, SOLUTION INTRAVENOUS CONTINUOUS
Status: DISCONTINUED | OUTPATIENT
Start: 2022-04-21 | End: 2022-04-21 | Stop reason: HOSPADM

## 2022-04-21 RX ORDER — SODIUM CHLORIDE 9 MG/ML
INJECTION, SOLUTION INTRAVENOUS CONTINUOUS PRN
Status: DISCONTINUED | OUTPATIENT
Start: 2022-04-21 | End: 2022-04-21

## 2022-04-21 RX ORDER — PROPOFOL 10 MG/ML
VIAL (ML) INTRAVENOUS
Status: DISCONTINUED | OUTPATIENT
Start: 2022-04-21 | End: 2022-04-21

## 2022-04-21 RX ADMIN — PROPOFOL 150 MCG/KG/MIN: 10 INJECTION, EMULSION INTRAVENOUS at 07:04

## 2022-04-21 RX ADMIN — PROPOFOL 50 MG: 10 INJECTION, EMULSION INTRAVENOUS at 07:04

## 2022-04-21 RX ADMIN — Medication 100 MG: at 07:04

## 2022-04-21 RX ADMIN — SODIUM CHLORIDE: 0.9 INJECTION, SOLUTION INTRAVENOUS at 06:04

## 2022-04-21 RX ADMIN — PROPOFOL 70 MG: 10 INJECTION, EMULSION INTRAVENOUS at 07:04

## 2022-04-21 NOTE — H&P
"COLONOSCOPY HISTORY AND PHYSICAL EXAM    Procedure : Colonoscopy      INDICATIONS: personal history of colon cancer and polyps. S/p Lap right in 2018 with Dr. Cheung    Family Hx of CRC: No    Last Colonoscopy:  2019 [facility]  Findings: 3mm transverse and 4mm rectal polyps, hyperplastic       Past Medical History:   Diagnosis Date    Anemia     Benign essential hypertension 12/17/2014    History of colon cancer, stage II 10/10/2018    Iron deficiency anemia due to chronic blood loss 10/16/2019     Sedation Problems: NO  Family History   Problem Relation Age of Onset    Hypertension Mother     Cancer Father         stomach    Hypertension Father     Diabetes Sister     Hypertension Brother     Glaucoma Brother     Diabetes Brother     Cataracts Brother     Cancer Sister     Amblyopia Neg Hx     Blindness Neg Hx     Macular degeneration Neg Hx     Retinal detachment Neg Hx     Strabismus Neg Hx     Stroke Neg Hx     Thyroid disease Neg Hx      Fam Hx of Sedation Problems: NO  Social History     Socioeconomic History    Marital status: Single   Tobacco Use    Smoking status: Never Smoker    Smokeless tobacco: Never Used   Substance and Sexual Activity    Alcohol use: Not Currently    Drug use: No       Review of Systems - Negative except   Respiratory ROS: no dyspnea  Cardiovascular ROS: no exertional chest pain  Gastrointestinal ROS: NO abdominal discomfort,  NO rectal bleeding  Musculoskeletal ROS: no muscular pain  Neurological ROS: no recent stroke    Physical Exam:  BP (!) 161/73 (BP Location: Left arm, Patient Position: Lying)   Pulse 60   Temp 98.1 °F (36.7 °C) (Temporal)   Resp 16   Ht 5' 11" (1.803 m)   Wt 100.7 kg (222 lb)   SpO2 100%   BMI 30.96 kg/m²   General: no distress  Head: normocephalic  Mallampati Score   Neck: supple, symmetrical, trachea midline  Lungs:  clear to auscultation bilaterally and normal respiratory effort  Heart: regular rate and rhythm and no " murmur  Abdomen: soft, non-tender non-distented; bowel sounds normal; no masses,  no organomegaly  Extremities: no cyanosis or edema, or clubbing    ASA:  II    PLAN  COLONOSCOPY.    SedationPlan :MAC    The details of the procedure, the possible need for biopsy or polypectomy and the potential risks including bleeding, perforation, missed polyps were discussed in detail.

## 2022-04-21 NOTE — ANESTHESIA PREPROCEDURE EVALUATION
04/21/2022  Noah Nichole is a 72 y.o., male.    Past Medical History:   Diagnosis Date    Anemia     Benign essential hypertension 12/17/2014    History of colon cancer, stage II 10/10/2018    Iron deficiency anemia due to chronic blood loss 10/16/2019     Past Surgical History:   Procedure Laterality Date    COLON SURGERY  09/18/2018    COLONOSCOPY N/A 9/14/2018    Procedure: COLONOSCOPY;  Surgeon: Adrian Cheung MD;  [facility]  Service: Endoscopy;  Laterality: N/A;  pt ate at 1:30 on 9/13/18-Dr Cheung informed and he stated ok to do colonoscopy.    COLONOSCOPY N/A 10/11/2019    Procedure: COLONOSCOPY;  Surgeon: Gabino Bonilla MD;  [facility]  Service: Endoscopy;  Laterality: N/A;    NO PAST SURGERIES      RIGHT HEMICOLECTOMY N/A 9/18/2018    Procedure: HEMICOLECTOMY, RIGHT, extended;  Surgeon: Adrian Cheung MD;  Location: [facility] OR [address] [place] [address];  Service: Colon and Rectal;  Laterality: N/A;       Pre-op Assessment    I have reviewed the Patient Summary Reports.     I have reviewed the Nursing Notes.    I have reviewed the Medications.     Review of Systems  Anesthesia Hx:  No problems with previous Anesthesia  Neg history of prior surgery. Denies Family Hx of Anesthesia complications.   Denies Personal Hx of Anesthesia complications.   Hematology/Oncology:  Hematology Normal   Oncology Normal     EENT/Dental:EENT/Dental Normal   Cardiovascular:   Hypertension    Pulmonary:  Pulmonary Normal    Renal/:  Renal/ Normal     Hepatic/GI:  Hepatic/GI Normal    Musculoskeletal:  Musculoskeletal Normal    Neurological:  Neurology Normal    Endocrine:  Endocrine Normal    Dermatological:  Skin Normal    Psych:  Psychiatric Normal           Physical Exam  General: Well nourished    Airway:  Mallampati: II / II  Mouth Opening: Normal  TM Distance: Normal  Tongue:  Normal  Neck ROM: Normal ROM    Dental:  Intact    Chest/Lungs:  Clear to auscultation, Normal Respiratory Rate    Heart:  Rate: Normal  Rhythm: Regular Rhythm  Sounds: Normal        Anesthesia Plan  Type of Anesthesia, risks & benefits discussed:    Anesthesia Type: Gen Natural Airway  Intra-op Monitoring Plan: Standard ASA Monitors  Induction:  IV  Informed Consent: Informed consent signed with the Patient and all parties understand the risks and agree with anesthesia plan.  All questions answered.   ASA Score: 2  Day of Surgery Review of History & Physical: H&P Update referred to the surgeon/provider.    Ready For Surgery From Anesthesia Perspective.     .

## 2022-04-21 NOTE — TRANSFER OF CARE
"Anesthesia Transfer of Care Note    Patient: Noah Nichole    Procedure(s) Performed: Procedure(s) (LRB):  COLONOSCOPY (N/A)    Patient location: PACU    Anesthesia Type: general    Transport from OR: Transported from OR on 100% O2 by closed face mask with adequate spontaneous ventilation    Post pain: adequate analgesia    Post assessment: no apparent anesthetic complications and tolerated procedure well    Post vital signs: stable    Level of consciousness: sedated and responds to stimulation    Nausea/Vomiting: no nausea/vomiting    Complications: none    Transfer of care protocol was followed      Last vitals:   Visit Vitals  BP (!) 161/73 (BP Location: Left arm, Patient Position: Lying)   Pulse 60   Temp 36.7 °C (98.1 °F) (Temporal)   Resp 16   Ht 5' 11" (1.803 m)   Wt 100.7 kg (222 lb)   SpO2 100%   BMI 30.96 kg/m²     "

## 2022-04-21 NOTE — ANESTHESIA POSTPROCEDURE EVALUATION
Anesthesia Post Evaluation    Patient: Noah Nichole    Procedure(s) Performed: Procedure(s) (LRB):  COLONOSCOPY (N/A)    Final Anesthesia Type: general      Level of consciousness: awake and alert  Post-procedure vital signs: reviewed and stable  Pain control: Pain has been treated.  Airway patency: patent    PONV status: Absent or treated.  Anesthetic complications: no      Cardiovascular status: hemodynamically stable  Respiratory status: unassisted  Hydration status: euvolemic            Vitals Value Taken Time   /80 04/21/22 0748     04/21/22 0819   Pulse 61 04/21/22 0748   Resp 17 04/21/22 0748   SpO2 99 % 04/21/22 0748         No case tracking events are documented in the log.      Pain/Mary Score: Mary Score: 10 (4/21/2022  7:49 AM)

## 2022-04-21 NOTE — PROVATION PATIENT INSTRUCTIONS
Discharge Summary/Instructions after an Endoscopic Procedure  Patient Name: Noah Nichole  Patient MRN: [id number]  Patient YOB: 1950  Thursday, April 21, 2022  Zeny Hinkle MD  Dear patient,  As a result of recent federal legislation (The Federal Cures Act), you may   receive lab or pathology results from your procedure in your [facility]   account before your physician is able to contact you. Your physician or   their representative will relay the results to you with their   recommendations at their soonest availability.  Thank you,  RESTRICTIONS:  During your procedure today, you received medications for sedation.  These   medications may affect your judgment, balance and coordination.  Therefore,   for 24 hours, you have the following restrictions:   - DO NOT drive a car, operate machinery, make legal/financial decisions,   sign important papers or drink alcohol.    ACTIVITY:  Today: no heavy lifting, straining or running due to procedural   sedation/anesthesia.  The following day: return to full activity including work.  DIET:  Eat and drink normally unless instructed otherwise.     TREATMENT FOR COMMON SIDE EFFECTS:  - Mild abdominal pain, nausea, belching, bloating or excessive gas:  rest,   eat lightly and use a heating pad.  - Sore Throat: treat with throat lozenges and/or gargle with warm salt   water.  - Because air was used during the procedure, expelling large amounts of air   from your rectum or belching is normal.  - If a bowel prep was taken, you may not have a bowel movement for 1-3 days.    This is normal.  SYMPTOMS TO WATCH FOR AND REPORT TO YOUR PHYSICIAN:  1. Abdominal pain or bloating, other than gas cramps.  2. Chest pain.  3. Back pain.  4. Signs of infection such as: chills or fever occurring within 24 hours   after the procedure.  5. Rectal bleeding, which would show as bright red, maroon, or black stools.   (A tablespoon of blood from the rectum is not serious, especially  if   hemorrhoids are present.)  6. Vomiting.  7. Weakness or dizziness.  GO DIRECTLY TO THE NEAREST EMERGENCY ROOM IF YOU HAVE ANY OF THE FOLLOWING:      Difficulty breathing              Chills and/or fever over 101 F   Persistent vomiting and/or vomiting blood   Severe abdominal pain   Severe chest pain   Black, tarry stools   Bleeding- more than one tablespoon   Any other symptom or condition that you feel may need urgent attention  Your doctor recommends these additional instructions:  If any biopsies were taken, your doctors clinic will contact you in 1 to 2   weeks with any results.  - Discharge patient to home.   - Resume previous diet.   - Continue present medications.   - Repeat colonoscopy in 5 years for surveillance.   - Return to primary care physician.   - Written discharge instructions were provided to the patient.   - The signs and symptoms of potential delayed complications were discussed   with the patient.   - Patient has a contact number available for emergencies.   - Return to normal activities tomorrow.  For questions, problems or results please call your physician - Zeny Hinkle MD at Work:  [phone].  [facility], EMERGENCY ROOM PHONE NUMBER: [phone]  IF A COMPLICATION OR EMERGENCY SITUATION ARISES AND YOU ARE UNABLE TO REACH   YOUR PHYSICIAN - GO DIRECTLY TO THE EMERGENCY ROOM.  Zeny Hinkle MD  4/21/2022 7:16:39 AM  This report has been verified and signed electronically.  Dear patient,  As a result of recent federal legislation (The Federal Cures Act), you may   receive lab or pathology results from your procedure in your [facility]   account before your physician is able to contact you. Your physician or   their representative will relay the results to you with their   recommendations at their soonest availability.  Thank you,  PROVATION

## 2022-05-11 ENCOUNTER — TELEPHONE (OUTPATIENT)
Dept: SURGERY | Facility: CLINIC | Age: 72
End: 2022-05-11
Payer: MEDICARE

## 2022-05-11 NOTE — TELEPHONE ENCOUNTER
Attempted to call twice and both times phone was picked up with no answer. Calling in regards to scheduling overdue CEA level.

## 2022-06-06 ENCOUNTER — OFFICE VISIT (OUTPATIENT)
Dept: URGENT CARE | Facility: CLINIC | Age: 72
End: 2022-06-06
Payer: MEDICARE

## 2022-06-06 ENCOUNTER — TELEPHONE (OUTPATIENT)
Dept: FAMILY MEDICINE | Facility: CLINIC | Age: 72
End: 2022-06-06
Payer: MEDICARE

## 2022-06-06 VITALS
WEIGHT: 222 LBS | RESPIRATION RATE: 20 BRPM | BODY MASS INDEX: 31.08 KG/M2 | HEIGHT: 71 IN | OXYGEN SATURATION: 98 % | SYSTOLIC BLOOD PRESSURE: 128 MMHG | HEART RATE: 58 BPM | DIASTOLIC BLOOD PRESSURE: 72 MMHG | TEMPERATURE: 99 F

## 2022-06-06 DIAGNOSIS — M10.9 ACUTE GOUT INVOLVING TOE OF LEFT FOOT, UNSPECIFIED CAUSE: Primary | ICD-10-CM

## 2022-06-06 DIAGNOSIS — T88.7XXA MEDICATION SIDE EFFECT: ICD-10-CM

## 2022-06-06 PROCEDURE — 1159F PR MEDICATION LIST DOCUMENTED IN MEDICAL RECORD: ICD-10-PCS | Mod: CPTII,S$GLB,, | Performed by: FAMILY MEDICINE

## 2022-06-06 PROCEDURE — 3074F PR MOST RECENT SYSTOLIC BLOOD PRESSURE < 130 MM HG: ICD-10-PCS | Mod: CPTII,S$GLB,, | Performed by: FAMILY MEDICINE

## 2022-06-06 PROCEDURE — 3044F PR MOST RECENT HEMOGLOBIN A1C LEVEL <7.0%: ICD-10-PCS | Mod: CPTII,S$GLB,, | Performed by: FAMILY MEDICINE

## 2022-06-06 PROCEDURE — 3078F PR MOST RECENT DIASTOLIC BLOOD PRESSURE < 80 MM HG: ICD-10-PCS | Mod: CPTII,S$GLB,, | Performed by: FAMILY MEDICINE

## 2022-06-06 PROCEDURE — 1159F MED LIST DOCD IN RCRD: CPT | Mod: CPTII,S$GLB,, | Performed by: FAMILY MEDICINE

## 2022-06-06 PROCEDURE — 99214 OFFICE O/P EST MOD 30 MIN: CPT | Mod: S$GLB,,, | Performed by: FAMILY MEDICINE

## 2022-06-06 PROCEDURE — 3074F SYST BP LT 130 MM HG: CPT | Mod: CPTII,S$GLB,, | Performed by: FAMILY MEDICINE

## 2022-06-06 PROCEDURE — 99214 PR OFFICE/OUTPT VISIT, EST, LEVL IV, 30-39 MIN: ICD-10-PCS | Mod: S$GLB,,, | Performed by: FAMILY MEDICINE

## 2022-06-06 PROCEDURE — 1160F PR REVIEW ALL MEDS BY PRESCRIBER/CLIN PHARMACIST DOCUMENTED: ICD-10-PCS | Mod: CPTII,S$GLB,, | Performed by: FAMILY MEDICINE

## 2022-06-06 PROCEDURE — 3078F DIAST BP <80 MM HG: CPT | Mod: CPTII,S$GLB,, | Performed by: FAMILY MEDICINE

## 2022-06-06 PROCEDURE — 3008F BODY MASS INDEX DOCD: CPT | Mod: CPTII,S$GLB,, | Performed by: FAMILY MEDICINE

## 2022-06-06 PROCEDURE — 1160F RVW MEDS BY RX/DR IN RCRD: CPT | Mod: CPTII,S$GLB,, | Performed by: FAMILY MEDICINE

## 2022-06-06 PROCEDURE — 3044F HG A1C LEVEL LT 7.0%: CPT | Mod: CPTII,S$GLB,, | Performed by: FAMILY MEDICINE

## 2022-06-06 PROCEDURE — 3008F PR BODY MASS INDEX (BMI) DOCUMENTED: ICD-10-PCS | Mod: CPTII,S$GLB,, | Performed by: FAMILY MEDICINE

## 2022-06-06 RX ORDER — INDOMETHACIN 25 MG/1
25 CAPSULE ORAL 2 TIMES DAILY WITH MEALS
Qty: 30 CAPSULE | Refills: 0 | OUTPATIENT
Start: 2022-06-06 | End: 2022-07-26

## 2022-06-06 RX ORDER — COLCHICINE 0.6 MG/1
0.6 TABLET ORAL DAILY
Qty: 30 TABLET | Refills: 2 | Status: SHIPPED | OUTPATIENT
Start: 2022-06-06 | End: 2023-01-23 | Stop reason: ALTCHOICE

## 2022-06-06 NOTE — LETTER
June 6, 2022      [facility]  [address] [address]  Phone: [phone]  Fax: [phone]       Patient: Noah Nichole   YOB: 1950  Date of Visit: 06/06/2022    To Whom It May Concern:    Hamida Nichole  was at [facility] on 06/06/2022. The patient may return to work/school on 06/08/2022 with no restrictions. If you have any questions or concerns, or if I can be of further assistance, please do not hesitate to contact me.    Sincerely,              Zacarias Gutierrez MD

## 2022-06-06 NOTE — TELEPHONE ENCOUNTER
----- Message from Katelynn Roberto sent at 6/6/2022  8:17 AM CDT -----  Contact: [phone]  Type:  Same Day Appointment Request    Caller is requesting a same day appointment.  Caller declined first available appointment listed below.    Name of Caller:Pt  When is the first available appointment?n/a  Symptoms: loose Bowels  Best Call Back Number:[phone]

## 2022-06-06 NOTE — PROGRESS NOTES
"Subjective:       Patient ID: Noah Nichole is a 72 y.o. male.    Vitals:  height is 5' 11" (1.803 m) and weight is 100.7 kg (222 lb). His temperature is 98.6 °F (37 °C). His blood pressure is 128/72 and his pulse is 58 (abnormal). His respiration is 20 and oxygen saturation is 98%.     Chief Complaint: Diarrhea    Pt complains of diarrhea, began yesterday. Pt also complains of gout in left first digit toe, began 2 days ago. Reports he has been taking an old prescription of colchicine which has been partially effective. No hx of trauma or concerning systemic symptoms. Admits to recent dietary indiscretions.     Diarrhea   This is a new problem. The problem has been unchanged. The stool consistency is described as watery.       Gastrointestinal: Positive for diarrhea.       Objective:      Physical Exam   Constitutional: He does not appear ill. No distress. normal  Cardiovascular: Normal rate, regular rhythm, normal heart sounds and normal pulses.   Pulmonary/Chest: Effort normal and breath sounds normal.   Abdominal: Normal appearance. Soft.   Musculoskeletal:         General: Swelling (1st MTP joint left foot - tender) and tenderness present.   Neurological: He is alert.   Nursing note and vitals reviewed.        Assessment:       1. Acute gout involving toe of left foot, unspecified cause    2. Medication side effect          Plan:         Acute gout involving toe of left foot, unspecified cause  -     colchicine (COLCRYS) 0.6 mg tablet; Take 1 tablet (0.6 mg total) by mouth once daily.  Dispense: 30 tablet; Refill: 2  -     indomethacin (INDOCIN) 25 MG capsule; Take 1 capsule (25 mg total) by mouth 2 (two) times daily with meals.  Dispense: 30 capsule; Refill: 0    Medication side effect    reviewed side effect profile of colchicine with patient.                "

## 2022-06-06 NOTE — TELEPHONE ENCOUNTER
Returned patient call in regards to an appointment for loose bowels. Patient said he was taken care of.

## 2022-06-10 ENCOUNTER — TELEPHONE (OUTPATIENT)
Dept: SURGERY | Facility: CLINIC | Age: 72
End: 2022-06-10
Payer: MEDICARE

## 2022-06-10 NOTE — TELEPHONE ENCOUNTER
Left voicemail with callback number regarding overdue lab work orders. Instructed patient to call back for help with scheduling.

## 2022-06-22 ENCOUNTER — TELEPHONE (OUTPATIENT)
Dept: FAMILY MEDICINE | Facility: CLINIC | Age: 72
End: 2022-06-22
Payer: MEDICARE

## 2022-06-22 ENCOUNTER — OFFICE VISIT (OUTPATIENT)
Dept: URGENT CARE | Facility: CLINIC | Age: 72
End: 2022-06-22
Payer: MEDICARE

## 2022-06-22 VITALS
HEART RATE: 60 BPM | DIASTOLIC BLOOD PRESSURE: 82 MMHG | BODY MASS INDEX: 3.08 KG/M2 | SYSTOLIC BLOOD PRESSURE: 160 MMHG | TEMPERATURE: 99 F | WEIGHT: 22 LBS | HEIGHT: 71 IN | OXYGEN SATURATION: 97 % | RESPIRATION RATE: 18 BRPM

## 2022-06-22 DIAGNOSIS — J06.9 VIRAL URI WITH COUGH: Primary | ICD-10-CM

## 2022-06-22 DIAGNOSIS — U07.1 COVID-19 VIRUS INFECTION: ICD-10-CM

## 2022-06-22 DIAGNOSIS — U07.1 COVID: Primary | ICD-10-CM

## 2022-06-22 DIAGNOSIS — R05.9 COUGH: ICD-10-CM

## 2022-06-22 DIAGNOSIS — U07.1 COVID-19 VIRUS DETECTED: ICD-10-CM

## 2022-06-22 LAB
CTP QC/QA: YES
SARS-COV-2 RDRP RESP QL NAA+PROBE: POSITIVE

## 2022-06-22 PROCEDURE — 1160F RVW MEDS BY RX/DR IN RCRD: CPT | Mod: CPTII,S$GLB,,

## 2022-06-22 PROCEDURE — 3077F PR MOST RECENT SYSTOLIC BLOOD PRESSURE >= 140 MM HG: ICD-10-PCS | Mod: CPTII,S$GLB,,

## 2022-06-22 PROCEDURE — 3044F HG A1C LEVEL LT 7.0%: CPT | Mod: CPTII,S$GLB,,

## 2022-06-22 PROCEDURE — U0002: ICD-10-PCS | Mod: QW,S$GLB,,

## 2022-06-22 PROCEDURE — 99214 OFFICE O/P EST MOD 30 MIN: CPT | Mod: S$GLB,,,

## 2022-06-22 PROCEDURE — 3079F PR MOST RECENT DIASTOLIC BLOOD PRESSURE 80-89 MM HG: ICD-10-PCS | Mod: CPTII,S$GLB,,

## 2022-06-22 PROCEDURE — 3044F PR MOST RECENT HEMOGLOBIN A1C LEVEL <7.0%: ICD-10-PCS | Mod: CPTII,S$GLB,,

## 2022-06-22 PROCEDURE — 1125F PR PAIN SEVERITY QUANTIFIED, PAIN PRESENT: ICD-10-PCS | Mod: CPTII,S$GLB,,

## 2022-06-22 PROCEDURE — 1125F AMNT PAIN NOTED PAIN PRSNT: CPT | Mod: CPTII,S$GLB,,

## 2022-06-22 PROCEDURE — 3077F SYST BP >= 140 MM HG: CPT | Mod: CPTII,S$GLB,,

## 2022-06-22 PROCEDURE — 99214 PR OFFICE/OUTPT VISIT, EST, LEVL IV, 30-39 MIN: ICD-10-PCS | Mod: S$GLB,,,

## 2022-06-22 PROCEDURE — 1159F PR MEDICATION LIST DOCUMENTED IN MEDICAL RECORD: ICD-10-PCS | Mod: CPTII,S$GLB,,

## 2022-06-22 PROCEDURE — 3079F DIAST BP 80-89 MM HG: CPT | Mod: CPTII,S$GLB,,

## 2022-06-22 PROCEDURE — 3008F BODY MASS INDEX DOCD: CPT | Mod: CPTII,S$GLB,,

## 2022-06-22 PROCEDURE — 1159F MED LIST DOCD IN RCRD: CPT | Mod: CPTII,S$GLB,,

## 2022-06-22 PROCEDURE — U0002 COVID-19 LAB TEST NON-CDC: HCPCS | Mod: QW,S$GLB,,

## 2022-06-22 PROCEDURE — 1160F PR REVIEW ALL MEDS BY PRESCRIBER/CLIN PHARMACIST DOCUMENTED: ICD-10-PCS | Mod: CPTII,S$GLB,,

## 2022-06-22 PROCEDURE — 3008F PR BODY MASS INDEX (BMI) DOCUMENTED: ICD-10-PCS | Mod: CPTII,S$GLB,,

## 2022-06-22 RX ORDER — BENZONATATE 100 MG/1
100 CAPSULE ORAL 3 TIMES DAILY PRN
Qty: 30 CAPSULE | Refills: 0 | Status: SHIPPED | OUTPATIENT
Start: 2022-06-22 | End: 2022-07-02

## 2022-06-22 RX ORDER — PROPOFOL 10 MG/ML
INJECTION, EMULSION INTRAVENOUS
COMMUNITY
Start: 2022-04-21 | End: 2022-08-31 | Stop reason: ALTCHOICE

## 2022-06-22 NOTE — LETTER
[address] [place] [address]  Phone: [phone]  Fax: [phone]          Return to Work/School    Patient: Noah Nichole  YOB: 1950   Date: 06/22/2022     To Whom It May Concern:     Noah Nichole was in contact with/seen in my office on 06/22/2022. COVID-19 is present in our communities across the state. There is limited testing for COVID at this time, so not all patients can be tested. In this situation, your employee meets the following criteria:     Noah Nichole has met the criteria for COVID-19 testing and has a POSITIVE result. He can return to work once they are asymptomatic for 24 hours without the use of fever reducing medications AND at least five days from the start of symptoms (or from the first positive result if they have no symptoms). 06/26/2022     If you have any questions or concerns, or if I can be of further assistance, please do not hesitate to contact me.     Sincerely,    Chin Ambriz NP

## 2022-06-22 NOTE — TELEPHONE ENCOUNTER
----- Message from Zeny Flanagan sent at 6/22/2022  8:07 AM CDT -----  Contact: Pt- [phone]  Good morning,    Pt is requesting a cough medicine be called in for him to the below pharmacy:    [facility] [address] [place], [place] - [address]  [address]  [place] [address]  Phone: [phone] Fax: [phone]    Thank you,  Zeny MONTELONGO  Access Navigator

## 2022-06-22 NOTE — PATIENT INSTRUCTIONS
Take Paxlovid as prescribed.   Take Tessalon as prescribed.    COVID  If your condition worsens or fails to improve we recommend that you receive another evaluation at the ER immediately or contact your PCP to discuss your concerns or return here. You must understand that you've received an urgent care treatment only and that you may be released before all your medical problems are known or treated. -  Flonase (fluticasone) is a nasal spray which is available over the counter and may help with your symptoms   -  Zyrtec D, Claritin D or allegra D can also help with symptoms of congestion and drainage.   -  If you just have drainage you can take plain Zyrtec, Claritin or Allegra    -  Rest and fluids are also important.   -  Tylenol or ibuprofen can also be used as directed for pain unless you have an allergy to them or medical condition such as stomach ulcers, kidney or liver disease or blood thinners etc for which you should not be taking these type of medications.    -Take tessalon as prescribed.      Your test was POSITIVE for COVID-19 (coronavirus).       Please isolate yourself at home.  You may leave home and/or return to work once the following conditions are met:    If you were not hospitalized and are not moderately to severely immunocompromised:   More than 5 days since symptoms first appeared AND  More than 24 hours fever free without medications AND  Symptoms are improving  Continue to wear a mask around others for 5 additional days.    If you were hospitalized OR are moderately to severely immunocompromised:  More than 20 days since symptoms first appeared  More than 24 hours fever free without medications  Symptoms have improved    If you had no symptoms but tested positive:  More than 5 days since the date of the first positive test (20 days if moderately to severely immunocompromised). If you develop symptoms, then use the guidelines above.  Continue to wear a mask around others for 5 additional  days.      Contact Tracing    As one of the next steps, you will receive a call or text from the [place] Department of Health ([facility]) COVID-19 Tracing Team. See the contact information below so you know not to ignore the health departments call. It is important that you contact them back immediately so they can help.      Contact Tracer Number:  [phone]  Caller ID for most carriers: [place] [facility]     What is contact tracing?  Contact tracing is a process that helps identify everyone who has been in close contact with an infected person. Contact tracers let those people know they may have been exposed and guide them on next steps. Confidentiality is important for everyone; no one will be told who may have exposed them to the virus.  Your involvement is important. The more we know about where and how this virus is spreading, the better chance we have at stopping it from spreading further.  What does exposure mean?  Exposure means you have been within 6 feet for more than 15 minutes with a person who has or had COVID-19.  What kind of questions do the contact tracers ask?  A contact tracer will confirm your basic contact information including name, address, phone number, and next of kin, as well as asking about any symptoms you may have had. Theyll also ask you how you think you may have gotten sick, such as places where you may have been exposed to the virus, and people you were with. Those names will never be shared with anyone outside of that call, and will only be used to help trace and stop the spread of the virus.   I have privacy concerns. How will the state use my information?  Your privacy about your health is important. All calls are completed using call centers that use the appropriate health privacy protection measures (HIPAA compliance), meaning that your patient information is safe. No one will ever ask you any questions related to immigration status. Your health comes first.   Do I have to  participate?  You do not have to participate, but we strongly encourage you to. Contact tracing can help us catch and control new outbreaks as theyre developing to keep your friends and family safe.   What if I dont hear from anyone?  If you dont receive a call within 24 hours, you can call the number above right away to inquire about your status. That line is open from 8:00 am - 8:00 p.m., 7 days a week.  Contact tracing saves lives! Together, we have the power to beat this virus and keep our loved ones and neighbors safe.    For more information see CDC link below.      https://www.cdc.gov/coronavirus/2019-ncov/hcp/guidance-prevent-spread.html#precautions        Sources:  Formerly named [facility], [place] [facility] and [facility]           Sincerely,     Chin Ambriz NP

## 2022-06-22 NOTE — PROGRESS NOTES
"Subjective:       Patient ID: Noah Nichole is a 72 y.o. male.    Vitals:  height is 5' 11" (1.803 m) and weight is 9.979 kg (22 lb). His oral temperature is 98.9 °F (37.2 °C). His blood pressure is 160/82 (abnormal) and his pulse is 60. His respiration is 18 and oxygen saturation is 97%.     Chief Complaint: URI    Pt presents to urgent care for cough,sore throat, headache,  chills, back & leg pain since Monday.   Pt has been taking ibuprofen    URI   This is a new problem. The current episode started in the past 7 days. The problem has been gradually worsening. There has been no fever. Associated symptoms include congestion, coughing, headaches and a sore throat. Pertinent negatives include no abdominal pain, chest pain, diarrhea, ear pain, nausea, neck pain, sinus pain, vomiting or wheezing. He has tried acetaminophen for the symptoms. The treatment provided no relief.       Constitution: Positive for fever. Negative for activity change, appetite change, chills, sweating, fatigue, unexpected weight change and generalized weakness.        Reports subjective fever since Monday    HENT: Positive for congestion, sinus pressure and sore throat. Negative for ear pain, ear discharge, nosebleeds, postnasal drip, sinus pain, trouble swallowing and voice change.         Reports having nasal congestion sinus pressure and sore throat since Monday.    Neck: neck negative. Negative for neck pain, neck stiffness and painful lymph nodes.   Cardiovascular: Negative.  Negative for chest pain, leg swelling, palpitations, sob on exertion and passing out.   Respiratory: Positive for cough. Negative for chest tightness, sputum production, shortness of breath, stridor and wheezing.         Reports dry cough   Gastrointestinal: Negative.  Negative for abdominal pain, nausea, vomiting, constipation and diarrhea.   Endocrine: negative. hair loss and cold intolerance.   Musculoskeletal: Negative.  Negative for pain and muscle ache. "   Skin: Negative.  Negative for wound and laceration.   Neurological: Positive for headaches. Negative for dizziness, light-headedness, passing out, facial drooping, speech difficulty, disorientation and altered mental status.   Hematologic/Lymphatic: Negative.  Negative for swollen lymph nodes.   Psychiatric/Behavioral: Negative.  Negative for altered mental status, disorientation and confusion.       Objective:      Physical Exam   Constitutional: He is oriented to person, place, and time. He appears well-developed. He is cooperative.  Non-toxic appearance. He does not appear ill. No distress.   HENT:   Head: Normocephalic and atraumatic.   Ears:   Right Ear: Hearing, tympanic membrane, external ear and ear canal normal.   Left Ear: Hearing, tympanic membrane, external ear and ear canal normal.   Nose: Nose normal. No mucosal edema, rhinorrhea or nasal deformity. No epistaxis. Right sinus exhibits no maxillary sinus tenderness and no frontal sinus tenderness. Left sinus exhibits no maxillary sinus tenderness and no frontal sinus tenderness.   Mouth/Throat: Uvula is midline and mucous membranes are normal. No trismus in the jaw. Normal dentition. No uvula swelling. Posterior oropharyngeal erythema present. No oropharyngeal exudate, posterior oropharyngeal edema, tonsillar abscesses or cobblestoning.   Eyes: Conjunctivae and lids are normal. No scleral icterus.   Neck: Trachea normal and phonation normal. Neck supple. No edema present. No erythema present. No neck rigidity present.   Cardiovascular: Normal rate, regular rhythm, normal heart sounds and normal pulses.   Pulmonary/Chest: Effort normal and breath sounds normal. No respiratory distress. He has no decreased breath sounds. He has no rhonchi.   Abdominal: Normal appearance.   Musculoskeletal: Normal range of motion.         General: No deformity. Normal range of motion.   Neurological: He is alert and oriented to person, place, and time. He exhibits normal  muscle tone. Coordination normal.   Skin: Skin is warm, dry, intact, not diaphoretic and not pale.   Psychiatric: His speech is normal and behavior is normal. Judgment and thought content normal.   Nursing note and vitals reviewed.        Results for orders placed or performed in visit on 06/22/22   POCT COVID-19 Rapid Screening   Result Value Ref Range    POC Rapid COVID Positive (A) Negative     Acceptable Yes      Assessment:       1. COVID    2. Cough          Plan:        Discussed positive COVID test results with PT. PT should Isolate for 5 days after symptoms start and then wear a mask for 5 days after when around people. Discussed is if condition worsens or fails to improve that PT receive another evaluation at the ER immediately or contact your PCP to discuss concerns or return here. Reviewed was for development of SOB, CP, lightheadedness, dizziness, PT should go to ED. Also addressed is the use of Zyrtec, for congestion and sinus pressure. Also reviewed was the use of Flonase and to use as directed by medication label directs. Also discussed was rest and fluids as well as Tylenol or ibuprofen can also be used as directed for pain or fever. Warm salt water gargles discussed as well. Also discuss is the use of Halls for sore throat. Discussed COVID risk score and Paxlovid, PT elected to take Paxlovid. Reviewed not tot start any new medications as PT reports only taking HTN medication regularly.  PT verbalized understanding and agreed with plan and diagnosis. PT ambulatory from clinic NAD.   COVID    Cough  -     POCT COVID-19 Rapid Screening    Other orders  -     nirmatrelvir-ritonavir 150 mg x 2- 100 mg copackaged tablets (EUA); Take 3 tablets by mouth 2 (two) times daily for 5 days. Each dose contains 2 nirmatrelvir (pink tablets) and 1 ritonavir (white tablet). Take all 3 tablets together  Dispense: 30 tablet; Refill: 0  -     benzonatate (TESSALON) 100 MG capsule; Take 1 capsule (100 mg  total) by mouth 3 (three) times daily as needed for Cough.  Dispense: 30 capsule; Refill: 0           Medical Decision Making:   Urgent Care Management:  COVID risk score 5. PT reports only taking HTN medication daily. Medications reviewed with PT.

## 2022-06-23 RX ORDER — CODEINE PHOSPHATE AND GUAIFENESIN 10; 100 MG/5ML; MG/5ML
5 SOLUTION ORAL 3 TIMES DAILY PRN
Qty: 118 ML | Refills: 0 | Status: SHIPPED | OUTPATIENT
Start: 2022-06-23 | End: 2022-07-03

## 2022-06-24 ENCOUNTER — TELEPHONE (OUTPATIENT)
Dept: FAMILY MEDICINE | Facility: CLINIC | Age: 72
End: 2022-06-24
Payer: MEDICARE

## 2022-06-24 NOTE — TELEPHONE ENCOUNTER
Returned patient call regarding if he can take both of these medications together. Patient was given Benzonatate (Tessalon) 100 MG capsules by urgent care, and was given Guaifenesin-codeine 100-10 mg/5ml (Tussi-organidin nr)  mg/5ml syrup. Please advise.

## 2022-06-24 NOTE — TELEPHONE ENCOUNTER
----- Message from Sondra Suero sent at 6/24/2022 11:18 AM CDT -----  Type:  Patient Returning Call    Who Called:Pt   Who Left Message for Patient:  Does the patient know what this is regarding?:  Would the patient rather a call back or a response via [facility]? call   Best Call Back Number: [phone]  Additional Information:     Pt would like a call back regarding medication he is unsure about

## 2022-06-24 NOTE — TELEPHONE ENCOUNTER
----- Message from Karl Humaira Soto sent at 6/23/2022  4:45 PM CDT -----  Contact: pt.  .Type:  Needs Medical Advice    Who Called: pt.   Would the patient rather a call back or a response via [facility]? Call back  Best Call Back Number:[phone]      Additional Information: Pt. Is calling regarding benzonatate (TESSALON) 100 MG capsule given to him by urgent care  and guaiFENesin-codeine 100-10 mg/5 ml (TUSSI-ORGANIDIN NR)  mg/5 mL syrup given to him by Dr. Manning  pt. Would like to know can he  take both of them together.

## 2022-07-26 ENCOUNTER — HOSPITAL ENCOUNTER (EMERGENCY)
Facility: HOSPITAL | Age: 72
Discharge: HOME | End: 2022-07-26
Attending: EMERGENCY MEDICINE
Payer: MEDICARE

## 2022-07-26 VITALS
TEMPERATURE: 98 F | DIASTOLIC BLOOD PRESSURE: 76 MMHG | WEIGHT: 222 LBS | HEIGHT: 72 IN | HEART RATE: 68 BPM | RESPIRATION RATE: 20 BRPM | SYSTOLIC BLOOD PRESSURE: 140 MMHG | BODY MASS INDEX: 30.07 KG/M2 | OXYGEN SATURATION: 100 %

## 2022-07-26 DIAGNOSIS — M10.9 ACUTE GOUT INVOLVING TOE OF LEFT FOOT, UNSPECIFIED CAUSE: Primary | ICD-10-CM

## 2022-07-26 PROCEDURE — 63600175 PHARM REV CODE 636 W HCPCS: Performed by: PHYSICIAN ASSISTANT

## 2022-07-26 PROCEDURE — 99284 EMERGENCY DEPT VISIT MOD MDM: CPT

## 2022-07-26 PROCEDURE — 96372 THER/PROPH/DIAG INJ SC/IM: CPT | Performed by: PHYSICIAN ASSISTANT

## 2022-07-26 RX ORDER — INDOMETHACIN 50 MG/1
50 CAPSULE ORAL 2 TIMES DAILY WITH MEALS
Qty: 20 CAPSULE | Refills: 0 | Status: ON HOLD | OUTPATIENT
Start: 2022-07-26 | End: 2022-09-27 | Stop reason: HOSPADM

## 2022-07-26 RX ORDER — KETOROLAC TROMETHAMINE 30 MG/ML
15 INJECTION, SOLUTION INTRAMUSCULAR; INTRAVENOUS
Status: COMPLETED | OUTPATIENT
Start: 2022-07-26 | End: 2022-07-26

## 2022-07-26 RX ADMIN — KETOROLAC TROMETHAMINE 15 MG: 30 INJECTION, SOLUTION INTRAMUSCULAR; INTRAVENOUS at 11:07

## 2022-07-26 NOTE — DISCHARGE INSTRUCTIONS

## 2022-07-26 NOTE — ED PROVIDER NOTES
Encounter Date: 7/26/2022       History     Chief Complaint   Patient presents with    Toe Pain     Left toe pain reports gout flare up since saturday     72-year-old male presents to ED with concern of left toe pain due to gout flare up that began roughly 3 days ago.  He does report his symptoms are consistent with previous gout flare ups.  He did try his home colchicine with minimal improvement.  Paint air described as sharp, worse with touch, alleviated with rest, severity 8/10.  No numbness or focal weakness.  No recent sicknesses, fevers, chills, nausea, vomiting.  No other acute complaints at this time.    The history is provided by the patient.     Review of patient's allergies indicates:  No Known Allergies  Past Medical History:   Diagnosis Date    Anemia     Benign essential hypertension 12/17/2014    History of colon cancer, stage II 10/10/2018    Iron deficiency anemia due to chronic blood loss 10/16/2019     Past Surgical History:   Procedure Laterality Date    COLON SURGERY  09/18/2018    COLONOSCOPY N/A 9/14/2018    Procedure: COLONOSCOPY;  Surgeon: Adrian Cheung MD;  Location: [facility] ([address] [place] [address]);  Service: Endoscopy;  Laterality: N/A;  pt ate at 1:30 on 9/13/18-Dr Cheung informed and he stated ok to do colonoscopy.    COLONOSCOPY N/A 10/11/2019    Procedure: COLONOSCOPY;  Surgeon: Gabino Bonilla MD;  Location: [facility] ([address] [place] [address]);  Service: Endoscopy;  Laterality: N/A;    COLONOSCOPY N/A 4/21/2022    Procedure: COLONOSCOPY;  Surgeon: Zeny Hinkle MD;  [facility]  Service: Colon and Rectal;  Laterality: N/A;  fully vaccinated, instructions sent to [facility] and emailed to [email or web address]  suprep    NO PAST SURGERIES      RIGHT HEMICOLECTOMY N/A 9/18/2018    Procedure: HEMICOLECTOMY, RIGHT, extended;  Surgeon: Adrian Cheung MD;  Location: [address];  Service: Colon and Rectal;  Laterality: N/A;     Family History    Problem Relation Age of Onset    Hypertension Mother     Cancer Father         stomach    Hypertension Father     Diabetes Sister     Hypertension Brother     Glaucoma Brother     Diabetes Brother     Cataracts Brother     Cancer Sister     Amblyopia Neg Hx     Blindness Neg Hx     Macular degeneration Neg Hx     Retinal detachment Neg Hx     Strabismus Neg Hx     Stroke Neg Hx     Thyroid disease Neg Hx      Social History     Tobacco Use    Smoking status: Never Smoker    Smokeless tobacco: Never Used   Substance Use Topics    Alcohol use: Not Currently    Drug use: No     Review of Systems   Constitutional: Negative for chills and fever.   Gastrointestinal: Negative for nausea and vomiting.   Musculoskeletal: Positive for arthralgias.   Skin: Negative for wound.   Neurological: Negative for weakness and numbness.       Physical Exam     Initial Vitals [07/26/22 1059]   BP Pulse Resp Temp SpO2   (!) 140/76 68 20 98.3 °F (36.8 °C) 100 %      MAP       --         Physical Exam    Nursing note and vitals reviewed.  Constitutional: He appears well-developed and well-nourished. He is active. He does not have a sickly appearance. He does not appear ill. No distress.   HENT:   Head: Normocephalic and atraumatic.   Neck:   Normal range of motion.  Musculoskeletal:         General: Tenderness present.      Cervical back: Normal range of motion.      Comments: Left great toe pain over dorsal aspect near MCP joint.  Minimal swelling.  No significant erythema or warmth.  Tenderness reported to light touch.  ROM is intact but limited due to reported discomfort.  Distal sensation intact.  Brisk capillary refill.     Neurological: He is alert. GCS eye subscore is 4. GCS verbal subscore is 5. GCS motor subscore is 6.   Skin: Skin is warm and dry.   Psychiatric: He has a normal mood and affect. His speech is normal and behavior is normal.         ED Course   Procedures  Labs Reviewed - No data to display        Imaging Results    None          Medications   ketorolac injection 15 mg (has no administration in time range)     Medical Decision Making:   Initial Assessment:   Patient presents with concern of left great toe pain that began 3 days ago.  He reports symptoms are consistent with previous gout flare was.  He has taken his home culture seen daily with minimal improvement.  No numbness or focal weakness, fevers, chills, recent sicknesses.  Afebrile arrival.  On exam, tenderness over 1st MCP joint on left foot.  Minimal swelling.  No warmth erythema.  Neurovascular intact.  Differential Diagnosis:   Gout, arthritis, less likely infectious, septic joint, cellulitis  ED Management:  With careful consideration, do suspect symptoms are due to gout flare up.  Patient endorsing symptoms are consistent with previous gout flare-ups.  Will continue with conservative care.  Toradol injection received in ED along with prescription for indomethacin.  Encouraged oral hydration, monitor symptoms closely, close PCP follow-up.  ED return precautions discussed.  Patient states his understanding and agrees with plan.                      Clinical Impression:   Final diagnoses:  [M10.9] Acute gout involving toe of left foot, unspecified cause (Primary)          ED Disposition Condition    Discharge Stable        ED Prescriptions     Medication Sig Dispense Start Date End Date Auth. Provider    indomethacin (INDOCIN) 50 MG capsule Take 1 capsule (50 mg total) by mouth 2 (two) times daily with meals. 20 capsule 7/26/2022  Arnel Vitale PA-C        Follow-up Information     Follow up With Specialties Details Why Contact Info    Grace Jeffery MD Family Medicine   [address]  [address]  [place] [address]  [phone]             Arnel Vitale PA-C  07/26/22 [id number]

## 2022-07-26 NOTE — ED TRIAGE NOTES
Pt reports left big toe pain since Saturday, reports recent gout flare up, last took medicine on Saturday. Pt ambulates independently

## 2022-07-26 NOTE — Clinical Note
"Noah Nichole (Rudolph) was seen and treated in our emergency department on 7/26/2022.  He may return to work on 07/29/2022.       If you have any questions or concerns, please don't hesitate to call.      Jose Barrera LPN    "

## 2022-08-22 ENCOUNTER — TELEPHONE (OUTPATIENT)
Dept: FAMILY MEDICINE | Facility: CLINIC | Age: 72
End: 2022-08-22
Payer: MEDICARE

## 2022-08-22 NOTE — TELEPHONE ENCOUNTER
Returned patient call in regards to a bump on the back of his neck that is getting larger. Patient was scheduled on Wednesday Aug. 31, 2022 at 3 pm. Patient verbalized understanding.

## 2022-08-22 NOTE — TELEPHONE ENCOUNTER
----- Message from Luis Cornejo sent at 8/22/2022  1:08 PM CDT -----  Type:  Needs Medical Advice    Who Called: patient  Symptoms (please be specific): bump on back of neck getting larger  How long has patient had these symptoms:  2 weeks  Would the patient rather a call back or a response via [facility]? call  Best Call Back Number: [phone]  Additional Information: He is requesting an appointment asap.

## 2022-08-31 ENCOUNTER — OFFICE VISIT (OUTPATIENT)
Dept: FAMILY MEDICINE | Facility: CLINIC | Age: 72
End: 2022-08-31
Payer: MEDICARE

## 2022-08-31 VITALS
DIASTOLIC BLOOD PRESSURE: 86 MMHG | OXYGEN SATURATION: 98 % | SYSTOLIC BLOOD PRESSURE: 128 MMHG | HEIGHT: 72 IN | WEIGHT: 222.69 LBS | HEART RATE: 72 BPM | BODY MASS INDEX: 30.16 KG/M2

## 2022-08-31 DIAGNOSIS — Z79.899 MEDICATION MANAGEMENT: ICD-10-CM

## 2022-08-31 DIAGNOSIS — M10.9 GOUTY ARTHRITIS OF LEFT GREAT TOE: Primary | ICD-10-CM

## 2022-08-31 DIAGNOSIS — R73.03 PREDIABETES: ICD-10-CM

## 2022-08-31 DIAGNOSIS — Z78.9 UNCIRCUMCISED MALE: ICD-10-CM

## 2022-08-31 DIAGNOSIS — E66.09 CLASS 1 OBESITY DUE TO EXCESS CALORIES WITH SERIOUS COMORBIDITY AND BODY MASS INDEX (BMI) OF 30.0 TO 30.9 IN ADULT: ICD-10-CM

## 2022-08-31 DIAGNOSIS — Z86.16 PERSONAL HISTORY OF COVID-19: ICD-10-CM

## 2022-08-31 DIAGNOSIS — I10 BENIGN ESSENTIAL HYPERTENSION: ICD-10-CM

## 2022-08-31 DIAGNOSIS — L72.3 SEBACEOUS CYST: ICD-10-CM

## 2022-08-31 DIAGNOSIS — E79.0 ELEVATED BLOOD URIC ACID LEVEL: ICD-10-CM

## 2022-08-31 PROCEDURE — 3044F HG A1C LEVEL LT 7.0%: CPT | Mod: CPTII,S$GLB,, | Performed by: FAMILY MEDICINE

## 2022-08-31 PROCEDURE — 99499 RISK ADDL DX/OHS AUDIT: ICD-10-PCS | Mod: S$GLB,,, | Performed by: FAMILY MEDICINE

## 2022-08-31 PROCEDURE — 3079F PR MOST RECENT DIASTOLIC BLOOD PRESSURE 80-89 MM HG: ICD-10-PCS | Mod: CPTII,S$GLB,, | Performed by: FAMILY MEDICINE

## 2022-08-31 PROCEDURE — 1159F MED LIST DOCD IN RCRD: CPT | Mod: CPTII,S$GLB,, | Performed by: FAMILY MEDICINE

## 2022-08-31 PROCEDURE — 99214 PR OFFICE/OUTPT VISIT, EST, LEVL IV, 30-39 MIN: ICD-10-PCS | Mod: S$GLB,,, | Performed by: FAMILY MEDICINE

## 2022-08-31 PROCEDURE — 3079F DIAST BP 80-89 MM HG: CPT | Mod: CPTII,S$GLB,, | Performed by: FAMILY MEDICINE

## 2022-08-31 PROCEDURE — 99499 UNLISTED E&M SERVICE: CPT | Mod: S$GLB,,, | Performed by: FAMILY MEDICINE

## 2022-08-31 PROCEDURE — 99214 OFFICE O/P EST MOD 30 MIN: CPT | Mod: S$GLB,,, | Performed by: FAMILY MEDICINE

## 2022-08-31 PROCEDURE — 1126F AMNT PAIN NOTED NONE PRSNT: CPT | Mod: CPTII,S$GLB,, | Performed by: FAMILY MEDICINE

## 2022-08-31 PROCEDURE — 3074F PR MOST RECENT SYSTOLIC BLOOD PRESSURE < 130 MM HG: ICD-10-PCS | Mod: CPTII,S$GLB,, | Performed by: FAMILY MEDICINE

## 2022-08-31 PROCEDURE — 1101F PT FALLS ASSESS-DOCD LE1/YR: CPT | Mod: CPTII,S$GLB,, | Performed by: FAMILY MEDICINE

## 2022-08-31 PROCEDURE — 99999 PR PBB SHADOW E&M-EST. PATIENT-LVL IV: CPT | Mod: PBBFAC,,, | Performed by: FAMILY MEDICINE

## 2022-08-31 PROCEDURE — 99999 PR PBB SHADOW E&M-EST. PATIENT-LVL IV: ICD-10-PCS | Mod: PBBFAC,,, | Performed by: FAMILY MEDICINE

## 2022-08-31 PROCEDURE — 3044F PR MOST RECENT HEMOGLOBIN A1C LEVEL <7.0%: ICD-10-PCS | Mod: CPTII,S$GLB,, | Performed by: FAMILY MEDICINE

## 2022-08-31 PROCEDURE — 3074F SYST BP LT 130 MM HG: CPT | Mod: CPTII,S$GLB,, | Performed by: FAMILY MEDICINE

## 2022-08-31 PROCEDURE — 3288F FALL RISK ASSESSMENT DOCD: CPT | Mod: CPTII,S$GLB,, | Performed by: FAMILY MEDICINE

## 2022-08-31 PROCEDURE — 1159F PR MEDICATION LIST DOCUMENTED IN MEDICAL RECORD: ICD-10-PCS | Mod: CPTII,S$GLB,, | Performed by: FAMILY MEDICINE

## 2022-08-31 PROCEDURE — 1126F PR PAIN SEVERITY QUANTIFIED, NO PAIN PRESENT: ICD-10-PCS | Mod: CPTII,S$GLB,, | Performed by: FAMILY MEDICINE

## 2022-08-31 PROCEDURE — 3288F PR FALLS RISK ASSESSMENT DOCUMENTED: ICD-10-PCS | Mod: CPTII,S$GLB,, | Performed by: FAMILY MEDICINE

## 2022-08-31 PROCEDURE — 3008F PR BODY MASS INDEX (BMI) DOCUMENTED: ICD-10-PCS | Mod: CPTII,S$GLB,, | Performed by: FAMILY MEDICINE

## 2022-08-31 PROCEDURE — 3008F BODY MASS INDEX DOCD: CPT | Mod: CPTII,S$GLB,, | Performed by: FAMILY MEDICINE

## 2022-08-31 PROCEDURE — 1101F PR PT FALLS ASSESS DOC 0-1 FALLS W/OUT INJ PAST YR: ICD-10-PCS | Mod: CPTII,S$GLB,, | Performed by: FAMILY MEDICINE

## 2022-08-31 PROCEDURE — 1160F PR REVIEW ALL MEDS BY PRESCRIBER/CLIN PHARMACIST DOCUMENTED: ICD-10-PCS | Mod: CPTII,S$GLB,, | Performed by: FAMILY MEDICINE

## 2022-08-31 PROCEDURE — 1160F RVW MEDS BY RX/DR IN RCRD: CPT | Mod: CPTII,S$GLB,, | Performed by: FAMILY MEDICINE

## 2022-08-31 RX ORDER — ALLOPURINOL 300 MG/1
300 TABLET ORAL DAILY
Qty: 90 TABLET | Refills: 4 | Status: SHIPPED | OUTPATIENT
Start: 2022-08-31 | End: 2023-01-23 | Stop reason: SDUPTHER

## 2022-08-31 NOTE — PROGRESS NOTES
Office Visit    Patient Name: Noah Nichole    : 1950  MRN: [id number]    Subjective:  Noah is a 72 y.o. male who presents today for:    Cyst (On neck)    Noah Nichole is a 72-year-old male who was most recently seen by me 22 and who has an appointment in about 1 month for routine labs and follow up, who presents today for acute care visit as the cyst on his neck that I previously drained is growing again and to discuss recent gout flare up-evaluated in the ER for gout of his left great toe on 2022.      Chronic conditions include history of stage II colon cancer(s/p resection 2018 and CEA WNL 10/11/2019), obesity, hypertension, history of iron deficiency anemia (likely associated with colon cancer history), hospitalization -4/10/20 for COVID-19 with LOU, prediabetes.  He had a recurrent case of COVID 2022, unfortunately this time it was much more mild with taking Paxlovid.     Today he is feeling well but would like me to drain the cyst on his neck as it is bothersome-not overly painful but is big enough to where others are noticing it and he will feel as if he has direct pressure on it such as from clothing or a necklace.    No concern for infection.      His foot is feeling better in terms of the gout following treatment with colchicine and indomethacin.    He had an updated colonoscopy 2022 for colon cancer surveillance that was clean and advised he could follow-up in 5 years.                PAST MEDICAL HISTORY, SURGICAL/SOCIAL/FAMILY HISTORY REVIEWED AS PER CHART, WITH PERTINENT FINDINGS INCLUDED IN HISTORY SECTION OF NOTE.     Current Medications    Medication List with Changes/Refills   New Medications    ALLOPURINOL (ZYLOPRIM) 300 MG TABLET    Take 1 tablet (300 mg total) by mouth once daily.   Current Medications    ACETAMINOPHEN (TYLENOL) 500 MG TABLET    Take 1 tablet (500 mg total) by mouth every 6 (six) hours as needed for Pain or Temperature greater than.     COLCHICINE (COLCRYS) 0.6 MG TABLET    Take 1 tablet (0.6 mg total) by mouth once daily.    INDOMETHACIN (INDOCIN) 50 MG CAPSULE    Take 1 capsule (50 mg total) by mouth 2 (two) times daily with meals.    LOSARTAN-HYDROCHLOROTHIAZIDE 100-12.5 MG (HYZAAR) 100-12.5 MG TAB    Take 1 tablet by mouth once daily.    MELOXICAM (MOBIC) 15 MG TABLET    Take 1 tablet (15 mg total) by mouth daily as needed (back pain).    METFORMIN (GLUCOPHAGE-XR) 500 MG ER 24HR TABLET    Take 1 tablet (500 mg total) by mouth daily with breakfast.   Discontinued Medications    PROPOFOL (DIPRIVAN) 10 MG/ML INFUSION           Allergies   Review of patient's allergies indicates:  No Known Allergies      Review of Systems (Pertinent positives)  Review of Systems   Constitutional:  Negative for unexpected weight change.   Eyes:  Negative for visual disturbance.   Respiratory:  Negative for shortness of breath.    Cardiovascular:  Negative for chest pain and leg swelling.   Musculoskeletal:  Positive for back pain (related to curren trailer mattress).   Skin:  Negative for color change.   Neurological:  Negative for dizziness, light-headedness and headaches.     /86   Pulse 72   Ht 6' (1.829 m)   Wt 101 kg (222 lb 10.6 oz)   SpO2 98%   BMI 30.20 kg/m²     Physical Exam  Vitals reviewed.   Constitutional:       General: He is not in acute distress.     Appearance: He is well-developed.   HENT:      Head: Normocephalic and atraumatic.   Eyes:      Conjunctiva/sclera: Conjunctivae normal.   Neck:     Cardiovascular:      Rate and Rhythm: Normal rate and regular rhythm.   Pulmonary:      Effort: Pulmonary effort is normal.   Musculoskeletal:      Right lower leg: No edema.      Left lower leg: No edema.   Skin:     General: Skin is warm and dry.   Neurological:      General: No focal deficit present.      Mental Status: He is alert and oriented to person, place, and time.   Psychiatric:         Mood and Affect: Mood normal.          Behavior: Behavior normal.         Assessment/Plan:  Noah Nichole is a 72 y.o. male who presents today for :        ICD-10-CM ICD-9-CM    1. Gouty arthritis of left great toe  M10.9 274.00 allopurinoL (ZYLOPRIM) 300 MG tablet      URIC ACID      2. Benign essential hypertension  I10 401.1       3. Class 1 obesity due to excess calories with serious comorbidity and body mass index (BMI) of 30.0 to 30.9 in adult  E66.09 278.00     Z68.30 V85.30       4. Prediabetes  R73.03 790.29       5. Elevated blood uric acid level  E79.0 790.6 allopurinoL (ZYLOPRIM) 300 MG tablet      URIC ACID      6. Medication management  Z79.899 V58.69       7. Sebaceous cyst  L72.3 706.2       8. Uncircumcised male  Z78.9 V49.89 Ambulatory referral/consult to Urology    desires urology referral to discuss circumcision procedure      9. Personal history of COVID-19  Z86.16 V12.09     Initial episode April of 2020 required admission for acute COVID related kidney injury, most recent COVID case 06/2022 mild & tx'd w/  Paxlovid         HISTORY OF GOUT, WITH RECURRENT EPISODES OF THE LEFT GREAT TOE:  Try review reveals previously elevated uric acid to 9.6 in 2016.  He is had to recently very painful episodes of gout of the left great toe and would like to start daily allopurinol.  Will start 300 mg daily as his kidney function is normal and recheck uric acid level with upcoming labs in about 1 month.  Symptoms did respond to colchicine and p.r.n. indomethacin.      HYPERTENSION:  Blood pressure controlled on losartan 100/HCTZ 12.5.  HCTZ may be contributing to gout flares, however he is on a low dose and it helps with his blood pressure.  Will consider stopping HCTZ if he is able to lose more weight.  Suspect he would still need uric acid lowering therapy as the uric acid elevation in 2016 likely predates his use of hydrochlorothiazide.    PRE DIABETES:  Most recent A1c increased to 6.2, now on metformin 500 extended release daily, advised  attention to regular exercise-) are bike riding, low carb diet with avoidance of soft drinks and will recheck A1c with upcoming labs in a month.      SEBACEOUS CYST:  Drained today without incision, advise if it does continue to reoccur, may advise punch biopsy with exploration to remove cyst capsule.      HISTORY OF COLON CANCER:  UP-TO-DATE WITH [facility] FO[facility], HAD A REPEAT COLONOSCOPY THIS YEAR IN APRIL 2022 THAT WAS UNREMARKABLE AND ADVISED ON A 5 YEAR FOLLOW-UP.          There are no Patient Instructions on file for this visit.      Follow up in about 1 month (around 9/30/2022) for to follow up on lab results, return as needed for new concerns.

## 2022-09-01 ENCOUNTER — TELEPHONE (OUTPATIENT)
Dept: FAMILY MEDICINE | Facility: CLINIC | Age: 72
End: 2022-09-01
Payer: MEDICARE

## 2022-09-02 ENCOUNTER — TELEPHONE (OUTPATIENT)
Dept: FAMILY MEDICINE | Facility: CLINIC | Age: 72
End: 2022-09-02
Payer: MEDICARE

## 2022-09-12 ENCOUNTER — HOSPITAL ENCOUNTER (EMERGENCY)
Facility: HOSPITAL | Age: 72
Discharge: HOME | End: 2022-09-12
Attending: EMERGENCY MEDICINE
Payer: OTHER MISCELLANEOUS

## 2022-09-12 VITALS
HEIGHT: 71 IN | RESPIRATION RATE: 20 BRPM | SYSTOLIC BLOOD PRESSURE: 144 MMHG | TEMPERATURE: 99 F | DIASTOLIC BLOOD PRESSURE: 76 MMHG | WEIGHT: 222 LBS | OXYGEN SATURATION: 99 % | HEART RATE: 69 BPM | BODY MASS INDEX: 31.08 KG/M2
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
## 2022-09-12 DIAGNOSIS — M54.42 ACUTE BILATERAL LOW BACK PAIN WITH LEFT-SIDED SCIATICA: ICD-10-CM

## 2022-09-12 DIAGNOSIS — S30.0XXA CONTUSION OF LOWER BACK, INITIAL ENCOUNTER: ICD-10-CM

## 2022-09-12 PROCEDURE — 99285 PR EMERGENCY DEPT VISIT,LEVEL V: ICD-10-PCS | Mod: ,,, | Performed by: EMERGENCY MEDICINE

## 2022-09-12 PROCEDURE — 99285 EMERGENCY DEPT VISIT HI MDM: CPT | Mod: 25,27

## 2022-09-12 PROCEDURE — 96372 THER/PROPH/DIAG INJ SC/IM: CPT | Performed by: EMERGENCY MEDICINE

## 2022-09-12 PROCEDURE — 63600175 PHARM REV CODE 636 W HCPCS: Performed by: EMERGENCY MEDICINE

## 2022-09-12 PROCEDURE — 99285 EMERGENCY DEPT VISIT HI MDM: CPT | Mod: ,,, | Performed by: EMERGENCY MEDICINE

## 2022-09-12 PROCEDURE — 96374 THER/PROPH/DIAG INJ IV PUSH: CPT

## 2022-09-12 PROCEDURE — 51702 INSERT TEMP BLADDER CATH: CPT

## 2022-09-12 PROCEDURE — 25000003 PHARM REV CODE 250: Performed by: EMERGENCY MEDICINE

## 2022-09-12 PROCEDURE — 99284 EMERGENCY DEPT VISIT MOD MDM: CPT | Mod: 25

## 2022-09-12 RX ORDER — LIDOCAINE 50 MG/G
1 PATCH TOPICAL
Status: DISCONTINUED | OUTPATIENT
Start: 2022-09-12 | End: 2022-09-12 | Stop reason: HOSPADM

## 2022-09-12 RX ORDER — KETOROLAC TROMETHAMINE 30 MG/ML
30 INJECTION, SOLUTION INTRAMUSCULAR; INTRAVENOUS
Status: COMPLETED | OUTPATIENT
Start: 2022-09-12 | End: 2022-09-12

## 2022-09-12 RX ORDER — LIDOCAINE 50 MG/G
1 PATCH TOPICAL DAILY
Qty: 15 PATCH | Refills: 0 | Status: SHIPPED | OUTPATIENT
Start: 2022-09-12 | End: 2023-11-06 | Stop reason: SDUPTHER

## 2022-09-12 RX ORDER — PROCHLORPERAZINE EDISYLATE 5 MG/ML
10 INJECTION INTRAMUSCULAR; INTRAVENOUS
Status: COMPLETED | OUTPATIENT
Start: 2022-09-12 | End: 2022-09-12

## 2022-09-12 RX ORDER — CYCLOBENZAPRINE HCL 10 MG
10 TABLET ORAL EVERY 8 HOURS PRN
Qty: 14 TABLET | Refills: 0 | Status: SHIPPED | OUTPATIENT
Start: 2022-09-12 | End: 2022-09-17

## 2022-09-12 RX ADMIN — PROCHLORPERAZINE EDISYLATE 10 MG: 5 INJECTION INTRAMUSCULAR; INTRAVENOUS at 02:09

## 2022-09-12 RX ADMIN — KETOROLAC TROMETHAMINE 30 MG: 30 INJECTION, SOLUTION INTRAMUSCULAR; INTRAVENOUS at 02:09

## 2022-09-12 RX ADMIN — LIDOCAINE 1 PATCH: 50 PATCH CUTANEOUS at 03:09

## 2022-09-12 NOTE — DISCHARGE INSTRUCTIONS
I recommend taking ibuprofen 600 mg every 8 hours with food and/or Tylenol 650 mg every 8 hours in addition to the prescribed medication as needed for pain. Please call your primary care physician in the morning for a follow-up appointment for further evaluation and management. Please return with any new or worsening symptoms.

## 2022-09-12 NOTE — ED NOTES
Pt given discharge and follow up instructions. Pt verbalized understanding. Pt taken out of ER in wheelchair by ED paramedic in stable condition.

## 2022-09-12 NOTE — ED PROVIDER NOTES
Encounter Date: 9/12/2022       History     Chief Complaint   Patient presents with    Fall     Patient fell back and landed on buttocks.  Complaints of right elbow pain, buttocks with radiation to both legs and lower back     72-year-old male presents emergency department complaining of low back pain.  Patient tripped and fell backwards landing on his buttock on a hard concrete curb.  He has been having constant, aching and throbbing low back pain since that time.  This happened just prior to arrival.  States pain radiates down the back of his left leg.  Denies any focal numbness or weakness.  Has been constant, worse with certain movements and positions and with sitting on his buttock, no alleviating factors reported.  His any difficulty urinating or defecating, loss of continence, saddle paresthesias, or any other symptoms at this time.    Review of patient's allergies indicates:  No Known Allergies  Past Medical History:   Diagnosis Date    Anemia     Benign essential hypertension 12/17/2014    History of colon cancer, stage II 10/10/2018    Iron deficiency anemia due to chronic blood loss 10/16/2019     Past Surgical History:   Procedure Laterality Date    COLON SURGERY  09/18/2018    COLONOSCOPY N/A 9/14/2018    Procedure: COLONOSCOPY;  Surgeon: Adrian Cheung MD;  Location: [address]);  Service: Endoscopy;  Laterality: N/A;  pt ate at 1:30 on 9/13/18-Dr Cheung informed and he stated ok to do colonoscopy.    COLONOSCOPY N/A 10/11/2019    Procedure: COLONOSCOPY;  Surgeon: Gabino Bonilla MD;  Location: [address]);  Service: Endoscopy;  Laterality: N/A;    COLONOSCOPY N/A 4/21/2022    Procedure: COLONOSCOPY;  Surgeon: Zeny Hinkle MD;  Location: [address]);  Service: Colon and Rectal;  Laterality: N/A;  fully vaccinated, instructions sent to [facility] and emailed to [email or web address]  suprep    NO PAST SURGERIES      RIGHT HEMICOLECTOMY N/A  9/18/2018    Procedure: HEMICOLECTOMY, RIGHT, extended;  Surgeon: Adrian Cheung MD;  Location: [facility] OR [address] [place] [address];  Service: Colon and Rectal;  Laterality: N/A;     Family History   Problem Relation Age of Onset    Hypertension Mother     Cancer Father         stomach    Hypertension Father     Diabetes Sister     Hypertension Brother     Glaucoma Brother     Diabetes Brother     Cataracts Brother     Cancer Sister     Amblyopia Neg Hx     Blindness Neg Hx     Macular degeneration Neg Hx     Retinal detachment Neg Hx     Strabismus Neg Hx     Stroke Neg Hx     Thyroid disease Neg Hx      Social History     Tobacco Use    Smoking status: Never    Smokeless tobacco: Never   Substance Use Topics    Alcohol use: Not Currently    Drug use: No     Review of Systems   Constitutional:  Negative for chills, fatigue and fever.   HENT:  Negative for congestion and sore throat.    Eyes:  Negative for photophobia and visual disturbance.   Respiratory:  Negative for cough and shortness of breath.    Cardiovascular:  Negative for chest pain and palpitations.   Gastrointestinal:  Negative for abdominal pain, diarrhea and vomiting.   Musculoskeletal:  Positive for back pain. Negative for neck pain and neck stiffness.   Neurological:  Negative for light-headedness, numbness and headaches.     Physical Exam     Initial Vitals [09/12/22 1317]   BP Pulse Resp Temp SpO2   (!) 144/76 69 20 98.6 °F (37 °C) 99 %      MAP       --         Physical Exam    Nursing note and vitals reviewed.  Constitutional: He appears well-developed and well-nourished. No distress.   HENT:   Head: Normocephalic and atraumatic.   Eyes: Conjunctivae and EOM are normal. Pupils are equal, round, and reactive to light.   Neck: Neck supple. No tracheal deviation present.   Normal range of motion.  Cardiovascular:  Normal rate and intact distal pulses.           Pulmonary/Chest: No respiratory distress.   Musculoskeletal:         General: Tenderness (Diffusely to low  back) present. No edema. Normal range of motion.      Cervical back: Normal range of motion and neck supple.     Neurological: He is alert and oriented to person, place, and time. He has normal strength. No cranial nerve deficit. GCS score is 15. GCS eye subscore is 4. GCS verbal subscore is 5. GCS motor subscore is 6.   Skin: Skin is warm and dry.       ED Course   Procedures  Labs Reviewed - No data to display           X-Rays:   Independently Interpreted Readings:   Other Readings:  Imaging interpreted by radiologist and visualized by me:   Imaging Results              X-Ray Lumbar Spine Ap And Lateral (Final result)  Result time 09/12/22 16:48:32      Final result by William Fong MD (09/12/22 16:48:32)                   Impression:      Multilevel chronic and degenerative changes.  No acute radiographic abnormality.      Electronically signed by: William Fong  Date:    09/12/2022  Time:    16:48               Narrative:    EXAMINATION:  XR LUMBAR SPINE AP AND LATERAL    CLINICAL HISTORY:  Low back pain;    TECHNIQUE:  AP, lateral and spot images were performed of the lumbar spine.    COMPARISON:  02/21/2018, 10/22/2012    FINDINGS:  Five non rib-bearing lumbar segments.  No acute fractures are detected.  Alignment is satisfactory.    Prominent anterior and marginal bridging osteophytes at L2-3.    Prominent anterior bridging osteophyte at C3-4.    Mild to moderate anterior bridging osteophyte at L4-5.    Disc spaces appear adequately maintained.    Mild to moderate facet arthropathy in the lower lumbar spine.  Probable minimal spinal asymmetry convex to the left, similar to the prior study.                                      Medications   LIDOcaine 5 % patch 1 patch (1 patch Transdermal Patch Applied 9/12/22 0387)   ketorolac injection 30 mg (30 mg Intramuscular Given 9/12/22 1453)   prochlorperazine injection Soln 10 mg (10 mg Intramuscular Given 9/12/22 1453)     Medical Decision Making:   Initial  Assessment:   72-year-old male presents emergency department complaining of low back pain after a trip and fall landing on his buttock  Differential Diagnosis:   Fracture, dislocation, contusion, sprain, strain, radiculopathy, cauda equina  Independently Interpreted Test(s):   I have ordered and independently interpreted X-rays - see prior notes.  ED Management:  Patient given IM Toradol and Compazine and a Lidoderm patch with moderate improvement in symptoms.  Informed patient of x-ray results as well as plan to discharge with prescriptions for Flexeril and Lidoderm, instructed on home management, prompt follow-up with primary care physician for further evaluation and management, strict return precautions given.  Vital signs stable at time of discharge.                        Clinical Impression:   Final diagnoses:  [W19.XXXA] Fall, initial encounter (Primary)  [M54.42] Acute bilateral low back pain with left-sided sciatica  [S30.0XXA] Contusion of lower back, initial encounter      ED Disposition Condition    Discharge Stable          ED Prescriptions       Medication Sig Dispense Start Date End Date Auth. Provider    cyclobenzaprine (FLEXERIL) 10 MG tablet Take 1 tablet (10 mg total) by mouth every 8 (eight) hours as needed for Muscle spasms. 14 tablet 9/12/2022 9/17/2022 Sunil Guillory MD    LIDOcaine (LIDODERM) 5 % Place 1 patch onto the skin once daily. Remove & Discard patch within 12 hours or as directed by MD 15 patch 9/12/2022 -- Sunil Guillory MD          Follow-up Information       Follow up With Specialties Details Why Contact Info    Grace Jeffery MD Family Medicine Schedule an appointment as soon as possible for a visit   [address] [facility]  [address]  [facility] [address] [phone]               Sunil Guillory MD  09/12/22 8695

## 2022-09-12 NOTE — ED NOTES
Pt in room 9 awake and alert. Pt had a fall earlier today and complains of back pain. Plan of care reviewed, call bell within reach.

## 2022-09-13 ENCOUNTER — HOSPITAL ENCOUNTER (EMERGENCY)
Facility: HOSPITAL | Age: 72
Discharge: HOME | End: 2022-09-13
Attending: EMERGENCY MEDICINE
Payer: MEDICARE

## 2022-09-13 VITALS
WEIGHT: 225 LBS | BODY MASS INDEX: 31.38 KG/M2 | SYSTOLIC BLOOD PRESSURE: 140 MMHG | RESPIRATION RATE: 18 BRPM | TEMPERATURE: 98 F | DIASTOLIC BLOOD PRESSURE: 80 MMHG | OXYGEN SATURATION: 99 % | HEART RATE: 66 BPM

## 2022-09-13 DIAGNOSIS — M54.9 BACK PAIN, UNSPECIFIED BACK LOCATION, UNSPECIFIED BACK PAIN LATERALITY, UNSPECIFIED CHRONICITY: ICD-10-CM
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
## 2022-09-13 DIAGNOSIS — R33.9 URINARY RETENTION: ICD-10-CM

## 2022-09-13 LAB
ALBUMIN SERPL BCP-MCNC: 3.8 G/DL (ref 3.5–5.2)
ALP SERPL-CCNC: 66 U/L (ref 55–135)
ALT SERPL W/O P-5'-P-CCNC: 19 U/L (ref 10–44)
ANION GAP SERPL CALC-SCNC: 7 MMOL/L (ref 8–16)
AST SERPL-CCNC: 35 U/L (ref 10–40)
BACTERIA #/AREA URNS AUTO: ABNORMAL /HPF
BASOPHILS # BLD AUTO: 0.04 K/UL (ref 0–0.2)
BASOPHILS NFR BLD: 0.6 % (ref 0–1.9)
BILIRUB SERPL-MCNC: 0.4 MG/DL (ref 0.1–1)
BILIRUB UR QL STRIP: NEGATIVE
BUN SERPL-MCNC: 25 MG/DL (ref 8–23)
CALCIUM SERPL-MCNC: 9.2 MG/DL (ref 8.7–10.5)
CHLORIDE SERPL-SCNC: 109 MMOL/L (ref 95–110)
CLARITY UR REFRACT.AUTO: ABNORMAL
CO2 SERPL-SCNC: 22 MMOL/L (ref 23–29)
COLOR UR AUTO: YELLOW
CREAT SERPL-MCNC: 1.2 MG/DL (ref 0.5–1.4)
DIFFERENTIAL METHOD: ABNORMAL
EOSINOPHIL # BLD AUTO: 0.1 K/UL (ref 0–0.5)
EOSINOPHIL NFR BLD: 1.6 % (ref 0–8)
ERYTHROCYTE [DISTWIDTH] IN BLOOD BY AUTOMATED COUNT: 14.3 % (ref 11.5–14.5)
EST. GFR  (NO RACE VARIABLE): >60 ML/MIN/1.73 M^2
GLUCOSE SERPL-MCNC: 104 MG/DL (ref 70–110)
GLUCOSE UR QL STRIP: NEGATIVE
HCT VFR BLD AUTO: 36.5 % (ref 40–54)
HGB BLD-MCNC: 11.9 G/DL (ref 14–18)
HGB UR QL STRIP: NEGATIVE
HYALINE CASTS UR QL AUTO: 0 /LPF
IMM GRANULOCYTES # BLD AUTO: 0.02 K/UL (ref 0–0.04)
IMM GRANULOCYTES NFR BLD AUTO: 0.3 % (ref 0–0.5)
KETONES UR QL STRIP: NEGATIVE
LEUKOCYTE ESTERASE UR QL STRIP: NEGATIVE
LYMPHOCYTES # BLD AUTO: 1.9 K/UL (ref 1–4.8)
LYMPHOCYTES NFR BLD: 27.7 % (ref 18–48)
MCH RBC QN AUTO: 26.9 PG (ref 27–31)
MCHC RBC AUTO-ENTMCNC: 32.6 G/DL (ref 32–36)
MCV RBC AUTO: 83 FL (ref 82–98)
MICROSCOPIC COMMENT: ABNORMAL
MONOCYTES # BLD AUTO: 0.8 K/UL (ref 0.3–1)
MONOCYTES NFR BLD: 11.6 % (ref 4–15)
NEUTROPHILS # BLD AUTO: 4 K/UL (ref 1.8–7.7)
NEUTROPHILS NFR BLD: 58.2 % (ref 38–73)
NITRITE UR QL STRIP: NEGATIVE
NRBC BLD-RTO: 0 /100 WBC
PH UR STRIP: 6 [PH] (ref 5–8)
PLATELET # BLD AUTO: 160 K/UL (ref 150–450)
PMV BLD AUTO: 11 FL (ref 9.2–12.9)
POTASSIUM SERPL-SCNC: 4.5 MMOL/L (ref 3.5–5.1)
PROT SERPL-MCNC: 7.4 G/DL (ref 6–8.4)
PROT UR QL STRIP: ABNORMAL
RBC # BLD AUTO: 4.42 M/UL (ref 4.6–6.2)
RBC #/AREA URNS AUTO: 2 /HPF (ref 0–4)
SODIUM SERPL-SCNC: 138 MMOL/L (ref 136–145)
SP GR UR STRIP: 1.03 (ref 1–1.03)
SQUAMOUS #/AREA URNS AUTO: 1 /HPF
URN SPEC COLLECT METH UR: ABNORMAL
WBC # BLD AUTO: 6.92 K/UL (ref 3.9–12.7)
WBC #/AREA URNS AUTO: 4 /HPF (ref 0–5)

## 2022-09-13 PROCEDURE — 96374 THER/PROPH/DIAG INJ IV PUSH: CPT

## 2022-09-13 PROCEDURE — 99285 EMERGENCY DEPT VISIT HI MDM: CPT | Mod: 25

## 2022-09-13 PROCEDURE — 25000003 PHARM REV CODE 250: Performed by: EMERGENCY MEDICINE

## 2022-09-13 PROCEDURE — 63600175 PHARM REV CODE 636 W HCPCS: Performed by: EMERGENCY MEDICINE

## 2022-09-13 PROCEDURE — 51702 INSERT TEMP BLADDER CATH: CPT

## 2022-09-13 PROCEDURE — 80053 COMPREHEN METABOLIC PANEL: CPT | Performed by: EMERGENCY MEDICINE

## 2022-09-13 PROCEDURE — 81001 URINALYSIS AUTO W/SCOPE: CPT | Performed by: EMERGENCY MEDICINE

## 2022-09-13 PROCEDURE — 85025 COMPLETE CBC W/AUTO DIFF WBC: CPT | Performed by: EMERGENCY MEDICINE

## 2022-09-13 RX ORDER — OXYCODONE HYDROCHLORIDE 5 MG/1
5 TABLET ORAL EVERY 4 HOURS PRN
Qty: 8 TABLET | Refills: 0 | Status: SHIPPED | OUTPATIENT
Start: 2022-09-13 | End: 2022-10-03

## 2022-09-13 RX ORDER — MORPHINE SULFATE 4 MG/ML
4 INJECTION, SOLUTION INTRAMUSCULAR; INTRAVENOUS
Status: COMPLETED | OUTPATIENT
Start: 2022-09-13 | End: 2022-09-13

## 2022-09-13 RX ORDER — OXYCODONE HYDROCHLORIDE 5 MG/1
5 TABLET ORAL
Status: COMPLETED | OUTPATIENT
Start: 2022-09-13 | End: 2022-09-13

## 2022-09-13 RX ORDER — MORPHINE SULFATE 2 MG/ML
2 INJECTION, SOLUTION INTRAMUSCULAR; INTRAVENOUS
Status: DISCONTINUED | OUTPATIENT
Start: 2022-09-13 | End: 2022-09-13 | Stop reason: HOSPADM

## 2022-09-13 RX ORDER — ACETAMINOPHEN 500 MG
1000 TABLET ORAL
Status: COMPLETED | OUTPATIENT
Start: 2022-09-13 | End: 2022-09-13

## 2022-09-13 RX ORDER — ACETAMINOPHEN 325 MG/1
650 TABLET ORAL EVERY 6 HOURS PRN
Qty: 30 TABLET | Refills: 0 | Status: SHIPPED | OUTPATIENT
Start: 2022-09-13

## 2022-09-13 RX ORDER — TAMSULOSIN HYDROCHLORIDE 0.4 MG/1
0.4 CAPSULE ORAL NIGHTLY
Qty: 10 CAPSULE | Refills: 0 | Status: SHIPPED | OUTPATIENT
Start: 2022-09-13 | End: 2022-09-20 | Stop reason: ALTCHOICE

## 2022-09-13 RX ADMIN — ACETAMINOPHEN 1000 MG: 500 TABLET ORAL at 08:09

## 2022-09-13 RX ADMIN — OXYCODONE 5 MG: 5 TABLET ORAL at 08:09

## 2022-09-13 RX ADMIN — MORPHINE SULFATE 4 MG: 4 INJECTION INTRAVENOUS at 02:09

## 2022-09-13 NOTE — FIRST PROVIDER EVALUATION
Emergency Department TeleTriage Encounter Note      CHIEF COMPLAINT    Chief Complaint   Patient presents with    Tailbone Pain     Pt sustained a fall today at work at 1230 on his tailbone. He is c/o tailbone pain. He also states that he has not been able to pee since the incident and has not had the urge to pee.        VITAL SIGNS   Initial Vitals [09/12/22 2126]   BP Pulse Resp Temp SpO2   (!) 153/85 69 18 97.2 °F (36.2 °C) 99 %      MAP       --            ALLERGIES    Review of patient's allergies indicates:  No Known Allergies    PROVIDER TRIAGE NOTE  This is a teletriage evaluation of a 72 y.o. male presenting to the ED complaining of a fall on his tailbone earlier today.  Was seen at [facility] and had xray.  Reports since being discharged he has not been able to urinate.  Will measure post residual void and defer to onsite team if MRI is indicated as my exam is limited.     Initial orders will be placed and care will be transferred to an alternate provider when patient is roomed for a full evaluation. Any additional orders and the final disposition will be determined by that provider.         ORDERS  Labs Reviewed - No data to display    ED Orders (720h ago, onward)      Start Ordered     Status Ordering Provider    09/12/22 2358 09/12/22 2358  Measure post void residual  Once         Ordered MYLENE ALTMAN              Virtual Visit Note: The provider triage portion of this emergency department evaluation and documentation was performed via [employer], a HIPAA-compliant telemedicine application, in concert with a tele-presenter in the room. A face to face patient evaluation with one of my colleagues will occur once the patient is placed in an emergency department room.      DISCLAIMER: This note was prepared with [employer] voice recognition transcription software. Garbled syntax, mangled pronouns, and other bizarre constructions may be attributed to that software system.

## 2022-09-13 NOTE — ED NOTES
MRI called to check on status of MRI. MRI states they can scan patient now if someone can bring him over.

## 2022-09-13 NOTE — ED NOTES
Pt resting in bed. RR even and unlabored. Pt denies complaints at this time. Will continue to monitor.

## 2022-09-13 NOTE — DISCHARGE INSTRUCTIONS
Your labs, urine studies, MRI did not show any signs of dangerous medical conditions.  He did have urinary retention and had inability to empty your bladder.  A Jackson catheter was placed.  Please follow-up with urology team to have it removed.  You were discharged with Flomax does medication to help you urinate.  At home you can take Tylenol for pain control.  Your discharge with a few oxycodone for breakthrough pain.  Please follow-up with primary care doctor if you have any continued back pain.    If you have any new, worsening worrisome symptoms please return to the emergency department for re-evaluation.

## 2022-09-13 NOTE — PROVIDER PROGRESS NOTES - EMERGENCY DEPT.
Encounter Date: 9/12/2022    ED Physician Progress Notes        Physician Note:   Signed out to me.  Here with mechanical fall lower back pain, difficulty ambulating with new urinary retension.  Labs and UA benign.   Pending MRI.    Update:  MRI resulted as noted:  There is grade 1 anterolisthesis L4 on L5.  Otherwise alignment is anatomic.  The vertebral body heights are well maintained, with no fracture.  No marrow signal abnormality suspicious for infiltrative process.  There are bulky areas ossification projecting from the right anterolateral aspect the L3 and L4 vertebral bodies and the left anterolateral aspect of L4 and L5, with L2-3 involved to a lesser extent.  Findings are favored to represent sequela of DISH.     The conus is normal in appearance.  The adjacent soft tissue structures show no significant abnormalities.     L1-L2: There is no focal disc herniation. No significant central canal or neural foraminal narrowing.     L2-L3: Circumferential disc bulge, marginal spondylitic spurring and mild facet arthropathy.  No significant central canal or neural foraminal narrowing.     L3-L4: Circumferential disc bulge, spondylitic spurring andd right anterolateral bulky periarticular ossification.  Moderate facet arthritis is present.  No central canal stenosis or foraminal stenosis     L4-L5: Congenital narrow canal.  Circumferential disc bulge, facet arthritis and spondylitic spurring are present.  Findings result in mild central canal stenosis and moderate left-sided foraminal stenosis.     L5-S1: Transitional lumbosacral anatomy with pseudoarthrosis on the left.  Prominence of the epidural fat suggesting an element of epidural lipomatosis.  No significant central canal or neural foraminal narrowing.     Patient re-evaluated, pain is improved however she has not gone since pain medications for several hours given Tylenol, oxycodone and small dose of IV morphine.  He is able to stand and ambulate with a  cane with no difficulty and is stable gait.  Patient encouraged to urinate as much as possible but still has close the 500 cc of urine in the bladder.  I suspect that he may have urinary tension from a mix of prostate issues and pain.  Given MRI findings above and exam not consistent with cauda equina at this time.  Given fact that patient was not able to significantly empty his bladder, Jackson catheter was placed.  UA and labs are unremarkable.  Will discharge with pain control, Flomax (at night).  Follow up with his primary care doctor regarding his back pain.  Follow-up with urology in 1 week for trial of void and Jackson catheter removal.

## 2022-09-13 NOTE — ED NOTES
MRI called. MRI states they are finishing up with an ICU patient and will try to get patient afterwards for scan.

## 2022-09-14 ENCOUNTER — TELEPHONE (OUTPATIENT)
Dept: FAMILY MEDICINE | Facility: CLINIC | Age: 72
End: 2022-09-14
Payer: MEDICARE

## 2022-09-14 NOTE — TELEPHONE ENCOUNTER
Patient advised that we do not perform catheter removals in primary care. Advised that he did have a referral to urology.  Patient will contact urology to schedule.

## 2022-09-14 NOTE — TELEPHONE ENCOUNTER
----- Message from Shalom Pierre sent at 9/14/2022  3:57 PM CDT -----  Type:  Needs Medical Advice    Who Called:  Pt  Symptoms (please be specific): Pt  needs to scheduling a appt 5 days from today pre ER visit for a removal of catheter  Would the patient rather a call back or a response via [facility]? call  Best Call Back Number: [phone]  Additional Information:

## 2022-09-14 NOTE — ED PROVIDER NOTES
History:  Noah Nichole is a 72 y.o. male who presents to the ED with Tailbone Pain (Pt sustained a fall today at work at 1230 on his tailbone. He is c/o tailbone pain. He also states that he has not been able to pee since the incident and has not had the urge to pee. )    Described as 71yo M again presenting to the ER after a fall. He was seen for the same earlier today and prescribed lidocaine patches and flexeril, but reports his pain has worsened and the medications do not help with pain. Daughter reports he has been unable to ambulate due to severe pain. He also reports he is having trouble emptying his bladder, reporting he feels the urge to go but has little output. No history of urinary retention in the past. No bowel incontinence, no saddle anesthesia. His pain is severe in his diffuse low back, radiating down his BLE, worse with ambulation and standing, better with lying still, not relieved with his new home pain medications.     Review of Systems: All systems reviewed and are negative except as per history of present illness.  Constitutional: Negative for fever.   HENT: Negative for congestion.    Respiratory: Negative for shortness of breath.    Cardiovascular: Negative for chest pain.   Gastrointestinal: Negative for abdominal pain.   Genitourinary: Negative for dysuria. +urinary retention  Musculoskeletal: +back pain  Skin: Negative for rash.   Neurological: Negative for focal weakness.   Psychiatric/Behavioral: Negative for memory loss.     Medications:   Discharge Medication List as of 9/13/2022 12:21 PM        CONTINUE these medications which have NOT CHANGED    Details   allopurinoL (ZYLOPRIM) 300 MG tablet Take 1 tablet (300 mg total) by mouth once daily., Starting Wed 8/31/2022, Normal      colchicine (COLCRYS) 0.6 mg tablet Take 1 tablet (0.6 mg total) by mouth once daily., Starting Mon 6/6/2022, Until Tue 6/6/2023, Normal      cyclobenzaprine (FLEXERIL) 10 MG tablet Take 1 tablet (10 mg  total) by mouth every 8 (eight) hours as needed for Muscle spasms., Starting Mon 9/12/2022, Until Sat 9/17/2022 at 2359, Print      indomethacin (INDOCIN) 50 MG capsule Take 1 capsule (50 mg total) by mouth 2 (two) times daily with meals., Starting Tue 7/26/2022, Print      LIDOcaine (LIDODERM) 5 % Place 1 patch onto the skin once daily. Remove & Discard patch within 12 hours or as directed by MD, Starting Mon 9/12/2022, Print      losartan-hydrochlorothiazide 100-12.5 mg (HYZAAR) 100-12.5 mg Tab Take 1 tablet by mouth once daily., Starting Wed 4/6/2022, Until Thu 4/6/2023, Normal      meloxicam (MOBIC) 15 MG tablet Take 1 tablet (15 mg total) by mouth daily as needed (back pain)., Starting Tue 2/23/2021, Normal      metFORMIN (GLUCOPHAGE-XR) 500 MG ER 24hr tablet Take 1 tablet (500 mg total) by mouth daily with breakfast., Starting Wed 4/6/2022, Normal             PMH:   Past Medical History:   Diagnosis Date    Anemia     Benign essential hypertension 12/17/2014    History of colon cancer, stage II 10/10/2018    Iron deficiency anemia due to chronic blood loss 10/16/2019     PSH:   Past Surgical History:   Procedure Laterality Date    COLON SURGERY  09/18/2018    COLONOSCOPY N/A 9/14/2018    Procedure: COLONOSCOPY;  Surgeon: Adrian Cheung MD;  Location: [address]);  Service: Endoscopy;  Laterality: N/A;  pt ate at 1:30 on 9/13/18-Dr Cheung informed and he stated ok to do colonoscopy.    COLONOSCOPY N/A 10/11/2019    Procedure: COLONOSCOPY;  Surgeon: Gabino Bonilla MD;  [facility]  Service: Endoscopy;  Laterality: N/A;    COLONOSCOPY N/A 4/21/2022    Procedure: COLONOSCOPY;  Surgeon: Zeny Hinkle MD;  [address]  Service: Colon and Rectal;  Laterality: N/A;  fully vaccinated, instructions sent to [facility] and emailed to [email or web address]  suprep    NO PAST SURGERIES      RIGHT HEMICOLECTOMY N/A 9/18/2018    Procedure:  HEMICOLECTOMY, RIGHT, extended;  Surgeon: Adrian Cheung MD;  Location: [facility] OR [address] [place] [address];  Service: Colon and Rectal;  Laterality: N/A;     Allergies: He has No Known Allergies.  Social History: Marital Status: single. He  reports that he has never smoked. He has never used smokeless tobacco.. He  reports that he does not currently use alcohol..       Exam:  VITAL SIGNS:   Vitals:    09/13/22 0820 09/13/22 0852 09/13/22 1136 09/13/22 1246   BP: (!) 145/65  (!) 162/90 (!) 140/80   BP Location: Right arm  Right arm Right arm   Patient Position: Lying  Sitting Sitting   Pulse: 66  60 66   Resp: 18 18 18 18   Temp: 98.4 °F (36.9 °C)      TempSrc:       SpO2: 95%  99% 99%   Weight:         Const: Awake and alert, NAD, sitting upright in wheel chair  Head: Atraumatic  Eyes: Normal Conjunctiva  ENT: Normal External Ears, Nose and Mouth.  Neck: Full range of motion. No meningismus.  Resp: Normal respiratory effort, No distress  Cardio: Equal and intact distal pulses  Abd: Soft, non tender, non distended.  Back: diffuse midline and paraspinal L spine TTP, no stepoffs  Skin: No petechiae or rashes  Ext: No cyanosis, or edema  Neur: Awake and alert, slight weakness in BLE due to pain, GCS 15, normal sensation, normal strength in BUE. No saddle anesthesia.   Psych: Normal Mood and Affect    Data:  Results for orders placed or performed during the hospital encounter of 09/13/22   Urinalysis, Reflex to Urine Culture Urine, Clean Catch    Specimen: Urine   Result Value Ref Range    Specimen UA Urine, Clean Catch     Color, UA Yellow Yellow, Straw, Charmaine    Appearance, UA Hazy (A) Clear    pH, UA 6.0 5.0 - 8.0    Specific Gravity, UA 1.030 1.005 - 1.030    Protein, UA 1+ (A) Negative    Glucose, UA Negative Negative    Ketones, UA Negative Negative    Bilirubin (UA) Negative Negative    Occult Blood UA Negative Negative    Nitrite, UA Negative Negative    Leukocytes, UA Negative Negative   CBC auto differential   Result Value Ref  Range    WBC 6.92 3.90 - 12.70 K/uL    RBC 4.42 (L) 4.60 - 6.20 M/uL    Hemoglobin 11.9 (L) 14.0 - 18.0 g/dL    Hematocrit 36.5 (L) 40.0 - 54.0 %    MCV 83 82 - 98 fL    MCH 26.9 (L) 27.0 - 31.0 pg    MCHC 32.6 32.0 - 36.0 g/dL    RDW 14.3 11.5 - 14.5 %    Platelets 160 150 - 450 K/uL    MPV 11.0 9.2 - 12.9 fL    Immature Granulocytes 0.3 0.0 - 0.5 %    Gran # (ANC) 4.0 1.8 - 7.7 K/uL    Immature Grans (Abs) 0.02 0.00 - 0.04 K/uL    Lymph # 1.9 1.0 - 4.8 K/uL    Mono # 0.8 0.3 - 1.0 K/uL    Eos # 0.1 0.0 - 0.5 K/uL    Baso # 0.04 0.00 - 0.20 K/uL    nRBC 0 0 /100 WBC    Gran % 58.2 38.0 - 73.0 %    Lymph % 27.7 18.0 - 48.0 %    Mono % 11.6 4.0 - 15.0 %    Eosinophil % 1.6 0.0 - 8.0 %    Basophil % 0.6 0.0 - 1.9 %    Differential Method Automated    Comprehensive metabolic panel   Result Value Ref Range    Sodium 138 136 - 145 mmol/L    Potassium 4.5 3.5 - 5.1 mmol/L    Chloride 109 95 - 110 mmol/L    CO2 22 (L) 23 - 29 mmol/L    Glucose 104 70 - 110 mg/dL    BUN 25 (H) 8 - 23 mg/dL    Creatinine 1.2 0.5 - 1.4 mg/dL    Calcium 9.2 8.7 - 10.5 mg/dL    Total Protein 7.4 6.0 - 8.4 g/dL    Albumin 3.8 3.5 - 5.2 g/dL    Total Bilirubin 0.4 0.1 - 1.0 mg/dL    Alkaline Phosphatase 66 55 - 135 U/L    AST 35 10 - 40 U/L    ALT 19 10 - 44 U/L    Anion Gap 7 (L) 8 - 16 mmol/L    eGFR >60.0 >60 mL/min/1.73 m^2   Urinalysis Microscopic   Result Value Ref Range    RBC, UA 2 0 - 4 /hpf    WBC, UA 4 0 - 5 /hpf    Bacteria Few (A) None-Occ /hpf    Squam Epithel, UA 1 /hpf    Hyaline Casts, UA 0 0-1/lpf /lpf    Microscopic Comment SEE COMMENT        Labs & Imaging studies were reviewed independently by me.     Medical Decision Makinyo M presenting with worsening LBP and new urinary retention after a fall today. Laboratory studies are unremarkable, doubt UTI. XR taken earlier today negative for fx. Given new urinary retention with PVR >300 and inability to ambulate, concern for possible cauda equina syndrome. Pain was well  controlled following 1 dose of morphine, he declined further pain medications while awaiting MRI imaging. Patient signed out to oncoming provider pending MRI L-spine.     Clinical Impression:  1. Fall, initial encounter    2. Urinary retention    3. Back pain, unspecified back location, unspecified back pain laterality, unspecified chronicity                 Ayesha Mendoza MD  09/13/22 2813

## 2022-09-20 ENCOUNTER — TELEPHONE (OUTPATIENT)
Dept: UROLOGY | Facility: CLINIC | Age: 72
End: 2022-09-20

## 2022-09-20 ENCOUNTER — OFFICE VISIT (OUTPATIENT)
Dept: UROLOGY | Facility: CLINIC | Age: 72
End: 2022-09-20
Payer: MEDICARE

## 2022-09-20 VITALS
SYSTOLIC BLOOD PRESSURE: 139 MMHG | BODY MASS INDEX: 30.92 KG/M2 | HEART RATE: 69 BPM | WEIGHT: 221.69 LBS | DIASTOLIC BLOOD PRESSURE: 82 MMHG

## 2022-09-20 DIAGNOSIS — N40.1 BENIGN LOCALIZED PROSTATIC HYPERPLASIA WITH LOWER URINARY TRACT SYMPTOMS (LUTS): ICD-10-CM

## 2022-09-20 DIAGNOSIS — R33.9 URINARY RETENTION: Primary | ICD-10-CM

## 2022-09-20 PROCEDURE — 3288F FALL RISK ASSESSMENT DOCD: CPT | Mod: CPTII,S$GLB,, | Performed by: UROLOGY

## 2022-09-20 PROCEDURE — 99205 PR OFFICE/OUTPT VISIT, NEW, LEVL V, 60-74 MIN: ICD-10-PCS | Mod: S$GLB,,, | Performed by: UROLOGY

## 2022-09-20 PROCEDURE — 1160F RVW MEDS BY RX/DR IN RCRD: CPT | Mod: CPTII,S$GLB,, | Performed by: UROLOGY

## 2022-09-20 PROCEDURE — 3044F PR MOST RECENT HEMOGLOBIN A1C LEVEL <7.0%: ICD-10-PCS | Mod: CPTII,S$GLB,, | Performed by: UROLOGY

## 2022-09-20 PROCEDURE — 3044F HG A1C LEVEL LT 7.0%: CPT | Mod: CPTII,S$GLB,, | Performed by: UROLOGY

## 2022-09-20 PROCEDURE — 3008F PR BODY MASS INDEX (BMI) DOCUMENTED: ICD-10-PCS | Mod: CPTII,S$GLB,, | Performed by: UROLOGY

## 2022-09-20 PROCEDURE — 1160F PR REVIEW ALL MEDS BY PRESCRIBER/CLIN PHARMACIST DOCUMENTED: ICD-10-PCS | Mod: CPTII,S$GLB,, | Performed by: UROLOGY

## 2022-09-20 PROCEDURE — 1159F PR MEDICATION LIST DOCUMENTED IN MEDICAL RECORD: ICD-10-PCS | Mod: CPTII,S$GLB,, | Performed by: UROLOGY

## 2022-09-20 PROCEDURE — 1101F PR PT FALLS ASSESS DOC 0-1 FALLS W/OUT INJ PAST YR: ICD-10-PCS | Mod: CPTII,S$GLB,, | Performed by: UROLOGY

## 2022-09-20 PROCEDURE — 3075F PR MOST RECENT SYSTOLIC BLOOD PRESS GE 130-139MM HG: ICD-10-PCS | Mod: CPTII,S$GLB,, | Performed by: UROLOGY

## 2022-09-20 PROCEDURE — 3079F DIAST BP 80-89 MM HG: CPT | Mod: CPTII,S$GLB,, | Performed by: UROLOGY

## 2022-09-20 PROCEDURE — 1159F MED LIST DOCD IN RCRD: CPT | Mod: CPTII,S$GLB,, | Performed by: UROLOGY

## 2022-09-20 PROCEDURE — 99999 PR PBB SHADOW E&M-EST. PATIENT-LVL III: CPT | Mod: PBBFAC,,, | Performed by: UROLOGY

## 2022-09-20 PROCEDURE — 3075F SYST BP GE 130 - 139MM HG: CPT | Mod: CPTII,S$GLB,, | Performed by: UROLOGY

## 2022-09-20 PROCEDURE — 3288F PR FALLS RISK ASSESSMENT DOCUMENTED: ICD-10-PCS | Mod: CPTII,S$GLB,, | Performed by: UROLOGY

## 2022-09-20 PROCEDURE — 3079F PR MOST RECENT DIASTOLIC BLOOD PRESSURE 80-89 MM HG: ICD-10-PCS | Mod: CPTII,S$GLB,, | Performed by: UROLOGY

## 2022-09-20 PROCEDURE — 99999 PR PBB SHADOW E&M-EST. PATIENT-LVL III: ICD-10-PCS | Mod: PBBFAC,,, | Performed by: UROLOGY

## 2022-09-20 PROCEDURE — 1125F AMNT PAIN NOTED PAIN PRSNT: CPT | Mod: CPTII,S$GLB,, | Performed by: UROLOGY

## 2022-09-20 PROCEDURE — 3008F BODY MASS INDEX DOCD: CPT | Mod: CPTII,S$GLB,, | Performed by: UROLOGY

## 2022-09-20 PROCEDURE — 99205 OFFICE O/P NEW HI 60 MIN: CPT | Mod: S$GLB,,, | Performed by: UROLOGY

## 2022-09-20 PROCEDURE — 1125F PR PAIN SEVERITY QUANTIFIED, PAIN PRESENT: ICD-10-PCS | Mod: CPTII,S$GLB,, | Performed by: UROLOGY

## 2022-09-20 PROCEDURE — 1101F PT FALLS ASSESS-DOCD LE1/YR: CPT | Mod: CPTII,S$GLB,, | Performed by: UROLOGY

## 2022-09-20 RX ORDER — TAMSULOSIN HYDROCHLORIDE 0.4 MG/1
0.4 CAPSULE ORAL DAILY
Qty: 90 CAPSULE | Refills: 3 | Status: SHIPPED | OUTPATIENT
Start: 2022-09-20 | End: 2023-11-06 | Stop reason: ALTCHOICE

## 2022-09-20 NOTE — TELEPHONE ENCOUNTER
Spoke with patient and advised him per Dr. Fuchs to come back into the office today in regards to not being able to urinate. Patient voiced his understanding and confirmed coming back into the office today.

## 2022-09-20 NOTE — TELEPHONE ENCOUNTER
----- Message from Frederic Chavez sent at 9/20/2022  2:16 PM CDT -----  Contact: Patient  The pt called and said that he had a catheter taken out today    Now, he feels the needs to urinate, but nothing comes out    Please call him back at [phone]

## 2022-09-20 NOTE — PROGRESS NOTES
[facility]   Clinic Note    SUBJECTIVE:     Chief Complaint: urinary retention    Referral from: Triston Fuchs MD.    History of Present Illness:  Noah Nichole is a 72 y.o. male who presents to clinic for urinary retention.    Patient recently experienced a fall.  He was found to have urinary retention with PVRs greater than 300 cc after he fell and presented to the emergency department.  He denies any symptoms at that time.  A Jackson catheter was placed.  He was started on tamsulosin.  He feels like his stream has been weak and also endorses hesitancy and intermittency prior to the Jackson placement.  He denies any hematuria.  Denies any dysuria.  He has been taking his Flomax.    Patient endorses no additional complaints at this time.    Past Medical History:   Diagnosis Date    Anemia     Benign essential hypertension 12/17/2014    History of colon cancer, stage II 10/10/2018    Iron deficiency anemia due to chronic blood loss 10/16/2019       Past Surgical History:   Procedure Laterality Date    COLON SURGERY  09/18/2018    COLONOSCOPY N/A 9/14/2018    Procedure: COLONOSCOPY;  Surgeon: Adrian Cheung MD;  Location: [address]);  Service: Endoscopy;  Laterality: N/A;  pt ate at 1:30 on 9/13/18-Dr Cheung informed and he stated ok to do colonoscopy.    COLONOSCOPY N/A 10/11/2019    Procedure: COLONOSCOPY;  Surgeon: Gabino Bonilla MD;  Location: [address]);  Service: Endoscopy;  Laterality: N/A;    COLONOSCOPY N/A 4/21/2022    Procedure: COLONOSCOPY;  Surgeon: Zeny Hinkle MD;  [facility]  Service: Colon and Rectal;  Laterality: N/A;  fully vaccinated, instructions sent to [facility] and emailed to [email or web address]  suprep    NO PAST SURGERIES      RIGHT HEMICOLECTOMY N/A 9/18/2018    Procedure: HEMICOLECTOMY, RIGHT, extended;  Surgeon: Adrian Cheung MD;  [address];  Service: Colon and Rectal;  Laterality: N/A;        Family History   Problem Relation Age of Onset    Hypertension Mother     Cancer Father         stomach    Hypertension Father     Diabetes Sister     Hypertension Brother     Glaucoma Brother     Diabetes Brother     Cataracts Brother     Cancer Sister     Amblyopia Neg Hx     Blindness Neg Hx     Macular degeneration Neg Hx     Retinal detachment Neg Hx     Strabismus Neg Hx     Stroke Neg Hx     Thyroid disease Neg Hx        Social History     Tobacco Use    Smoking status: Never    Smokeless tobacco: Never   Substance Use Topics    Alcohol use: Not Currently    Drug use: No       Current Outpatient Medications on File Prior to Visit   Medication Sig Dispense Refill    acetaminophen (TYLENOL) 325 MG tablet Take 2 tablets (650 mg total) by mouth every 6 (six) hours as needed for Pain. 30 tablet 0    allopurinoL (ZYLOPRIM) 300 MG tablet Take 1 tablet (300 mg total) by mouth once daily. 90 tablet 4    colchicine (COLCRYS) 0.6 mg tablet Take 1 tablet (0.6 mg total) by mouth once daily. 30 tablet 2    indomethacin (INDOCIN) 50 MG capsule Take 1 capsule (50 mg total) by mouth 2 (two) times daily with meals. 20 capsule 0    LIDOcaine (LIDODERM) 5 % Place 1 patch onto the skin once daily. Remove & Discard patch within 12 hours or as directed by MD 15 patch 0    losartan-hydrochlorothiazide 100-12.5 mg (HYZAAR) 100-12.5 mg Tab Take 1 tablet by mouth once daily. 90 tablet 3    meloxicam (MOBIC) 15 MG tablet Take 1 tablet (15 mg total) by mouth daily as needed (back pain). 30 tablet 2    metFORMIN (GLUCOPHAGE-XR) 500 MG ER 24hr tablet Take 1 tablet (500 mg total) by mouth daily with breakfast. 90 tablet 4    oxyCODONE (ROXICODONE) 5 MG immediate release tablet Take 1 tablet (5 mg total) by mouth every 4 (four) hours as needed (severe pain). 8 tablet 0    [DISCONTINUED] tamsulosin (FLOMAX) 0.4 mg Cap Take 1 capsule (0.4 mg total) by mouth every evening. 10 capsule 0     No current facility-administered medications on  "file prior to visit.       Review of patient's allergies indicates:  No Known Allergies    Review of Systems:  A review of 10+ systems was conducted with pertinent positive and negative findings documented in HPI with all other systems reviewed and negative.    OBJECTIVE:     Estimated body mass index is 30.92 kg/m² as calculated from the following:    Height as of 9/12/22: 5' 11" (1.803 m).    Weight as of this encounter: 100.5 kg (221 lb 10.8 oz).    Vital Signs (Most Recent)  Vitals:    09/20/22 0932   BP: 139/82   Pulse: 69       Physical Exam:  GENERAL: patient sitting comfortably  HEENT: normocephalic  NECK: supple, no JVD  PULM: normal chest rise, no increased WOB  HEART: non-diaphoretic  ABDO: soft, nondistended, nontender  BACK: no CVA tenderness bilaterally  SKIN: warm, dry, well perfused  EXT: no bruising or edema  NEURO: grossly normal with no focal deficits  PSYCH: appropriate mood and affect    Genitourinary Exam:  deferred    Lab Results   Component Value Date    BUN 25 (H) 09/13/2022    CREATININE 1.2 09/13/2022    WBC 6.92 09/13/2022    HGB 11.9 (L) 09/13/2022    HCT 36.5 (L) 09/13/2022     09/13/2022    AST 35 09/13/2022    ALT 19 09/13/2022    ALKPHOS 66 09/13/2022    ALBUMIN 3.8 09/13/2022    HGBA1C 6.2 (H) 03/30/2022    INR 1.0 07/04/2021        ASSESSMENT     1. Urinary retention    2. Benign localized prostatic hyperplasia with lower urinary tract symptoms (LUTS)        PLAN:     Urinary retention  BPH     - Void trial today. Return precautions provided  - continue tamsulosin  - discussed options for management of BPH.  The patient is interested in a preoperative workup with cystoscopy and transrectal ultrasound of the prostate to evaluate for his prostate size and anatomy for a potential deobstructive surgery  - will schedule for the above tests    Triston Fuchs MD  Urology  [facility]    Disclaimer: This note has been generated using voice-recognition " software. There may be typographical errors that have been missed during proof-reading.

## 2022-09-21 DIAGNOSIS — N40.0 BENIGN PROSTATIC HYPERPLASIA, UNSPECIFIED WHETHER LOWER URINARY TRACT SYMPTOMS PRESENT: Primary | ICD-10-CM

## 2022-09-21 RX ORDER — CEFAZOLIN SODIUM 2 G/50ML
2 SOLUTION INTRAVENOUS
Status: CANCELLED | OUTPATIENT
Start: 2022-09-21

## 2022-09-22 ENCOUNTER — TELEPHONE (OUTPATIENT)
Dept: UROLOGY | Facility: CLINIC | Age: 72
End: 2022-09-22
Payer: MEDICARE

## 2022-09-22 NOTE — TELEPHONE ENCOUNTER
----- Message from Shabnam Teresa sent at 9/22/2022 11:28 AM CDT -----  Regarding: Advice  Contact: [phone]  Patient is calling to speak with the doctor about his restrictions being fax to his . Please contact pt    Fax: [phone]

## 2022-09-22 NOTE — TELEPHONE ENCOUNTER
Spoke with patient about about sending out paperwork. Voiced to patient that this issue would get handled today. Patient voiced his understanding.

## 2022-09-23 ENCOUNTER — OFFICE VISIT (OUTPATIENT)
Dept: UROLOGY | Facility: CLINIC | Age: 72
End: 2022-09-23
Payer: MEDICARE

## 2022-09-23 ENCOUNTER — LAB VISIT (OUTPATIENT)
Dept: LAB | Facility: HOSPITAL | Age: 72
End: 2022-09-23
Attending: FAMILY MEDICINE
Payer: MEDICARE

## 2022-09-23 ENCOUNTER — TELEPHONE (OUTPATIENT)
Dept: FAMILY MEDICINE | Facility: CLINIC | Age: 72
End: 2022-09-23
Payer: MEDICARE

## 2022-09-23 VITALS
HEART RATE: 87 BPM | DIASTOLIC BLOOD PRESSURE: 69 MMHG | SYSTOLIC BLOOD PRESSURE: 129 MMHG | WEIGHT: 214.31 LBS | HEIGHT: 71 IN | BODY MASS INDEX: 30 KG/M2

## 2022-09-23 DIAGNOSIS — R50.9 FEVER, UNSPECIFIED FEVER CAUSE: ICD-10-CM

## 2022-09-23 DIAGNOSIS — R50.9 FEVER, UNSPECIFIED FEVER CAUSE: Primary | ICD-10-CM

## 2022-09-23 DIAGNOSIS — R82.90 CLOUDY URINE: Primary | ICD-10-CM

## 2022-09-23 LAB
BACTERIA #/AREA URNS HPF: ABNORMAL /HPF
BILIRUB UR QL STRIP: NEGATIVE
CLARITY UR: ABNORMAL
COLOR UR: YELLOW
GLUCOSE UR QL STRIP: NEGATIVE
HGB UR QL STRIP: ABNORMAL
HYALINE CASTS #/AREA URNS LPF: 26 /LPF
KETONES UR QL STRIP: ABNORMAL
LEUKOCYTE ESTERASE UR QL STRIP: ABNORMAL
MICROSCOPIC COMMENT: ABNORMAL
NITRITE UR QL STRIP: NEGATIVE
PH UR STRIP: 6 [PH] (ref 5–8)
PROT UR QL STRIP: ABNORMAL
RBC #/AREA URNS HPF: 27 /HPF (ref 0–4)
SP GR UR STRIP: 1.02 (ref 1–1.03)
SQUAMOUS #/AREA URNS HPF: 1 /HPF
URN SPEC COLLECT METH UR: ABNORMAL
UROBILINOGEN UR STRIP-ACNC: ABNORMAL EU/DL
WBC #/AREA URNS HPF: >100 /HPF (ref 0–5)

## 2022-09-23 PROCEDURE — 3044F HG A1C LEVEL LT 7.0%: CPT | Mod: CPTII,S$GLB,, | Performed by: UROLOGY

## 2022-09-23 PROCEDURE — 1101F PR PT FALLS ASSESS DOC 0-1 FALLS W/OUT INJ PAST YR: ICD-10-PCS | Mod: CPTII,S$GLB,, | Performed by: UROLOGY

## 2022-09-23 PROCEDURE — 3008F BODY MASS INDEX DOCD: CPT | Mod: CPTII,S$GLB,, | Performed by: UROLOGY

## 2022-09-23 PROCEDURE — 3078F DIAST BP <80 MM HG: CPT | Mod: CPTII,S$GLB,, | Performed by: UROLOGY

## 2022-09-23 PROCEDURE — 87088 URINE BACTERIA CULTURE: CPT | Performed by: FAMILY MEDICINE

## 2022-09-23 PROCEDURE — 99213 PR OFFICE/OUTPT VISIT, EST, LEVL III, 20-29 MIN: ICD-10-PCS | Mod: S$GLB,,, | Performed by: UROLOGY

## 2022-09-23 PROCEDURE — 87186 SC STD MICRODIL/AGAR DIL: CPT | Performed by: FAMILY MEDICINE

## 2022-09-23 PROCEDURE — 3288F FALL RISK ASSESSMENT DOCD: CPT | Mod: CPTII,S$GLB,, | Performed by: UROLOGY

## 2022-09-23 PROCEDURE — 3074F SYST BP LT 130 MM HG: CPT | Mod: CPTII,S$GLB,, | Performed by: UROLOGY

## 2022-09-23 PROCEDURE — 1125F PR PAIN SEVERITY QUANTIFIED, PAIN PRESENT: ICD-10-PCS | Mod: CPTII,S$GLB,, | Performed by: UROLOGY

## 2022-09-23 PROCEDURE — 3074F PR MOST RECENT SYSTOLIC BLOOD PRESSURE < 130 MM HG: ICD-10-PCS | Mod: CPTII,S$GLB,, | Performed by: UROLOGY

## 2022-09-23 PROCEDURE — 1160F PR REVIEW ALL MEDS BY PRESCRIBER/CLIN PHARMACIST DOCUMENTED: ICD-10-PCS | Mod: CPTII,S$GLB,, | Performed by: UROLOGY

## 2022-09-23 PROCEDURE — 1125F AMNT PAIN NOTED PAIN PRSNT: CPT | Mod: CPTII,S$GLB,, | Performed by: UROLOGY

## 2022-09-23 PROCEDURE — 1160F RVW MEDS BY RX/DR IN RCRD: CPT | Mod: CPTII,S$GLB,, | Performed by: UROLOGY

## 2022-09-23 PROCEDURE — 3008F PR BODY MASS INDEX (BMI) DOCUMENTED: ICD-10-PCS | Mod: CPTII,S$GLB,, | Performed by: UROLOGY

## 2022-09-23 PROCEDURE — 3044F PR MOST RECENT HEMOGLOBIN A1C LEVEL <7.0%: ICD-10-PCS | Mod: CPTII,S$GLB,, | Performed by: UROLOGY

## 2022-09-23 PROCEDURE — 87077 CULTURE AEROBIC IDENTIFY: CPT | Performed by: FAMILY MEDICINE

## 2022-09-23 PROCEDURE — 99999 PR PBB SHADOW E&M-EST. PATIENT-LVL III: CPT | Mod: PBBFAC,,, | Performed by: UROLOGY

## 2022-09-23 PROCEDURE — 99213 OFFICE O/P EST LOW 20 MIN: CPT | Mod: S$GLB,,, | Performed by: UROLOGY

## 2022-09-23 PROCEDURE — 1159F PR MEDICATION LIST DOCUMENTED IN MEDICAL RECORD: ICD-10-PCS | Mod: CPTII,S$GLB,, | Performed by: UROLOGY

## 2022-09-23 PROCEDURE — 3288F PR FALLS RISK ASSESSMENT DOCUMENTED: ICD-10-PCS | Mod: CPTII,S$GLB,, | Performed by: UROLOGY

## 2022-09-23 PROCEDURE — 81000 URINALYSIS NONAUTO W/SCOPE: CPT | Performed by: FAMILY MEDICINE

## 2022-09-23 PROCEDURE — 1159F MED LIST DOCD IN RCRD: CPT | Mod: CPTII,S$GLB,, | Performed by: UROLOGY

## 2022-09-23 PROCEDURE — 99999 PR PBB SHADOW E&M-EST. PATIENT-LVL III: ICD-10-PCS | Mod: PBBFAC,,, | Performed by: UROLOGY

## 2022-09-23 PROCEDURE — 3078F PR MOST RECENT DIASTOLIC BLOOD PRESSURE < 80 MM HG: ICD-10-PCS | Mod: CPTII,S$GLB,, | Performed by: UROLOGY

## 2022-09-23 PROCEDURE — 1101F PT FALLS ASSESS-DOCD LE1/YR: CPT | Mod: CPTII,S$GLB,, | Performed by: UROLOGY

## 2022-09-23 PROCEDURE — 87086 URINE CULTURE/COLONY COUNT: CPT | Performed by: FAMILY MEDICINE

## 2022-09-23 RX ORDER — SULFAMETHOXAZOLE AND TRIMETHOPRIM 800; 160 MG/1; MG/1
1 TABLET ORAL 2 TIMES DAILY
Qty: 14 TABLET | Refills: 0 | Status: ON HOLD | OUTPATIENT
Start: 2022-09-23 | End: 2022-09-27 | Stop reason: HOSPADM

## 2022-09-23 NOTE — PROGRESS NOTES
[facility]   Clinic Note    SUBJECTIVE:     Chief Complaint: urinary retention    Referral from: No ref. provider found.    History of Present Illness:  Noah Nichole is a 72 y.o. male with urinary retention    Dr. Fuchs:  Patient recently experienced a fall.  He was found to have urinary retention with PVRs greater than 300 cc after he fell and presented to the emergency department.  He denies any symptoms at that time.  A Jackson catheter was placed.  He was started on tamsulosin.  He feels like his stream has been weak and also endorses hesitancy and intermittency prior to the Jackson placement.  He denies any hematuria.  Denies any dysuria.  He has been taking his Flomax.    Owen 9/23/22: seen today for possible infection, notes having fever/chills since caht placed, worsening back pain after recent fall.  Urine cloudy, no objective fever.  No n/V.  To have cysto/TRUS next week with Dr. Fuchs for urinary retention.     Past Medical History:   Diagnosis Date    Anemia     Benign essential hypertension 12/17/2014    History of colon cancer, stage II 10/10/2018    Iron deficiency anemia due to chronic blood loss 10/16/2019       Past Surgical History:   Procedure Laterality Date    COLON SURGERY  09/18/2018    COLONOSCOPY N/A 9/14/2018    Procedure: COLONOSCOPY;  Surgeon: Ardian Cheung MD;  Location: [address]);  Service: Endoscopy;  Laterality: N/A;  pt ate at 1:30 on 9/13/18-Dr Cheung informed and he stated ok to do colonoscopy.    COLONOSCOPY N/A 10/11/2019    Procedure: COLONOSCOPY;  Surgeon: Gabino Bonilla MD;  Location: [address]);  Service: Endoscopy;  Laterality: N/A;    COLONOSCOPY N/A 4/21/2022    Procedure: COLONOSCOPY;  Surgeon: Zeny Hinkle MD;  Location: [facility] ([address] [place] [address]);  Service: Colon and Rectal;  Laterality: N/A;  fully vaccinated, instructions sent to [facility] and emailed to [email or web address]  suprep    NO PAST  SURGERIES      RIGHT HEMICOLECTOMY N/A 9/18/2018    Procedure: HEMICOLECTOMY, RIGHT, extended;  Surgeon: Adrian Cheung MD;  Location: [facility] OR [address] [place] [address];  Service: Colon and Rectal;  Laterality: N/A;       Family History   Problem Relation Age of Onset    Hypertension Mother     Cancer Father         stomach    Hypertension Father     Diabetes Sister     Hypertension Brother     Glaucoma Brother     Diabetes Brother     Cataracts Brother     Cancer Sister     Amblyopia Neg Hx     Blindness Neg Hx     Macular degeneration Neg Hx     Retinal detachment Neg Hx     Strabismus Neg Hx     Stroke Neg Hx     Thyroid disease Neg Hx        Social History     Tobacco Use    Smoking status: Never    Smokeless tobacco: Never   Substance Use Topics    Alcohol use: Not Currently    Drug use: No       Current Outpatient Medications on File Prior to Visit   Medication Sig Dispense Refill    acetaminophen (TYLENOL) 325 MG tablet Take 2 tablets (650 mg total) by mouth every 6 (six) hours as needed for Pain. 30 tablet 0    allopurinoL (ZYLOPRIM) 300 MG tablet Take 1 tablet (300 mg total) by mouth once daily. 90 tablet 4    colchicine (COLCRYS) 0.6 mg tablet Take 1 tablet (0.6 mg total) by mouth once daily. 30 tablet 2    indomethacin (INDOCIN) 50 MG capsule Take 1 capsule (50 mg total) by mouth 2 (two) times daily with meals. 20 capsule 0    LIDOcaine (LIDODERM) 5 % Place 1 patch onto the skin once daily. Remove & Discard patch within 12 hours or as directed by MD 15 patch 0    losartan-hydrochlorothiazide 100-12.5 mg (HYZAAR) 100-12.5 mg Tab Take 1 tablet by mouth once daily. 90 tablet 3    meloxicam (MOBIC) 15 MG tablet Take 1 tablet (15 mg total) by mouth daily as needed (back pain). 30 tablet 2    metFORMIN (GLUCOPHAGE-XR) 500 MG ER 24hr tablet Take 1 tablet (500 mg total) by mouth daily with breakfast. 90 tablet 4    oxyCODONE (ROXICODONE) 5 MG immediate release tablet Take 1 tablet (5 mg total) by mouth every 4 (four) hours as  "needed (severe pain). 8 tablet 0    tamsulosin (FLOMAX) 0.4 mg Cap Take 1 capsule (0.4 mg total) by mouth once daily. 90 capsule 3     No current facility-administered medications on file prior to visit.       Review of patient's allergies indicates:  No Known Allergies    Review of Systems:  A review of 10+ systems was conducted with pertinent positive and negative findings documented in HPI with all other systems reviewed and negative.    OBJECTIVE:     Estimated body mass index is 30.9 kg/m² as calculated from the following:    Height as of this encounter: 5' 11" (1.803 m).    Weight as of this encounter: 100.5 kg (221 lb 9 oz).    Vital Signs (Most Recent)  Vitals:    09/23/22 1004   BP: 129/69   Pulse: 87       Physical Exam:  GENERAL: patient sitting comfortably  HEENT: normocephalic  NECK: supple, no JVD  PULM: normal chest rise, no increased WOB  HEART: non-diaphoretic  ABDO: soft, nondistended, nontender  BACK: no CVA tenderness bilaterally  SKIN: warm, dry, well perfused  EXT: no bruising or edema  NEURO: grossly normal with no focal deficits  PSYCH: appropriate mood and affect    Genitourinary Exam:  Urine cloudy     Lab Results   Component Value Date    BUN 25 (H) 09/13/2022    CREATININE 1.2 09/13/2022    WBC 6.92 09/13/2022    HGB 11.9 (L) 09/13/2022    HCT 36.5 (L) 09/13/2022     09/13/2022    AST 35 09/13/2022    ALT 19 09/13/2022    ALKPHOS 66 09/13/2022    ALBUMIN 3.8 09/13/2022    HGBA1C 6.2 (H) 03/30/2022    INR 1.0 07/04/2021        ASSESSMENT     1. Cloudy urine      Urinary retention    PLAN:      No need for culture from bag, will cover with empiric antibiotic given upcoming procedure  Will CC Dr. Fuchs  Plan for cystoscopy/TRUS next week, can cancel if persistent fever/symptoms      Giuseppe Weaver MD          "

## 2022-09-23 NOTE — TELEPHONE ENCOUNTER
Patient walked in and said he is out of his muscle spasm medication and is requesting a refill. He was also sent down to get a urine sample for his fever that he is having. No other symptoms onset. Patient said he took a covid test and it was negative. Patient was also told he will have to complete another one in 48 hours. Patient also said he is having some swelling in his feet. Please advise.

## 2022-09-24 ENCOUNTER — HOSPITAL ENCOUNTER (OUTPATIENT)
Facility: HOSPITAL | Age: 72
Discharge: HOME | End: 2022-09-27
Attending: EMERGENCY MEDICINE | Admitting: STUDENT IN AN ORGANIZED HEALTH CARE EDUCATION/TRAINING PROGRAM
Payer: MEDICARE

## 2022-09-24 DIAGNOSIS — R07.9 CHEST PAIN: ICD-10-CM

## 2022-09-24 DIAGNOSIS — N30.01 ACUTE CYSTITIS WITH HEMATURIA: Primary | ICD-10-CM

## 2022-09-24 DIAGNOSIS — N41.9 PROSTATITIS, UNSPECIFIED PROSTATITIS TYPE: ICD-10-CM

## 2022-09-24 DIAGNOSIS — R53.81 DEBILITY: ICD-10-CM

## 2022-09-24 LAB
ALBUMIN SERPL BCP-MCNC: 3.2 G/DL (ref 3.5–5.2)
ALP SERPL-CCNC: 72 U/L (ref 55–135)
ALT SERPL W/O P-5'-P-CCNC: 33 U/L (ref 10–44)
ANION GAP SERPL CALC-SCNC: 12 MMOL/L (ref 8–16)
AST SERPL-CCNC: 35 U/L (ref 10–40)
BACTERIA #/AREA URNS AUTO: ABNORMAL /HPF
BASOPHILS # BLD AUTO: 0.02 K/UL (ref 0–0.2)
BASOPHILS NFR BLD: 0.3 % (ref 0–1.9)
BILIRUB SERPL-MCNC: 0.5 MG/DL (ref 0.1–1)
BILIRUB UR QL STRIP: NEGATIVE
BUN SERPL-MCNC: 35 MG/DL (ref 8–23)
CALCIUM SERPL-MCNC: 9.6 MG/DL (ref 8.7–10.5)
CHLORIDE SERPL-SCNC: 103 MMOL/L (ref 95–110)
CLARITY UR REFRACT.AUTO: ABNORMAL
CO2 SERPL-SCNC: 21 MMOL/L (ref 23–29)
COLOR UR AUTO: YELLOW
CREAT SERPL-MCNC: 1.7 MG/DL (ref 0.5–1.4)
CRP SERPL-MCNC: 125 MG/L (ref 0–8.2)
CTP QC/QA: YES
DIFFERENTIAL METHOD: ABNORMAL
EOSINOPHIL # BLD AUTO: 0.1 K/UL (ref 0–0.5)
EOSINOPHIL NFR BLD: 0.9 % (ref 0–8)
ERYTHROCYTE [DISTWIDTH] IN BLOOD BY AUTOMATED COUNT: 13.7 % (ref 11.5–14.5)
ERYTHROCYTE [SEDIMENTATION RATE] IN BLOOD BY PHOTOMETRIC METHOD: 109 MM/HR (ref 0–23)
EST. GFR  (NO RACE VARIABLE): 42.6 ML/MIN/1.73 M^2
GLUCOSE SERPL-MCNC: 121 MG/DL (ref 70–110)
GLUCOSE UR QL STRIP: NEGATIVE
HCT VFR BLD AUTO: 32.6 % (ref 40–54)
HCV AB SERPL QL IA: NORMAL
HGB BLD-MCNC: 11 G/DL (ref 14–18)
HGB UR QL STRIP: ABNORMAL
HIV 1+2 AB+HIV1 P24 AG SERPL QL IA: NORMAL
HYALINE CASTS UR QL AUTO: 0 /LPF
IMM GRANULOCYTES # BLD AUTO: 0.04 K/UL (ref 0–0.04)
IMM GRANULOCYTES NFR BLD AUTO: 0.6 % (ref 0–0.5)
KETONES UR QL STRIP: NEGATIVE
LACTATE SERPL-SCNC: 1.1 MMOL/L (ref 0.5–2.2)
LEUKOCYTE ESTERASE UR QL STRIP: ABNORMAL
LYMPHOCYTES # BLD AUTO: 1.2 K/UL (ref 1–4.8)
LYMPHOCYTES NFR BLD: 17.4 % (ref 18–48)
MCH RBC QN AUTO: 26.6 PG (ref 27–31)
MCHC RBC AUTO-ENTMCNC: 33.7 G/DL (ref 32–36)
MCV RBC AUTO: 79 FL (ref 82–98)
MICROSCOPIC COMMENT: ABNORMAL
MONOCYTES # BLD AUTO: 0.9 K/UL (ref 0.3–1)
MONOCYTES NFR BLD: 13 % (ref 4–15)
NEUTROPHILS # BLD AUTO: 4.5 K/UL (ref 1.8–7.7)
NEUTROPHILS NFR BLD: 67.8 % (ref 38–73)
NITRITE UR QL STRIP: POSITIVE
NON-SQ EPI CELLS #/AREA URNS AUTO: 1 /HPF
NRBC BLD-RTO: 0 /100 WBC
PH UR STRIP: 6 [PH] (ref 5–8)
PLATELET # BLD AUTO: 144 K/UL (ref 150–450)
PMV BLD AUTO: 11.2 FL (ref 9.2–12.9)
POC MOLECULAR INFLUENZA A AGN: NEGATIVE
POC MOLECULAR INFLUENZA B AGN: NEGATIVE
POTASSIUM SERPL-SCNC: 3.7 MMOL/L (ref 3.5–5.1)
PROT SERPL-MCNC: 7.3 G/DL (ref 6–8.4)
PROT UR QL STRIP: ABNORMAL
RBC # BLD AUTO: 4.14 M/UL (ref 4.6–6.2)
RBC #/AREA URNS AUTO: 46 /HPF (ref 0–4)
SARS-COV-2 RDRP RESP QL NAA+PROBE: NEGATIVE
SODIUM SERPL-SCNC: 136 MMOL/L (ref 136–145)
SP GR UR STRIP: 1.02 (ref 1–1.03)
URN SPEC COLLECT METH UR: ABNORMAL
WBC # BLD AUTO: 6.6 K/UL (ref 3.9–12.7)
WBC #/AREA URNS AUTO: 88 /HPF (ref 0–5)
WBC CLUMPS UR QL AUTO: ABNORMAL

## 2022-09-24 PROCEDURE — 81001 URINALYSIS AUTO W/SCOPE: CPT | Performed by: EMERGENCY MEDICINE

## 2022-09-24 PROCEDURE — 99284 EMERGENCY DEPT VISIT MOD MDM: CPT | Mod: CS,,, | Performed by: EMERGENCY MEDICINE

## 2022-09-24 PROCEDURE — 99284 PR EMERGENCY DEPT VISIT,LEVEL IV: ICD-10-PCS | Mod: CS,,, | Performed by: EMERGENCY MEDICINE

## 2022-09-24 PROCEDURE — 96365 THER/PROPH/DIAG IV INF INIT: CPT

## 2022-09-24 PROCEDURE — 87040 BLOOD CULTURE FOR BACTERIA: CPT | Performed by: EMERGENCY MEDICINE

## 2022-09-24 PROCEDURE — 85652 RBC SED RATE AUTOMATED: CPT | Performed by: EMERGENCY MEDICINE

## 2022-09-24 PROCEDURE — 86803 HEPATITIS C AB TEST: CPT | Performed by: PHYSICIAN ASSISTANT

## 2022-09-24 PROCEDURE — 87077 CULTURE AEROBIC IDENTIFY: CPT | Performed by: EMERGENCY MEDICINE

## 2022-09-24 PROCEDURE — 87086 URINE CULTURE/COLONY COUNT: CPT | Performed by: EMERGENCY MEDICINE

## 2022-09-24 PROCEDURE — 87389 HIV-1 AG W/HIV-1&-2 AB AG IA: CPT | Performed by: PHYSICIAN ASSISTANT

## 2022-09-24 PROCEDURE — 63600175 PHARM REV CODE 636 W HCPCS: Performed by: EMERGENCY MEDICINE

## 2022-09-24 PROCEDURE — 85025 COMPLETE CBC W/AUTO DIFF WBC: CPT | Performed by: EMERGENCY MEDICINE

## 2022-09-24 PROCEDURE — 80053 COMPREHEN METABOLIC PANEL: CPT | Performed by: EMERGENCY MEDICINE

## 2022-09-24 PROCEDURE — 83605 ASSAY OF LACTIC ACID: CPT | Performed by: EMERGENCY MEDICINE

## 2022-09-24 PROCEDURE — 87502 INFLUENZA DNA AMP PROBE: CPT

## 2022-09-24 PROCEDURE — 99285 EMERGENCY DEPT VISIT HI MDM: CPT | Mod: 25

## 2022-09-24 PROCEDURE — G0378 HOSPITAL OBSERVATION PER HR: HCPCS

## 2022-09-24 PROCEDURE — 63600175 PHARM REV CODE 636 W HCPCS: Performed by: STUDENT IN AN ORGANIZED HEALTH CARE EDUCATION/TRAINING PROGRAM

## 2022-09-24 PROCEDURE — 96367 TX/PROPH/DG ADDL SEQ IV INF: CPT

## 2022-09-24 PROCEDURE — 87088 URINE BACTERIA CULTURE: CPT | Performed by: EMERGENCY MEDICINE

## 2022-09-24 PROCEDURE — U0002 COVID-19 LAB TEST NON-CDC: HCPCS | Performed by: EMERGENCY MEDICINE

## 2022-09-24 PROCEDURE — 87186 SC STD MICRODIL/AGAR DIL: CPT | Performed by: EMERGENCY MEDICINE

## 2022-09-24 PROCEDURE — 86140 C-REACTIVE PROTEIN: CPT | Performed by: EMERGENCY MEDICINE

## 2022-09-24 RX ORDER — IBUPROFEN 200 MG
16 TABLET ORAL
Status: DISCONTINUED | OUTPATIENT
Start: 2022-09-24 | End: 2022-09-27 | Stop reason: HOSPADM

## 2022-09-24 RX ORDER — IBUPROFEN 200 MG
24 TABLET ORAL
Status: DISCONTINUED | OUTPATIENT
Start: 2022-09-24 | End: 2022-09-27 | Stop reason: HOSPADM

## 2022-09-24 RX ORDER — LOSARTAN POTASSIUM AND HYDROCHLOROTHIAZIDE 12.5; 1 MG/1; MG/1
1 TABLET ORAL DAILY
Status: DISCONTINUED | OUTPATIENT
Start: 2022-09-25 | End: 2022-09-27 | Stop reason: HOSPADM

## 2022-09-24 RX ORDER — SODIUM CHLORIDE 0.9 % (FLUSH) 0.9 %
10 SYRINGE (ML) INJECTION EVERY 12 HOURS PRN
Status: DISCONTINUED | OUTPATIENT
Start: 2022-09-24 | End: 2022-09-27 | Stop reason: HOSPADM

## 2022-09-24 RX ORDER — SODIUM CHLORIDE, SODIUM LACTATE, POTASSIUM CHLORIDE, CALCIUM CHLORIDE 600; 310; 30; 20 MG/100ML; MG/100ML; MG/100ML; MG/100ML
INJECTION, SOLUTION INTRAVENOUS CONTINUOUS
Status: DISCONTINUED | OUTPATIENT
Start: 2022-09-24 | End: 2022-09-27 | Stop reason: HOSPADM

## 2022-09-24 RX ORDER — OXYCODONE HYDROCHLORIDE 5 MG/1
5 TABLET ORAL ONCE
Status: COMPLETED | OUTPATIENT
Start: 2022-09-25 | End: 2022-09-24

## 2022-09-24 RX ORDER — HEPARIN SODIUM 5000 [USP'U]/ML
5000 INJECTION, SOLUTION INTRAVENOUS; SUBCUTANEOUS EVERY 8 HOURS
Status: DISCONTINUED | OUTPATIENT
Start: 2022-09-25 | End: 2022-09-27 | Stop reason: HOSPADM

## 2022-09-24 RX ORDER — GLUCAGON 1 MG
1 KIT INJECTION
Status: DISCONTINUED | OUTPATIENT
Start: 2022-09-24 | End: 2022-09-27 | Stop reason: HOSPADM

## 2022-09-24 RX ORDER — ACETAMINOPHEN 325 MG/1
650 TABLET ORAL EVERY 8 HOURS PRN
Status: DISCONTINUED | OUTPATIENT
Start: 2022-09-24 | End: 2022-09-27 | Stop reason: HOSPADM

## 2022-09-24 RX ORDER — CEFEPIME HYDROCHLORIDE 1 G/50ML
1 INJECTION, SOLUTION INTRAVENOUS
Status: DISCONTINUED | OUTPATIENT
Start: 2022-09-24 | End: 2022-09-27 | Stop reason: HOSPADM

## 2022-09-24 RX ORDER — TAMSULOSIN HYDROCHLORIDE 0.4 MG/1
0.4 CAPSULE ORAL DAILY
Status: DISCONTINUED | OUTPATIENT
Start: 2022-09-25 | End: 2022-09-27 | Stop reason: HOSPADM

## 2022-09-24 RX ORDER — CYCLOBENZAPRINE HCL 5 MG
5 TABLET ORAL 3 TIMES DAILY PRN
Status: DISCONTINUED | OUTPATIENT
Start: 2022-09-24 | End: 2022-09-27 | Stop reason: HOSPADM

## 2022-09-24 RX ADMIN — OXYCODONE 5 MG: 5 TABLET ORAL at 11:09

## 2022-09-24 RX ADMIN — CEFTRIAXONE 1 G: 1 INJECTION, SOLUTION INTRAVENOUS at 09:09

## 2022-09-24 RX ADMIN — CEFEPIME HYDROCHLORIDE 1 G: 1 INJECTION, SOLUTION INTRAVENOUS at 11:09

## 2022-09-24 RX ADMIN — SODIUM CHLORIDE, SODIUM LACTATE, POTASSIUM CHLORIDE, AND CALCIUM CHLORIDE: .6; .31; .03; .02 INJECTION, SOLUTION INTRAVENOUS at 11:09

## 2022-09-24 NOTE — Clinical Note
Diagnosis: Prostatitis, unspecified prostatitis type [[id number]]   Future Attending Provider: ROZ KOO [[id number]]   Admitting Provider:: ROZ KOO [[id number]]

## 2022-09-25 PROBLEM — Z97.8 CHRONIC INDWELLING FOLEY CATHETER: Status: ACTIVE | Noted: 2022-09-25

## 2022-09-25 PROBLEM — N30.01 ACUTE CYSTITIS WITH HEMATURIA: Status: ACTIVE | Noted: 2022-09-25

## 2022-09-25 PROBLEM — N39.0 UTI (URINARY TRACT INFECTION): Status: ACTIVE | Noted: 2022-09-25

## 2022-09-25 PROBLEM — M54.9 BACK PAIN: Status: ACTIVE | Noted: 2022-09-25

## 2022-09-25 LAB
ANION GAP SERPL CALC-SCNC: 10 MMOL/L (ref 8–16)
BACTERIA UR CULT: ABNORMAL
BUN SERPL-MCNC: 30 MG/DL (ref 8–23)
CALCIUM SERPL-MCNC: 9.2 MG/DL (ref 8.7–10.5)
CHLORIDE SERPL-SCNC: 104 MMOL/L (ref 95–110)
CO2 SERPL-SCNC: 20 MMOL/L (ref 23–29)
CREAT SERPL-MCNC: 1.5 MG/DL (ref 0.5–1.4)
EST. GFR  (NO RACE VARIABLE): 49.5 ML/MIN/1.73 M^2
GLUCOSE SERPL-MCNC: 111 MG/DL (ref 70–110)
MAGNESIUM SERPL-MCNC: 2.2 MG/DL (ref 1.6–2.6)
POTASSIUM SERPL-SCNC: 3.8 MMOL/L (ref 3.5–5.1)
SODIUM SERPL-SCNC: 134 MMOL/L (ref 136–145)

## 2022-09-25 PROCEDURE — 63600175 PHARM REV CODE 636 W HCPCS: Performed by: STUDENT IN AN ORGANIZED HEALTH CARE EDUCATION/TRAINING PROGRAM

## 2022-09-25 PROCEDURE — 97165 OT EVAL LOW COMPLEX 30 MIN: CPT

## 2022-09-25 PROCEDURE — 96366 THER/PROPH/DIAG IV INF ADDON: CPT

## 2022-09-25 PROCEDURE — 97110 THERAPEUTIC EXERCISES: CPT

## 2022-09-25 PROCEDURE — 80048 BASIC METABOLIC PNL TOTAL CA: CPT | Performed by: STUDENT IN AN ORGANIZED HEALTH CARE EDUCATION/TRAINING PROGRAM

## 2022-09-25 PROCEDURE — G0378 HOSPITAL OBSERVATION PER HR: HCPCS

## 2022-09-25 PROCEDURE — 36415 COLL VENOUS BLD VENIPUNCTURE: CPT | Performed by: STUDENT IN AN ORGANIZED HEALTH CARE EDUCATION/TRAINING PROGRAM

## 2022-09-25 PROCEDURE — 99220 PR INITIAL OBSERVATION CARE,LEVL III: CPT | Mod: ,,, | Performed by: STUDENT IN AN ORGANIZED HEALTH CARE EDUCATION/TRAINING PROGRAM

## 2022-09-25 PROCEDURE — 25000003 PHARM REV CODE 250: Performed by: STUDENT IN AN ORGANIZED HEALTH CARE EDUCATION/TRAINING PROGRAM

## 2022-09-25 PROCEDURE — 83735 ASSAY OF MAGNESIUM: CPT | Performed by: STUDENT IN AN ORGANIZED HEALTH CARE EDUCATION/TRAINING PROGRAM

## 2022-09-25 PROCEDURE — 99220 PR INITIAL OBSERVATION CARE,LEVL III: ICD-10-PCS | Mod: ,,, | Performed by: STUDENT IN AN ORGANIZED HEALTH CARE EDUCATION/TRAINING PROGRAM

## 2022-09-25 PROCEDURE — 97535 SELF CARE MNGMENT TRAINING: CPT

## 2022-09-25 PROCEDURE — 96372 THER/PROPH/DIAG INJ SC/IM: CPT | Performed by: STUDENT IN AN ORGANIZED HEALTH CARE EDUCATION/TRAINING PROGRAM

## 2022-09-25 RX ORDER — POLYETHYLENE GLYCOL 3350 17 G/17G
17 POWDER, FOR SOLUTION ORAL DAILY
Status: DISCONTINUED | OUTPATIENT
Start: 2022-09-25 | End: 2022-09-27 | Stop reason: HOSPADM

## 2022-09-25 RX ORDER — LACTULOSE 10 G/15ML
30 SOLUTION ORAL ONCE
Status: COMPLETED | OUTPATIENT
Start: 2022-09-25 | End: 2022-09-25

## 2022-09-25 RX ADMIN — LOSARTAN POTASSIUM AND HYDROCHLOROTHIAZIDE 1 TABLET: 100; 12.5 TABLET, FILM COATED ORAL at 12:09

## 2022-09-25 RX ADMIN — CYCLOBENZAPRINE HYDROCHLORIDE 5 MG: 5 TABLET, FILM COATED ORAL at 10:09

## 2022-09-25 RX ADMIN — CEFEPIME HYDROCHLORIDE 1 G: 1 INJECTION, SOLUTION INTRAVENOUS at 12:09

## 2022-09-25 RX ADMIN — HEPARIN SODIUM 5000 UNITS: 5000 INJECTION INTRAVENOUS; SUBCUTANEOUS at 05:09

## 2022-09-25 RX ADMIN — HEPARIN SODIUM 5000 UNITS: 5000 INJECTION INTRAVENOUS; SUBCUTANEOUS at 01:09

## 2022-09-25 RX ADMIN — HEPARIN SODIUM 5000 UNITS: 5000 INJECTION INTRAVENOUS; SUBCUTANEOUS at 10:09

## 2022-09-25 RX ADMIN — TAMSULOSIN HYDROCHLORIDE 0.4 MG: 0.4 CAPSULE ORAL at 10:09

## 2022-09-25 RX ADMIN — LACTULOSE 30 G: 20 SOLUTION ORAL at 01:09

## 2022-09-25 RX ADMIN — POLYETHYLENE GLYCOL 3350 17 G: 17 POWDER, FOR SOLUTION ORAL at 01:09

## 2022-09-25 NOTE — H&P
[facility] - Telemetry [address] Medicine  History & Physical    Patient Name: Noah Nichole  MRN: [id number]  Patient Class: OP- Observation  Admission Date: 9/24/2022  Attending Physician: Augustin Larson MD   Primary Care Provider: Grace Jeffery MD         Patient information was obtained from patient and ER records.     Subjective:     Chief Complaint:   Chief Complaint   Patient presents with    Male  Problem     Flores cath placed on the 12th after a fall, injury to tailbone, sweats and fever since wed        HPI: Noah Nichole is a 72-year-old man with a history of HTN who presents to the emergency department for evaluation of fever.   The patient was evaluated recently after a fall.  At that time he had associated urinary retention.  A flores catheter was placed in the emergency department.  He was evaluated in Urology clinic any voiding trial was performed.  The patient failed his voiding trial.  Urine sample yesterday was determined to be infected.  He was started on Bactrim.  Daughter reports that he has had 3 doses the antibiotic.  Today he started having fever.  He reports that the urine in the Flores catheter bag has gotten darker.  He denies any nausea or vomiting.       Past Medical History:   Diagnosis Date    Anemia     Benign essential hypertension 12/17/2014    History of colon cancer, stage II 10/10/2018    Iron deficiency anemia due to chronic blood loss 10/16/2019       Past Surgical History:   Procedure Laterality Date    COLON SURGERY  09/18/2018    COLONOSCOPY N/A 9/14/2018    Procedure: COLONOSCOPY;  Surgeon: Adrian Cheung MD;  Location: [address]);  Service: Endoscopy;  Laterality: N/A;  pt ate at 1:30 on 9/13/18-Dr Cheung informed and he stated ok to do colonoscopy.    COLONOSCOPY N/A 10/11/2019    Procedure: COLONOSCOPY;  Surgeon: Gabino Bonilla MD;  Location: [facility] ([address] [place] [address]);  Service: Endoscopy;  Laterality: N/A;    COLONOSCOPY  N/A 4/21/2022    Procedure: COLONOSCOPY;  Surgeon: Zeny Hinkle MD;  Location: [facility] ENDO (4TH FLR);  Service: Colon and Rectal;  Laterality: N/A;  fully vaccinated, instructions sent to [facility] and emailed to [email or web address]  suprep    NO PAST SURGERIES      RIGHT HEMICOLECTOMY N/A 9/18/2018    Procedure: HEMICOLECTOMY, RIGHT, extended;  Surgeon: Adrian Cheung MD;  Location: [facility] OR 2ND FLR;  Service: Colon and Rectal;  Laterality: N/A;       Review of patient's allergies indicates:  No Known Allergies    No current facility-administered medications on file prior to encounter.     Current Outpatient Medications on File Prior to Encounter   Medication Sig    acetaminophen (TYLENOL) 325 MG tablet Take 2 tablets (650 mg total) by mouth every 6 (six) hours as needed for Pain.    allopurinoL (ZYLOPRIM) 300 MG tablet Take 1 tablet (300 mg total) by mouth once daily.    colchicine (COLCRYS) 0.6 mg tablet Take 1 tablet (0.6 mg total) by mouth once daily.    indomethacin (INDOCIN) 50 MG capsule Take 1 capsule (50 mg total) by mouth 2 (two) times daily with meals.    LIDOcaine (LIDODERM) 5 % Place 1 patch onto the skin once daily. Remove & Discard patch within 12 hours or as directed by MD    losartan-hydrochlorothiazide 100-12.5 mg (HYZAAR) 100-12.5 mg Tab Take 1 tablet by mouth once daily.    meloxicam (MOBIC) 15 MG tablet Take 1 tablet (15 mg total) by mouth daily as needed (back pain).    metFORMIN (GLUCOPHAGE-XR) 500 MG ER 24hr tablet Take 1 tablet (500 mg total) by mouth daily with breakfast.    oxyCODONE (ROXICODONE) 5 MG immediate release tablet Take 1 tablet (5 mg total) by mouth every 4 (four) hours as needed (severe pain).    sulfamethoxazole-trimethoprim 800-160mg (BACTRIM DS) 800-160 mg Tab Take 1 tablet by mouth 2 (two) times daily. for 7 days    tamsulosin (FLOMAX) 0.4 mg Cap Take 1 capsule (0.4 mg total) by mouth once daily.     Family History        Problem Relation (Age of Onset)    Cancer Father, Sister    Cataracts Brother    Diabetes Sister, Brother    Glaucoma Brother    Hypertension Mother, Father, Brother          Tobacco Use    Smoking status: Never    Smokeless tobacco: Never   Substance and Sexual Activity    Alcohol use: Not Currently    Drug use: No    Sexual activity: Yes     Partners: Female     Review of Systems   Constitutional:  Positive for appetite change, chills, fatigue and fever.   HENT:  Negative for drooling, mouth sores, rhinorrhea and sneezing.    Eyes:  Negative for photophobia.   Respiratory:  Negative for cough, shortness of breath and stridor.    Cardiovascular:  Negative for chest pain, palpitations and leg swelling.   Gastrointestinal:  Negative for abdominal distention, abdominal pain, constipation, diarrhea and nausea.   Endocrine: Negative for polyuria.   Genitourinary:  Positive for difficulty urinating. Negative for dysuria, hematuria and urgency.   Musculoskeletal:  Positive for back pain.   Skin:  Negative for rash and wound.   Neurological:  Negative for dizziness, seizures, syncope and speech difficulty.   Psychiatric/Behavioral:  Negative for hallucinations. The patient is not hyperactive.    Objective:     Vital Signs (Most Recent):  Temp: 98.7 °F (37.1 °C) (09/24/22 2116)  Pulse: 77 (09/24/22 2116)  Resp: 16 (09/24/22 2116)  BP: 136/69 (09/24/22 2116)  SpO2: 99 % (09/24/22 2116) Vital Signs (24h Range):  Temp:  [98.7 °F (37.1 °C)-98.8 °F (37.1 °C)] 98.7 °F (37.1 °C)  Pulse:  [72-77] 77  Resp:  [16-18] 16  SpO2:  [97 %-99 %] 99 %  BP: (130-136)/(69-74) 136/69     Weight: 97.1 kg (214 lb)  Body mass index is 29.85 kg/m².    Physical Exam  Constitutional:       General: He is not in acute distress.     Appearance: Normal appearance. He is not ill-appearing, toxic-appearing or diaphoretic.      Comments: Pleasant man in no acute distress. Cooperative with examination and conversant with interview.   HENT:      Head:  Normocephalic and atraumatic.      Nose: Nose normal. No congestion or rhinorrhea.      Mouth/Throat:      Mouth: Mucous membranes are moist.      Pharynx: No oropharyngeal exudate or posterior oropharyngeal erythema.   Eyes:      General: No scleral icterus.  Cardiovascular:      Rate and Rhythm: Normal rate and regular rhythm.      Heart sounds: No murmur heard.    No friction rub. No gallop.   Pulmonary:      Effort: Pulmonary effort is normal.      Breath sounds: Normal breath sounds. No wheezing, rhonchi or rales.   Chest:      Chest wall: No tenderness.   Abdominal:      General: Abdomen is flat. Bowel sounds are normal. There is no distension.      Palpations: Abdomen is soft. There is no mass.      Tenderness: There is no abdominal tenderness. There is no guarding.   Genitourinary:     Comments: Patient with flores catheter in place draining dark yellow urine.  Musculoskeletal:         General: Normal range of motion.      Cervical back: Normal range of motion and neck supple. No rigidity.      Right lower leg: No edema.      Left lower leg: No edema.   Skin:     General: Skin is warm and dry.   Neurological:      General: No focal deficit present.      Mental Status: He is alert and oriented to person, place, and time. Mental status is at baseline.      Sensory: No sensory deficit.   Psychiatric:         Mood and Affect: Mood normal.         Behavior: Behavior normal.         Thought Content: Thought content normal.         Judgment: Judgment normal.           Significant Labs: All pertinent labs within the past 24 hours have been reviewed.  CBC:   Recent Labs   Lab 09/24/22 1933   WBC 6.60   HGB 11.0*   HCT 32.6*   *     CMP:   Recent Labs   Lab 09/24/22 1933      K 3.7      CO2 21*   *   BUN 35*   CREATININE 1.7*   CALCIUM 9.6   PROT 7.3   ALBUMIN 3.2*   BILITOT 0.5   ALKPHOS 72   AST 35   ALT 33   ANIONGAP 12       Significant Imaging: I have reviewed all pertinent imaging  results/findings within the past 24 hours.    Assessment/Plan:     UTI (urinary tract infection)  Patient with complicated urinary tract infection in the setting of flores placement after urinary retention after notable fall. Following with Urology clinic. UA consistent with infection. Previous urine culture from Urology clinic growing 100,000 CFUs of gram negative rods.  - cefepime 1 g q12h  - lactated ringer's infusion at 125 mL/hour  - patient would benefit from flores exchange given daughter reports current flores catheter has been in placed since Monday, asking nursing for exchange of catheter  - f/u repeat urine culture, blood cultures      Back pain  Patient with back pain after fall. MRI with no fracture.  - continue cyclobenzaprine and acetaminophen PRN      Benign essential hypertension  - continue losartan-HCTZ 100-12.5 mg daily        VTE Risk Mitigation (From admission, onward)         Ordered     heparin (porcine) injection 5,000 Units  Every 8 hours         09/24/22 2239     IP VTE HIGH RISK PATIENT  Once         09/24/22 2239     Place sequential compression device  Until discontinued         09/24/22 2239               Code Status: FULL    Hawk Garsia MD  Department of Hospital Medicine   [facility] - Tele[facility] Stepdown ([place])

## 2022-09-25 NOTE — PLAN OF CARE
Problem: Occupational Therapy  Goal: Occupational Therapy Goal  Description: Goals to be met by: 10/09/2022     Patient will increase functional independence with ADLs by performing:    UE Dressing with Supervision.  LE Dressing with Supervision.  Grooming while seated with Supervision.  Toileting from toilet with Supervision for hygiene and clothing management.     Outcome: Ongoing, Progressing   Goal se: 09/25/2022

## 2022-09-25 NOTE — ED PROVIDER NOTES
Encounter Date: 9/24/2022       History     Chief Complaint   Patient presents with    Male  Problem     Jackson cath placed on the 12th after a fall, injury to tailbone, sweats and fever since wed     72-year-old gentleman with past medical history of hypertension presents for evaluation of fever.  Patient was evaluated recently after a fall.  At that time he had associated urinary retention.  A Jackson catheter was placed in the emergency department.  He was evaluated in Urology clinic any voiding trial was performed.  The patient failed his voiding trial.  Urine sample yesterday was determined to be infected.  He was started on Bactrim.  Daughter reports that he has had 3 doses the antibiotic.  Today he started having fever.  He reports that the urine in the Jackson catheter bag has gotten darker.  He denies any nausea or vomiting.    The history is provided by the patient.   Review of patient's allergies indicates:  No Known Allergies  Past Medical History:   Diagnosis Date    Anemia     Benign essential hypertension 12/17/2014    History of colon cancer, stage II 10/10/2018    Iron deficiency anemia due to chronic blood loss 10/16/2019     Past Surgical History:   Procedure Laterality Date    COLON SURGERY  09/18/2018    COLONOSCOPY N/A 9/14/2018    Procedure: COLONOSCOPY;  Surgeon: Adrian Cheung MD;  Location: [address]);  Service: Endoscopy;  Laterality: N/A;  pt ate at 1:30 on 9/13/18-Dr Cheung informed and he stated ok to do colonoscopy.    COLONOSCOPY N/A 10/11/2019    Procedure: COLONOSCOPY;  Surgeon: Gabino Bonilla MD;  Location: [address]);  Service: Endoscopy;  Laterality: N/A;    COLONOSCOPY N/A 4/21/2022    Procedure: COLONOSCOPY;  Surgeon: Zeny Hinkle MD;  Location: [address]);  Service: Colon and Rectal;  Laterality: N/A;  fully vaccinated, instructions sent to [facility] and emailed to [email or web address]  suprep    NO PAST SURGERIES       RIGHT HEMICOLECTOMY N/A 9/18/2018    Procedure: HEMICOLECTOMY, RIGHT, extended;  Surgeon: Adrian Cheung MD;  Location: [facility] OR [address] [place] [address];  Service: Colon and Rectal;  Laterality: N/A;     Family History   Problem Relation Age of Onset    Hypertension Mother     Cancer Father         stomach    Hypertension Father     Diabetes Sister     Hypertension Brother     Glaucoma Brother     Diabetes Brother     Cataracts Brother     Cancer Sister     Amblyopia Neg Hx     Blindness Neg Hx     Macular degeneration Neg Hx     Retinal detachment Neg Hx     Strabismus Neg Hx     Stroke Neg Hx     Thyroid disease Neg Hx      Social History     Tobacco Use    Smoking status: Never    Smokeless tobacco: Never   Substance Use Topics    Alcohol use: Not Currently    Drug use: No     Review of Systems   Constitutional:  Negative for fever.   HENT:  Negative for sore throat.    Respiratory:  Negative for shortness of breath.    Cardiovascular:  Negative for chest pain.   Gastrointestinal:  Negative for nausea.   Genitourinary:  Positive for difficulty urinating. Negative for dysuria.   Musculoskeletal:  Negative for back pain.   Skin:  Negative for rash.   Neurological:  Negative for weakness.   Hematological:  Does not bruise/bleed easily.   All other systems reviewed and are negative.    Physical Exam     Initial Vitals [09/24/22 1645]   BP Pulse Resp Temp SpO2   130/74 72 18 98.8 °F (37.1 °C) 97 %      MAP       --         Physical Exam    Nursing note and vitals reviewed.  Constitutional: He appears well-developed and well-nourished.   HENT:   Head: Normocephalic and atraumatic.   Mouth/Throat: Oropharynx is clear and moist.   Eyes: Conjunctivae and EOM are normal. Pupils are equal, round, and reactive to light.   Neck: Phonation normal. Neck supple.   Normal range of motion.  Cardiovascular:  Normal rate, regular rhythm, normal heart sounds and normal pulses.           Pulmonary/Chest: Breath sounds normal. No respiratory  distress.   Abdominal: Abdomen is soft. no abdominal tenderness   Genitourinary:    Genitourinary Comments: Jackson catheter in place     Musculoskeletal:         General: Normal range of motion.      Cervical back: Normal range of motion and neck supple.     Neurological: He is alert and oriented to person, place, and time. He has normal strength.   No decreased sensation   Skin: Skin is warm and dry.       ED Course   Procedures  Labs Reviewed   COMPREHENSIVE METABOLIC PANEL - Abnormal; Notable for the following components:       Result Value    CO2 21 (*)     Glucose 121 (*)     BUN 35 (*)     Creatinine 1.7 (*)     Albumin 3.2 (*)     eGFR 42.6 (*)     All other components within normal limits   CBC W/ AUTO DIFFERENTIAL - Abnormal; Notable for the following components:    RBC 4.14 (*)     Hemoglobin 11.0 (*)     Hematocrit 32.6 (*)     MCV 79 (*)     MCH 26.6 (*)     Platelets 144 (*)     Immature Granulocytes 0.6 (*)     Lymph % 17.4 (*)     All other components within normal limits   SEDIMENTATION RATE - Abnormal; Notable for the following components:    Sed Rate 109 (*)     All other components within normal limits   C-REACTIVE PROTEIN - Abnormal; Notable for the following components:    .0 (*)     All other components within normal limits   CULTURE, BLOOD   CULTURE, BLOOD   CULTURE, URINE   HIV 1 / 2 ANTIBODY    Narrative:     Release to patient->Immediate   HEPATITIS C ANTIBODY    Narrative:     Release to patient->Immediate   LACTIC ACID, PLASMA   SARS-COV-2 RNA AMPLIFICATION, QUAL   URINALYSIS   POCT INFLUENZA A/B MOLECULAR          Imaging Results    None          Medications   cefTRIAXone (ROCEPHIN) 1 g/50 mL D5W IVPB (has no administration in time range)     Medical Decision Making:   History:   Old Medical Records: I decided to obtain old medical records.  Initial Assessment:   Evaluation of fever  Differential Diagnosis:   Urosepsis, prostatitis, viral illness   Clinical Tests:   Lab Tests:  "Ordered and Reviewed  Radiological Study: Ordered and Reviewed  Medical Tests: Ordered and Reviewed  Sepsis Perfusion Assessment: "I attest a sepsis perfusion exam was performed within 6 hours of sepsis, severe sepsis, or septic shock presentation, following fluid resuscitation."  ED Management:  Patient with recent diagnosis urinary retention, thought to be prostate related. Did have recent trauma, but MRI imaging was negative for spinal cord pathology that might be contributing to his symptoms. He has no neurologic deficits today that would make me think a repeat MRI with contrast would be indicated. His inflammatory markers are noted to be elevated. I am concerned for possible prostatitis. Will give dose of abx and admit for further abx, and uro evaluation.                         Clinical Impression:   Final diagnoses:  [N41.9] Prostatitis, unspecified prostatitis type (Primary)        ED Disposition Condition    Observation                 Wanda Griffiths MD  09/24/22 2038    "

## 2022-09-25 NOTE — ASSESSMENT & PLAN NOTE
Patient with complicated urinary tract infection in the setting of flores placement after urinary retention after notable fall. Following with Urology clinic. UA consistent with infection. Previous urine culture from Urology clinic growing 100,000 CFUs of gram negative rods.  - cefepime 1 g q12h  - lactated ringer's infusion at 125 mL/hour  - patient would benefit from flores exchange given daughter reports current flores catheter has been in placed since Monday, asking nursing for exchange of catheter  - f/u repeat urine culture, blood cultures

## 2022-09-25 NOTE — ASSESSMENT & PLAN NOTE
Patient with back pain after fall. MRI with no fracture.  - continue cyclobenzaprine and acetaminophen PRN

## 2022-09-25 NOTE — HPI
Noah Nichole is a 72-year-old man with a history of HTN who presents to the emergency department for evaluation of fever.   The patient was evaluated recently after a fall.  At that time he had associated urinary retention.  A flores catheter was placed in the emergency department.  He was evaluated in Urology clinic any voiding trial was performed.  The patient failed his voiding trial.  Urine sample yesterday was determined to be infected.  He was started on Bactrim.  Daughter reports that he has had 3 doses the antibiotic.  Today he started having fever.  He reports that the urine in the Flores catheter bag has gotten darker.  He denies any nausea or vomiting.

## 2022-09-25 NOTE — PT/OT/SLP EVAL
Occupational Therapy   Evaluation    Name: Noah Nichole  MRN: [id number]  Admitting Diagnosis:  Acute cystitis with hematuria  Recent Surgery: * No surgery found *      Recommendations:     Discharge Recommendations: home health OT  Discharge Equipment Recommendations:  bath bench  Barriers to discharge:  None    Assessment:     Noah Nichole is a 72 y.o. male with a medical diagnosis of Acute cystitis with hematuria.  He presents with pleasant attitude and motivated. Performance deficits affecting function: weakness, impaired endurance, impaired self care skills, impaired functional mobility, gait instability, impaired balance, pain, decreased safety awareness.      Pt performed lateral weight transfer to scoot closer to EOB with SBA. Sit > stand using bed rail with SBA. Maintained static standing balance for 5 minutes with LOB. Stand > sit with SBA. Pt was hesitant to perform sitting marches / kicks due to exacerbated lower back pain. Total dependence for donning / doffing LE dressing.     Rehab Prognosis: Fair; patient would benefit from acute skilled OT services to address these deficits and reach maximum level of function.       Plan:     Patient to be seen 3 x/week to address the above listed problems via self-care/home management, therapeutic activities, therapeutic exercises, neuromuscular re-education  Plan of Care Expires: 10/25/22  Plan of Care Reviewed with: patient, family, other (see comments) ( present)    Subjective     Chief Complaint: Pain  Patient/Family Comments/goals: Return home    Occupational Profile:  Living Environment: Pt lives in a single level travel trailer with 4 steps and LHR. Tub / shower combo without bench and detachable shower head.   Previous level of function: Independent  Roles and Routines: Father / [employer] member  Equipment Used at Home:  cane, straight  Assistance upon Discharge: Daughter / Son x 2    Pain/Comfort:  Pain Rating 1: other (see comments) (Not  specified)  Location - Side 1: Bilateral  Location - Orientation 1: lower  Location 1: back  Pain Addressed 1: Distraction, Cessation of Activity    Patients cultural, spiritual, Holiness conflicts given the current situation: no    Objective:     Communicated with: Nurse prior to session.  Patient found sitting edge of bed with flores catheter, telemetry, peripheral IV, pulse ox (continuous) upon OT entry to room.    General Precautions: Standard, fall   Orthopedic Precautions:N/A   Braces: N/A  Respiratory Status: Room air    Occupational Performance:    Bed Mobility:    Patient completed Scooting/Bridging with stand by assistance  Patient completed Supine to Sit with stand by assistance    Functional Mobility/Transfers:  Patient completed Sit <> Stand Transfer with stand by assistance  with  no assistive device   Functional Mobility: Pt did not ambulate    Activities of Daily Living:  Upper Body Dressing: minimum assistance don front facing gown  Lower Body Dressing: total assistance donning / doffing socks    Cognitive/Visual Perceptual:  Cognitive/Psychosocial Skills:     -       Oriented to: Person, Place, Time, and Situation   -       Follows Commands/attention:Follows multistep  commands  -       Safety awareness/insight to disability: intact   -       Mood/Affect/Coping skills/emotional control: Appropriate to situation and Cooperative    Physical Exam:  Balance:    -       Patient maintained static balance for 5 minutes without LOB  Postural examination/scapula alignment:    -       No postural abnormalities identified  Skin integrity: Bruising of cocxyx   Sensation:    -       Intact  Upper Extremity Range of Motion:     -       Right Upper Extremity: WNL  -       Left Upper Extremity: WNL  Upper Extremity Strength:    -       Right Upper Extremity: WNL  -       Left Upper Extremity: WNL    AMPAC 6 Click ADL:  AMPAC Total Score: 14    Treatment & Education:  Pt educated on role of OT/POC  Pt. Educated on  safety precautions   Pt provided self-care/ADL re-education  Pt reviewed bed mobility/functional mobility     Patient left sitting edge of bed with all lines intact, call button in reach, and Family present    GOALS:   Multidisciplinary Problems       Occupational Therapy Goals          Problem: Occupational Therapy    Goal Priority Disciplines Outcome Interventions   Occupational Therapy Goal     OT, PT/OT Ongoing, Progressing    Description: Goals to be met by: 10/09/2022     Patient will increase functional independence with ADLs by performing:    UE Dressing with Supervision.  LE Dressing with Supervision.  Grooming while seated with Supervision.  Toileting from toilet with Supervision for hygiene and clothing management.                          History:     Past Medical History:   Diagnosis Date    Anemia     Benign essential hypertension 12/17/2014    History of colon cancer, stage II 10/10/2018    Iron deficiency anemia due to chronic blood loss 10/16/2019         Past Surgical History:   Procedure Laterality Date    COLON SURGERY  09/18/2018    COLONOSCOPY N/A 9/14/2018    Procedure: COLONOSCOPY;  Surgeon: Adrian Cheung MD;  [facility]  Service: Endoscopy;  Laterality: N/A;  pt ate at 1:30 on 9/13/18-Dr Cheung informed and he stated ok to do colonoscopy.    COLONOSCOPY N/A 10/11/2019    Procedure: COLONOSCOPY;  Surgeon: Gabino Bonilla MD;  Location: [facility] (4TH FLR);  Service: Endoscopy;  Laterality: N/A;    COLONOSCOPY N/A 4/21/2022    Procedure: COLONOSCOPY;  Surgeon: Zeny Hinkle MD;  Location: [facility] (4TH FLR);  Service: Colon and Rectal;  Laterality: N/A;  fully vaccinated, instructions sent to [facility] and emailed to [email or web address]  suprep    NO PAST SURGERIES      RIGHT HEMICOLECTOMY N/A 9/18/2018    Procedure: HEMICOLECTOMY, RIGHT, extended;  Surgeon: Adrian Cheung MD;  [facility];  Service: Colon and Rectal;   Laterality: N/A;       Time Tracking:     OT Date of Treatment: 09/25/22  OT Start Time: 1743  OT Stop Time: 1758  OT Total Time (min): 15 min    Billable Minutes:Evaluation 5  Therapeutic Exercise 10    9/25/2022

## 2022-09-25 NOTE — ED TRIAGE NOTES
72-year-old gentleman with past medical history of hypertension presents for evaluation of fever.  Patient was evaluated recently after a fall.  At that time he had associated urinary retention.  A Jackson catheter was placed in the emergency department.  He was evaluated in Urology clinic any voiding trial was performed.  The patient failed his voiding trial.  Urine sample yesterday was determined to be infected.  He was started on Bactrim.  Daughter reports that he has had 3 doses the antibiotic.  Today he started having fever.  He reports that the urine in the Jackson catheter bag has gotten darker.  He denies any nausea or vomiting.

## 2022-09-25 NOTE — PLAN OF CARE
Hospital Medicine Plan of Care Note    Admission H&P dated earlier this morning reviewed, and agree with assessment and plan as documented. Pt seen and examined this morning on rounds, ERIBERTO.     LOU improving on AM labs, cefepime continued for UTI pending UCx results. Other than stable/unchanged LBP, he voices no further complaints. Continuing current plan pending further results.      Augustin Larson MD  Attending Physician  Department of Hospital Medicine  Epic secure chat preferred, or ext. [phone]  9/25/2022

## 2022-09-25 NOTE — SUBJECTIVE & OBJECTIVE
Past Medical History:   Diagnosis Date    Anemia     Benign essential hypertension 12/17/2014    History of colon cancer, stage II 10/10/2018    Iron deficiency anemia due to chronic blood loss 10/16/2019       Past Surgical History:   Procedure Laterality Date    COLON SURGERY  09/18/2018    COLONOSCOPY N/A 9/14/2018    Procedure: COLONOSCOPY;  Surgeon: Adrian Cheung MD;  Location: [facility] (4TH FLR);  Service: Endoscopy;  Laterality: N/A;  pt ate at 1:30 on 9/13/18-Dr Cheung informed and he stated ok to do colonoscopy.    COLONOSCOPY N/A 10/11/2019    Procedure: COLONOSCOPY;  Surgeon: Gabino Bonilla MD;  Location: [facility] (4TH FLR);  Service: Endoscopy;  Laterality: N/A;    COLONOSCOPY N/A 4/21/2022    Procedure: COLONOSCOPY;  Surgeon: Zeny Hinkle MD;  Location: [facility] (4TH FLR);  Service: Colon and Rectal;  Laterality: N/A;  fully vaccinated, instructions sent to [facility] and emailed to [email or web address]  suprep    NO PAST SURGERIES      RIGHT HEMICOLECTOMY N/A 9/18/2018    Procedure: HEMICOLECTOMY, RIGHT, extended;  Surgeon: Adrian Cheung MD;  [facility]  Service: Colon and Rectal;  Laterality: N/A;       Review of patient's allergies indicates:  No Known Allergies    No current facility-administered medications on file prior to encounter.     Current Outpatient Medications on File Prior to Encounter   Medication Sig    acetaminophen (TYLENOL) 325 MG tablet Take 2 tablets (650 mg total) by mouth every 6 (six) hours as needed for Pain.    allopurinoL (ZYLOPRIM) 300 MG tablet Take 1 tablet (300 mg total) by mouth once daily.    colchicine (COLCRYS) 0.6 mg tablet Take 1 tablet (0.6 mg total) by mouth once daily.    indomethacin (INDOCIN) 50 MG capsule Take 1 capsule (50 mg total) by mouth 2 (two) times daily with meals.    LIDOcaine (LIDODERM) 5 % Place 1 patch onto the skin once daily. Remove & Discard patch within 12 hours or as directed by MD     losartan-hydrochlorothiazide 100-12.5 mg (HYZAAR) 100-12.5 mg Tab Take 1 tablet by mouth once daily.    meloxicam (MOBIC) 15 MG tablet Take 1 tablet (15 mg total) by mouth daily as needed (back pain).    metFORMIN (GLUCOPHAGE-XR) 500 MG ER 24hr tablet Take 1 tablet (500 mg total) by mouth daily with breakfast.    oxyCODONE (ROXICODONE) 5 MG immediate release tablet Take 1 tablet (5 mg total) by mouth every 4 (four) hours as needed (severe pain).    sulfamethoxazole-trimethoprim 800-160mg (BACTRIM DS) 800-160 mg Tab Take 1 tablet by mouth 2 (two) times daily. for 7 days    tamsulosin (FLOMAX) 0.4 mg Cap Take 1 capsule (0.4 mg total) by mouth once daily.     Family History       Problem Relation (Age of Onset)    Cancer Father, Sister    Cataracts Brother    Diabetes Sister, Brother    Glaucoma Brother    Hypertension Mother, Father, Brother          Tobacco Use    Smoking status: Never    Smokeless tobacco: Never   Substance and Sexual Activity    Alcohol use: Not Currently    Drug use: No    Sexual activity: Yes     Partners: Female     Review of Systems   Constitutional:  Positive for appetite change, chills, fatigue and fever.   HENT:  Negative for drooling, mouth sores, rhinorrhea and sneezing.    Eyes:  Negative for photophobia.   Respiratory:  Negative for cough, shortness of breath and stridor.    Cardiovascular:  Negative for chest pain, palpitations and leg swelling.   Gastrointestinal:  Negative for abdominal distention, abdominal pain, constipation, diarrhea and nausea.   Endocrine: Negative for polyuria.   Genitourinary:  Positive for difficulty urinating. Negative for dysuria, hematuria and urgency.   Musculoskeletal:  Positive for back pain.   Skin:  Negative for rash and wound.   Neurological:  Negative for dizziness, seizures, syncope and speech difficulty.   Psychiatric/Behavioral:  Negative for hallucinations. The patient is not hyperactive.    Objective:     Vital Signs (Most Recent):  Temp:  98.7 °F (37.1 °C) (09/24/22 2116)  Pulse: 77 (09/24/22 2116)  Resp: 16 (09/24/22 2116)  BP: 136/69 (09/24/22 2116)  SpO2: 99 % (09/24/22 2116) Vital Signs (24h Range):  Temp:  [98.7 °F (37.1 °C)-98.8 °F (37.1 °C)] 98.7 °F (37.1 °C)  Pulse:  [72-77] 77  Resp:  [16-18] 16  SpO2:  [97 %-99 %] 99 %  BP: (130-136)/(69-74) 136/69     Weight: 97.1 kg (214 lb)  Body mass index is 29.85 kg/m².    Physical Exam  Constitutional:       General: He is not in acute distress.     Appearance: Normal appearance. He is not ill-appearing, toxic-appearing or diaphoretic.      Comments: Pleasant man in no acute distress. Cooperative with examination and conversant with interview.   HENT:      Head: Normocephalic and atraumatic.      Nose: Nose normal. No congestion or rhinorrhea.      Mouth/Throat:      Mouth: Mucous membranes are moist.      Pharynx: No oropharyngeal exudate or posterior oropharyngeal erythema.   Eyes:      General: No scleral icterus.  Cardiovascular:      Rate and Rhythm: Normal rate and regular rhythm.      Heart sounds: No murmur heard.    No friction rub. No gallop.   Pulmonary:      Effort: Pulmonary effort is normal.      Breath sounds: Normal breath sounds. No wheezing, rhonchi or rales.   Chest:      Chest wall: No tenderness.   Abdominal:      General: Abdomen is flat. Bowel sounds are normal. There is no distension.      Palpations: Abdomen is soft. There is no mass.      Tenderness: There is no abdominal tenderness. There is no guarding.   Genitourinary:     Comments: Patient with flores catheter in place draining dark yellow urine.  Musculoskeletal:         General: Normal range of motion.      Cervical back: Normal range of motion and neck supple. No rigidity.      Right lower leg: No edema.      Left lower leg: No edema.   Skin:     General: Skin is warm and dry.   Neurological:      General: No focal deficit present.      Mental Status: He is alert and oriented to person, place, and time. Mental status  is at baseline.      Sensory: No sensory deficit.   Psychiatric:         Mood and Affect: Mood normal.         Behavior: Behavior normal.         Thought Content: Thought content normal.         Judgment: Judgment normal.           Significant Labs: All pertinent labs within the past 24 hours have been reviewed.  CBC:   Recent Labs   Lab 09/24/22 1933   WBC 6.60   HGB 11.0*   HCT 32.6*   *     CMP:   Recent Labs   Lab 09/24/22 1933      K 3.7      CO2 21*   *   BUN 35*   CREATININE 1.7*   CALCIUM 9.6   PROT 7.3   ALBUMIN 3.2*   BILITOT 0.5   ALKPHOS 72   AST 35   ALT 33   ANIONGAP 12       Significant Imaging: I have reviewed all pertinent imaging results/findings within the past 24 hours.

## 2022-09-26 ENCOUNTER — TELEPHONE (OUTPATIENT)
Dept: UROLOGY | Facility: CLINIC | Age: 72
End: 2022-09-26
Payer: MEDICARE

## 2022-09-26 LAB
ALBUMIN SERPL BCP-MCNC: 2.8 G/DL (ref 3.5–5.2)
ALP SERPL-CCNC: 72 U/L (ref 55–135)
ALT SERPL W/O P-5'-P-CCNC: 36 U/L (ref 10–44)
ANION GAP SERPL CALC-SCNC: 9 MMOL/L (ref 8–16)
AST SERPL-CCNC: 31 U/L (ref 10–40)
BACTERIA UR CULT: ABNORMAL
BASOPHILS # BLD AUTO: 0.03 K/UL (ref 0–0.2)
BASOPHILS NFR BLD: 0.5 % (ref 0–1.9)
BILIRUB SERPL-MCNC: 0.4 MG/DL (ref 0.1–1)
BUN SERPL-MCNC: 24 MG/DL (ref 8–23)
CALCIUM SERPL-MCNC: 9.1 MG/DL (ref 8.7–10.5)
CHLORIDE SERPL-SCNC: 106 MMOL/L (ref 95–110)
CO2 SERPL-SCNC: 24 MMOL/L (ref 23–29)
CREAT SERPL-MCNC: 1.3 MG/DL (ref 0.5–1.4)
DIFFERENTIAL METHOD: ABNORMAL
EOSINOPHIL # BLD AUTO: 0.1 K/UL (ref 0–0.5)
EOSINOPHIL NFR BLD: 2.3 % (ref 0–8)
ERYTHROCYTE [DISTWIDTH] IN BLOOD BY AUTOMATED COUNT: 13.7 % (ref 11.5–14.5)
EST. GFR  (NO RACE VARIABLE): 58.7 ML/MIN/1.73 M^2
GLUCOSE SERPL-MCNC: 114 MG/DL (ref 70–110)
HCT VFR BLD AUTO: 29.4 % (ref 40–54)
HGB BLD-MCNC: 9.6 G/DL (ref 14–18)
IMM GRANULOCYTES # BLD AUTO: 0.03 K/UL (ref 0–0.04)
IMM GRANULOCYTES NFR BLD AUTO: 0.5 % (ref 0–0.5)
LYMPHOCYTES # BLD AUTO: 1.6 K/UL (ref 1–4.8)
LYMPHOCYTES NFR BLD: 26.7 % (ref 18–48)
MAGNESIUM SERPL-MCNC: 2 MG/DL (ref 1.6–2.6)
MCH RBC QN AUTO: 26.5 PG (ref 27–31)
MCHC RBC AUTO-ENTMCNC: 32.7 G/DL (ref 32–36)
MCV RBC AUTO: 81 FL (ref 82–98)
MONOCYTES # BLD AUTO: 1.1 K/UL (ref 0.3–1)
MONOCYTES NFR BLD: 17.6 % (ref 4–15)
NEUTROPHILS # BLD AUTO: 3.2 K/UL (ref 1.8–7.7)
NEUTROPHILS NFR BLD: 52.4 % (ref 38–73)
NRBC BLD-RTO: 0 /100 WBC
PHOSPHATE SERPL-MCNC: 3 MG/DL (ref 2.7–4.5)
PLATELET # BLD AUTO: 152 K/UL (ref 150–450)
PMV BLD AUTO: 10.7 FL (ref 9.2–12.9)
POTASSIUM SERPL-SCNC: 4 MMOL/L (ref 3.5–5.1)
PROT SERPL-MCNC: 6.2 G/DL (ref 6–8.4)
RBC # BLD AUTO: 3.62 M/UL (ref 4.6–6.2)
SODIUM SERPL-SCNC: 139 MMOL/L (ref 136–145)
WBC # BLD AUTO: 6.07 K/UL (ref 3.9–12.7)

## 2022-09-26 PROCEDURE — 85025 COMPLETE CBC W/AUTO DIFF WBC: CPT | Performed by: STUDENT IN AN ORGANIZED HEALTH CARE EDUCATION/TRAINING PROGRAM

## 2022-09-26 PROCEDURE — 25000003 PHARM REV CODE 250: Performed by: STUDENT IN AN ORGANIZED HEALTH CARE EDUCATION/TRAINING PROGRAM

## 2022-09-26 PROCEDURE — 36415 COLL VENOUS BLD VENIPUNCTURE: CPT | Performed by: STUDENT IN AN ORGANIZED HEALTH CARE EDUCATION/TRAINING PROGRAM

## 2022-09-26 PROCEDURE — 96372 THER/PROPH/DIAG INJ SC/IM: CPT | Performed by: STUDENT IN AN ORGANIZED HEALTH CARE EDUCATION/TRAINING PROGRAM

## 2022-09-26 PROCEDURE — 80053 COMPREHEN METABOLIC PANEL: CPT | Performed by: STUDENT IN AN ORGANIZED HEALTH CARE EDUCATION/TRAINING PROGRAM

## 2022-09-26 PROCEDURE — 83735 ASSAY OF MAGNESIUM: CPT | Performed by: STUDENT IN AN ORGANIZED HEALTH CARE EDUCATION/TRAINING PROGRAM

## 2022-09-26 PROCEDURE — 63600175 PHARM REV CODE 636 W HCPCS: Performed by: STUDENT IN AN ORGANIZED HEALTH CARE EDUCATION/TRAINING PROGRAM

## 2022-09-26 PROCEDURE — 94761 N-INVAS EAR/PLS OXIMETRY MLT: CPT

## 2022-09-26 PROCEDURE — 99226 PR SUBSEQUENT OBSERVATION CARE,LEVEL III: ICD-10-PCS | Mod: ,,, | Performed by: STUDENT IN AN ORGANIZED HEALTH CARE EDUCATION/TRAINING PROGRAM

## 2022-09-26 PROCEDURE — 99226 PR SUBSEQUENT OBSERVATION CARE,LEVEL III: CPT | Mod: ,,, | Performed by: STUDENT IN AN ORGANIZED HEALTH CARE EDUCATION/TRAINING PROGRAM

## 2022-09-26 PROCEDURE — 84100 ASSAY OF PHOSPHORUS: CPT | Performed by: STUDENT IN AN ORGANIZED HEALTH CARE EDUCATION/TRAINING PROGRAM

## 2022-09-26 PROCEDURE — 96366 THER/PROPH/DIAG IV INF ADDON: CPT

## 2022-09-26 PROCEDURE — G0378 HOSPITAL OBSERVATION PER HR: HCPCS

## 2022-09-26 RX ADMIN — HEPARIN SODIUM 5000 UNITS: 5000 INJECTION INTRAVENOUS; SUBCUTANEOUS at 06:09

## 2022-09-26 RX ADMIN — HEPARIN SODIUM 5000 UNITS: 5000 INJECTION INTRAVENOUS; SUBCUTANEOUS at 02:09

## 2022-09-26 RX ADMIN — CEFEPIME HYDROCHLORIDE 1 G: 1 INJECTION, SOLUTION INTRAVENOUS at 11:09

## 2022-09-26 RX ADMIN — LOSARTAN POTASSIUM AND HYDROCHLOROTHIAZIDE 1 TABLET: 100; 12.5 TABLET, FILM COATED ORAL at 08:09

## 2022-09-26 RX ADMIN — CEFEPIME HYDROCHLORIDE 1 G: 1 INJECTION, SOLUTION INTRAVENOUS at 01:09

## 2022-09-26 RX ADMIN — TAMSULOSIN HYDROCHLORIDE 0.4 MG: 0.4 CAPSULE ORAL at 08:09

## 2022-09-26 RX ADMIN — SODIUM CHLORIDE, SODIUM LACTATE, POTASSIUM CHLORIDE, AND CALCIUM CHLORIDE: .6; .31; .03; .02 INJECTION, SOLUTION INTRAVENOUS at 02:09

## 2022-09-26 RX ADMIN — HEPARIN SODIUM 5000 UNITS: 5000 INJECTION INTRAVENOUS; SUBCUTANEOUS at 10:09

## 2022-09-26 NOTE — PLAN OF CARE
Pt alert, stable; scheduled meds given without difficulty; IVF continued; abx therapy continued; possible cystoscopy this week; no distress noted during shift; safety maintained.    Problem: Infection  Goal: Absence of Infection Signs and Symptoms  Outcome: Ongoing, Progressing   Problem: Adult Inpatient Plan of Care  Goal: Plan of Care Review  Outcome: Ongoing, Progressing  Goal: Patient-Specific Goal (Individualized)  Outcome: Ongoing, Progressing  Goal: Absence of Hospital-Acquired Illness or Injury  Outcome: Ongoing, Progressing  Goal: Optimal Comfort and Wellbeing  Outcome: Ongoing, Progressing  Goal: Readiness for Transition of Care  Outcome: Ongoing, Progressing

## 2022-09-26 NOTE — ASSESSMENT & PLAN NOTE
- Body mass index is 30.73 kg/m².  - Needs dietary and lifestyle modifications in relation to health  - Consider dietary/nutrition consult outpatient

## 2022-09-26 NOTE — TELEPHONE ENCOUNTER
Spoke with patient, stated he needs a work status paper to be signed and his  has them.  Advised to contact , release should be signed, and they can contact the office with clarity of requested information.  He voiced understanding.

## 2022-09-26 NOTE — PROGRESS NOTES
[facility] [facility]  Progress Note    Patient Name: Noah Nichole  MRN: [id number]  Patient Class: OP- Observation   Admission Date: 9/24/2022  Length of Stay: 0 days  Attending Physician: Augustin Larson MD  Primary Care Provider: Grace Jeffery MD        Subjective:     Principal Problem:Acute cystitis with hematuria        HPI:  Noah Nichole is a 72-year-old man with a history of HTN who presents to the emergency department for evaluation of fever.   The patient was evaluated recently after a fall.  At that time he had associated urinary retention.  A flores catheter was placed in the emergency department.  He was evaluated in Urology clinic any voiding trial was performed.  The patient failed his voiding trial.  Urine sample yesterday was determined to be infected.  He was started on Bactrim.  Daughter reports that he has had 3 doses the antibiotic.  Today he started having fever.  He reports that the urine in the Flores catheter bag has gotten darker.  He denies any nausea or vomiting.       Overview/Hospital Course:  Pt admitted to [facility] and started on cefepime, remained afebrile and without leukocytosis. Urine culture growing GNRs.      Interval History: Pt seen and examined this morning on rounds. NAEON. Continues to have LBP, though no other issues. Optimistic for discharge soon. Care plan reviewed. Otherwise, doing well and with no further complaints at this time.      Objective:     Vital Signs (Most Recent):  Temp: 98 °F (36.7 °C) (09/26/22 0838)  Pulse: 74 (09/26/22 0838)  Resp: 18 (09/26/22 0838)  BP: (!) 166/81 (09/26/22 0838)  SpO2: 96 % (09/26/22 0838)   Vital Signs (24h Range):  Temp:  [97.1 °F (36.2 °C)-99.1 °F (37.3 °C)] 98 °F (36.7 °C)  Pulse:  [69-76] 74  Resp:  [18] 18  SpO2:  [93 %-97 %] 96 %  BP: (130-166)/(62-81) 166/81     Weight: 99.9 kg (220 lb 3.8 oz)  Body mass index is 30.73 kg/m².    Intake/Output Summary (Last 24 hours) at 9/26/2022  1154  Last data filed at 9/26/2022 0509  Gross per 24 hour   Intake 100 ml   Output 900 ml   Net -800 ml        Physical Exam  Gen: in NAD, appears stated age; pt is obese  Neuro: AAOx4, CN2-12 grossly intact BL; motor, sensory, and strength grossly intact BL  HEENT: NTNC, EOMI, PERRLA, MMM; no thyromegaly or lymphadenopathy; no JVD appreciated  CVS: RRR, no m/r/g; S1/S2 auscultated with no S3 or S4; capillary refill < 2 sec  Resp: lungs CTAB, no w/r/r; no belabored breathing or accessory muscle use appreciated   Abd: BS+ in all 4 quadrants; NTND, soft to palpation; no organomegaly appreciated   : flores in place  Extrem: pulses full, equal, and regular over all 4 extremities; no UE or LE edema BL      Significant Labs: All pertinent labs within the past 24 hours have been reviewed.    Significant Imaging: I have reviewed all pertinent imaging results/findings within the past 24 hours.      Assessment/Plan:      * Acute cystitis with hematuria  - Interval history and physical exam findings as described above  - UCx growing GNRs  - Awaiting final C&S results  - Continue cefepime  - Afebrile, no leukocytosis    Chronic indwelling Flores catheter  - Exchanged on admission  - Urology appointment on 9/28 at [facility]    Back pain  Patient with back pain after fall. MRI with no fracture.  - continue cyclobenzaprine and acetaminophen PRN    Class 1 obesity due to excess calories with serious comorbidity and body mass index (BMI) of 30.0 to 30.9 in adult  - Body mass index is 30.73 kg/m².  - Needs dietary and lifestyle modifications in relation to health  - Consider dietary/nutrition consult outpatient    Benign essential hypertension  - continue losartan-HCTZ 100-12.5 mg daily      VTE Risk Mitigation (From admission, onward)         Ordered     heparin (porcine) injection 5,000 Units  Every 8 hours         09/24/22 2239     IP VTE HIGH RISK PATIENT  Once         09/24/22 2239     Place sequential compression device  Until  discontinued         09/24/22 2239                Discharge Planning   BYRON: 9/27/2022     Code Status: Full Code   Is the patient medically ready for discharge?: No    Reason for patient still in hospital (select all that apply): Patient trending condition               Augustin Larson MD  Attending Physician  Department of Hospital Medicine  Epic secure chat preferred, or ext. [phone]  9/26/2022

## 2022-09-26 NOTE — TELEPHONE ENCOUNTER
----- Message from [facility] sent at 9/26/2022 11:11 AM CDT -----  Type:  Needs Medical Advice    Who Called: Pt   Would the patient rather a call back or a response via [facility]? Callback   Best Call Back Number: [phone]  Additional Information: Pt requesting callback from office to discuss getting work orders

## 2022-09-26 NOTE — ASSESSMENT & PLAN NOTE
- Interval history and physical exam findings as described above  - UCx growing GNRs  - Awaiting final C&S results  - Continue cefepime  - Afebrile, no leukocytosis

## 2022-09-26 NOTE — SUBJECTIVE & OBJECTIVE
Interval History: Pt seen and examined this morning on rounds. ERIBERTO. Continues to have LBP, though no other issues. Optimistic for discharge soon. Care plan reviewed. Otherwise, doing well and with no further complaints at this time.      Objective:     Vital Signs (Most Recent):  Temp: 98 °F (36.7 °C) (09/26/22 0838)  Pulse: 74 (09/26/22 0838)  Resp: 18 (09/26/22 0838)  BP: (!) 166/81 (09/26/22 0838)  SpO2: 96 % (09/26/22 0838)   Vital Signs (24h Range):  Temp:  [97.1 °F (36.2 °C)-99.1 °F (37.3 °C)] 98 °F (36.7 °C)  Pulse:  [69-76] 74  Resp:  [18] 18  SpO2:  [93 %-97 %] 96 %  BP: (130-166)/(62-81) 166/81     Weight: 99.9 kg (220 lb 3.8 oz)  Body mass index is 30.73 kg/m².    Intake/Output Summary (Last 24 hours) at 9/26/2022 1154  Last data filed at 9/26/2022 0509  Gross per 24 hour   Intake 100 ml   Output 900 ml   Net -800 ml        Physical Exam  Gen: in NAD, appears stated age; pt is obese  Neuro: AAOx4, CN2-12 grossly intact BL; motor, sensory, and strength grossly intact BL  HEENT: NTNC, EOMI, PERRLA, MMM; no thyromegaly or lymphadenopathy; no JVD appreciated  CVS: RRR, no m/r/g; S1/S2 auscultated with no S3 or S4; capillary refill < 2 sec  Resp: lungs CTAB, no w/r/r; no belabored breathing or accessory muscle use appreciated   Abd: BS+ in all 4 quadrants; NTND, soft to palpation; no organomegaly appreciated   : flores in place  Extrem: pulses full, equal, and regular over all 4 extremities; no UE or LE edema BL      Significant Labs: All pertinent labs within the past 24 hours have been reviewed.    Significant Imaging: I have reviewed all pertinent imaging results/findings within the past 24 hours.

## 2022-09-27 VITALS
SYSTOLIC BLOOD PRESSURE: 154 MMHG | HEART RATE: 73 BPM | BODY MASS INDEX: 30.83 KG/M2 | TEMPERATURE: 97 F | WEIGHT: 220.25 LBS | RESPIRATION RATE: 17 BRPM | OXYGEN SATURATION: 98 % | DIASTOLIC BLOOD PRESSURE: 71 MMHG | HEIGHT: 71 IN

## 2022-09-27 LAB
ALBUMIN SERPL BCP-MCNC: 2.7 G/DL (ref 3.5–5.2)
ALP SERPL-CCNC: 74 U/L (ref 55–135)
ALT SERPL W/O P-5'-P-CCNC: 31 U/L (ref 10–44)
ANION GAP SERPL CALC-SCNC: 9 MMOL/L (ref 8–16)
ANISOCYTOSIS BLD QL SMEAR: SLIGHT
AST SERPL-CCNC: 22 U/L (ref 10–40)
BASOPHILS # BLD AUTO: 0.04 K/UL (ref 0–0.2)
BASOPHILS NFR BLD: 0.6 % (ref 0–1.9)
BILIRUB SERPL-MCNC: 0.4 MG/DL (ref 0.1–1)
BUN SERPL-MCNC: 19 MG/DL (ref 8–23)
CALCIUM SERPL-MCNC: 9.2 MG/DL (ref 8.7–10.5)
CHLORIDE SERPL-SCNC: 106 MMOL/L (ref 95–110)
CO2 SERPL-SCNC: 24 MMOL/L (ref 23–29)
CREAT SERPL-MCNC: 1 MG/DL (ref 0.5–1.4)
DIFFERENTIAL METHOD: ABNORMAL
EOSINOPHIL # BLD AUTO: 0.1 K/UL (ref 0–0.5)
EOSINOPHIL NFR BLD: 2.1 % (ref 0–8)
ERYTHROCYTE [DISTWIDTH] IN BLOOD BY AUTOMATED COUNT: 13.5 % (ref 11.5–14.5)
EST. GFR  (NO RACE VARIABLE): >60 ML/MIN/1.73 M^2
GLUCOSE SERPL-MCNC: 114 MG/DL (ref 70–110)
HCT VFR BLD AUTO: 29.6 % (ref 40–54)
HGB BLD-MCNC: 9.7 G/DL (ref 14–18)
IMM GRANULOCYTES # BLD AUTO: 0.03 K/UL (ref 0–0.04)
IMM GRANULOCYTES NFR BLD AUTO: 0.5 % (ref 0–0.5)
LYMPHOCYTES # BLD AUTO: 2.2 K/UL (ref 1–4.8)
LYMPHOCYTES NFR BLD: 33.5 % (ref 18–48)
MAGNESIUM SERPL-MCNC: 1.8 MG/DL (ref 1.6–2.6)
MCH RBC QN AUTO: 26.6 PG (ref 27–31)
MCHC RBC AUTO-ENTMCNC: 32.8 G/DL (ref 32–36)
MCV RBC AUTO: 81 FL (ref 82–98)
MONOCYTES # BLD AUTO: 1 K/UL (ref 0.3–1)
MONOCYTES NFR BLD: 15.2 % (ref 4–15)
NEUTROPHILS # BLD AUTO: 3.2 K/UL (ref 1.8–7.7)
NEUTROPHILS NFR BLD: 48.1 % (ref 38–73)
NRBC BLD-RTO: 0 /100 WBC
OVALOCYTES BLD QL SMEAR: ABNORMAL
PHOSPHATE SERPL-MCNC: 2.9 MG/DL (ref 2.7–4.5)
PLATELET # BLD AUTO: 171 K/UL (ref 150–450)
PLATELET BLD QL SMEAR: ABNORMAL
PMV BLD AUTO: 10.9 FL (ref 9.2–12.9)
POIKILOCYTOSIS BLD QL SMEAR: SLIGHT
POTASSIUM SERPL-SCNC: 4.1 MMOL/L (ref 3.5–5.1)
PROT SERPL-MCNC: 6.3 G/DL (ref 6–8.4)
RBC # BLD AUTO: 3.65 M/UL (ref 4.6–6.2)
SODIUM SERPL-SCNC: 139 MMOL/L (ref 136–145)
WBC # BLD AUTO: 6.6 K/UL (ref 3.9–12.7)

## 2022-09-27 PROCEDURE — G0378 HOSPITAL OBSERVATION PER HR: HCPCS

## 2022-09-27 PROCEDURE — 99217 PR OBSERVATION CARE DISCHARGE: ICD-10-PCS | Mod: ,,, | Performed by: STUDENT IN AN ORGANIZED HEALTH CARE EDUCATION/TRAINING PROGRAM

## 2022-09-27 PROCEDURE — 83735 ASSAY OF MAGNESIUM: CPT | Performed by: STUDENT IN AN ORGANIZED HEALTH CARE EDUCATION/TRAINING PROGRAM

## 2022-09-27 PROCEDURE — 96372 THER/PROPH/DIAG INJ SC/IM: CPT | Performed by: STUDENT IN AN ORGANIZED HEALTH CARE EDUCATION/TRAINING PROGRAM

## 2022-09-27 PROCEDURE — 63600175 PHARM REV CODE 636 W HCPCS: Performed by: STUDENT IN AN ORGANIZED HEALTH CARE EDUCATION/TRAINING PROGRAM

## 2022-09-27 PROCEDURE — 97116 GAIT TRAINING THERAPY: CPT

## 2022-09-27 PROCEDURE — 84100 ASSAY OF PHOSPHORUS: CPT | Performed by: STUDENT IN AN ORGANIZED HEALTH CARE EDUCATION/TRAINING PROGRAM

## 2022-09-27 PROCEDURE — 85025 COMPLETE CBC W/AUTO DIFF WBC: CPT | Performed by: STUDENT IN AN ORGANIZED HEALTH CARE EDUCATION/TRAINING PROGRAM

## 2022-09-27 PROCEDURE — 36415 COLL VENOUS BLD VENIPUNCTURE: CPT | Performed by: STUDENT IN AN ORGANIZED HEALTH CARE EDUCATION/TRAINING PROGRAM

## 2022-09-27 PROCEDURE — 94761 N-INVAS EAR/PLS OXIMETRY MLT: CPT

## 2022-09-27 PROCEDURE — 80053 COMPREHEN METABOLIC PANEL: CPT | Performed by: STUDENT IN AN ORGANIZED HEALTH CARE EDUCATION/TRAINING PROGRAM

## 2022-09-27 PROCEDURE — 97530 THERAPEUTIC ACTIVITIES: CPT

## 2022-09-27 PROCEDURE — 25000003 PHARM REV CODE 250: Performed by: STUDENT IN AN ORGANIZED HEALTH CARE EDUCATION/TRAINING PROGRAM

## 2022-09-27 PROCEDURE — 99217 PR OBSERVATION CARE DISCHARGE: CPT | Mod: ,,, | Performed by: STUDENT IN AN ORGANIZED HEALTH CARE EDUCATION/TRAINING PROGRAM

## 2022-09-27 PROCEDURE — 97161 PT EVAL LOW COMPLEX 20 MIN: CPT

## 2022-09-27 RX ORDER — CYCLOBENZAPRINE HCL 5 MG
5 TABLET ORAL 3 TIMES DAILY PRN
Qty: 30 TABLET | Refills: 0 | Status: SHIPPED | OUTPATIENT
Start: 2022-09-27 | End: 2022-10-07

## 2022-09-27 RX ORDER — CEFDINIR 300 MG/1
300 CAPSULE ORAL 2 TIMES DAILY
Qty: 10 CAPSULE | Refills: 0 | Status: SHIPPED | OUTPATIENT
Start: 2022-09-27 | End: 2022-10-02

## 2022-09-27 RX ADMIN — LOSARTAN POTASSIUM AND HYDROCHLOROTHIAZIDE 1 TABLET: 100; 12.5 TABLET, FILM COATED ORAL at 09:09

## 2022-09-27 RX ADMIN — TAMSULOSIN HYDROCHLORIDE 0.4 MG: 0.4 CAPSULE ORAL at 09:09

## 2022-09-27 RX ADMIN — HEPARIN SODIUM 5000 UNITS: 5000 INJECTION INTRAVENOUS; SUBCUTANEOUS at 05:09

## 2022-09-27 NOTE — PLAN OF CARE
[facility] - Telemetry Stepdown ([place] [address])      HOME HEALTH ORDERS  FACE TO FACE ENCOUNTER    Patient Name: Noah Nichole  YOB: 1950    PCP: Grace Jeffery MD   PCP Address: [address] / [place] [address]  PCP Phone Number: [phone]  PCP Fax: [phone]    Encounter Date: 9/24/22    Admit to Home Health    Diagnoses:  Active Hospital Problems    Diagnosis  POA    *Acute cystitis with hematuria [N30.01]  Yes     Priority: 1 - High    Chronic indwelling Jackson catheter [Z97.8]  Not Applicable     Priority: 2     Back pain [M54.9]  Yes    Class 1 obesity due to excess calories with serious comorbidity and body mass index (BMI) of 30.0 to 30.9 in adult [E66.09, Z68.30]  Not Applicable    Benign essential hypertension [I10]  Yes      Resolved Hospital Problems   No resolved problems to display.       Follow Up Appointments:  Future Appointments   Date Time Provider Department Center   9/28/2022 10:00 AM Triston Fuchs MD [facility] UROLOGY [facility] [facility]   9/30/2022  7:00 AM APPOINTMENT LAB, [facility] LAB [facility] [facility]   10/7/2022  8:00 AM Grace Jeffery MD [facility] MED [facility]       Allergies:Review of patient's allergies indicates:  No Known Allergies    Medications: Review discharge medications with patient and family and provide education.    Current Facility-Administered Medications   Medication Dose Route Frequency Provider Last Rate Last Admin    acetaminophen tablet 650 mg  650 mg Oral Q8H PRN Hawk Garsia MD        cefepime in dextrose 5 % 1 gram/50 mL IVPB 1 g  1 g Intravenous Q12H Hawk Garsia MD   Stopped at 09/27/22 0000    cyclobenzaprine tablet 5 mg  5 mg Oral TID PRN Hawk Garsia MD   5 mg at 09/25/22 2215    glucagon (human recombinant) injection 1 mg  1 mg Intramuscular PRN Hawk Garsia MD        glucose chewable tablet 16 g  16 g Oral PRN Hawk Garsia MD        glucose chewable tablet 24 g  24 g Oral PRN Hawk Garsia MD         heparin (porcine) injection 5,000 Units  5,000 Units Subcutaneous Q8H Hawk Garsia MD   5,000 Units at 09/27/22 0554    lactated ringers infusion   Intravenous Continuous Hawk Garsia  mL/hr at 09/26/22 0209 New Bag at 09/26/22 0209    losartan-hydrochlorothiazide 100-12.5 mg per tablet 1 tablet  1 tablet Oral Daily Hawk Garsia MD   1 tablet at 09/26/22 0839    polyethylene glycol packet 17 g  17 g Oral Daily Augustin Larson MD   17 g at 09/25/22 1336    sodium chloride 0.9% flush 10 mL  10 mL Intravenous Q12H PRN Hawk Garsia MD        tamsulosin 24 hr capsule 0.4 mg  0.4 mg Oral Daily Hawk Garsia MD   0.4 mg at 09/26/22 0839     Current Discharge Medication List        START taking these medications    Details   cefdinir (OMNICEF) 300 MG capsule Take 1 capsule (300 mg total) by mouth 2 (two) times daily. for 5 days  Qty: 10 capsule, Refills: 0      cyclobenzaprine (FLEXERIL) 5 MG tablet Take 1 tablet (5 mg total) by mouth 3 (three) times daily as needed for Muscle spasms.  Qty: 30 tablet, Refills: 0           CONTINUE these medications which have NOT CHANGED    Details   acetaminophen (TYLENOL) 325 MG tablet Take 2 tablets (650 mg total) by mouth every 6 (six) hours as needed for Pain.  Qty: 30 tablet, Refills: 0      allopurinoL (ZYLOPRIM) 300 MG tablet Take 1 tablet (300 mg total) by mouth once daily.  Qty: 90 tablet, Refills: 4    Associated Diagnoses: Gouty arthritis of left great toe; Elevated blood uric acid level      colchicine (COLCRYS) 0.6 mg tablet Take 1 tablet (0.6 mg total) by mouth once daily.  Qty: 30 tablet, Refills: 2    Associated Diagnoses: Acute gout involving toe of left foot, unspecified cause      LIDOcaine (LIDODERM) 5 % Place 1 patch onto the skin once daily. Remove & Discard patch within 12 hours or as directed by MD  Qty: 15 patch, Refills: 0      losartan-hydrochlorothiazide 100-12.5 mg (HYZAAR) 100-12.5 mg Tab Take 1 tablet by mouth once daily.  Qty: 90  tablet, Refills: 3    Comments: .  Associated Diagnoses: Benign essential hypertension      meloxicam (MOBIC) 15 MG tablet Take 1 tablet (15 mg total) by mouth daily as needed (back pain).  Qty: 30 tablet, Refills: 2    Associated Diagnoses: Chronic bilateral low back pain without sciatica      metFORMIN (GLUCOPHAGE-XR) 500 MG ER 24hr tablet Take 1 tablet (500 mg total) by mouth daily with breakfast.  Qty: 90 tablet, Refills: 4    Associated Diagnoses: Prediabetes      oxyCODONE (ROXICODONE) 5 MG immediate release tablet Take 1 tablet (5 mg total) by mouth every 4 (four) hours as needed (severe pain).  Qty: 8 tablet, Refills: 0    Comments: Quantity prescribed more than 7 day supply? No      tamsulosin (FLOMAX) 0.4 mg Cap Take 1 capsule (0.4 mg total) by mouth once daily.  Qty: 90 capsule, Refills: 3           STOP taking these medications       indomethacin (INDOCIN) 50 MG capsule Comments:   Reason for Stopping:         sulfamethoxazole-trimethoprim 800-160mg (BACTRIM DS) 800-160 mg Tab Comments:   Reason for Stopping:                 I have seen and examined this patient within the last 30 days. My clinical findings that support the need for the home health skilled services and home bound status are the following:no   Weakness/numbness causing balance and gait disturbance due to Weakness/Debility making it taxing to leave home.     Diet:   cardiac diet, diabetic diet 2000 calorie, and 2 gram sodium diet    Labs:  Report Lab results to PCP.    Referrals/ Consults  Physical Therapy to evaluate and treat. Evaluate for home safety and equipment needs; Establish/upgrade home exercise program. Perform / instruct on therapeutic exercises, gait training, transfer training, and Range of Motion.  Occupational Therapy to evaluate and treat. Evaluate home environment for safety and equipment needs. Perform/Instruct on transfers, ADL training, ROM, and therapeutic exercises.  Aide to provide assistance with personal care,  ADLs, and vital signs.    Activities:   activity as tolerated    Nursing:   Agency to admit patient within 24 hours of hospital discharge unless specified on physician order or at patient request    SN to complete comprehensive assessment including routine vital signs. Instruct on disease process and s/s of complications to report to MD. Review/verify medication list sent home with the patient at time of discharge  and instruct patient/caregiver as needed. Frequency may be adjusted depending on start of care date.     Skilled nurse to perform up to 3 visits PRN for symptoms related to diagnosis    Notify MD if SBP > 160 or < 90; DBP > 90 or < 50; HR > 120 or < 50; Temp > 101; O2 < 88%    Ok to schedule additional visits based on staff availability and patient request on consecutive days within the home health episode.    When multiple disciplines ordered:    Start of Care occurs on Sunday - Wednesday schedule remaining discipline evaluations as ordered on separate consecutive days following the start of care.    Thursday SOC -schedule subsequent evaluations Friday and Monday the following week.     Friday - Saturday SOC - schedule subsequent discipline evaluations on consecutive days starting Monday of the following week.    For all post-discharge communication and subsequent orders please contact patient's primary care physician.  Miscellaneous   NA    Home Health Aide:  Physical Therapy Three times weekly, Occupational Therapy Three times weekly, and Home Health Aide Three times weekly    Wound Care Orders  no    I certify that this patient is confined to his home and needs intermittent skilled nursing care, physical therapy, and occupational therapy.        Augustin Larson MD  Attending Physician  Department of Hospital Medicine  Epic secure chat preferred, or ext. [phone]  9/27/2022

## 2022-09-27 NOTE — PLAN OF CARE
Problem: Infection  Goal: Absence of Infection Signs and Symptoms  Outcome: Ongoing, Progressing     Problem: Adult Inpatient Plan of Care  Goal: Plan of Care Review  Outcome: Ongoing, Progressing

## 2022-09-27 NOTE — NURSING
Pt discharged to home. Pt verbalized understanding discharge instruction, how to take prescriptions, and the importance of attending follow up appointments. Jackson cath connected to leg bag. Patient and family given opportunity to ask questions. IV removed. NAD noted. Patient waiting on wheelchair to bring to vehicle.

## 2022-09-27 NOTE — DISCHARGE SUMMARY
[facility] [address]  [facility] Medicine  Discharge Summary      Patient Name: Noah Nichole  MRN: [id number]  Patient Class: OP- Observation  Admission Date: 9/24/2022  Hospital Length of Stay: 0 days  Discharge Date and Time:  09/27/2022 10:48 AM  Attending Physician: Augustin Larson MD   Discharging Provider: Augutsin Larson MD  Primary Care Provider: Grace Jeffery MD  [facility] Medicine Team: [facility] Augustin Larson MD    HPI:   Noah Nichole is a 72-year-old man with a history of HTN who presents to the emergency department for evaluation of fever.   The patient was evaluated recently after a fall.  At that time he had associated urinary retention.  A flores catheter was placed in the emergency department.  He was evaluated in Urology clinic any voiding trial was performed.  The patient failed his voiding trial.  Urine sample yesterday was determined to be infected.  He was started on Bactrim.  Daughter reports that he has had 3 doses the antibiotic.  Today he started having fever.  He reports that the urine in the Flores catheter bag has gotten darker.  He denies any nausea or vomiting.       * No surgery found *      Hospital Course:   Pt admitted to [facility] and started on cefepime, remained afebrile and without leukocytosis. Urine culture growing GNRs, resulted as pan-sensitive Klebsiella. Pt continued to remain stable overnight and into 9/27. Pt deemed appropriate for discharge to complete PO cefdinir at home. Has urology procedure on 9/28 at [facility]. Plan discussed with pt, who was agreeable and amenable; medications were discussed and reviewed, outpatient follow-up scheduled, ER precautions were given, all questions were answered to the pt's satisfaction, and Mr. Nichole was subsequently discharged.         Goals of Care Treatment Preferences:  Code Status: Full Code      Consults:   Consults (From admission, onward)        Status Ordering Provider     IP consult  "to case management  Once        Provider:  (Not yet assigned)    Acknowledged ROZ KOO          No new Assessment & Plan notes have been filed under this hospital service since the last note was generated.  Service: Hospital Medicine    Final Active Diagnoses:    Diagnosis Date Noted POA    PRINCIPAL PROBLEM:  Acute cystitis with hematuria [N30.01] 09/25/2022 Yes    Chronic indwelling Jackson catheter [Z97.8] 09/25/2022 Not Applicable    Back pain [M54.9] 09/25/2022 Yes    Class 1 obesity due to excess calories with serious comorbidity and body mass index (BMI) of 30.0 to 30.9 in adult [E66.09, Z68.30] 02/19/2021 Not Applicable    Benign essential hypertension [I10] 12/17/2014 Yes      Problems Resolved During this Admission:       Discharged Condition: stable    Disposition: Home or Self Care    Follow Up:   Follow-up Information     Grace Jeffery MD. Schedule an appointment as soon as possible for a visit.    Specialty: Family Medicine  Contact information:  [address]  [address]  [facility] [id number] [phone]                       Patient Instructions:      BATH/SHOWER CHAIR FOR HOME USE     Order Specific Question Answer Comments   Height: 5' 10.98" (1.803 m)    Weight: 99.9 kg (220 lb 3.8 oz)    Does patient have medical equipment at home? cane, straight    Length of need (1-99 months): 99    Type: With back      Ambulatory referral/consult to Family Practice   Standing Status: Future   Referral Priority: Routine Referral Type: Consultation   Referral Reason: Specialty Services Required   Requested Specialty: Family Medicine   Number of Visits Requested: 1     Diet diabetic     Diet Cardiac     Notify your health care provider if you experience any of the following:  increased confusion or weakness     Notify your health care provider if you experience any of the following:  persistent dizziness, light-headedness, or visual disturbances     Notify your health care provider if you experience " any of the following:  worsening rash     Notify your health care provider if you experience any of the following:  severe persistent headache     Notify your health care provider if you experience any of the following:  difficulty breathing or increased cough     Notify your health care provider if you experience any of the following:  severe uncontrolled pain     Notify your health care provider if you experience any of the following:  persistent nausea and vomiting or diarrhea     Notify your health care provider if you experience any of the following:  temperature >100.4     Activity as tolerated       Significant Diagnostic Studies: Labs: All labs within the past 24 hours have been reviewed    Pending Diagnostic Studies:     Procedure Component Value Units Date/Time    CBC with Automated Differential [[id number]] Collected: 09/25/22 0353    Order Status: Sent Lab Status: In process Updated: 09/25/22 0353    Specimen: Blood          Medications:  Reconciled Home Medications:      Medication List      START taking these medications    cefdinir 300 MG capsule  Commonly known as: OMNICEF  Take 1 capsule (300 mg total) by mouth 2 (two) times daily. for 5 days     cyclobenzaprine 5 MG tablet  Commonly known as: FLEXERIL  Take 1 tablet (5 mg total) by mouth 3 (three) times daily as needed for Muscle spasms.        CONTINUE taking these medications    acetaminophen 325 MG tablet  Commonly known as: TYLENOL  Take 2 tablets (650 mg total) by mouth every 6 (six) hours as needed for Pain.     allopurinoL 300 MG tablet  Commonly known as: ZYLOPRIM  Take 1 tablet (300 mg total) by mouth once daily.     colchicine 0.6 mg tablet  Commonly known as: COLCRYS  Take 1 tablet (0.6 mg total) by mouth once daily.     LIDOcaine 5 %  Commonly known as: LIDODERM  Place 1 patch onto the skin once daily. Remove & Discard patch within 12 hours or as directed by MD     losartan-hydrochlorothiazide 100-12.5 mg 100-12.5 mg Tab  Commonly known  as: HYZAAR  Take 1 tablet by mouth once daily.     meloxicam 15 MG tablet  Commonly known as: MOBIC  Take 1 tablet (15 mg total) by mouth daily as needed (back pain).     metFORMIN 500 MG ER 24hr tablet  Commonly known as: GLUCOPHAGE-XR  Take 1 tablet (500 mg total) by mouth daily with breakfast.     oxyCODONE 5 MG immediate release tablet  Commonly known as: ROXICODONE  Take 1 tablet (5 mg total) by mouth every 4 (four) hours as needed (severe pain).     tamsulosin 0.4 mg Cap  Commonly known as: FLOMAX  Take 1 capsule (0.4 mg total) by mouth once daily.        STOP taking these medications    indomethacin 50 MG capsule  Commonly known as: INDOCIN     sulfamethoxazole-trimethoprim 800-160mg 800-160 mg Tab  Commonly known as: BACTRIM DS            Indwelling Lines/Drains at time of discharge:   Lines/Drains/Airways     Drain  Duration                Urethral Catheter 09/25/22 0415 Silicone 16 Fr. 2 days                Time spent on the discharge of patient: 35 minutes         Augustin Larson MD  Attending Physician  Department of Hospital Medicine  Epic secure chat preferred, or ext. [phone]  9/27/2022

## 2022-09-27 NOTE — PT/OT/SLP PROGRESS
Occupational Therapy   Treatment    Name: Noah Nichole  MRN: [id number]  Admitting Diagnosis:  Acute cystitis with hematuria       Recommendations:     Discharge Recommendations: home health OT  Discharge Equipment Recommendations:  walker, rolling  Barriers to discharge:  Decreased caregiver support    Assessment:     Noah Nichole is a 72 y.o. male with a medical diagnosis of Acute cystitis with hematuria. Performance deficits affecting function are weakness, impaired endurance, impaired self care skills, impaired functional mobility, gait instability, impaired balance. Patient would benefit from continued skilled acute OT 3x/wk to improve functional mobility, increase independence with ADLs, and address established goals. Recommending HHOT once medically appropriate for discharge to increase maximal independence, reduce burden of care, and ensure safety.     Rehab Prognosis:  Good; patient would benefit from acute skilled OT services to address these deficits and reach maximum level of function.       Plan:     Patient to be seen 3 x/week to address the above listed problems via self-care/home management, therapeutic activities, therapeutic exercises  Plan of Care Expires: 10/25/22  Plan of Care Reviewed with: patient    Subjective     Pain/Comfort:  Pain Rating 1: 6/10  Location - Side 1: Bilateral  Location - Orientation 1: lower  Location 1: coccyx  Pain Addressed 1: Reposition, Distraction, Cessation of Activity  Pain Rating Post-Intervention 1: 6/10    Objective:     Communicated with: NSG prior to session.  Patient found sitting edge of bed with flores catheter, peripheral IV upon OT entry to room.    General Precautions: Standard, fall   Orthopedic Precautions:N/A   Braces: N/A  Respiratory Status: Room air     Occupational Performance:     Functional Mobility/Transfers:  Patient completed Sit <> Stand Transfer with stand by assistance  with  no assistive device   Patient completed Toilet Transfer  bed>toilet with CGA and no AD with functional ambulation technique; toilet>bed with functional ambulation technique with contact guard assistance with straight cane (patient ambulated in hallway to do stairs with PT. Stayed with PT for safety concerns and follow up with chair just in case (but then did not need it). Patient did need CGA with SPC initially and progressing to SBA. (See PT note as needed)     Activities of Daily Living:  Lower Body Dressing: total assistance Patient unable to doff and don socks at this time.   Patient declined g/h tasks as he reported he performed them prior to this OT session.     [facility] 6 Click ADL: 17    Treatment & Education:  Role of OT and POC  ADL retraining  Functional mobility training  Safety  Importance of OOB activity    Co-treatment performed due to patient's multiple deficits requiring two skilled therapists to appropriately and safely assess patient's strength and endurance while facilitating functional tasks in addition to accommodating for patient's activity tolerance.     Patient left sitting edge of bed with all lines intact, PT present, and all needs met.     GOALS:   Multidisciplinary Problems       Occupational Therapy Goals          Problem: Occupational Therapy    Goal Priority Disciplines Outcome Interventions   Occupational Therapy Goal     OT, PT/OT Ongoing, Progressing    Description: Goals to be met by: 10/09/2022     Patient will increase functional independence with ADLs by performing:    UE Dressing with Supervision.  LE Dressing with Supervision.  Grooming while seated with Supervision.  Toileting from toilet with Supervision for hygiene and clothing management.                          Time Tracking:     OT Date of Treatment: 09/27/22  OT Start Time: 0943  OT Stop Time: 0958  OT Total Time (min): 15 min    Billable Minutes:Therapeutic Activity 15          CARLOS Visit Number: 0    9/27/2022

## 2022-09-27 NOTE — PLAN OF CARE
[facility] - Telemetry Stepdown ([facility])  Discharge Assessment    Primary Care Provider: Grace Jeffery MD     Discharge Assessment (most recent)       BRIEF DISCHARGE ASSESSMENT - 09/27/22 8480          Discharge Planning    Equipment Currently Used at Home cane, straight (P)      Current Living Arrangements home/apartment/condo (P)      Patient/Family Anticipates Transition to home (P)      Patient/Family Anticipated Services at Transition home health care (P)      DME Needed Upon Discharge  none (P)      Discharge Plan A Home;Home Health (P)         Housing Stability    In the last 12 months, was there a time when you were not able to pay the mortgage or rent on time? No (P)      In the last 12 months, was there a time when you did not have a steady place to sleep or slept in a shelter (including now)? No (P)         Transportation Needs    In the past 12 months, has lack of transportation kept you from medical appointments or from getting medications? No (P)      In the past 12 months, has lack of transportation kept you from meetings, work, or from getting things needed for daily living? No (P)         Food Insecurity    Within the past 12 months, you worried that your food would run out before you got the money to buy more. Never true (P)      Within the past 12 months, the food you bought just didn't last and you didn't have money to get more. Never true (P)         Social Connections    How often do you attend Christian or Religion services? -- (P)      How often do you attend meetings of the clubs or organizations you belong to? More than 4 times per year (P)      Are you , , , , never , or living with a partner? Never  (P)         Alcohol Use    Q1: How often do you have a drink containing alcohol? Never (P)      Q2: How many drinks containing alcohol do you have on a typical day when you are drinking? Patient does not drink (P)      Q3: How often do you  have six or more drinks on one occasion? Never (P)                    [facility] - Telemetry Stepdown ([facility])  Discharge Final Note    Primary Care Provider: Grace Jeffery MD    Expected Discharge Date: 9/27/2022    Final Discharge Note (most recent)       Final Note - 09/27/22 0949          Final Note    Assessment Type Final Discharge Note     Hospital Resources/Appts/Education Provided Appointments scheduled and added to AVS        Post-Acute Status    Post-Acute Authorization Home Health     Home Health Status Referrals Sent     Discharge Delays None known at this time                     Important Message from Medicare             Contact Info       Grace Jeffery MD   Specialty: Family Medicine   Relationship: PCP - General    [address]  [address]  [place] [address]   Phone: [phone]       Next Steps: Schedule an appointment as soon as possible for a visit            Pt to discharge home today. Referral for HHOT sent to [facility]. Any appointments arranged per AVS. PT is currently working with pt. CM will follow for any additional discharge needs.    Bull Junior, RN Case Manager  [phone] 1225 update: pt declined bath bench and rolling walker. Pt stated that he had a ride.[facility] has accepted.

## 2022-09-27 NOTE — PT/OT/SLP EVAL
"Physical Therapy Evaluation  And Co-Treatment    Patient Name:  Noah Nichole   MRN:  [id number]    Recommendations:     Discharge Recommendations:  home health PT   Discharge Equipment Recommendations: walker, rolling   Barriers to discharge: None    Assessment:     Noah Nichole is a 72 y.o. male admitted with a medical diagnosis of Acute cystitis with hematuria.  He presents with the following impairments/functional limitations:  weakness, impaired endurance, impaired functional mobility, gait instability, impaired balance, pain Patient was agreeable to therapy session and tolerated the session well. He was able to complete multiple transfers from varying surface heights, ambulation in hallway, and assess stair training to simulate home environment. Patient is an appropriate candidate for HHPT at this time, and will continue to benefit from skilled PT during this admit to ensure no loss in his functional mobility.    Rehab Prognosis: Good; patient would benefit from acute skilled PT services to address these deficits and reach maximum level of function.    Recent Surgery: * No surgery found *      Plan:     During this hospitalization, patient to be seen 3 x/week to address the identified rehab impairments via gait training, therapeutic activities, therapeutic exercises, neuromuscular re-education and progress toward the following goals:    Plan of Care Expires:  10/27/22    Subjective     Chief Complaint: "The pain in my tailbone is getting better everyday"  Patient/Family Comments/goals: return home to [facility]  Pain/Comfort:  Pain Rating 1: 6/10  Location 1: coccyx  Pain Addressed 1: Reposition, Distraction, Cessation of Activity  Pain Rating Post-Intervention 1: 6/10    Patients cultural, spiritual, Gnosticism conflicts given the current situation: no    Living Environment:  Patient lives alone in a mobile home with 4 ROBB and a L HR. The bathroom contains a tub-shower combination without a seat or grab bars " inside.  Prior to admission, patients level of function was modified independent with use of SPC.  Equipment used at home: cane, straight.  DME owned (not currently used): none.  Upon discharge, patient will have assistance from adult children.    Objective:   Co-treatment with OT due to patient's poor activity tolerance and medical complexity requiring skilled assistance from 2 therapists.   Communicated with nurse prior to session.  Patient found sitting edge of bed with flores catheter, peripheral IV  upon PT entry to room (IV detached by nursing for stair training.)    General Precautions: Standard, fall   Orthopedic Precautions:N/A   Braces: N/A  Respiratory Status: Room air    Exams:  Cognitive Exam:  Patient is oriented to Person, Place, Time, and Situation  Gross Motor Coordination:  WFL  Postural Exam:  Patient presented with the following abnormalities:    -       Rounded shoulders  -       Forward head  RLE ROM: WFL  RLE Strength: WFL  LLE ROM: WFL  LLE Strength: WFL    Functional Mobility:  Bed Mobility:     Transfers:     Sit to Stand:  stand by assistance with no AD  Toilet Transfer: stand by assistance with  grab bars  using  Step Transfer  Gait: ~15 ft to bathroom CGA no AD, ~80 ft in hallway with patient's SPC and CGA initially progressing to SBA. Decreased sandro noted, but appropriate use of SPC and improved stability. No LOB throughout ambulation, 2nd person with chair follow.   Stairs: completed 5 stairs with L HR going up and SPC on RUE. Pt demonstrated reciprocal gait pattern initially but foot not fully placed on stair, verbal cueing for step-to gait pattern with increased stability noted. CGA progressing to SBA. 2nd person present for safety during stair trial.   Balance: sitting unsupported at EOB with no BUE support: good, Standing: SBA    Therapeutic Activities and Exercises:   Patient educated on calling for assistance for any needs to improve overall safety awareness.  Patient educated  on role of PT in acute care, PT POC, and PT goals.   Patient educated on energy conservation techniques.    AM-PAC 6 CLICK MOBILITY  Total Score:22     Patient left sitting edge of bed with all lines intact, call button in reach, and nurse notified.    GOALS:   Multidisciplinary Problems       Physical Therapy Goals          Problem: Physical Therapy    Goal Priority Disciplines Outcome Goal Variances Interventions   Physical Therapy Goal     PT, PT/OT Ongoing, Progressing     Description: Goals to be met by: 10/11/2022     Patient will increase functional independence with mobility by performin. Supine to sit with Modified [place]  2. Sit to supine with Modified [place]  3. Sit to stand transfer with Modified [place]  4. Bed to chair transfer with Modified [place] using Single-point Cane   5. Gait  x 150 feet with Modified [place] using Single-point Cane .   6. Ascend/descend 4 stair with left Handrails Modified [place] using Single-point Cane .   7. Lower extremity exercise program x15 reps per handout, with assistance as needed                         History:     Past Medical History:   Diagnosis Date    Anemia     Benign essential hypertension 2014    History of colon cancer, stage II 10/10/2018    Iron deficiency anemia due to chronic blood loss 10/16/2019       Past Surgical History:   Procedure Laterality Date    COLON SURGERY  2018    COLONOSCOPY N/A 2018    Procedure: COLONOSCOPY;  Surgeon: Adrian Cheung MD;  Location: [address]);  Service: Endoscopy;  Laterality: N/A;  pt ate at 1:30 on 18-Dr Cheung informed and he stated ok to do colonoscopy.    COLONOSCOPY N/A 10/11/2019    Procedure: COLONOSCOPY;  Surgeon: Gabino Bonilla MD;  Location: [address]);  Service: Endoscopy;  Laterality: N/A;    COLONOSCOPY N/A 2022    Procedure: COLONOSCOPY;  Surgeon: Zeny Hinkle MD;  Location: [facility] ([address] [place] [address]);  Service: Colon  and Rectal;  Laterality: N/A;  fully vaccinated, instructions sent to [facility] and emailed to [email or web address]  suprep    NO PAST SURGERIES      RIGHT HEMICOLECTOMY N/A 9/18/2018    Procedure: HEMICOLECTOMY, RIGHT, extended;  Surgeon: Adrian Cheung MD;  Location: [facility] OR [address] [place] [address];  Service: Colon and Rectal;  Laterality: N/A;       Time Tracking:     PT Received On: 09/27/22  PT Start Time: 0943     PT Stop Time: 1000  PT Total Time (min): 17 min     Billable Minutes: Evaluation 7 and Gait Training 10      09/27/2022

## 2022-09-27 NOTE — PLAN OF CARE
Problem: Physical Therapy  Goal: Physical Therapy Goal  Description: Goals to be met by: 10/11/2022     Patient will increase functional independence with mobility by performin. Supine to sit with Modified [place]  2. Sit to supine with Modified [place]  3. Sit to stand transfer with Modified [place]  4. Bed to chair transfer with Modified [place] using Single-point Cane   5. Gait  x 150 feet with Modified [place] using Single-point Cane .   6. Ascend/descend 4 stair with left Handrails Modified [place] using Single-point Cane .   7. Lower extremity exercise program x15 reps per handout, with assistance as needed    PT Evaluation completed 2022   PT goals and POC established.     Outcome: Ongoing, Progressing

## 2022-09-28 ENCOUNTER — TELEPHONE (OUTPATIENT)
Dept: FAMILY MEDICINE | Facility: CLINIC | Age: 72
End: 2022-09-28
Payer: MEDICARE

## 2022-09-28 ENCOUNTER — PROCEDURE VISIT (OUTPATIENT)
Dept: UROLOGY | Facility: CLINIC | Age: 72
End: 2022-09-28
Payer: MEDICARE

## 2022-09-28 VITALS
WEIGHT: 215.19 LBS | BODY MASS INDEX: 30.02 KG/M2 | SYSTOLIC BLOOD PRESSURE: 154 MMHG | DIASTOLIC BLOOD PRESSURE: 80 MMHG | HEART RATE: 93 BPM

## 2022-09-28 DIAGNOSIS — R33.9 URINARY RETENTION: ICD-10-CM

## 2022-09-28 PROCEDURE — 76872 PR US TRANSRECTAL: ICD-10-PCS | Mod: 26,S$GLB,, | Performed by: UROLOGY

## 2022-09-28 PROCEDURE — 76872 US TRANSRECTAL: CPT | Mod: 26,S$GLB,, | Performed by: UROLOGY

## 2022-09-28 PROCEDURE — 52000 CYSTOURETHROSCOPY: CPT | Mod: S$GLB,,, | Performed by: UROLOGY

## 2022-09-28 PROCEDURE — 52000 PR CYSTOURETHROSCOPY: ICD-10-PCS | Mod: S$GLB,,, | Performed by: UROLOGY

## 2022-09-28 NOTE — Clinical Note
Hello,  Please schedule a follow up appointment with me in 12 weeks.  Thanks,  Triston Fuchs MD Urology  [facility];zarina

## 2022-09-28 NOTE — PROCEDURES
[facility]  Urolog   Cystoscopy and Transrectal Ultrasound of the Prostate Procedure Notes    Cystoscopy Note     Procedure:   Flexible cysto-uretheroscopy    Pre Procedure Diagnosis:  LUTS    Post Procedure Diagnosis:  Same    Surgeon: Triston Fuchs MD     Anesthesia: 2% uro-jet lidocaine jelly for local analgesia    Procedure note:  A flexible cysto-urethroscopy was performed after consent was obtained.  The risks and benefits were explained. 2% lidocaine urojet was used for local analgesia. The genitalia was prepped and draped in the sterile fashion.     The flexible scope was advanced into the urethra and into the bladder. There were no urethral strictures noted during scope passage.     The bladder was completely surveyed in a systematic fashion. The bilateral ureteral orifices were identified in their normal orthotopic locations with efflux noted bilaterally. The remained of the bladder was evaluated. No bladder tumors or lesions suspicious for malignancy were seen.     Upon retroflexion there was no significant median lobe enlargement. As the flexible cystoscope was being withdrawn, the prostate was evaluated carefully. The lateral lobes of the prostate were mildly enlarged.     The patient tolerated the procedure well without complication.     Findings in summary:  Mildly enlarged lateral lobes  Short prostatic fossa    Transrectal Ultrasound of the Prostate Note     Surgeon: Jef    Informed consent was obtained. He was placed into the lateral decubitus position. BLADIMIR revealed no suspicious nodules or lesions. Transrectal ultrasound probe placed into the rectum and ultrasound of the prostate performed.     The prostate measured 34 cc. On previous CT scan it measured 69 cc. Prostate likely being compressed during US    Plan:     Jackson removed for voiding trial  Will follow up in 3 months    Triston Fuchs MD  Urology  [facility]

## 2022-09-28 NOTE — TELEPHONE ENCOUNTER
----- Message from Luann Harvey sent at 9/28/2022 11:56 AM CDT -----  Type:  Needs Medical Advice    Who Called: PT  Symptoms (please be specific):    How long has patient had these symptoms:    Pharmacy name and phone #:    Would the patient rather a call back or a response via [facility]? CALL  Best Call Back Number: [phone]  Additional Information: NEEDS A WORK STATUS & RESTRICTIONS FORM FAX TO:  [phone] AND PLEASE CALL TO CONFIRM IT WAS DONE

## 2022-09-28 NOTE — TELEPHONE ENCOUNTER
Returned patient call in regards to paperwork he said he needed to be faxed over for workers comp. Patient was informed that we do not handle Workers comp. Patient said he needed a letter saying that he's been treated since the 12 th and what he's been treated for. Patient was informed that he will have to contact the department that is treating him to get that information. Patient verbalized understanding.

## 2022-09-28 NOTE — PROGRESS NOTES
[facility] - Urology   Clinic Note    SUBJECTIVE:     Chief Complaint: urinary retention    Referral from: Triston Fuchs MD.    History of Present Illness:  Noah Nichole is a 72 y.o. male who presents to clinic for urinary retention.    Patient recently experienced a fall.  He was found to have urinary retention with PVRs greater than 300 cc after he fell and presented to the emergency department.  He denies any symptoms at that time.  A Jackson catheter was placed.  He was started on tamsulosin.  He feels like his stream has been weak and also endorses hesitancy and intermittency prior to the Jackson placement.  He denies any hematuria.  Denies any dysuria.  He has been taking his Flomax.    Update 9/28:  Patient underwent cystoscopy and truss today.  He saw Dr. Weaver last week and was started on antibiotics.  He reports he went to the hospital the next day was admitted for several days the urinary tract infection.  His catheter was changed.  He has been on p.o. cefdinir since then.  He would like to have a voiding trial done.    Patient endorses no additional complaints at this time.    Past Medical History:   Diagnosis Date    Anemia     Benign essential hypertension 12/17/2014    History of colon cancer, stage II 10/10/2018    Iron deficiency anemia due to chronic blood loss 10/16/2019       Past Surgical History:   Procedure Laterality Date    COLON SURGERY  09/18/2018    COLONOSCOPY N/A 9/14/2018    Procedure: COLONOSCOPY;  Surgeon: Adrian Cheung MD;  Location: [address]);  Service: Endoscopy;  Laterality: N/A;  pt ate at 1:30 on 9/13/18-Dr Cheung informed and he stated ok to do colonoscopy.    COLONOSCOPY N/A 10/11/2019    Procedure: COLONOSCOPY;  Surgeon: Gabino Bonilla MD;  Location: [address]);  Service: Endoscopy;  Laterality: N/A;    COLONOSCOPY N/A 4/21/2022    Procedure: COLONOSCOPY;  Surgeon: Zeny Hinkle MD;  Location: [facility] ([address] [place] [address]);  Service: Colon and Rectal;   Laterality: N/A;  fully vaccinated, instructions sent to [facility] and emailed to [email or web address]  suprep    NO PAST SURGERIES      RIGHT HEMICOLECTOMY N/A 9/18/2018    Procedure: HEMICOLECTOMY, RIGHT, extended;  Surgeon: Adrian Cheung MD;  Location: [facility] OR [address] [place] [address];  Service: Colon and Rectal;  Laterality: N/A;       Family History   Problem Relation Age of Onset    Hypertension Mother     Cancer Father         stomach    Hypertension Father     Diabetes Sister     Hypertension Brother     Glaucoma Brother     Diabetes Brother     Cataracts Brother     Cancer Sister     Amblyopia Neg Hx     Blindness Neg Hx     Macular degeneration Neg Hx     Retinal detachment Neg Hx     Strabismus Neg Hx     Stroke Neg Hx     Thyroid disease Neg Hx        Social History     Tobacco Use    Smoking status: Never    Smokeless tobacco: Never   Substance Use Topics    Alcohol use: Not Currently    Drug use: No       Current Outpatient Medications on File Prior to Visit   Medication Sig Dispense Refill    acetaminophen (TYLENOL) 325 MG tablet Take 2 tablets (650 mg total) by mouth every 6 (six) hours as needed for Pain. 30 tablet 0    allopurinoL (ZYLOPRIM) 300 MG tablet Take 1 tablet (300 mg total) by mouth once daily. 90 tablet 4    cefdinir (OMNICEF) 300 MG capsule Take 1 capsule (300 mg total) by mouth 2 (two) times daily. for 5 days 10 capsule 0    colchicine (COLCRYS) 0.6 mg tablet Take 1 tablet (0.6 mg total) by mouth once daily. 30 tablet 2    cyclobenzaprine (FLEXERIL) 5 MG tablet Take 1 tablet (5 mg total) by mouth 3 (three) times daily as needed for Muscle spasms. 30 tablet 0    LIDOcaine (LIDODERM) 5 % Place 1 patch onto the skin once daily. Remove & Discard patch within 12 hours or as directed by MD 15 patch 0    losartan-hydrochlorothiazide 100-12.5 mg (HYZAAR) 100-12.5 mg Tab Take 1 tablet by mouth once daily. 90 tablet 3    meloxicam (MOBIC) 15 MG tablet Take 1 tablet (15 mg total) by  mouth daily as needed (back pain). 30 tablet 2    metFORMIN (GLUCOPHAGE-XR) 500 MG ER 24hr tablet Take 1 tablet (500 mg total) by mouth daily with breakfast. 90 tablet 4    oxyCODONE (ROXICODONE) 5 MG immediate release tablet Take 1 tablet (5 mg total) by mouth every 4 (four) hours as needed (severe pain). 8 tablet 0    tamsulosin (FLOMAX) 0.4 mg Cap Take 1 capsule (0.4 mg total) by mouth once daily. 90 capsule 3     Current Facility-Administered Medications on File Prior to Visit   Medication Dose Route Frequency Provider Last Rate Last Admin    [DISCONTINUED] acetaminophen tablet 650 mg  650 mg Oral Q8H PRN Hawk Garsia MD        [DISCONTINUED] cefepime in dextrose 5 % 1 gram/50 mL IVPB 1 g  1 g Intravenous Q12H Hawk Garsia MD   Stopped at 09/27/22 0000    [DISCONTINUED] cyclobenzaprine tablet 5 mg  5 mg Oral TID PRN Hawk Garsia MD   5 mg at 09/25/22 2215    [DISCONTINUED] glucagon (human recombinant) injection 1 mg  1 mg Intramuscular PRN Hawk Garsia MD        [DISCONTINUED] glucose chewable tablet 16 g  16 g Oral PRN Hawk Garsia MD        [DISCONTINUED] glucose chewable tablet 24 g  24 g Oral PRN Hawk Garsia MD        [DISCONTINUED] heparin (porcine) injection 5,000 Units  5,000 Units Subcutaneous Q8H Hawk Garsia MD   5,000 Units at 09/27/22 0554    [DISCONTINUED] lactated ringers infusion   Intravenous Continuous Hawk Garsia  mL/hr at 09/26/22 0209 New Bag at 09/26/22 0209    [DISCONTINUED] losartan-hydrochlorothiazide 100-12.5 mg per tablet 1 tablet  1 tablet Oral Daily Hawk Garsia MD   1 tablet at 09/27/22 0932    [DISCONTINUED] polyethylene glycol packet 17 g  17 g Oral Daily Augustin Larson MD   17 g at 09/25/22 1336    [DISCONTINUED] sodium chloride 0.9% flush 10 mL  10 mL Intravenous Q12H PRN Hawk Garsia MD        [DISCONTINUED] tamsulosin 24 hr capsule 0.4 mg  0.4 mg Oral Daily Hawk Garsia MD   0.4 mg at 09/27/22 0932       Review of patient's  "allergies indicates:  No Known Allergies    Review of Systems:  A review of 10+ systems was conducted with pertinent positive and negative findings documented in HPI with all other systems reviewed and negative.    OBJECTIVE:     Estimated body mass index is 30.02 kg/m² as calculated from the following:    Height as of 9/24/22: 5' 10.98" (1.803 m).    Weight as of this encounter: 97.6 kg (215 lb 2.7 oz).    Vital Signs (Most Recent)  Vitals:    09/28/22 1013   BP: (!) 154/80   Pulse: 93       Physical Exam:  GENERAL: patient sitting comfortably  HEENT: normocephalic  NECK: supple, no JVD  PULM: normal chest rise, no increased WOB  HEART: non-diaphoretic  ABDO: soft, nondistended, nontender  BACK: no CVA tenderness bilaterally  SKIN: warm, dry, well perfused  EXT: no bruising or edema  NEURO: grossly normal with no focal deficits  PSYCH: appropriate mood and affect    Genitourinary Exam:  deferred    Lab Results   Component Value Date    BUN 19 09/27/2022    CREATININE 1.0 09/27/2022    WBC 6.60 09/27/2022    HGB 9.7 (L) 09/27/2022    HCT 29.6 (L) 09/27/2022     09/27/2022    AST 22 09/27/2022    ALT 31 09/27/2022    ALKPHOS 74 09/27/2022    ALBUMIN 2.7 (L) 09/27/2022    HGBA1C 6.2 (H) 03/30/2022    INR 1.0 07/04/2021          ASSESSMENT     1. Urinary retention        PLAN:     Discussed options with the patient.  The patient strongly desired a voiding trial.  I informed him that there is a chance that his retention could occur again and he might need to go to the hospital to be evaluated.  He stated that he still wanted to try.  His catheter was removed he voided in the clinic with a PVR of 9 cc.  Will cancel his HoLEP procedure and reschedule if he desires  Follow-up in 3 months      Triston Fuchs MD  Urology  [facility]    Disclaimer: This note has been generated using voice-recognition software. There may be typographical errors that have been missed during proof-reading.     "

## 2022-09-28 NOTE — TELEPHONE ENCOUNTER
----- Message from Yesenia Gross sent at 9/28/2022  1:03 PM CDT -----  Type:  Needs Medical Advice    Who Called: pt  Symptoms (please be specific): pt is following up on some paper work that needs to be fax pt needs this to be completed for workers comp    Would the patient rather a call back or a response via [employer]? call  Best Call Back Number: [phone]  Additional Information: fax to [phone] attn:Fareed

## 2022-09-29 LAB
BACTERIA BLD CULT: NORMAL
BACTERIA BLD CULT: NORMAL

## 2022-09-30 ENCOUNTER — LAB VISIT (OUTPATIENT)
Dept: LAB | Facility: HOSPITAL | Age: 72
End: 2022-09-30
Attending: FAMILY MEDICINE
Payer: MEDICARE

## 2022-09-30 ENCOUNTER — TELEPHONE (OUTPATIENT)
Dept: ADMINISTRATIVE | Facility: CLINIC | Age: 72
End: 2022-09-30
Payer: MEDICARE

## 2022-09-30 DIAGNOSIS — M10.9 GOUTY ARTHRITIS OF LEFT GREAT TOE: ICD-10-CM

## 2022-09-30 DIAGNOSIS — E55.9 VITAMIN D DEFICIENCY: ICD-10-CM

## 2022-09-30 DIAGNOSIS — R73.03 PREDIABETES: ICD-10-CM

## 2022-09-30 DIAGNOSIS — Z79.899 MEDICATION MANAGEMENT: ICD-10-CM

## 2022-09-30 DIAGNOSIS — I10 BENIGN ESSENTIAL HYPERTENSION: ICD-10-CM

## 2022-09-30 DIAGNOSIS — E79.0 ELEVATED BLOOD URIC ACID LEVEL: ICD-10-CM

## 2022-09-30 LAB
25(OH)D3+25(OH)D2 SERPL-MCNC: 43 NG/ML (ref 30–96)
ALBUMIN SERPL BCP-MCNC: 3.3 G/DL (ref 3.5–5.2)
ALP SERPL-CCNC: 93 U/L (ref 55–135)
ALT SERPL W/O P-5'-P-CCNC: 54 U/L (ref 10–44)
ANION GAP SERPL CALC-SCNC: 10 MMOL/L (ref 8–16)
AST SERPL-CCNC: 57 U/L (ref 10–40)
BASOPHILS # BLD AUTO: 0.05 K/UL (ref 0–0.2)
BASOPHILS NFR BLD: 0.9 % (ref 0–1.9)
BILIRUB SERPL-MCNC: 0.3 MG/DL (ref 0.1–1)
BUN SERPL-MCNC: 16 MG/DL (ref 8–23)
CALCIUM SERPL-MCNC: 9.6 MG/DL (ref 8.7–10.5)
CHLORIDE SERPL-SCNC: 105 MMOL/L (ref 95–110)
CHOLEST SERPL-MCNC: 142 MG/DL (ref 120–199)
CHOLEST/HDLC SERPL: 5.7 {RATIO} (ref 2–5)
CO2 SERPL-SCNC: 24 MMOL/L (ref 23–29)
CREAT SERPL-MCNC: 0.9 MG/DL (ref 0.5–1.4)
DIFFERENTIAL METHOD: ABNORMAL
EOSINOPHIL # BLD AUTO: 0.1 K/UL (ref 0–0.5)
EOSINOPHIL NFR BLD: 2 % (ref 0–8)
ERYTHROCYTE [DISTWIDTH] IN BLOOD BY AUTOMATED COUNT: 13.6 % (ref 11.5–14.5)
EST. GFR  (NO RACE VARIABLE): >60 ML/MIN/1.73 M^2
ESTIMATED AVG GLUCOSE: 126 MG/DL (ref 68–131)
GLUCOSE SERPL-MCNC: 104 MG/DL (ref 70–110)
HBA1C MFR BLD: 6 % (ref 4–5.6)
HCT VFR BLD AUTO: 33.9 % (ref 40–54)
HDLC SERPL-MCNC: 25 MG/DL (ref 40–75)
HDLC SERPL: 17.6 % (ref 20–50)
HGB BLD-MCNC: 10.6 G/DL (ref 14–18)
IMM GRANULOCYTES # BLD AUTO: 0.1 K/UL (ref 0–0.04)
IMM GRANULOCYTES NFR BLD AUTO: 1.8 % (ref 0–0.5)
LDLC SERPL CALC-MCNC: 94 MG/DL (ref 63–159)
LYMPHOCYTES # BLD AUTO: 2 K/UL (ref 1–4.8)
LYMPHOCYTES NFR BLD: 34.8 % (ref 18–48)
MAGNESIUM SERPL-MCNC: 2.1 MG/DL (ref 1.6–2.6)
MCH RBC QN AUTO: 25.9 PG (ref 27–31)
MCHC RBC AUTO-ENTMCNC: 31.3 G/DL (ref 32–36)
MCV RBC AUTO: 83 FL (ref 82–98)
MONOCYTES # BLD AUTO: 0.7 K/UL (ref 0.3–1)
MONOCYTES NFR BLD: 12.3 % (ref 4–15)
NEUTROPHILS # BLD AUTO: 2.7 K/UL (ref 1.8–7.7)
NEUTROPHILS NFR BLD: 48.2 % (ref 38–73)
NONHDLC SERPL-MCNC: 117 MG/DL
NRBC BLD-RTO: 0 /100 WBC
PLATELET # BLD AUTO: 314 K/UL (ref 150–450)
PMV BLD AUTO: 10 FL (ref 9.2–12.9)
POTASSIUM SERPL-SCNC: 4.3 MMOL/L (ref 3.5–5.1)
PROT SERPL-MCNC: 7.5 G/DL (ref 6–8.4)
RBC # BLD AUTO: 4.09 M/UL (ref 4.6–6.2)
SODIUM SERPL-SCNC: 139 MMOL/L (ref 136–145)
TRIGL SERPL-MCNC: 115 MG/DL (ref 30–150)
TSH SERPL DL<=0.005 MIU/L-ACNC: 2.17 UIU/ML (ref 0.4–4)
URATE SERPL-MCNC: 6 MG/DL (ref 3.4–7)
WBC # BLD AUTO: 5.6 K/UL (ref 3.9–12.7)

## 2022-09-30 PROCEDURE — 84443 ASSAY THYROID STIM HORMONE: CPT | Performed by: FAMILY MEDICINE

## 2022-09-30 PROCEDURE — 85025 COMPLETE CBC W/AUTO DIFF WBC: CPT | Performed by: FAMILY MEDICINE

## 2022-09-30 PROCEDURE — 80053 COMPREHEN METABOLIC PANEL: CPT | Performed by: FAMILY MEDICINE

## 2022-09-30 PROCEDURE — 83036 HEMOGLOBIN GLYCOSYLATED A1C: CPT | Performed by: FAMILY MEDICINE

## 2022-09-30 PROCEDURE — 80061 LIPID PANEL: CPT | Performed by: FAMILY MEDICINE

## 2022-09-30 PROCEDURE — 83735 ASSAY OF MAGNESIUM: CPT | Performed by: FAMILY MEDICINE

## 2022-09-30 PROCEDURE — 82306 VITAMIN D 25 HYDROXY: CPT | Performed by: FAMILY MEDICINE

## 2022-09-30 PROCEDURE — 84550 ASSAY OF BLOOD/URIC ACID: CPT | Performed by: FAMILY MEDICINE

## 2022-09-30 PROCEDURE — 36415 COLL VENOUS BLD VENIPUNCTURE: CPT | Performed by: FAMILY MEDICINE

## 2022-09-30 NOTE — TELEPHONE ENCOUNTER
Returned call to [facility] with home health. No OT available for eval this week. Order given to move OT eval to next week.

## 2022-10-03 ENCOUNTER — OFFICE VISIT (OUTPATIENT)
Dept: SURGERY | Facility: CLINIC | Age: 72
End: 2022-10-03
Payer: MEDICARE

## 2022-10-03 VITALS
SYSTOLIC BLOOD PRESSURE: 165 MMHG | WEIGHT: 219.13 LBS | DIASTOLIC BLOOD PRESSURE: 64 MMHG | HEIGHT: 71 IN | HEART RATE: 75 BPM | BODY MASS INDEX: 30.68 KG/M2

## 2022-10-03 DIAGNOSIS — K64.5 THROMBOSED EXTERNAL HEMORRHOID: Primary | ICD-10-CM

## 2022-10-03 PROCEDURE — 3288F FALL RISK ASSESSMENT DOCD: CPT | Mod: CPTII,S$GLB,, | Performed by: NURSE PRACTITIONER

## 2022-10-03 PROCEDURE — 1159F PR MEDICATION LIST DOCUMENTED IN MEDICAL RECORD: ICD-10-PCS | Mod: CPTII,S$GLB,, | Performed by: NURSE PRACTITIONER

## 2022-10-03 PROCEDURE — 1125F PR PAIN SEVERITY QUANTIFIED, PAIN PRESENT: ICD-10-PCS | Mod: CPTII,S$GLB,, | Performed by: NURSE PRACTITIONER

## 2022-10-03 PROCEDURE — 1159F MED LIST DOCD IN RCRD: CPT | Mod: CPTII,S$GLB,, | Performed by: NURSE PRACTITIONER

## 2022-10-03 PROCEDURE — 3077F PR MOST RECENT SYSTOLIC BLOOD PRESSURE >= 140 MM HG: ICD-10-PCS | Mod: CPTII,S$GLB,, | Performed by: NURSE PRACTITIONER

## 2022-10-03 PROCEDURE — 3078F DIAST BP <80 MM HG: CPT | Mod: CPTII,S$GLB,, | Performed by: NURSE PRACTITIONER

## 2022-10-03 PROCEDURE — 99999 PR PBB SHADOW E&M-EST. PATIENT-LVL III: ICD-10-PCS | Mod: PBBFAC,,, | Performed by: NURSE PRACTITIONER

## 2022-10-03 PROCEDURE — 1160F PR REVIEW ALL MEDS BY PRESCRIBER/CLIN PHARMACIST DOCUMENTED: ICD-10-PCS | Mod: CPTII,S$GLB,, | Performed by: NURSE PRACTITIONER

## 2022-10-03 PROCEDURE — 99213 OFFICE O/P EST LOW 20 MIN: CPT | Mod: 25,S$GLB,, | Performed by: NURSE PRACTITIONER

## 2022-10-03 PROCEDURE — 1160F RVW MEDS BY RX/DR IN RCRD: CPT | Mod: CPTII,S$GLB,, | Performed by: NURSE PRACTITIONER

## 2022-10-03 PROCEDURE — 3077F SYST BP >= 140 MM HG: CPT | Mod: CPTII,S$GLB,, | Performed by: NURSE PRACTITIONER

## 2022-10-03 PROCEDURE — 46320 PR EXCISION THROMBOSED HEMORRHOID, EXTERNAL: ICD-10-PCS | Mod: S$GLB,,, | Performed by: NURSE PRACTITIONER

## 2022-10-03 PROCEDURE — 3044F PR MOST RECENT HEMOGLOBIN A1C LEVEL <7.0%: ICD-10-PCS | Mod: CPTII,S$GLB,, | Performed by: NURSE PRACTITIONER

## 2022-10-03 PROCEDURE — 46320 REMOVAL OF HEMORRHOID CLOT: CPT | Mod: S$GLB,,, | Performed by: NURSE PRACTITIONER

## 2022-10-03 PROCEDURE — 3008F PR BODY MASS INDEX (BMI) DOCUMENTED: ICD-10-PCS | Mod: CPTII,S$GLB,, | Performed by: NURSE PRACTITIONER

## 2022-10-03 PROCEDURE — 1101F PR PT FALLS ASSESS DOC 0-1 FALLS W/OUT INJ PAST YR: ICD-10-PCS | Mod: CPTII,S$GLB,, | Performed by: NURSE PRACTITIONER

## 2022-10-03 PROCEDURE — 1101F PT FALLS ASSESS-DOCD LE1/YR: CPT | Mod: CPTII,S$GLB,, | Performed by: NURSE PRACTITIONER

## 2022-10-03 PROCEDURE — 3078F PR MOST RECENT DIASTOLIC BLOOD PRESSURE < 80 MM HG: ICD-10-PCS | Mod: CPTII,S$GLB,, | Performed by: NURSE PRACTITIONER

## 2022-10-03 PROCEDURE — 3288F PR FALLS RISK ASSESSMENT DOCUMENTED: ICD-10-PCS | Mod: CPTII,S$GLB,, | Performed by: NURSE PRACTITIONER

## 2022-10-03 PROCEDURE — 3008F BODY MASS INDEX DOCD: CPT | Mod: CPTII,S$GLB,, | Performed by: NURSE PRACTITIONER

## 2022-10-03 PROCEDURE — 99999 PR PBB SHADOW E&M-EST. PATIENT-LVL III: CPT | Mod: PBBFAC,,, | Performed by: NURSE PRACTITIONER

## 2022-10-03 PROCEDURE — 3044F HG A1C LEVEL LT 7.0%: CPT | Mod: CPTII,S$GLB,, | Performed by: NURSE PRACTITIONER

## 2022-10-03 PROCEDURE — 1125F AMNT PAIN NOTED PAIN PRSNT: CPT | Mod: CPTII,S$GLB,, | Performed by: NURSE PRACTITIONER

## 2022-10-03 PROCEDURE — 99213 PR OFFICE/OUTPT VISIT, EST, LEVL III, 20-29 MIN: ICD-10-PCS | Mod: 25,S$GLB,, | Performed by: NURSE PRACTITIONER

## 2022-10-03 RX ORDER — OXYCODONE HYDROCHLORIDE 5 MG/1
5 TABLET ORAL EVERY 6 HOURS PRN
Qty: 12 TABLET | Refills: 0 | Status: SHIPPED | OUTPATIENT
Start: 2022-10-03 | End: 2022-10-06

## 2022-10-03 RX ORDER — HYDROCORTISONE 25 MG/G
CREAM TOPICAL 2 TIMES DAILY
Qty: 28 G | Refills: 2 | Status: SHIPPED | OUTPATIENT
Start: 2022-10-03 | End: 2023-03-24

## 2022-10-03 NOTE — PATIENT INSTRUCTIONS
Anusol cream 2x/day for up to 2 weeks  Daily fiber supplement  3 days pain medication every 6 hrs as needed

## 2022-10-03 NOTE — PROGRESS NOTES
CRS Office Visit History and Physical    Referring Md:   No referring provider defined for this encounter.    SUBJECTIVE:     Chief Complaint: hemorrhoids    History of Present Illness:  The patient is known patient to this practice.   Course is as follows:  Patient is a 72 y.o. male with hx of colon cancer presents with painful hemorrhoid. Currently 10/10.  Symptoms have been present for 2 days.  Has tried nothing.  Associated bleeding: no  Previous anorectal procedures: no  confirms straining/prolonged time on toilet with bowel movements.  is not currently taking fiber supplement or stool softener  Blood thinners: No     Last Colonoscopy completed on 4/21/2022        Review of patient's allergies indicates:  No Known Allergies    Past Medical History:   Diagnosis Date    Anemia     Benign essential hypertension 12/17/2014    Chronic indwelling Jackson catheter 9/25/2022    History of colon cancer, stage II 10/10/2018    Iron deficiency anemia due to chronic blood loss 10/16/2019     Past Surgical History:   Procedure Laterality Date    COLON SURGERY  09/18/2018    COLONOSCOPY N/A 9/14/2018    Procedure: COLONOSCOPY;  Surgeon: Adrian Cheung MD;  Location: [facility] ([address] [place] [address]);  Service: Endoscopy;  Laterality: N/A;  pt ate at 1:30 on 9/13/18-Dr Cheung informed and he stated ok to do colonoscopy.    COLONOSCOPY N/A 10/11/2019    Procedure: COLONOSCOPY;  Surgeon: Gabino Bonilla MD;  [facility] ([facility]);  Service: Endoscopy;  Laterality: N/A;    COLONOSCOPY N/A 4/21/2022    Procedure: COLONOSCOPY;  Surgeon: Zeny Hinkle MD;  [facility] ([facility]  Service: Colon and Rectal;  Laterality: N/A;  fully vaccinated, instructions sent to [email or web address] and emailed to [email or web address]  suprep    NO PAST SURGERIES      RIGHT HEMICOLECTOMY N/A 9/18/2018    Procedure: HEMICOLECTOMY, RIGHT, extended;  Surgeon: Adrian Cheung MD;  [facility];  Service: Colon  "and Rectal;  Laterality: N/A;     Family History   Problem Relation Age of Onset    Hypertension Mother     Cancer Father         stomach    Hypertension Father     Diabetes Sister     Hypertension Brother     Glaucoma Brother     Diabetes Brother     Cataracts Brother     Cancer Sister     Amblyopia Neg Hx     Blindness Neg Hx     Macular degeneration Neg Hx     Retinal detachment Neg Hx     Strabismus Neg Hx     Stroke Neg Hx     Thyroid disease Neg Hx      Social History     Tobacco Use    Smoking status: Never    Smokeless tobacco: Never   Substance Use Topics    Alcohol use: Not Currently    Drug use: No        Review of Systems:  Review of Systems   Constitutional:  Negative for malaise/fatigue.   Gastrointestinal:  Positive for constipation. Negative for abdominal pain, blood in stool and diarrhea.     OBJECTIVE:     Vital Signs (Most Recent)  Blood Pressure (Abnormal) 165/64 (BP Location: Right arm, Patient Position: Sitting, BP Method: Large (Automatic))   Pulse 75   Height 5' 10.98" (1.803 m)   Weight 99.4 kg (219 lb 2.2 oz)   Body Mass Index 30.58 kg/m²     Physical Exam:  General: Black or  male in no distress   Neuro: Alert and oriented to person, place, and time.  Moves all extremities.     HEENT: No icterus.  Trachea midline  Respiratory: Respirations are even and unlabored, no cough or audible wheezing  Skin: Warm dry and intact, No visible rashes, no jaundice    Labs reviewed today:  Lab Results   Component Value Date    WBC 5.60 09/30/2022    HGB 10.6 (L) 09/30/2022    HCT 33.9 (L) 09/30/2022     09/30/2022    CHOL 142 09/30/2022    TRIG 115 09/30/2022    HDL 25 (L) 09/30/2022    ALT 54 (H) 09/30/2022    AST 57 (H) 09/30/2022     09/30/2022    K 4.3 09/30/2022     09/30/2022    CREATININE 0.9 09/30/2022    BUN 16 09/30/2022    CO2 24 09/30/2022    TSH 2.174 09/30/2022    PSA 0.96 01/27/2021    INR 1.0 07/04/2021    HGBA1C 6.0 (H) 09/30/2022       Imaging " reviewed today:  none    Endoscopy reviewed today:  Last Colonoscopy completed on 4/21/2022  - The examined portion of the ileum was normal.   - Patent functional end-to-end ileo-colonic anastomosis, characterized by healthy appearing mucosa.   - The examination was otherwise normal on direct and retroflexion views.   - No specimens collected.   - repeat in 5 years    Anorectal Exam:    Anal Skin: External hemorrhoids  - right lateral large thrombosed  - small thrombosed left lateral    Digital Rectal Exam:  Deferred      Post Thrombosed External Hemorrhoid Excision    1. Pt gave verbal informed consent for excision of thrombosed external hemorrhoid    2. Area cleansed with betadine, anesthetized with subcutaneous infiltration of 1% lidocaine with epi, and incised with a 11 scalpel, the clot was extracted with curved scissors and tweezers. Hemostasis achieved with gentle pressure. The area was then covered with dry gauze.    3. Oxycodone IR 5 mg tabs q6h PRN pain    4. Pt to f/u prn        ASSESSMENT/PLAN:     Diagnoses and all orders for this visit:    Thrombosed external hemorrhoid  -     oxyCODONE (ROXICODONE) 5 MG immediate release tablet; Take 1 tablet (5 mg total) by mouth every 6 (six) hours as needed for Pain.  -     hydrocortisone (ANUSOL-HC) 2.5 % rectal cream; Place rectally 2 (two) times daily.      The patient was instructed to:  Anusol bid for up to 2 weeks  Oxycodone  prn pain for 3 days  Increase water intake to at least 8-10 glasses of water per day.  Take a daily fiber supplement (Konsyl, Benefiber, Metamucil) and increase dietary intake to 20-30 grams/day.  Avoid straining or spending >5min/event with bowel movements.  Follow-up in clinic prn      NNAMDI Hancock  Colon and Rectal Surgery

## 2022-10-04 NOTE — PROGRESS NOTES
Returned pt's call. Had a bm of 2 hard balls. Difficult to pass. Very painful.   Instructed on miralax, hydrocortisone, and pain medication prn. Will f/u if no improvement in 2 days.

## 2022-10-07 ENCOUNTER — TELEPHONE (OUTPATIENT)
Dept: FAMILY MEDICINE | Facility: CLINIC | Age: 72
End: 2022-10-07

## 2022-10-07 ENCOUNTER — OFFICE VISIT (OUTPATIENT)
Dept: FAMILY MEDICINE | Facility: CLINIC | Age: 72
End: 2022-10-07
Payer: MEDICARE

## 2022-10-07 VITALS
SYSTOLIC BLOOD PRESSURE: 132 MMHG | WEIGHT: 215.38 LBS | HEART RATE: 86 BPM | BODY MASS INDEX: 30.83 KG/M2 | OXYGEN SATURATION: 99 % | DIASTOLIC BLOOD PRESSURE: 78 MMHG | HEIGHT: 70 IN

## 2022-10-07 DIAGNOSIS — K64.5 EXTERNAL THROMBOSED HEMORRHOIDS: ICD-10-CM

## 2022-10-07 DIAGNOSIS — M10.9 GOUTY ARTHRITIS OF LEFT GREAT TOE: ICD-10-CM

## 2022-10-07 DIAGNOSIS — D64.9 ANEMIA, UNSPECIFIED TYPE: ICD-10-CM

## 2022-10-07 DIAGNOSIS — E66.09 CLASS 1 OBESITY DUE TO EXCESS CALORIES WITH SERIOUS COMORBIDITY AND BODY MASS INDEX (BMI) OF 30.0 TO 30.9 IN ADULT: ICD-10-CM

## 2022-10-07 DIAGNOSIS — R73.03 PREDIABETES: ICD-10-CM

## 2022-10-07 DIAGNOSIS — B96.89 UTI DUE TO KLEBSIELLA SPECIES: Primary | ICD-10-CM

## 2022-10-07 DIAGNOSIS — I10 BENIGN ESSENTIAL HYPERTENSION: ICD-10-CM
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
## 2022-10-07 DIAGNOSIS — M54.50 ACUTE LOW BACK PAIN, UNSPECIFIED BACK PAIN LATERALITY, UNSPECIFIED WHETHER SCIATICA PRESENT: ICD-10-CM

## 2022-10-07 DIAGNOSIS — Z79.899 MEDICATION MANAGEMENT: ICD-10-CM

## 2022-10-07 DIAGNOSIS — N39.0 UTI DUE TO KLEBSIELLA SPECIES: Primary | ICD-10-CM

## 2022-10-07 DIAGNOSIS — S39.92XS INJURY OF COCCYX, SEQUELA: ICD-10-CM

## 2022-10-07 DIAGNOSIS — K59.00 CONSTIPATION, UNSPECIFIED CONSTIPATION TYPE: ICD-10-CM

## 2022-10-07 DIAGNOSIS — R33.9 URINARY RETENTION: ICD-10-CM

## 2022-10-07 DIAGNOSIS — Z09 HOSPITAL DISCHARGE FOLLOW-UP: Primary | ICD-10-CM

## 2022-10-07 DIAGNOSIS — Z23 NEEDS FLU SHOT: ICD-10-CM

## 2022-10-07 DIAGNOSIS — E79.0 ELEVATED URIC ACID IN BLOOD: ICD-10-CM

## 2022-10-07 DIAGNOSIS — Z09 HOSPITAL DISCHARGE FOLLOW-UP: ICD-10-CM

## 2022-10-07 DIAGNOSIS — Z85.038 HISTORY OF COLON CANCER, STAGE II: ICD-10-CM

## 2022-10-07 PROBLEM — Z97.8 CHRONIC INDWELLING FOLEY CATHETER: Status: RESOLVED | Noted: 2022-09-25 | Resolved: 2022-10-07

## 2022-10-07 PROBLEM — R10.2 PELVIC PAIN: Status: ACTIVE | Noted: 2022-10-07

## 2022-10-07 PROCEDURE — 3008F PR BODY MASS INDEX (BMI) DOCUMENTED: ICD-10-PCS | Mod: CPTII,S$GLB,, | Performed by: FAMILY MEDICINE

## 2022-10-07 PROCEDURE — 3075F PR MOST RECENT SYSTOLIC BLOOD PRESS GE 130-139MM HG: ICD-10-PCS | Mod: CPTII,S$GLB,, | Performed by: FAMILY MEDICINE

## 2022-10-07 PROCEDURE — 3288F PR FALLS RISK ASSESSMENT DOCUMENTED: ICD-10-PCS | Mod: CPTII,S$GLB,, | Performed by: FAMILY MEDICINE

## 2022-10-07 PROCEDURE — 1101F PT FALLS ASSESS-DOCD LE1/YR: CPT | Mod: CPTII,S$GLB,, | Performed by: FAMILY MEDICINE

## 2022-10-07 PROCEDURE — 3075F SYST BP GE 130 - 139MM HG: CPT | Mod: CPTII,S$GLB,, | Performed by: FAMILY MEDICINE

## 2022-10-07 PROCEDURE — G0008 ADMIN INFLUENZA VIRUS VAC: HCPCS | Mod: S$GLB,,, | Performed by: FAMILY MEDICINE

## 2022-10-07 PROCEDURE — 3288F FALL RISK ASSESSMENT DOCD: CPT | Mod: CPTII,S$GLB,, | Performed by: FAMILY MEDICINE

## 2022-10-07 PROCEDURE — 1101F PR PT FALLS ASSESS DOC 0-1 FALLS W/OUT INJ PAST YR: ICD-10-PCS | Mod: CPTII,S$GLB,, | Performed by: FAMILY MEDICINE

## 2022-10-07 PROCEDURE — 3078F DIAST BP <80 MM HG: CPT | Mod: CPTII,S$GLB,, | Performed by: FAMILY MEDICINE

## 2022-10-07 PROCEDURE — 1125F AMNT PAIN NOTED PAIN PRSNT: CPT | Mod: CPTII,S$GLB,, | Performed by: FAMILY MEDICINE

## 2022-10-07 PROCEDURE — 99214 PR OFFICE/OUTPT VISIT, EST, LEVL IV, 30-39 MIN: ICD-10-PCS | Mod: 25,S$GLB,, | Performed by: FAMILY MEDICINE

## 2022-10-07 PROCEDURE — 99999 PR PBB SHADOW E&M-EST. PATIENT-LVL III: CPT | Mod: PBBFAC,,, | Performed by: FAMILY MEDICINE

## 2022-10-07 PROCEDURE — 3044F HG A1C LEVEL LT 7.0%: CPT | Mod: CPTII,S$GLB,, | Performed by: FAMILY MEDICINE

## 2022-10-07 PROCEDURE — 99999 PR PBB SHADOW E&M-EST. PATIENT-LVL III: ICD-10-PCS | Mod: PBBFAC,,, | Performed by: FAMILY MEDICINE

## 2022-10-07 PROCEDURE — 3078F PR MOST RECENT DIASTOLIC BLOOD PRESSURE < 80 MM HG: ICD-10-PCS | Mod: CPTII,S$GLB,, | Performed by: FAMILY MEDICINE

## 2022-10-07 PROCEDURE — 99499 UNLISTED E&M SERVICE: CPT | Mod: S$GLB,,, | Performed by: FAMILY MEDICINE

## 2022-10-07 PROCEDURE — 99499 RISK ADDL DX/OHS AUDIT: ICD-10-PCS | Mod: S$GLB,,, | Performed by: FAMILY MEDICINE

## 2022-10-07 PROCEDURE — G0008 FLU VACCINE - QUADRIVALENT - ADJUVANTED: ICD-10-PCS | Mod: S$GLB,,, | Performed by: FAMILY MEDICINE

## 2022-10-07 PROCEDURE — 3044F PR MOST RECENT HEMOGLOBIN A1C LEVEL <7.0%: ICD-10-PCS | Mod: CPTII,S$GLB,, | Performed by: FAMILY MEDICINE

## 2022-10-07 PROCEDURE — 3008F BODY MASS INDEX DOCD: CPT | Mod: CPTII,S$GLB,, | Performed by: FAMILY MEDICINE

## 2022-10-07 PROCEDURE — 90694 FLU VACCINE - QUADRIVALENT - ADJUVANTED: ICD-10-PCS | Mod: S$GLB,,, | Performed by: FAMILY MEDICINE

## 2022-10-07 PROCEDURE — 90694 VACC AIIV4 NO PRSRV 0.5ML IM: CPT | Mod: S$GLB,,, | Performed by: FAMILY MEDICINE

## 2022-10-07 PROCEDURE — 99214 OFFICE O/P EST MOD 30 MIN: CPT | Mod: 25,S$GLB,, | Performed by: FAMILY MEDICINE

## 2022-10-07 PROCEDURE — 1125F PR PAIN SEVERITY QUANTIFIED, PAIN PRESENT: ICD-10-PCS | Mod: CPTII,S$GLB,, | Performed by: FAMILY MEDICINE

## 2022-10-07 RX ORDER — IBUPROFEN 800 MG/1
800 TABLET ORAL 2 TIMES DAILY PRN
Qty: 60 TABLET | Refills: 1 | Status: SHIPPED | OUTPATIENT
Start: 2022-10-07 | End: 2023-03-24

## 2022-10-07 NOTE — PROGRESS NOTES
Chief Complaint  Chief Complaint   Patient presents with    Annual Exam     Patient fell on his tailbone on Sept. 12th.        HPI  Noah Nichole is a 72 y.o. male with colon cancer stage II s/p rt hemicolectomy 2018, HTN, iron def anemia secondary to chronic blood loss, urinary retention w/ prior chronic indwelling flores that presents for hospital follow up.   His last appointment with primary care was 8/31/2022.      Recent hospitalization, d/c on 9/27/22-- admitted w/ for fever and difficulty urinating.   He was seen prior (9/12/22) in ED due to fall at work leading to tailbone pain, Xray negative for fracture, MRI lumbar spine indicating chronic arthritic changes and spondylitic spinal stenosis and foraminal stenosis at L4-5. He was found to have urinary retention and a flores was placed. In urology clinic follow up UA was indicative of infection and he was started on Bactrim. After three doses pt became febrile and urine became darker leading to second presentation to ED. He was afebrile, no leukocytosis. Started on Cefepime. Pan-senstivie klebsiella grew on urine culture, d/c on cefdinir 5 day course. Blood cultures neg. Pt completed antibiotics, no GI upset. No dysuria.     Today reports 8/10 pain from external hemorrhoids and tailbone. Pt reports tailbone pain worse w/ sitting and walking. Can't stand too long. 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 Pt reports weakness in legs, difficulty lifting legs. Denies paresthesia. Cannot bend over to put on shoes or tie them. Needs assistance putting on socks and picking up things. Pt has home health coming 3x per week for therapy. Pt is scared to take oxycodone, taking miralax and stool softener to combat constipation has not yet started a fiber supplement as advised by Colorectal.    Pt is taking ibuprofen 200mg once daily for pain. Roll on stop pain topical w/ some relief. Home health stretches help. Not  using heating pad or ice pack.     Hemorrhoids: Seen CRS last 10/3/22 for thromboses external hemorrhoid excision, to follow up as needed, anusol bid for 2 weeks, oxy PRN for 3 days prescribed but pt not taking, increase water, pt not taking daily fiber supplementation (goal 20-30grams/day total fiber intake).      Urinary retention: Pt reports urinary stream is weak, w/ difficulty starting and stopping. Pt endorses feelings of residual fullness. No hematuria, no dysuria. No suprapubic pain. Seen urology last 9/28/22 and underwent cystoscopy and trus, unremarkable bladder, mildly enlarged prostate.  Jackson removed and voiding trail w/ PVR of 9cc. Pt cancelled prostate procedure but is interested in rescheduling as he is having persistent urinary symptoms.     HTN: 132/78 today, home health visits: 120-130s/60-70s. Taking antihypertensive daily, no lightheadedness or dizziness.     Weight: down 10 lbs, pt reports due to less activity and appetite.       PAST MEDICAL HISTORY:  Past Medical History:   Diagnosis Date    Anemia     Benign essential hypertension 12/17/2014    Chronic indwelling Jackson catheter 9/25/2022    History of colon cancer, stage II 10/10/2018    Iron deficiency anemia due to chronic blood loss 10/16/2019     PAST MEDICAL HISTORY, SURGICAL/SOCIAL/FAMILY HISTORY REVIEWED AS PER CHART, WITH PERTINENT FINDINGS INCLUDED IN HISTORY SECTION OF NOTE.      ALLERGIES AND MEDICATIONS: updated and reviewed.  Review of patient's allergies indicates:  No Known Allergies  Current Outpatient Medications   Medication Sig Dispense Refill    acetaminophen (TYLENOL) 325 MG tablet Take 2 tablets (650 mg total) by mouth every 6 (six) hours as needed for Pain. 30 tablet 0    colchicine (COLCRYS) 0.6 mg tablet Take 1 tablet (0.6 mg total) by mouth once daily. 30 tablet 2    cyclobenzaprine (FLEXERIL) 5 MG tablet Take 1 tablet (5 mg total) by mouth 3 (three) times daily as needed for Muscle spasms. 30 tablet 0     "hydrocortisone (ANUSOL-HC) 2.5 % rectal cream Place rectally 2 (two) times daily. 28 g 2    LIDOcaine (LIDODERM) 5 % Place 1 patch onto the skin once daily. Remove & Discard patch within 12 hours or as directed by MD 15 patch 0    losartan-hydrochlorothiazide 100-12.5 mg (HYZAAR) 100-12.5 mg Tab Take 1 tablet by mouth once daily. 90 tablet 3    metFORMIN (GLUCOPHAGE-XR) 500 MG ER 24hr tablet Take 1 tablet (500 mg total) by mouth daily with breakfast. 90 tablet 4    tamsulosin (FLOMAX) 0.4 mg Cap Take 1 capsule (0.4 mg total) by mouth once daily. 90 capsule 3    allopurinoL (ZYLOPRIM) 300 MG tablet Take 1 tablet (300 mg total) by mouth once daily. (Patient not taking: No sig reported) 90 tablet 4    ibuprofen (ADVIL,MOTRIN) 800 MG tablet Take 1 tablet (800 mg total) by mouth 2 (two) times daily as needed for Pain (low back and pelvic pain). Take with food 60 tablet 1     No current facility-administered medications for this visit.         ROS  Review of Systems   Constitutional:  Positive for appetite change (decrease). Negative for chills, diaphoresis and fever.   Gastrointestinal:  Positive for constipation and rectal pain. Negative for anal bleeding and blood in stool.   Genitourinary:  Positive for difficulty urinating. Negative for dysuria.        Residual fullness feeling, trouble starting and stopping urination, weak stream   Musculoskeletal:  Positive for back pain and gait problem.         Physical Exam  Vitals:    10/07/22 0816   BP: 132/78   BP Location: Left arm   Patient Position: Sitting   Pulse: 86   SpO2: 99%   Weight: 97.7 kg (215 lb 6.2 oz)   Height: 5' 10" (1.778 m)    Body mass index is 30.91 kg/m².  Weight: 97.7 kg (215 lb 6.2 oz)   Height: 5' 10" (177.8 cm)   Physical Exam  Vitals reviewed.   Constitutional:       General: He is not in acute distress.     Appearance: Normal appearance. He is not ill-appearing, toxic-appearing or diaphoretic.   HENT:      Head: Normocephalic and atraumatic. "   Eyes:      Conjunctiva/sclera: Conjunctivae normal.   Cardiovascular:      Rate and Rhythm: Normal rate and regular rhythm.      Pulses: Normal pulses.      Heart sounds: Normal heart sounds.   Pulmonary:      Effort: Pulmonary effort is normal.      Breath sounds: Normal breath sounds.   Abdominal:      General: Abdomen is flat. There is no distension.      Palpations: Abdomen is soft.      Tenderness: There is no abdominal tenderness. There is no guarding or rebound.   Musculoskeletal:      Lumbar back: Deformity (Step off near L8) and tenderness present.      Right hip: Tenderness present.      Left hip: Tenderness present.      Right lower leg: No edema.      Left lower leg: No edema.      Comments: Strength 5/5 in lower extremities bilaterally. Bilat LE gross sensation intact.    Neurological:      Mental Status: He is alert.         Health Maintenance         Date Due Completion Date    COVID-19 Vaccine (4 - Booster for Moderna series) 01/28/2022 12/3/2021    Influenza Vaccine (1) 09/01/2022 10/23/2021    Colorectal Cancer Screening 04/21/2027 4/21/2022    Override on 2/15/2011: Done    Lipid Panel 09/30/2027 9/30/2022    TETANUS VACCINE 10/09/2031 10/9/2021    Override on 12/29/2016: Declined            Assessment and Plan:  UTI due to Klebsiella, resolved:   S/p Bactrim 3 dose & Cefdinir course completion  No persistent dysuria.   Likely secondary to urinary retention s/p fall with back and tailbone injury.     Hospital discharge follow up:   Follow up in 3 months w/ labs.     Urinary retention secondary to mild BPH s/p pelvic trauma:   Follow up w/ urology.   Continue tamulosin   Pt is considering rescheduling prostate resection procedure as his symptoms are persistent-- requests that we notify Urology- message sent  Discussed w/ pt strict precautions to return to clinic or to seek medical care if retention worsens or if symptoms of infection present.   MRI negative for cauda equina- areas of stenosis and  degenerative change noted.    Thrombosed external hemorrhoids associated with constipation:   Continue topicals per colorectal surgery.   Start fiber power supplementation w/ benefiber, metamucil, or generic one scoop in water twice per day, titrate up as needed.   Stool softener per colorectal surgery.   Miralax PRN   Increase water intake.   Follow up w/ colorectal surgery PRN    Pelvic pain:   Xray after recent fall, no evidence of fracture, MRI indicating lumbar spondylosis w/ foraminal stenosis @ L4/5 & facet arthropathy   Ibuprofen 800mg q8hrs PRN   Using pillow, topical analgesics, and heating pad/ice pack PRN  Continue use of mobility aids  Continue home health PT     H/o colon cancer:   Up to date w/ suvivorship and colonoscopy   No evidence of melena or hematochezia.    Health Maintenance:   Health maintenance reviewed & updated today. Remain up to date with regular eye/dental exams and regular exercise.   Last colonoscopy 4/21/22 normal examined part of ileum, patent functional end to tend oleo-colonic anastomosis w/ healthy mucosa. Repeat in 5 years.   Flu shot administered today.   Covid bivalent booster due - encouraged pt to get from pharmacy, pt verbalized understanding.       UTI due to Klebsiella species    Hospital discharge follow-up    Urinary retention    External thrombosed hemorrhoids    Constipation, unspecified constipation type    History of colon cancer, stage II    Prediabetes  -     Hemoglobin A1C; Future; Expected date: 01/05/2023  -     Comprehensive Metabolic Panel; Future; Expected date: 01/05/2023    Benign essential hypertension    Anemia, unspecified type  -     CBC Auto Differential; Future; Expected date: 01/05/2023  -     Ferritin; Future; Expected date: 01/05/2023  -     Iron and TIBC; Future; Expected date: 01/05/2023    Class 1 obesity due to excess calories with serious comorbidity and body mass index (BMI) of 30.0 to 30.9 in adult    Fall, sequela    Injury of coccyx,  sequela  -     ibuprofen (ADVIL,MOTRIN) 800 MG tablet; Take 1 tablet (800 mg total) by mouth 2 (two) times daily as needed for Pain (low back and pelvic pain). Take with food  Dispense: 60 tablet; Refill: 1    Acute low back pain, unspecified back pain laterality, unspecified whether sciatica present  -     ibuprofen (ADVIL,MOTRIN) 800 MG tablet; Take 1 tablet (800 mg total) by mouth 2 (two) times daily as needed for Pain (low back and pelvic pain). Take with food  Dispense: 60 tablet; Refill: 1    Needs flu shot  -     Influenza - High Dose (65+) (PF) (IM)    Medication management  -     Hemoglobin A1C; Future; Expected date: 01/05/2023  -     Comprehensive Metabolic Panel; Future; Expected date: 01/05/2023  -     CBC Auto Differential; Future; Expected date: 01/05/2023  -     Ferritin; Future; Expected date: 01/05/2023  -     Iron and TIBC; Future; Expected date: 01/05/2023  -     URIC ACID; Future; Expected date: 01/05/2023    Gouty arthritis of left great toe  -     URIC ACID; Future; Expected date: 01/05/2023    Elevated uric acid in blood  -     URIC ACID; Future; Expected date: 01/05/2023    Other orders  -     Influenza (FLUAD) - Quadrivalent (Adjuvanted) *Preferred* (65+) (PF)    I hereby acknowledge that I am relying upon documentation authored by a medical student working under my supervision and further I hereby attest that I have verified the student documentation or findings by personally re-performing the physical exam and medical decision making activities of the Evaluation and Management service to be billed.  Grace Jeffery

## 2022-10-07 NOTE — TELEPHONE ENCOUNTER
----- Message from Yesenia Gross sent at 10/7/2022  1:13 PM CDT -----  Type:  Needs Medical Advice    Who Called: pt  Symptoms (please be specific): pt is requesting a return call from Dr Rollins     Would the patient rather a call back or a response via [facility]? call  Best Call Back Number: [phone]  Additional Information:

## 2022-10-10 ENCOUNTER — PATIENT MESSAGE (OUTPATIENT)
Dept: UROLOGY | Facility: CLINIC | Age: 72
End: 2022-10-10
Payer: MEDICARE

## 2022-10-10 ENCOUNTER — TELEPHONE (OUTPATIENT)
Dept: FAMILY MEDICINE | Facility: CLINIC | Age: 72
End: 2022-10-10
Payer: MEDICARE

## 2022-10-10 ENCOUNTER — TELEPHONE (OUTPATIENT)
Dept: UROLOGY | Facility: CLINIC | Age: 72
End: 2022-10-10
Payer: MEDICARE

## 2022-10-10 NOTE — TELEPHONE ENCOUNTER
----- Message from Karl Soto sent at 10/10/2022  7:04 AM CDT -----  Contact: pt  .Type:  Needs Medical Advice    Who Called: pt    Would the patient rather a call back or a response via [facility]? Call back  Best Call Back Number: [phone]  Additional Information: Pt. Is calling for the status of his paperwork for Workers Compensation.   Pt states this is his 4th week trying to get this paperwork done.

## 2022-10-10 NOTE — TELEPHONE ENCOUNTER
Spoke with Patient about his issues and suggested to him that Dr. Fuchs may would like for him to come in to be seen in regards to the issues he was having. Told patient I would follow up with him after I spoke with doctor on this matter.

## 2022-10-10 NOTE — TELEPHONE ENCOUNTER
----- Message from CASIE Downs sent at 10/10/2022  9:29 AM CDT -----  Regarding: please address, sent to wrong provider  Contact: pt  Please respond to this message, sent to wrong dept  ----- Message -----  From: Karl Soto  Sent: 10/10/2022   7:08 AM CDT  To: Dede Kate Staff    .Type:  Needs Medical Advice    Who Called: pt    Pharmacy name and phone #: [id number] - [place], [place] - [address]   Phone: [phone]  Fax:  [phone]  Would the patient rather a call back or a response via [facility]? Call back  Best Call Back Number: [phone]  Additional Information: Pt is requesting a call back regarding a pill he can take for weak urine stream

## 2022-10-10 NOTE — TELEPHONE ENCOUNTER
Spoke with patient. Notified patient it takes 7-14 business days. Notified him it still process and havent heard back regarding his paperwork. But notified him if we hear back we will update him. Patient understood and agreed.

## 2022-10-11 ENCOUNTER — HOSPITAL ENCOUNTER (OUTPATIENT)
Dept: PREADMISSION TESTING | Facility: HOSPITAL | Age: 72
Discharge: HOME | End: 2022-10-11
Attending: UROLOGY
Payer: MEDICARE

## 2022-10-13 ENCOUNTER — OFFICE VISIT (OUTPATIENT)
Dept: SURGERY | Facility: CLINIC | Age: 72
End: 2022-10-13
Payer: MEDICARE

## 2022-10-13 VITALS
WEIGHT: 219.81 LBS | HEART RATE: 77 BPM | DIASTOLIC BLOOD PRESSURE: 77 MMHG | SYSTOLIC BLOOD PRESSURE: 151 MMHG | HEIGHT: 70 IN | BODY MASS INDEX: 31.47 KG/M2

## 2022-10-13 DIAGNOSIS — K64.5 THROMBOSED EXTERNAL HEMORRHOID: Primary | ICD-10-CM

## 2022-10-13 PROCEDURE — 99024 PR POST-OP FOLLOW-UP VISIT: ICD-10-PCS | Mod: S$GLB,,, | Performed by: NURSE PRACTITIONER

## 2022-10-13 PROCEDURE — 3288F PR FALLS RISK ASSESSMENT DOCUMENTED: ICD-10-PCS | Mod: CPTII,S$GLB,, | Performed by: NURSE PRACTITIONER

## 2022-10-13 PROCEDURE — 99024 POSTOP FOLLOW-UP VISIT: CPT | Mod: S$GLB,,, | Performed by: NURSE PRACTITIONER

## 2022-10-13 PROCEDURE — 3044F HG A1C LEVEL LT 7.0%: CPT | Mod: CPTII,S$GLB,, | Performed by: NURSE PRACTITIONER

## 2022-10-13 PROCEDURE — 1101F PR PT FALLS ASSESS DOC 0-1 FALLS W/OUT INJ PAST YR: ICD-10-PCS | Mod: CPTII,S$GLB,, | Performed by: NURSE PRACTITIONER

## 2022-10-13 PROCEDURE — 1160F RVW MEDS BY RX/DR IN RCRD: CPT | Mod: CPTII,S$GLB,, | Performed by: NURSE PRACTITIONER

## 2022-10-13 PROCEDURE — 1125F AMNT PAIN NOTED PAIN PRSNT: CPT | Mod: CPTII,S$GLB,, | Performed by: NURSE PRACTITIONER

## 2022-10-13 PROCEDURE — 3077F PR MOST RECENT SYSTOLIC BLOOD PRESSURE >= 140 MM HG: ICD-10-PCS | Mod: CPTII,S$GLB,, | Performed by: NURSE PRACTITIONER

## 2022-10-13 PROCEDURE — 3077F SYST BP >= 140 MM HG: CPT | Mod: CPTII,S$GLB,, | Performed by: NURSE PRACTITIONER

## 2022-10-13 PROCEDURE — 1159F MED LIST DOCD IN RCRD: CPT | Mod: CPTII,S$GLB,, | Performed by: NURSE PRACTITIONER

## 2022-10-13 PROCEDURE — 1125F PR PAIN SEVERITY QUANTIFIED, PAIN PRESENT: ICD-10-PCS | Mod: CPTII,S$GLB,, | Performed by: NURSE PRACTITIONER

## 2022-10-13 PROCEDURE — 1101F PT FALLS ASSESS-DOCD LE1/YR: CPT | Mod: CPTII,S$GLB,, | Performed by: NURSE PRACTITIONER

## 2022-10-13 PROCEDURE — 1160F PR REVIEW ALL MEDS BY PRESCRIBER/CLIN PHARMACIST DOCUMENTED: ICD-10-PCS | Mod: CPTII,S$GLB,, | Performed by: NURSE PRACTITIONER

## 2022-10-13 PROCEDURE — 3078F PR MOST RECENT DIASTOLIC BLOOD PRESSURE < 80 MM HG: ICD-10-PCS | Mod: CPTII,S$GLB,, | Performed by: NURSE PRACTITIONER

## 2022-10-13 PROCEDURE — 1159F PR MEDICATION LIST DOCUMENTED IN MEDICAL RECORD: ICD-10-PCS | Mod: CPTII,S$GLB,, | Performed by: NURSE PRACTITIONER

## 2022-10-13 PROCEDURE — 99999 PR PBB SHADOW E&M-EST. PATIENT-LVL III: CPT | Mod: PBBFAC,,, | Performed by: NURSE PRACTITIONER

## 2022-10-13 PROCEDURE — 3044F PR MOST RECENT HEMOGLOBIN A1C LEVEL <7.0%: ICD-10-PCS | Mod: CPTII,S$GLB,, | Performed by: NURSE PRACTITIONER

## 2022-10-13 PROCEDURE — 3078F DIAST BP <80 MM HG: CPT | Mod: CPTII,S$GLB,, | Performed by: NURSE PRACTITIONER

## 2022-10-13 PROCEDURE — 46320 PR EXCISION THROMBOSED HEMORRHOID, EXTERNAL: ICD-10-PCS | Mod: 76,S$GLB,, | Performed by: NURSE PRACTITIONER

## 2022-10-13 PROCEDURE — 3288F FALL RISK ASSESSMENT DOCD: CPT | Mod: CPTII,S$GLB,, | Performed by: NURSE PRACTITIONER

## 2022-10-13 PROCEDURE — 99999 PR PBB SHADOW E&M-EST. PATIENT-LVL III: ICD-10-PCS | Mod: PBBFAC,,, | Performed by: NURSE PRACTITIONER

## 2022-10-13 PROCEDURE — 46320 REMOVAL OF HEMORRHOID CLOT: CPT | Mod: 76,S$GLB,, | Performed by: NURSE PRACTITIONER

## 2022-10-13 NOTE — PROGRESS NOTES
CRS Office Visit History and Physical    Referring Md:   No referring provider defined for this encounter.    SUBJECTIVE:     Chief Complaint: hemorrhoid    History of Present Illness:  The patient is established patient to this practice. Saw Dede NP 10 days ago, I&D of thrombosed hemorrhoid performed. Pt is new to me.   He reports the I&D from 10 days ago helped but the pain returned a few days later. Used the hydrocortisone cream. Feels swollen  He has been taking a stool softener and fiber every day. Go every two days or so.      Review of patient's allergies indicates:  No Known Allergies    Past Medical History:   Diagnosis Date    Anemia     Benign essential hypertension 12/17/2014    Chronic indwelling Jackson catheter 9/25/2022    History of colon cancer, stage II 10/10/2018    Iron deficiency anemia due to chronic blood loss 10/16/2019     Past Surgical History:   Procedure Laterality Date    COLON SURGERY  09/18/2018    COLONOSCOPY N/A 9/14/2018    Procedure: COLONOSCOPY;  Surgeon: Adrian Cheung MD;  Location: [facility] ([address] [place] [address]);  Service: Endoscopy;  Laterality: N/A;  pt ate at 1:30 on 9/13/18-Dr Cheung informed and he stated ok to do colonoscopy.    COLONOSCOPY N/A 10/11/2019    Procedure: COLONOSCOPY;  Surgeon: Gabino Bonilla MD;  [facility]  Service: Endoscopy;  Laterality: N/A;    COLONOSCOPY N/A 4/21/2022    Procedure: COLONOSCOPY;  Surgeon: Zeny Hinkle MD;  [facility]  Service: Colon and Rectal;  Laterality: N/A;  fully vaccinated, instructions sent to [facility] and emailed to [email or web address]  suprep    NO PAST SURGERIES      RIGHT HEMICOLECTOMY N/A 9/18/2018    Procedure: HEMICOLECTOMY, RIGHT, extended;  Surgeon: Adrian Cheung MD;  [facility];  Service: Colon and Rectal;  Laterality: N/A;     Family History   Problem Relation Age of Onset    Hypertension Mother     Cancer Father         stomach  "   Hypertension Father     Diabetes Sister     Hypertension Brother     Glaucoma Brother     Diabetes Brother     Cataracts Brother     Cancer Sister     Amblyopia Neg Hx     Blindness Neg Hx     Macular degeneration Neg Hx     Retinal detachment Neg Hx     Strabismus Neg Hx     Stroke Neg Hx     Thyroid disease Neg Hx      Social History     Tobacco Use    Smoking status: Never    Smokeless tobacco: Never   Substance Use Topics    Alcohol use: Not Currently    Drug use: No        Review of Systems:  ROS    OBJECTIVE:     Vital Signs (Most Recent)  BP (!) 151/77 (BP Location: Right arm, Patient Position: Sitting, BP Method: Large (Automatic))   Pulse 77   Ht 5' 10" (1.778 m)   Wt 99.7 kg (219 lb 12.8 oz)   BMI 31.54 kg/m²     Physical Exam:  General: Black or  male in no distress   Neuro: Alert and oriented to person, place, and time.  Moves all extremities.     HEENT: No icterus.  Trachea midline  Respiratory: Respirations are even and unlabored, no cough or audible wheezing  Skin: Warm dry and intact, No visible rashes, no jaundice    Labs reviewed today:  Lab Results   Component Value Date    WBC 5.60 09/30/2022    HGB 10.6 (L) 09/30/2022    HCT 33.9 (L) 09/30/2022     09/30/2022    CHOL 142 09/30/2022    TRIG 115 09/30/2022    HDL 25 (L) 09/30/2022    ALT 54 (H) 09/30/2022    AST 57 (H) 09/30/2022     09/30/2022    K 4.3 09/30/2022     09/30/2022    CREATININE 0.9 09/30/2022    BUN 16 09/30/2022    CO2 24 09/30/2022    TSH 2.174 09/30/2022    PSA 0.96 01/27/2021    INR 1.0 07/04/2021    HGBA1C 6.0 (H) 09/30/2022       Imaging reviewed today:  none    Endoscopy reviewed today:  none    Anorectal Exam:    Anal Skin: thrombosed external hemorrhoid w ulceration    Post Thrombosed External Hemorrhoid Excision    1. Pt gave verbal informed consent for excision of thrombosed external hemorrhoid    2. Area cleansed with betadine, anesthetized with subcutaneous infiltration of 1% " lidocaine with epi, and incised with a 11 scalpel, the blood clot was extracted with curved scissors and gel-foam was placed to achieve hemostasis. The area was then covered with dry gauze.  Pt tolerated well    ASSESSMENT/PLAN:     Diagnoses and all orders for this visit:    Thrombosed external hemorrhoid    See above hemorrhoid excision note    The patient was instructed to:  Increase water intake to at least 8-10 glasses of water per day.  Take a daily fiber supplement (Konsyl, Benefiber, Metamucil) and increase dietary intake to 20-30 grams/day.  Avoid straining or spending >5min/event with bowel movements.  Continue with anusol for the next week    Follow-up in clinic PRN    NNAMDI Sharma  Colon and Rectal Surgery

## 2022-11-09 ENCOUNTER — PES CALL (OUTPATIENT)
Dept: ADMINISTRATIVE | Facility: CLINIC | Age: 72
End: 2022-11-09
Payer: MEDICARE

## 2022-11-14 NOTE — PROGRESS NOTES
[facility]  Colon and Rectal Surgery    [address]  [place], [place]  Tel: [phone]  Fax: [phone]  [email or web address]   MD Adrian Godoy MD Brian Kann, MD W. Forrest Johnston, MD Matthew Giglia, MD Jennifer Paruch, MD William Kethman, MD Danielle Kay, MD     Patient name: Noah Nichole   YOB: 1950   MRN: [id number]  Date of visit: 11/15/2022    Dear Dr. Jeffery,    It was a pleasure seeing Mr. Nichole in the Colon and Rectal Surgery clinic here at [facility].     As you know, Mr. Nichole is a 72 year old man with a history of HTN, LISA, and Stage II colon cancer  who presents for evaluation of external thrombosed hemorrhoid . He underwent excision with Ms. Aguayo and again by Ms. Paz on 10/13/2022 here for follow-up. He did undergo an extended right hemicolectomy with Dr. Cheung in 9/2018 - T3N0 (0/15). He describes perianal discomfort - almost a pressure and spasm in his anus after undergoing recent anoscopy. He denies blood in his stool.     Last colonoscopy: 4/21/2022 - 5 year follow-up recommended  There was evidence of a prior functional end-to-end ileo-colonic anastomosis in the proximal transverse colon. This was patent and was characterized by healthy appearing mucosa.    Pathology  Adenocarcinoma, 6.7cm, moderately differentiated Margins all negative Fifteen benign lymph nodes Unremarkable appendix    6/21/2019 - CT AP  1. Postoperative changes from right hemicolectomy in this patient with history of colon cancer.  No evidence local disease recurrence or metastasis. There are no measurable lesions per RECIST criteria.  2. Bilateral simple renal cysts.  3. Additional findings as above.    3/1/2021 - CEA 2.4 from 2.7 from 2.5 from 3.2    The patient was informed of the availability of a certified  without charge. A certified  was not necessary for this visit.    Review of Systems  See  pertinent review of systems above    Past Medical History:   Diagnosis Date    Anemia     Benign essential hypertension 12/17/2014    Chronic indwelling Jackson catheter 9/25/2022    History of colon cancer, stage II 10/10/2018    Iron deficiency anemia due to chronic blood loss 10/16/2019     Past Surgical History:   Procedure Laterality Date    COLON SURGERY  09/18/2018    COLONOSCOPY N/A 9/14/2018    Procedure: COLONOSCOPY;  Surgeon: Adrian Cheung MD;  Location: [facility] (4TH FLR);  Service: Endoscopy;  Laterality: N/A;  pt ate at 1:30 on 9/13/18-Dr Cheung informed and he stated ok to do colonoscopy.    COLONOSCOPY N/A 10/11/2019    Procedure: COLONOSCOPY;  Surgeon: Gabino Bonilla MD;  [facility]  Service: Endoscopy;  Laterality: N/A;    COLONOSCOPY N/A 4/21/2022    Procedure: COLONOSCOPY;  Surgeon: Zeny Hinkle MD;  Location: [facility] (4TH FLR);  Service: Colon and Rectal;  Laterality: N/A;  fully vaccinated, instructions sent to [facility] and emailed to [email or web address]  suprep    NO PAST SURGERIES      RIGHT HEMICOLECTOMY N/A 9/18/2018    Procedure: HEMICOLECTOMY, RIGHT, extended;  Surgeon: Adrian Cheung MD;  [facility];  Service: Colon and Rectal;  Laterality: N/A;     Family History   Problem Relation Age of Onset    Hypertension Mother     Cancer Father         stomach    Hypertension Father     Diabetes Sister     Hypertension Brother     Glaucoma Brother     Diabetes Brother     Cataracts Brother     Cancer Sister     Amblyopia Neg Hx     Blindness Neg Hx     Macular degeneration Neg Hx     Retinal detachment Neg Hx     Strabismus Neg Hx     Stroke Neg Hx     Thyroid disease Neg Hx      Social History     Tobacco Use    Smoking status: Never    Smokeless tobacco: Never   Substance Use Topics    Alcohol use: Not Currently    Drug use: No     Review of patient's allergies indicates:  No Known Allergies    Current Outpatient  "Medications on File Prior to Visit   Medication Sig Dispense Refill    allopurinoL (ZYLOPRIM) 300 MG tablet Take 1 tablet (300 mg total) by mouth once daily. 90 tablet 4    colchicine (COLCRYS) 0.6 mg tablet Take 1 tablet (0.6 mg total) by mouth once daily. 30 tablet 2    hydrocortisone (ANUSOL-HC) 2.5 % rectal cream Place rectally 2 (two) times daily. 28 g 2    ibuprofen (ADVIL,MOTRIN) 800 MG tablet Take 1 tablet (800 mg total) by mouth 2 (two) times daily as needed for Pain (low back and pelvic pain). Take with food 60 tablet 1    LIDOcaine (LIDODERM) 5 % Place 1 patch onto the skin once daily. Remove & Discard patch within 12 hours or as directed by MD 15 patch 0    losartan-hydrochlorothiazide 100-12.5 mg (HYZAAR) 100-12.5 mg Tab Take 1 tablet by mouth once daily. 90 tablet 3    tamsulosin (FLOMAX) 0.4 mg Cap Take 1 capsule (0.4 mg total) by mouth once daily. 90 capsule 3    acetaminophen (TYLENOL) 325 MG tablet Take 2 tablets (650 mg total) by mouth every 6 (six) hours as needed for Pain. (Patient not taking: Reported on 10/13/2022) 30 tablet 0    metFORMIN (GLUCOPHAGE-XR) 500 MG ER 24hr tablet Take 1 tablet (500 mg total) by mouth daily with breakfast. (Patient not taking: Reported on 10/13/2022) 90 tablet 4     No current facility-administered medications on file prior to visit.     Physical Examination  BP (!) 164/73 (BP Location: Left arm, Patient Position: Sitting, BP Method: Large (Automatic))   Pulse 73   Ht 5' 10" (1.778 m)   Wt 102.4 kg (225 lb 11.2 oz)   BMI 32.38 kg/m²      A chaperone was present for the physical examination.    Constitutional: well developed, no cough, no dyspnea, alert, and no acute distress    Head: Normocephalic, no lesions, without obvious abnormality  Eye: Normal external eye, conjunctiva, and lids  Cardiovascular: regular rate and regular rhythm  Respiratory: normal air entry  Gastrointestinal: soft, non-tender    Perianal Skin: Normal - prominence of external hemorrhoids " but soft, no clear fissure identified but he does have spasm of the sphincter muscle, no induration or evidence of abscess/infection, deferred invasive exam today    Musculoskeletal: full range of motion without pain  Neurologic: alert, oriented, normal speech, no focal findings or movement disorder noted  Psychiatric: appropriate, normal mood    Assessment and Plan of Care    Thank you again for referring Mr. Nichole to my care. In summary, Mr. Nichole is a 72 year old man presenting with perianal discomfort after recent anoscopic exam. We discussed treatment options and have provided the following recommendations:    Discussed possible etiologies for his discomfort - no clear explanation on exam but suspect that this will resolve with time. Will try Diltiazem 2% to assist in the process.  Follow-up in 1 month to reassess     Please do not hesitate to contact me if you have any questions.      Gabino Arreola MD - Staff Surgeon  Department of Colon & Rectal Surgery  [facility]

## 2022-11-15 ENCOUNTER — OFFICE VISIT (OUTPATIENT)
Dept: SURGERY | Facility: CLINIC | Age: 72
End: 2022-11-15
Payer: MEDICARE

## 2022-11-15 VITALS
HEART RATE: 73 BPM | SYSTOLIC BLOOD PRESSURE: 164 MMHG | WEIGHT: 225.69 LBS | DIASTOLIC BLOOD PRESSURE: 73 MMHG | HEIGHT: 70 IN | BODY MASS INDEX: 32.31 KG/M2

## 2022-11-15 DIAGNOSIS — K64.5 THROMBOSED EXTERNAL HEMORRHOID: Primary | ICD-10-CM

## 2022-11-15 PROCEDURE — 99999 PR PBB SHADOW E&M-EST. PATIENT-LVL III: ICD-10-PCS | Mod: PBBFAC,,, | Performed by: STUDENT IN AN ORGANIZED HEALTH CARE EDUCATION/TRAINING PROGRAM

## 2022-11-15 PROCEDURE — 99999 PR PBB SHADOW E&M-EST. PATIENT-LVL III: CPT | Mod: PBBFAC,,, | Performed by: STUDENT IN AN ORGANIZED HEALTH CARE EDUCATION/TRAINING PROGRAM

## 2022-11-15 PROCEDURE — 3077F PR MOST RECENT SYSTOLIC BLOOD PRESSURE >= 140 MM HG: ICD-10-PCS | Mod: CPTII,S$GLB,, | Performed by: STUDENT IN AN ORGANIZED HEALTH CARE EDUCATION/TRAINING PROGRAM

## 2022-11-15 PROCEDURE — 3288F PR FALLS RISK ASSESSMENT DOCUMENTED: ICD-10-PCS | Mod: CPTII,S$GLB,, | Performed by: STUDENT IN AN ORGANIZED HEALTH CARE EDUCATION/TRAINING PROGRAM

## 2022-11-15 PROCEDURE — 1125F AMNT PAIN NOTED PAIN PRSNT: CPT | Mod: CPTII,S$GLB,, | Performed by: STUDENT IN AN ORGANIZED HEALTH CARE EDUCATION/TRAINING PROGRAM

## 2022-11-15 PROCEDURE — 99214 OFFICE O/P EST MOD 30 MIN: CPT | Mod: S$GLB,,, | Performed by: STUDENT IN AN ORGANIZED HEALTH CARE EDUCATION/TRAINING PROGRAM

## 2022-11-15 PROCEDURE — 3044F HG A1C LEVEL LT 7.0%: CPT | Mod: CPTII,S$GLB,, | Performed by: STUDENT IN AN ORGANIZED HEALTH CARE EDUCATION/TRAINING PROGRAM

## 2022-11-15 PROCEDURE — 1125F PR PAIN SEVERITY QUANTIFIED, PAIN PRESENT: ICD-10-PCS | Mod: CPTII,S$GLB,, | Performed by: STUDENT IN AN ORGANIZED HEALTH CARE EDUCATION/TRAINING PROGRAM

## 2022-11-15 PROCEDURE — 99214 PR OFFICE/OUTPT VISIT, EST, LEVL IV, 30-39 MIN: ICD-10-PCS | Mod: S$GLB,,, | Performed by: STUDENT IN AN ORGANIZED HEALTH CARE EDUCATION/TRAINING PROGRAM

## 2022-11-15 PROCEDURE — 1159F PR MEDICATION LIST DOCUMENTED IN MEDICAL RECORD: ICD-10-PCS | Mod: CPTII,S$GLB,, | Performed by: STUDENT IN AN ORGANIZED HEALTH CARE EDUCATION/TRAINING PROGRAM

## 2022-11-15 PROCEDURE — 3077F SYST BP >= 140 MM HG: CPT | Mod: CPTII,S$GLB,, | Performed by: STUDENT IN AN ORGANIZED HEALTH CARE EDUCATION/TRAINING PROGRAM

## 2022-11-15 PROCEDURE — 3044F PR MOST RECENT HEMOGLOBIN A1C LEVEL <7.0%: ICD-10-PCS | Mod: CPTII,S$GLB,, | Performed by: STUDENT IN AN ORGANIZED HEALTH CARE EDUCATION/TRAINING PROGRAM

## 2022-11-15 PROCEDURE — 1101F PR PT FALLS ASSESS DOC 0-1 FALLS W/OUT INJ PAST YR: ICD-10-PCS | Mod: CPTII,S$GLB,, | Performed by: STUDENT IN AN ORGANIZED HEALTH CARE EDUCATION/TRAINING PROGRAM

## 2022-11-15 PROCEDURE — 3078F PR MOST RECENT DIASTOLIC BLOOD PRESSURE < 80 MM HG: ICD-10-PCS | Mod: CPTII,S$GLB,, | Performed by: STUDENT IN AN ORGANIZED HEALTH CARE EDUCATION/TRAINING PROGRAM

## 2022-11-15 PROCEDURE — 3078F DIAST BP <80 MM HG: CPT | Mod: CPTII,S$GLB,, | Performed by: STUDENT IN AN ORGANIZED HEALTH CARE EDUCATION/TRAINING PROGRAM

## 2022-11-15 PROCEDURE — 3288F FALL RISK ASSESSMENT DOCD: CPT | Mod: CPTII,S$GLB,, | Performed by: STUDENT IN AN ORGANIZED HEALTH CARE EDUCATION/TRAINING PROGRAM

## 2022-11-15 PROCEDURE — 1101F PT FALLS ASSESS-DOCD LE1/YR: CPT | Mod: CPTII,S$GLB,, | Performed by: STUDENT IN AN ORGANIZED HEALTH CARE EDUCATION/TRAINING PROGRAM

## 2022-11-15 PROCEDURE — 3008F BODY MASS INDEX DOCD: CPT | Mod: CPTII,S$GLB,, | Performed by: STUDENT IN AN ORGANIZED HEALTH CARE EDUCATION/TRAINING PROGRAM

## 2022-11-15 PROCEDURE — 3008F PR BODY MASS INDEX (BMI) DOCUMENTED: ICD-10-PCS | Mod: CPTII,S$GLB,, | Performed by: STUDENT IN AN ORGANIZED HEALTH CARE EDUCATION/TRAINING PROGRAM

## 2022-11-15 PROCEDURE — 1159F MED LIST DOCD IN RCRD: CPT | Mod: CPTII,S$GLB,, | Performed by: STUDENT IN AN ORGANIZED HEALTH CARE EDUCATION/TRAINING PROGRAM

## 2022-12-05 ENCOUNTER — TELEPHONE (OUTPATIENT)
Dept: SURGERY | Facility: CLINIC | Age: 72
End: 2022-12-05
Payer: MEDICARE

## 2022-12-05 NOTE — TELEPHONE ENCOUNTER
Called patient back, states he is experiencing rectal pain. Diltiazem really didn't help. Advised to use witch hazel wipes, wipes instead of toilet paper, cold compresses for relief. Patient verbalized understanding. Appointment already scheduled 12/13. Asked him to please keep us updated.

## 2022-12-05 NOTE — TELEPHONE ENCOUNTER
----- Message from Zeny Maldonado sent at 12/5/2022  8:55 AM CST -----  Needs advice from nurse:      Who Called:pt  Regarding:would like an xray/MRI of his rectum/feels swollen/cream is not helping  Would the patient rather a call back or VIA [facility]?  Best Call Back number:[phone]  Additional Info:

## 2022-12-12 NOTE — PROGRESS NOTES
[facility]  Colon and Rectal Surgery    [address]  [place], [place]  Tel: [phone]  Fax: [phone]  [email or web address]   MD Adrian Godoy MD Brian Kann, MD W. Forrest Johnston, MD Matthew Giglia, MD Jennifer Paruch, MD William Kethman, MD Danielle Kay, MD     Patient name: Noah Nichole   YOB: 1950   MRN: [id number]  Date of visit: 12/13/2022    Dear Dr. Jeffery,    It was a pleasure seeing Mr. Nichole in the Colon and Rectal Surgery clinic here at [facility].     As you know, Mr. Nichole is a 72 year old man with a history of HTN, LISA, and Stage II colon cancer  who presents for evaluation of external thrombosed hemorrhoid . He underwent excision with Ms. Aguayo and again by Ms. Paz on 10/13/2022 here for follow-up. He did undergo an extended right hemicolectomy with Dr. Cheung in 9/2018 - T3N0 (0/15). He describes perianal discomfort - almost a pressure and spasm in his anus after undergoing recent anoscopy. He denies blood in his stool.     Last colonoscopy: 4/21/2022 - 5 year follow-up recommended  There was evidence of a prior functional end-to-end ileo-colonic anastomosis in the proximal transverse colon. This was patent and was characterized by healthy appearing mucosa.    Pathology  Adenocarcinoma, 6.7cm, moderately differentiated Margins all negative Fifteen benign lymph nodes Unremarkable appendix    6/21/2019 - CT AP  1. Postoperative changes from right hemicolectomy in this patient with history of colon cancer.  No evidence local disease recurrence or metastasis. There are no measurable lesions per RECIST criteria.  2. Bilateral simple renal cysts.  3. Additional findings as above.    3/1/2021 - CEA 2.4 from 2.7 from 2.5 from 3.2    12/13/2022  Here for re-evaluation of perianal discomfort after examination - managed with Diltiazem. Still having pressure - mostly internal. He does note some urinary frequency.  He did try the Diltiazem but without relief. He is still having some pelvic discomfort from recent fall.    The patient was informed of the availability of a certified  without charge. A certified  was not necessary for this visit.    Review of Systems  See pertinent review of systems above    Past Medical History:   Diagnosis Date    Anemia     Benign essential hypertension 12/17/2014    Chronic indwelling Jackson catheter 9/25/2022    History of colon cancer, stage II 10/10/2018    Iron deficiency anemia due to chronic blood loss 10/16/2019     Past Surgical History:   Procedure Laterality Date    COLON SURGERY  09/18/2018    COLONOSCOPY N/A 9/14/2018    Procedure: COLONOSCOPY;  Surgeon: Adrian Cheung MD;  Location: [facility] (4TH FLR);  Service: Endoscopy;  Laterality: N/A;  pt ate at 1:30 on 9/13/18-Dr Cheung informed and he stated ok to do colonoscopy.    COLONOSCOPY N/A 10/11/2019    Procedure: COLONOSCOPY;  Surgeon: Gabino Bonilla MD;  Location: [facility] (4TH FLR);  Service: Endoscopy;  Laterality: N/A;    COLONOSCOPY N/A 4/21/2022    Procedure: COLONOSCOPY;  Surgeon: Zeny Hinkle MD;  Location: [facility] (4TH FLR);  Service: Colon and Rectal;  Laterality: N/A;  fully vaccinated, instructions sent to [employer] and emailed to [email or web address]  suprep    NO PAST SURGERIES      RIGHT HEMICOLECTOMY N/A 9/18/2018    Procedure: HEMICOLECTOMY, RIGHT, extended;  Surgeon: Adrian Cheung MD;  [facility];  Service: Colon and Rectal;  Laterality: N/A;     Family History   Problem Relation Age of Onset    Hypertension Mother     Cancer Father         stomach    Hypertension Father     Diabetes Sister     Hypertension Brother     Glaucoma Brother     Diabetes Brother     Cataracts Brother     Cancer Sister     Amblyopia Neg Hx     Blindness Neg Hx     Macular degeneration Neg Hx     Retinal detachment Neg Hx     Strabismus Neg Hx      "Stroke Neg Hx     Thyroid disease Neg Hx      Social History     Tobacco Use    Smoking status: Never    Smokeless tobacco: Never   Substance Use Topics    Alcohol use: Not Currently    Drug use: No     Review of patient's allergies indicates:  No Known Allergies    Current Outpatient Medications on File Prior to Visit   Medication Sig Dispense Refill    acetaminophen (TYLENOL) 325 MG tablet Take 2 tablets (650 mg total) by mouth every 6 (six) hours as needed for Pain. 30 tablet 0    allopurinoL (ZYLOPRIM) 300 MG tablet Take 1 tablet (300 mg total) by mouth once daily. 90 tablet 4    colchicine (COLCRYS) 0.6 mg tablet Take 1 tablet (0.6 mg total) by mouth once daily. 30 tablet 2    diltiazem HCl (DILTIAZEM 2% CREAM) Apply topically 3 (three) times daily. Apply topically to anal area. 30 g 0    hydrocortisone (ANUSOL-HC) 2.5 % rectal cream Place rectally 2 (two) times daily. 28 g 2    ibuprofen (ADVIL,MOTRIN) 800 MG tablet Take 1 tablet (800 mg total) by mouth 2 (two) times daily as needed for Pain (low back and pelvic pain). Take with food 60 tablet 1    LIDOcaine (LIDODERM) 5 % Place 1 patch onto the skin once daily. Remove & Discard patch within 12 hours or as directed by MD 15 patch 0    losartan-hydrochlorothiazide 100-12.5 mg (HYZAAR) 100-12.5 mg Tab Take 1 tablet by mouth once daily. 90 tablet 3    metFORMIN (GLUCOPHAGE-XR) 500 MG ER 24hr tablet Take 1 tablet (500 mg total) by mouth daily with breakfast. 90 tablet 4    tamsulosin (FLOMAX) 0.4 mg Cap Take 1 capsule (0.4 mg total) by mouth once daily. 90 capsule 3     No current facility-administered medications on file prior to visit.     Physical Examination  BP (!) 172/78 (BP Location: Left arm, Patient Position: Sitting, BP Method: Large (Automatic))   Pulse 63   Ht 5' 10" (1.778 m)   Wt 101 kg (222 lb 9.6 oz)   BMI 31.94 kg/m²      A chaperone was present for the physical examination.    Constitutional: well developed, no cough, no dyspnea, alert, and " no acute distress    Head: Normocephalic, no lesions, without obvious abnormality  Eye: Normal external eye, conjunctiva, and lids  Cardiovascular: regular rate and regular rhythm  Respiratory: normal air entry  Gastrointestinal: soft, non-tender    Perianal Skin: Normal - prominence of external hemorrhoids but soft, no fissure identified, less spasm, no induration or evidence of abscess/infection, tolerated BLADIMIR, no masses, no blood    Musculoskeletal: full range of motion without pain  Neurologic: alert, oriented, normal speech, no focal findings or movement disorder noted  Psychiatric: appropriate, normal mood    Assessment and Plan of Care    Thank you again for referring Mr. Nichole to my care. In summary, Mr. Nichole is a 72 year old man presenting with perianal discomfort after recent anoscopic exam. We discussed treatment options and have provided the following recommendations:    Continue Flomax - recommend follow-up with Urology for urinary symptoms   Discussed that I suspect at least some of his symptoms are sequela of his back injury and more recent constipation due to pain medications. He does have prominence (without thrombosis) of his external hemorrhoids - discussed ongoing management of constipation, prefer Miralax over Dulcolax.   Follow-up as needed    Please do not hesitate to contact me if you have any questions.      Gabino Arreola MD - Staff Surgeon  Department of Colon & Rectal Surgery  [facility]

## 2022-12-13 ENCOUNTER — OFFICE VISIT (OUTPATIENT)
Dept: SURGERY | Facility: CLINIC | Age: 72
End: 2022-12-13
Payer: MEDICARE

## 2022-12-13 VITALS
SYSTOLIC BLOOD PRESSURE: 172 MMHG | DIASTOLIC BLOOD PRESSURE: 78 MMHG | BODY MASS INDEX: 31.87 KG/M2 | WEIGHT: 222.63 LBS | HEART RATE: 63 BPM | HEIGHT: 70 IN

## 2022-12-13 DIAGNOSIS — K64.5 THROMBOSED EXTERNAL HEMORRHOID: Primary | ICD-10-CM

## 2022-12-13 PROCEDURE — 3078F PR MOST RECENT DIASTOLIC BLOOD PRESSURE < 80 MM HG: ICD-10-PCS | Mod: CPTII,S$GLB,, | Performed by: STUDENT IN AN ORGANIZED HEALTH CARE EDUCATION/TRAINING PROGRAM

## 2022-12-13 PROCEDURE — 99999 PR PBB SHADOW E&M-EST. PATIENT-LVL III: CPT | Mod: PBBFAC,,, | Performed by: STUDENT IN AN ORGANIZED HEALTH CARE EDUCATION/TRAINING PROGRAM

## 2022-12-13 PROCEDURE — 3044F HG A1C LEVEL LT 7.0%: CPT | Mod: CPTII,S$GLB,, | Performed by: STUDENT IN AN ORGANIZED HEALTH CARE EDUCATION/TRAINING PROGRAM

## 2022-12-13 PROCEDURE — 99213 PR OFFICE/OUTPT VISIT, EST, LEVL III, 20-29 MIN: ICD-10-PCS | Mod: S$GLB,,, | Performed by: STUDENT IN AN ORGANIZED HEALTH CARE EDUCATION/TRAINING PROGRAM

## 2022-12-13 PROCEDURE — 3044F PR MOST RECENT HEMOGLOBIN A1C LEVEL <7.0%: ICD-10-PCS | Mod: CPTII,S$GLB,, | Performed by: STUDENT IN AN ORGANIZED HEALTH CARE EDUCATION/TRAINING PROGRAM

## 2022-12-13 PROCEDURE — 3077F SYST BP >= 140 MM HG: CPT | Mod: CPTII,S$GLB,, | Performed by: STUDENT IN AN ORGANIZED HEALTH CARE EDUCATION/TRAINING PROGRAM

## 2022-12-13 PROCEDURE — 1159F PR MEDICATION LIST DOCUMENTED IN MEDICAL RECORD: ICD-10-PCS | Mod: CPTII,S$GLB,, | Performed by: STUDENT IN AN ORGANIZED HEALTH CARE EDUCATION/TRAINING PROGRAM

## 2022-12-13 PROCEDURE — 3077F PR MOST RECENT SYSTOLIC BLOOD PRESSURE >= 140 MM HG: ICD-10-PCS | Mod: CPTII,S$GLB,, | Performed by: STUDENT IN AN ORGANIZED HEALTH CARE EDUCATION/TRAINING PROGRAM

## 2022-12-13 PROCEDURE — 1100F PTFALLS ASSESS-DOCD GE2>/YR: CPT | Mod: CPTII,S$GLB,, | Performed by: STUDENT IN AN ORGANIZED HEALTH CARE EDUCATION/TRAINING PROGRAM

## 2022-12-13 PROCEDURE — 3008F BODY MASS INDEX DOCD: CPT | Mod: CPTII,S$GLB,, | Performed by: STUDENT IN AN ORGANIZED HEALTH CARE EDUCATION/TRAINING PROGRAM

## 2022-12-13 PROCEDURE — 1100F PR PT FALLS ASSESS DOC 2+ FALLS/FALL W/INJURY/YR: ICD-10-PCS | Mod: CPTII,S$GLB,, | Performed by: STUDENT IN AN ORGANIZED HEALTH CARE EDUCATION/TRAINING PROGRAM

## 2022-12-13 PROCEDURE — 1159F MED LIST DOCD IN RCRD: CPT | Mod: CPTII,S$GLB,, | Performed by: STUDENT IN AN ORGANIZED HEALTH CARE EDUCATION/TRAINING PROGRAM

## 2022-12-13 PROCEDURE — 1125F PR PAIN SEVERITY QUANTIFIED, PAIN PRESENT: ICD-10-PCS | Mod: CPTII,S$GLB,, | Performed by: STUDENT IN AN ORGANIZED HEALTH CARE EDUCATION/TRAINING PROGRAM

## 2022-12-13 PROCEDURE — 99999 PR PBB SHADOW E&M-EST. PATIENT-LVL III: ICD-10-PCS | Mod: PBBFAC,,, | Performed by: STUDENT IN AN ORGANIZED HEALTH CARE EDUCATION/TRAINING PROGRAM

## 2022-12-13 PROCEDURE — 99213 OFFICE O/P EST LOW 20 MIN: CPT | Mod: S$GLB,,, | Performed by: STUDENT IN AN ORGANIZED HEALTH CARE EDUCATION/TRAINING PROGRAM

## 2022-12-13 PROCEDURE — 3078F DIAST BP <80 MM HG: CPT | Mod: CPTII,S$GLB,, | Performed by: STUDENT IN AN ORGANIZED HEALTH CARE EDUCATION/TRAINING PROGRAM

## 2022-12-13 PROCEDURE — 3288F FALL RISK ASSESSMENT DOCD: CPT | Mod: CPTII,S$GLB,, | Performed by: STUDENT IN AN ORGANIZED HEALTH CARE EDUCATION/TRAINING PROGRAM

## 2022-12-13 PROCEDURE — 1125F AMNT PAIN NOTED PAIN PRSNT: CPT | Mod: CPTII,S$GLB,, | Performed by: STUDENT IN AN ORGANIZED HEALTH CARE EDUCATION/TRAINING PROGRAM

## 2022-12-13 PROCEDURE — 3288F PR FALLS RISK ASSESSMENT DOCUMENTED: ICD-10-PCS | Mod: CPTII,S$GLB,, | Performed by: STUDENT IN AN ORGANIZED HEALTH CARE EDUCATION/TRAINING PROGRAM

## 2022-12-13 PROCEDURE — 3008F PR BODY MASS INDEX (BMI) DOCUMENTED: ICD-10-PCS | Mod: CPTII,S$GLB,, | Performed by: STUDENT IN AN ORGANIZED HEALTH CARE EDUCATION/TRAINING PROGRAM

## 2022-12-20 ENCOUNTER — TELEPHONE (OUTPATIENT)
Dept: FAMILY MEDICINE | Facility: CLINIC | Age: 72
End: 2022-12-20
Payer: MEDICARE

## 2022-12-20 NOTE — TELEPHONE ENCOUNTER
----- Message from Lisa Vicente sent at 12/20/2022  3:35 PM CST -----  Type:  Needs Medical Advice    Who Called: Pt  Symptoms (please be specific): A lot of coughing  How long has patient had these symptoms:  one week  Pharmacy name and phone #:  [facility] [id number] - [address]   Phone:  [phone]  Would the patient rather a call back or a response via [facility]? call  Best Call Back Number: [phone]  Additional Information: Pt is requesting a prescription for codeine.  Pt was had been prescribed before but it is not on his chart.

## 2022-12-20 NOTE — TELEPHONE ENCOUNTER
Called pt and was able to schedule him an appointment to come in Thursday to see one of Dr. San colleagues

## 2022-12-21 ENCOUNTER — TELEPHONE (OUTPATIENT)
Dept: UROLOGY | Facility: CLINIC | Age: 72
End: 2022-12-21
Payer: MEDICARE

## 2022-12-21 ENCOUNTER — TELEPHONE (OUTPATIENT)
Dept: FAMILY MEDICINE | Facility: CLINIC | Age: 72
End: 2022-12-21
Payer: MEDICARE

## 2022-12-21 NOTE — TELEPHONE ENCOUNTER
----- Message from Karl Soto sent at 12/21/2022  7:52 AM CST -----  Contact: pt.   .Type:  Needs Medical Advice    Who Called: pt  Symptoms (please be specific):  buttock area hurting, can't bend or stoop.   How long has patient had these symptoms:  3 months  Would the patient rather a call back or a response via [facility]?  Call back  Best Call Back Number: [phone]  Additional Information: Nilay is requesting a MRI be order fot him.  Pt. States his buttock area hurt and he cant bend over or stoop.

## 2022-12-21 NOTE — TELEPHONE ENCOUNTER
Spoke with patient in regards to his previous message.Advised to patient that he requested to see another Urologist and cancelled his appointment with Dr. Fuchs. Advised to patient after speaking with Dr. Fuchs he ( the patient) would need to follow up with PCP to schedule an MRI. Patient voiced his understanding.

## 2022-12-21 NOTE — TELEPHONE ENCOUNTER
----- Message from Zachary Hendrix MA sent at 12/21/2022  4:23 PM CST -----  Contact: pt    ----- Message -----  From: Karl Soto  Sent: 12/21/2022   9:50 AM CST  To: Latia PISANO Staff    Needs Medical Advice     Who Called: pt  Symptoms (please be specific):  buttock area hurting, can't bend or stoop.   How long has patient had these symptoms:  3 months  Would the patient rather a call back or a response via [facility]?  Call back  Best Call Back Number: [phone]  Additional Information: [facility] is requesting a MRI be order fot him.  Pt. States his buttock area hurt and he cant bend over or stoop.

## 2022-12-22 ENCOUNTER — OFFICE VISIT (OUTPATIENT)
Dept: FAMILY MEDICINE | Facility: CLINIC | Age: 72
End: 2022-12-22
Attending: FAMILY MEDICINE
Payer: MEDICARE

## 2022-12-22 VITALS
SYSTOLIC BLOOD PRESSURE: 148 MMHG | HEART RATE: 79 BPM | DIASTOLIC BLOOD PRESSURE: 78 MMHG | BODY MASS INDEX: 32.1 KG/M2 | OXYGEN SATURATION: 98 % | WEIGHT: 224.19 LBS | HEIGHT: 70 IN

## 2022-12-22 DIAGNOSIS — R05.9 COUGH, UNSPECIFIED TYPE: ICD-10-CM

## 2022-12-22 DIAGNOSIS — U07.1 COVID-19: Primary | ICD-10-CM

## 2022-12-22 LAB
CTP QC/QA: YES
CTP QC/QA: YES
FLUAV AG NPH QL: NEGATIVE
FLUBV AG NPH QL: NEGATIVE
SARS-COV-2 AG RESP QL IA.RAPID: POSITIVE

## 2022-12-22 PROCEDURE — U0002 COVID-19 LAB TEST NON-CDC: HCPCS | Mod: QW,S$GLB,, | Performed by: FAMILY MEDICINE

## 2022-12-22 PROCEDURE — 3078F PR MOST RECENT DIASTOLIC BLOOD PRESSURE < 80 MM HG: ICD-10-PCS | Mod: CPTII,S$GLB,, | Performed by: FAMILY MEDICINE

## 2022-12-22 PROCEDURE — 99214 PR OFFICE/OUTPT VISIT, EST, LEVL IV, 30-39 MIN: ICD-10-PCS | Mod: 25,S$GLB,, | Performed by: FAMILY MEDICINE

## 2022-12-22 PROCEDURE — 3008F PR BODY MASS INDEX (BMI) DOCUMENTED: ICD-10-PCS | Mod: CPTII,S$GLB,, | Performed by: FAMILY MEDICINE

## 2022-12-22 PROCEDURE — 1126F AMNT PAIN NOTED NONE PRSNT: CPT | Mod: CPTII,S$GLB,, | Performed by: FAMILY MEDICINE

## 2022-12-22 PROCEDURE — 3044F PR MOST RECENT HEMOGLOBIN A1C LEVEL <7.0%: ICD-10-PCS | Mod: CPTII,S$GLB,, | Performed by: FAMILY MEDICINE

## 2022-12-22 PROCEDURE — 1160F RVW MEDS BY RX/DR IN RCRD: CPT | Mod: CPTII,S$GLB,, | Performed by: FAMILY MEDICINE

## 2022-12-22 PROCEDURE — 1126F PR PAIN SEVERITY QUANTIFIED, NO PAIN PRESENT: ICD-10-PCS | Mod: CPTII,S$GLB,, | Performed by: FAMILY MEDICINE

## 2022-12-22 PROCEDURE — 3044F HG A1C LEVEL LT 7.0%: CPT | Mod: CPTII,S$GLB,, | Performed by: FAMILY MEDICINE

## 2022-12-22 PROCEDURE — 3288F PR FALLS RISK ASSESSMENT DOCUMENTED: ICD-10-PCS | Mod: CPTII,S$GLB,, | Performed by: FAMILY MEDICINE

## 2022-12-22 PROCEDURE — 87804 INFLUENZA ASSAY W/OPTIC: CPT | Mod: QW,S$GLB,, | Performed by: FAMILY MEDICINE

## 2022-12-22 PROCEDURE — 99214 OFFICE O/P EST MOD 30 MIN: CPT | Mod: 25,S$GLB,, | Performed by: FAMILY MEDICINE

## 2022-12-22 PROCEDURE — 1159F MED LIST DOCD IN RCRD: CPT | Mod: CPTII,S$GLB,, | Performed by: FAMILY MEDICINE

## 2022-12-22 PROCEDURE — 3288F FALL RISK ASSESSMENT DOCD: CPT | Mod: CPTII,S$GLB,, | Performed by: FAMILY MEDICINE

## 2022-12-22 PROCEDURE — U0002 SARS CORONAVIRUS 2 ANTIGEN POCT: ICD-10-PCS | Mod: QW,S$GLB,, | Performed by: FAMILY MEDICINE

## 2022-12-22 PROCEDURE — 1101F PT FALLS ASSESS-DOCD LE1/YR: CPT | Mod: CPTII,S$GLB,, | Performed by: FAMILY MEDICINE

## 2022-12-22 PROCEDURE — 1159F PR MEDICATION LIST DOCUMENTED IN MEDICAL RECORD: ICD-10-PCS | Mod: CPTII,S$GLB,, | Performed by: FAMILY MEDICINE

## 2022-12-22 PROCEDURE — 99999 PR PBB SHADOW E&M-EST. PATIENT-LVL IV: ICD-10-PCS | Mod: PBBFAC,,, | Performed by: FAMILY MEDICINE

## 2022-12-22 PROCEDURE — 3078F DIAST BP <80 MM HG: CPT | Mod: CPTII,S$GLB,, | Performed by: FAMILY MEDICINE

## 2022-12-22 PROCEDURE — 99999 PR PBB SHADOW E&M-EST. PATIENT-LVL IV: CPT | Mod: PBBFAC,,, | Performed by: FAMILY MEDICINE

## 2022-12-22 PROCEDURE — 3077F PR MOST RECENT SYSTOLIC BLOOD PRESSURE >= 140 MM HG: ICD-10-PCS | Mod: CPTII,S$GLB,, | Performed by: FAMILY MEDICINE

## 2022-12-22 PROCEDURE — 3008F BODY MASS INDEX DOCD: CPT | Mod: CPTII,S$GLB,, | Performed by: FAMILY MEDICINE

## 2022-12-22 PROCEDURE — 87804 POCT INFLUENZA A/B: ICD-10-PCS | Mod: QW,S$GLB,, | Performed by: FAMILY MEDICINE

## 2022-12-22 PROCEDURE — 1160F PR REVIEW ALL MEDS BY PRESCRIBER/CLIN PHARMACIST DOCUMENTED: ICD-10-PCS | Mod: CPTII,S$GLB,, | Performed by: FAMILY MEDICINE

## 2022-12-22 PROCEDURE — 1101F PR PT FALLS ASSESS DOC 0-1 FALLS W/OUT INJ PAST YR: ICD-10-PCS | Mod: CPTII,S$GLB,, | Performed by: FAMILY MEDICINE

## 2022-12-22 PROCEDURE — 3077F SYST BP >= 140 MM HG: CPT | Mod: CPTII,S$GLB,, | Performed by: FAMILY MEDICINE

## 2022-12-22 RX ORDER — BENZONATATE 200 MG/1
200 CAPSULE ORAL 3 TIMES DAILY PRN
Qty: 30 CAPSULE | Refills: 0 | Status: SHIPPED | OUTPATIENT
Start: 2022-12-22 | End: 2023-01-01

## 2023-01-10 ENCOUNTER — LAB VISIT (OUTPATIENT)
Dept: LAB | Facility: HOSPITAL | Age: 73
End: 2023-01-10
Attending: FAMILY MEDICINE
Payer: MEDICARE

## 2023-01-10 DIAGNOSIS — D64.9 ANEMIA, UNSPECIFIED TYPE: ICD-10-CM

## 2023-01-10 DIAGNOSIS — E79.0 ELEVATED URIC ACID IN BLOOD: ICD-10-CM

## 2023-01-10 DIAGNOSIS — M10.9 GOUTY ARTHRITIS OF LEFT GREAT TOE: ICD-10-CM

## 2023-01-10 DIAGNOSIS — R73.03 PREDIABETES: ICD-10-CM

## 2023-01-10 DIAGNOSIS — Z79.899 MEDICATION MANAGEMENT: ICD-10-CM

## 2023-01-10 LAB
ALBUMIN SERPL BCP-MCNC: 3.8 G/DL (ref 3.5–5.2)
ALP SERPL-CCNC: 73 U/L (ref 55–135)
ALT SERPL W/O P-5'-P-CCNC: 14 U/L (ref 10–44)
ANION GAP SERPL CALC-SCNC: 9 MMOL/L (ref 8–16)
AST SERPL-CCNC: 15 U/L (ref 10–40)
BASOPHILS # BLD AUTO: 0.03 K/UL (ref 0–0.2)
BASOPHILS NFR BLD: 0.5 % (ref 0–1.9)
BILIRUB SERPL-MCNC: 0.5 MG/DL (ref 0.1–1)
BUN SERPL-MCNC: 17 MG/DL (ref 8–23)
CALCIUM SERPL-MCNC: 9.6 MG/DL (ref 8.7–10.5)
CHLORIDE SERPL-SCNC: 104 MMOL/L (ref 95–110)
CO2 SERPL-SCNC: 27 MMOL/L (ref 23–29)
CREAT SERPL-MCNC: 1.2 MG/DL (ref 0.5–1.4)
DIFFERENTIAL METHOD: ABNORMAL
EOSINOPHIL # BLD AUTO: 0.2 K/UL (ref 0–0.5)
EOSINOPHIL NFR BLD: 3.1 % (ref 0–8)
ERYTHROCYTE [DISTWIDTH] IN BLOOD BY AUTOMATED COUNT: 13.6 % (ref 11.5–14.5)
EST. GFR  (NO RACE VARIABLE): >60 ML/MIN/1.73 M^2
ESTIMATED AVG GLUCOSE: 123 MG/DL (ref 68–131)
FERRITIN SERPL-MCNC: 82 NG/ML (ref 20–300)
GLUCOSE SERPL-MCNC: 129 MG/DL (ref 70–110)
HBA1C MFR BLD: 5.9 % (ref 4–5.6)
HCT VFR BLD AUTO: 40.5 % (ref 40–54)
HGB BLD-MCNC: 13 G/DL (ref 14–18)
IMM GRANULOCYTES # BLD AUTO: 0.01 K/UL (ref 0–0.04)
IMM GRANULOCYTES NFR BLD AUTO: 0.2 % (ref 0–0.5)
IRON SERPL-MCNC: 95 UG/DL (ref 45–160)
LYMPHOCYTES # BLD AUTO: 2.2 K/UL (ref 1–4.8)
LYMPHOCYTES NFR BLD: 37.8 % (ref 18–48)
MCH RBC QN AUTO: 26.2 PG (ref 27–31)
MCHC RBC AUTO-ENTMCNC: 32.1 G/DL (ref 32–36)
MCV RBC AUTO: 82 FL (ref 82–98)
MONOCYTES # BLD AUTO: 0.6 K/UL (ref 0.3–1)
MONOCYTES NFR BLD: 9.8 % (ref 4–15)
NEUTROPHILS # BLD AUTO: 2.8 K/UL (ref 1.8–7.7)
NEUTROPHILS NFR BLD: 48.6 % (ref 38–73)
NRBC BLD-RTO: 0 /100 WBC
PLATELET # BLD AUTO: 169 K/UL (ref 150–450)
PMV BLD AUTO: 10.6 FL (ref 9.2–12.9)
POTASSIUM SERPL-SCNC: 4.2 MMOL/L (ref 3.5–5.1)
PROT SERPL-MCNC: 7.5 G/DL (ref 6–8.4)
RBC # BLD AUTO: 4.96 M/UL (ref 4.6–6.2)
SATURATED IRON: 32 % (ref 20–50)
SODIUM SERPL-SCNC: 140 MMOL/L (ref 136–145)
TOTAL IRON BINDING CAPACITY: 293 UG/DL (ref 250–450)
TRANSFERRIN SERPL-MCNC: 198 MG/DL (ref 200–375)
URATE SERPL-MCNC: 6.6 MG/DL (ref 3.4–7)
WBC # BLD AUTO: 5.74 K/UL (ref 3.9–12.7)

## 2023-01-10 PROCEDURE — 84466 ASSAY OF TRANSFERRIN: CPT | Mod: HCNC | Performed by: FAMILY MEDICINE

## 2023-01-10 PROCEDURE — 85025 COMPLETE CBC W/AUTO DIFF WBC: CPT | Mod: HCNC | Performed by: FAMILY MEDICINE

## 2023-01-10 PROCEDURE — 36415 COLL VENOUS BLD VENIPUNCTURE: CPT | Mod: HCNC | Performed by: FAMILY MEDICINE

## 2023-01-10 PROCEDURE — 83036 HEMOGLOBIN GLYCOSYLATED A1C: CPT | Mod: HCNC | Performed by: FAMILY MEDICINE

## 2023-01-10 PROCEDURE — 82728 ASSAY OF FERRITIN: CPT | Mod: HCNC | Performed by: FAMILY MEDICINE

## 2023-01-10 PROCEDURE — 80053 COMPREHEN METABOLIC PANEL: CPT | Mod: HCNC | Performed by: FAMILY MEDICINE

## 2023-01-10 PROCEDURE — 84550 ASSAY OF BLOOD/URIC ACID: CPT | Mod: HCNC | Performed by: FAMILY MEDICINE

## 2023-01-19 ENCOUNTER — OFFICE VISIT (OUTPATIENT)
Dept: UROLOGY | Facility: CLINIC | Age: 73
End: 2023-01-19
Payer: MEDICARE

## 2023-01-19 VITALS
DIASTOLIC BLOOD PRESSURE: 81 MMHG | SYSTOLIC BLOOD PRESSURE: 155 MMHG | BODY MASS INDEX: 32.69 KG/M2 | WEIGHT: 228.38 LBS | HEART RATE: 80 BPM | HEIGHT: 70 IN

## 2023-01-19 DIAGNOSIS — Z87.438 HISTORY OF PHIMOSIS OF PENIS: Primary | ICD-10-CM

## 2023-01-19 DIAGNOSIS — N40.1 BENIGN PROSTATIC HYPERPLASIA WITH NOCTURIA: ICD-10-CM

## 2023-01-19 DIAGNOSIS — Z01.818 PRE-OP EXAM: ICD-10-CM

## 2023-01-19 DIAGNOSIS — R35.1 BENIGN PROSTATIC HYPERPLASIA WITH NOCTURIA: ICD-10-CM

## 2023-01-19 DIAGNOSIS — R35.1 NOCTURIA: ICD-10-CM

## 2023-01-19 PROCEDURE — 3079F DIAST BP 80-89 MM HG: CPT | Mod: HCNC,CPTII,S$GLB, | Performed by: NURSE PRACTITIONER

## 2023-01-19 PROCEDURE — 1159F MED LIST DOCD IN RCRD: CPT | Mod: HCNC,CPTII,S$GLB, | Performed by: NURSE PRACTITIONER

## 2023-01-19 PROCEDURE — 3008F PR BODY MASS INDEX (BMI) DOCUMENTED: ICD-10-PCS | Mod: HCNC,CPTII,S$GLB, | Performed by: NURSE PRACTITIONER

## 2023-01-19 PROCEDURE — 3077F SYST BP >= 140 MM HG: CPT | Mod: HCNC,CPTII,S$GLB, | Performed by: NURSE PRACTITIONER

## 2023-01-19 PROCEDURE — 99999 PR PBB SHADOW E&M-EST. PATIENT-LVL IV: CPT | Mod: PBBFAC,HCNC,, | Performed by: NURSE PRACTITIONER

## 2023-01-19 PROCEDURE — 81001 URINALYSIS AUTO W/SCOPE: CPT | Mod: HCNC | Performed by: NURSE PRACTITIONER

## 2023-01-19 PROCEDURE — 1159F PR MEDICATION LIST DOCUMENTED IN MEDICAL RECORD: ICD-10-PCS | Mod: HCNC,CPTII,S$GLB, | Performed by: NURSE PRACTITIONER

## 2023-01-19 PROCEDURE — 3288F FALL RISK ASSESSMENT DOCD: CPT | Mod: HCNC,CPTII,S$GLB, | Performed by: NURSE PRACTITIONER

## 2023-01-19 PROCEDURE — 99999 PR PBB SHADOW E&M-EST. PATIENT-LVL IV: ICD-10-PCS | Mod: PBBFAC,HCNC,, | Performed by: NURSE PRACTITIONER

## 2023-01-19 PROCEDURE — 3044F HG A1C LEVEL LT 7.0%: CPT | Mod: HCNC,CPTII,S$GLB, | Performed by: NURSE PRACTITIONER

## 2023-01-19 PROCEDURE — 87186 SC STD MICRODIL/AGAR DIL: CPT | Mod: HCNC | Performed by: NURSE PRACTITIONER

## 2023-01-19 PROCEDURE — 87086 URINE CULTURE/COLONY COUNT: CPT | Mod: HCNC | Performed by: NURSE PRACTITIONER

## 2023-01-19 PROCEDURE — 3008F BODY MASS INDEX DOCD: CPT | Mod: HCNC,CPTII,S$GLB, | Performed by: NURSE PRACTITIONER

## 2023-01-19 PROCEDURE — 87088 URINE BACTERIA CULTURE: CPT | Mod: HCNC | Performed by: NURSE PRACTITIONER

## 2023-01-19 PROCEDURE — 1101F PT FALLS ASSESS-DOCD LE1/YR: CPT | Mod: HCNC,CPTII,S$GLB, | Performed by: NURSE PRACTITIONER

## 2023-01-19 PROCEDURE — 3288F PR FALLS RISK ASSESSMENT DOCUMENTED: ICD-10-PCS | Mod: HCNC,CPTII,S$GLB, | Performed by: NURSE PRACTITIONER

## 2023-01-19 PROCEDURE — 3079F PR MOST RECENT DIASTOLIC BLOOD PRESSURE 80-89 MM HG: ICD-10-PCS | Mod: HCNC,CPTII,S$GLB, | Performed by: NURSE PRACTITIONER

## 2023-01-19 PROCEDURE — 3077F PR MOST RECENT SYSTOLIC BLOOD PRESSURE >= 140 MM HG: ICD-10-PCS | Mod: HCNC,CPTII,S$GLB, | Performed by: NURSE PRACTITIONER

## 2023-01-19 PROCEDURE — 99214 PR OFFICE/OUTPT VISIT, EST, LEVL IV, 30-39 MIN: ICD-10-PCS | Mod: HCNC,S$GLB,, | Performed by: NURSE PRACTITIONER

## 2023-01-19 PROCEDURE — 3044F PR MOST RECENT HEMOGLOBIN A1C LEVEL <7.0%: ICD-10-PCS | Mod: HCNC,CPTII,S$GLB, | Performed by: NURSE PRACTITIONER

## 2023-01-19 PROCEDURE — 87077 CULTURE AEROBIC IDENTIFY: CPT | Mod: HCNC | Performed by: NURSE PRACTITIONER

## 2023-01-19 PROCEDURE — 1126F AMNT PAIN NOTED NONE PRSNT: CPT | Mod: HCNC,CPTII,S$GLB, | Performed by: NURSE PRACTITIONER

## 2023-01-19 PROCEDURE — 1126F PR PAIN SEVERITY QUANTIFIED, NO PAIN PRESENT: ICD-10-PCS | Mod: HCNC,CPTII,S$GLB, | Performed by: NURSE PRACTITIONER

## 2023-01-19 PROCEDURE — 99214 OFFICE O/P EST MOD 30 MIN: CPT | Mod: HCNC,S$GLB,, | Performed by: NURSE PRACTITIONER

## 2023-01-19 PROCEDURE — 1101F PR PT FALLS ASSESS DOC 0-1 FALLS W/OUT INJ PAST YR: ICD-10-PCS | Mod: HCNC,CPTII,S$GLB, | Performed by: NURSE PRACTITIONER

## 2023-01-19 NOTE — PATIENT INSTRUCTIONS
Schedule patient for outpatient circumcision for hx of phimosis by Dr. Malcolm.   Pre-op labs up-to-date.  PSA, total and free today  Continue taking tamsulosin (Flomax) 0.4 mg daily for enlarged prostate.  Continue to avoid the use of blood thinners for 7-10 days prior to surgery to reduce risk of bleeding.   Follow-up post-op.

## 2023-01-19 NOTE — PROGRESS NOTES
Subjective:       Patient ID: Noah Nichole is a 72 y.o. male.    Chief Complaint: Establish Care    Patient is new to me. He is a 73 yo AAM who is here today for a follow-up for urinary retention 3 months ago. Patient has a hx of BPH; currently taking tamsulosin 0.4 mg daily, which he reports is effective. He denies any urinary issues at this time. Patient reports a hx of phimosis and requesting to have excess foreskin removed from his penis. He also has a hx of Type DM, which is controlled according to his most recent blood work on 1/10/2023.     Benign Prostatic Hypertrophy  This is a chronic problem. The current episode started more than 1 month ago. The problem has been gradually improving since onset. Irritative symptoms include nocturia (x2). Irritative symptoms do not include frequency or urgency. Obstructive symptoms do not include dribbling, incomplete emptying, straining or a weak stream. Pertinent negatives include no chills, dysuria, genital pain, hematuria, hesitancy, nausea or vomiting. He is sexually active. Nothing aggravates the symptoms. Past treatments include tamsulosin. The treatment provided significant relief.   Review of Systems   Constitutional:  Negative for chills, fatigue and fever.   Gastrointestinal:  Positive for constipation. Negative for abdominal pain, change in bowel habit, diarrhea, nausea, vomiting and change in bowel habit.   Genitourinary:  Positive for nocturia (x2). Negative for decreased urine volume, difficulty urinating, discharge, dysuria, frequency, hematuria, hesitancy, incomplete emptying, penile pain, penile swelling, scrotal swelling, testicular pain and urgency.        Nocturia x2   Neurological:  Negative for dizziness, weakness and headaches.   Psychiatric/Behavioral: Negative.         Objective:      Physical Exam  Vitals and nursing note reviewed.   Constitutional:       General: He is not in acute distress.     Appearance: He is well-developed. He is  obese. He is not ill-appearing.   HENT:      Head: Normocephalic and atraumatic.   Eyes:      Pupils: Pupils are equal, round, and reactive to light.   Cardiovascular:      Rate and Rhythm: Normal rate.   Pulmonary:      Effort: Pulmonary effort is normal. No respiratory distress.   Abdominal:      Palpations: Abdomen is soft.      Tenderness: There is no abdominal tenderness.   Genitourinary:     Penis: Uncircumcised.    Musculoskeletal:         General: Normal range of motion.      Cervical back: Normal range of motion.   Skin:     General: Skin is warm and dry.   Neurological:      Mental Status: He is alert and oriented to person, place, and time.      Coordination: Coordination normal.   Psychiatric:         Mood and Affect: Mood normal.         Behavior: Behavior normal.         Thought Content: Thought content normal.         Judgment: Judgment normal.       Assessment:       Problem List Items Addressed This Visit    None  Visit Diagnoses       History of phimosis of penis    -  Primary    Relevant Orders    Urine culture    Urinalysis    Benign prostatic hyperplasia with nocturia        Relevant Orders    PSA, Total and Free    Urine culture    Urinalysis    Nocturia        Relevant Orders    PSA, Total and Free    Urine culture    Urinalysis    Pre-op exam        Relevant Orders    Urine culture    Urinalysis            Plan:           Noah was seen today for establish care.    Diagnoses and all orders for this visit:    History of phimosis of penis  -     Urine culture  -     Urinalysis    Benign prostatic hyperplasia with nocturia  -     PSA, Total and Free; Future  -     Urine culture  -     Urinalysis    Nocturia  -     PSA, Total and Free; Future  -     Urine culture  -     Urinalysis    Pre-op exam  -     Urine culture  -     Urinalysis    Other orders  Schedule patient for outpatient circumcision for hx of phimosis by Dr. Malcolm.   Pre-op labs up-to-date.  PSA, total and free today  Continue  taking tamsulosin (Flomax) 0.4 mg daily for enlarged prostate.  Continue to avoid the use of blood thinners for 7-10 days prior to surgery to reduce risk of bleeding.     Follow-up post-op.     Leonid Carmona NP

## 2023-01-20 ENCOUNTER — TELEPHONE (OUTPATIENT)
Dept: UROLOGY | Facility: CLINIC | Age: 73
End: 2023-01-20
Payer: MEDICARE

## 2023-01-20 LAB
BACTERIA #/AREA URNS AUTO: ABNORMAL /HPF
BILIRUB UR QL STRIP: NEGATIVE
CLARITY UR REFRACT.AUTO: CLEAR
COLOR UR AUTO: YELLOW
GLUCOSE UR QL STRIP: NEGATIVE
HGB UR QL STRIP: NEGATIVE
KETONES UR QL STRIP: NEGATIVE
LEUKOCYTE ESTERASE UR QL STRIP: ABNORMAL
MICROSCOPIC COMMENT: ABNORMAL
NITRITE UR QL STRIP: POSITIVE
PH UR STRIP: 6 [PH] (ref 5–8)
PROT UR QL STRIP: NEGATIVE
RBC #/AREA URNS AUTO: 5 /HPF (ref 0–4)
SP GR UR STRIP: 1.02 (ref 1–1.03)
URN SPEC COLLECT METH UR: ABNORMAL
WBC #/AREA URNS AUTO: 54 /HPF (ref 0–5)

## 2023-01-20 NOTE — TELEPHONE ENCOUNTER
I spoke with pt and informed him of the results below.    ----- Message from Leonid Carmona NP sent at 1/20/2023  9:36 AM CST -----  Please inform patient via telephone that his PSA was normal at 3.2 ng/mL. Recommended to repeat in 1 year.

## 2023-01-23 ENCOUNTER — OFFICE VISIT (OUTPATIENT)
Dept: FAMILY MEDICINE | Facility: CLINIC | Age: 73
End: 2023-01-23
Payer: MEDICARE

## 2023-01-23 VITALS
DIASTOLIC BLOOD PRESSURE: 78 MMHG | WEIGHT: 227.31 LBS | HEIGHT: 70 IN | SYSTOLIC BLOOD PRESSURE: 138 MMHG | TEMPERATURE: 98 F | OXYGEN SATURATION: 100 % | BODY MASS INDEX: 32.54 KG/M2 | HEART RATE: 60 BPM

## 2023-01-23 DIAGNOSIS — Z86.16 HISTORY OF 2019 NOVEL CORONAVIRUS DISEASE (COVID-19): ICD-10-CM

## 2023-01-23 DIAGNOSIS — E55.9 VITAMIN D DEFICIENCY: ICD-10-CM

## 2023-01-23 DIAGNOSIS — N30.00 ACUTE CYSTITIS WITHOUT HEMATURIA: Primary | ICD-10-CM

## 2023-01-23 DIAGNOSIS — R29.898 LEG WEAKNESS, BILATERAL: ICD-10-CM

## 2023-01-23 DIAGNOSIS — D50.0 IRON DEFICIENCY ANEMIA DUE TO CHRONIC BLOOD LOSS: ICD-10-CM

## 2023-01-23 DIAGNOSIS — R73.03 PREDIABETES: ICD-10-CM

## 2023-01-23 DIAGNOSIS — M54.9 BACK PAIN, UNSPECIFIED BACK LOCATION, UNSPECIFIED BACK PAIN LATERALITY, UNSPECIFIED CHRONICITY: ICD-10-CM

## 2023-01-23 DIAGNOSIS — E79.0 ELEVATED BLOOD URIC ACID LEVEL: ICD-10-CM

## 2023-01-23 DIAGNOSIS — I10 BENIGN ESSENTIAL HYPERTENSION: Primary | ICD-10-CM

## 2023-01-23 DIAGNOSIS — Z79.899 MEDICATION MANAGEMENT: ICD-10-CM

## 2023-01-23 DIAGNOSIS — Z85.038 HISTORY OF COLON CANCER, STAGE II: ICD-10-CM

## 2023-01-23 DIAGNOSIS — K59.00 CONSTIPATION, UNSPECIFIED CONSTIPATION TYPE: ICD-10-CM

## 2023-01-23 LAB — BACTERIA UR CULT: ABNORMAL

## 2023-01-23 PROCEDURE — 3008F BODY MASS INDEX DOCD: CPT | Mod: HCNC,CPTII,S$GLB, | Performed by: FAMILY MEDICINE

## 2023-01-23 PROCEDURE — 99214 OFFICE O/P EST MOD 30 MIN: CPT | Mod: HCNC,S$GLB,, | Performed by: FAMILY MEDICINE

## 2023-01-23 PROCEDURE — 3075F SYST BP GE 130 - 139MM HG: CPT | Mod: HCNC,CPTII,S$GLB, | Performed by: FAMILY MEDICINE

## 2023-01-23 PROCEDURE — 1159F PR MEDICATION LIST DOCUMENTED IN MEDICAL RECORD: ICD-10-PCS | Mod: HCNC,CPTII,S$GLB, | Performed by: FAMILY MEDICINE

## 2023-01-23 PROCEDURE — 3044F HG A1C LEVEL LT 7.0%: CPT | Mod: HCNC,CPTII,S$GLB, | Performed by: FAMILY MEDICINE

## 2023-01-23 PROCEDURE — 3288F PR FALLS RISK ASSESSMENT DOCUMENTED: ICD-10-PCS | Mod: HCNC,CPTII,S$GLB, | Performed by: FAMILY MEDICINE

## 2023-01-23 PROCEDURE — 99214 PR OFFICE/OUTPT VISIT, EST, LEVL IV, 30-39 MIN: ICD-10-PCS | Mod: HCNC,S$GLB,, | Performed by: FAMILY MEDICINE

## 2023-01-23 PROCEDURE — 1125F PR PAIN SEVERITY QUANTIFIED, PAIN PRESENT: ICD-10-PCS | Mod: HCNC,CPTII,S$GLB, | Performed by: FAMILY MEDICINE

## 2023-01-23 PROCEDURE — 3288F FALL RISK ASSESSMENT DOCD: CPT | Mod: HCNC,CPTII,S$GLB, | Performed by: FAMILY MEDICINE

## 2023-01-23 PROCEDURE — 3078F PR MOST RECENT DIASTOLIC BLOOD PRESSURE < 80 MM HG: ICD-10-PCS | Mod: HCNC,CPTII,S$GLB, | Performed by: FAMILY MEDICINE

## 2023-01-23 PROCEDURE — 1100F PTFALLS ASSESS-DOCD GE2>/YR: CPT | Mod: HCNC,CPTII,S$GLB, | Performed by: FAMILY MEDICINE

## 2023-01-23 PROCEDURE — 3078F DIAST BP <80 MM HG: CPT | Mod: HCNC,CPTII,S$GLB, | Performed by: FAMILY MEDICINE

## 2023-01-23 PROCEDURE — 3044F PR MOST RECENT HEMOGLOBIN A1C LEVEL <7.0%: ICD-10-PCS | Mod: HCNC,CPTII,S$GLB, | Performed by: FAMILY MEDICINE

## 2023-01-23 PROCEDURE — 1159F MED LIST DOCD IN RCRD: CPT | Mod: HCNC,CPTII,S$GLB, | Performed by: FAMILY MEDICINE

## 2023-01-23 PROCEDURE — 3075F PR MOST RECENT SYSTOLIC BLOOD PRESS GE 130-139MM HG: ICD-10-PCS | Mod: HCNC,CPTII,S$GLB, | Performed by: FAMILY MEDICINE

## 2023-01-23 PROCEDURE — 1100F PR PT FALLS ASSESS DOC 2+ FALLS/FALL W/INJURY/YR: ICD-10-PCS | Mod: HCNC,CPTII,S$GLB, | Performed by: FAMILY MEDICINE

## 2023-01-23 PROCEDURE — 1125F AMNT PAIN NOTED PAIN PRSNT: CPT | Mod: HCNC,CPTII,S$GLB, | Performed by: FAMILY MEDICINE

## 2023-01-23 PROCEDURE — 3008F PR BODY MASS INDEX (BMI) DOCUMENTED: ICD-10-PCS | Mod: HCNC,CPTII,S$GLB, | Performed by: FAMILY MEDICINE

## 2023-01-23 PROCEDURE — 99999 PR PBB SHADOW E&M-EST. PATIENT-LVL IV: ICD-10-PCS | Mod: PBBFAC,HCNC,, | Performed by: FAMILY MEDICINE

## 2023-01-23 PROCEDURE — 99999 PR PBB SHADOW E&M-EST. PATIENT-LVL IV: CPT | Mod: PBBFAC,HCNC,, | Performed by: FAMILY MEDICINE

## 2023-01-23 RX ORDER — SULFAMETHOXAZOLE AND TRIMETHOPRIM 800; 160 MG/1; MG/1
1 TABLET ORAL 2 TIMES DAILY
Qty: 20 TABLET | Refills: 0 | Status: SHIPPED | OUTPATIENT
Start: 2023-01-23 | End: 2023-02-02

## 2023-01-23 RX ORDER — METFORMIN HYDROCHLORIDE 500 MG/1
500 TABLET, EXTENDED RELEASE ORAL
Qty: 90 TABLET | Refills: 4 | Status: SHIPPED | OUTPATIENT
Start: 2023-01-23 | End: 2023-05-24 | Stop reason: SDUPTHER

## 2023-01-23 RX ORDER — ALLOPURINOL 300 MG/1
300 TABLET ORAL DAILY
Qty: 90 TABLET | Refills: 4 | Status: SHIPPED | OUTPATIENT
Start: 2023-01-23 | End: 2023-05-24 | Stop reason: SDUPTHER

## 2023-01-23 NOTE — PATIENT INSTRUCTIONS
CITRUCEL ORANGE POWDER (NOT SUGAR FREE AS ARTIFICIAL SWEETENERS CAN WORSEN GAS/BLOATING):  MIX 1 HEAPING TABLESPOON WITH 10 OZ OF WATER AND DRINK NIGHTLY AFTER DINNER. ADD A SECOND DOSE AFTER BREAKFAST IF NEEDED.     Ensure you have resumed DAILY METFORMIN AND ALLOPURINOL FOR GOUT PREVENTION

## 2023-01-23 NOTE — PROGRESS NOTES
Office Visit    Patient Name: Noah Nichole    : 1950  MRN: [id number]    Subjective:  Noah is a 72 y.o. male who presents today for:    Follow-up (3 month)    MOST RECENT OFFICE VISIT WITH ME 10/07/2022.    HAD COVID-19-MILD CASE 2022     Noah Nichole is a 72 y.o. male with colon cancer stage II s/p rt hemicolectomy , HTN, iron def anemia secondary to chronic blood loss, urinary retention w/ prior chronic indwelling flores who presents today for follow-up.  He has really struggled since his fall that occurred 2022-has had debilitating tailbone and back pain with leg weakness and intermittent urinary retention issues since then.  Was scheduled for laser enucleation of the prostate but this procedure was canceled because patient reports his urination has improved on Flomax.    LABS PRIOR TO OFFICE VISIT 01/10/2023 SHOWS SLIGHT IMPROVEMENT IN A1C FROM 6.0 DOWN TO 5.9, NORMAL KIDNEY FUNCTION, NORMALIZATION OF PREVIOUSLY ELEVATED LIVER ENZYMES.    URINALYSIS/CULTURE DOES SHOW POSITIVE NITRITES AND GREATER THAN 100,000 COLONIES OF KLEBSIELLA, PANSENSITIVE EXCEPT FOR TO NITROFURANTOIN.  FOLLOWS WITH UROLOGY JONATHAN RASHID.    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 He was found to have urinary retention and a flores was placed. In urology clinic follow up UA was indicative of infection and he was started on Bactrim.  HE HAS CONTINUED TO FOLLOW WITH UROLOGY AND FORTUNATELY HIS BLADDER EMPTYING HAS IMPROVED ON FLOMAX.    His mobility remains decreased, following with workman's comp and hopes to start some therapy today.  Along with decreased mobility he has suffered with increasing constipation.      He is not sure if he is taking his metformin 500 XR daily or his allopurinol for gout prevention.  He is taking his blood  pressure medication and reports home blood pressure readings overall at goal of less than 140/90.    Weight recently stable at BMI of 32 but this is increased from his prior baseline due to inability to exercise currently-he is trying to watch his diet.       \PAST MEDICAL HISTORY, SURGICAL/SOCIAL/FAMILY HISTORY REVIEWED AS PER CHART, WITH PERTINENT FINDINGS INCLUDED IN HISTORY SECTION OF NOTE.     Current Medications    Medication List with Changes/Refills   Current Medications    ACETAMINOPHEN (TYLENOL) 325 MG TABLET    Take 2 tablets (650 mg total) by mouth every 6 (six) hours as needed for Pain.    DILTIAZEM HCL (DILTIAZEM 2% CREAM)    Apply topically 3 (three) times daily. Apply topically to anal area.    HYDROCORTISONE (ANUSOL-HC) 2.5 % RECTAL CREAM    Place rectally 2 (two) times daily.    IBUPROFEN (ADVIL,MOTRIN) 800 MG TABLET    Take 1 tablet (800 mg total) by mouth 2 (two) times daily as needed for Pain (low back and pelvic pain). Take with food    LIDOCAINE (LIDODERM) 5 %    Place 1 patch onto the skin once daily. Remove & Discard patch within 12 hours or as directed by MD    LOSARTAN-HYDROCHLOROTHIAZIDE 100-12.5 MG (HYZAAR) 100-12.5 MG TAB    Take 1 tablet by mouth once daily.    TAMSULOSIN (FLOMAX) 0.4 MG CAP    Take 1 capsule (0.4 mg total) by mouth once daily.   Changed and/or Refilled Medications    Modified Medication Previous Medication    ALLOPURINOL (ZYLOPRIM) 300 MG TABLET allopurinoL (ZYLOPRIM) 300 MG tablet       Take 1 tablet (300 mg total) by mouth once daily. Take daily for gout prevention    Take 1 tablet (300 mg total) by mouth once daily.    METFORMIN (GLUCOPHAGE-XR) 500 MG ER 24HR TABLET metFORMIN (GLUCOPHAGE-XR) 500 MG ER 24hr tablet       Take 1 tablet (500 mg total) by mouth daily with breakfast.    Take 1 tablet (500 mg total) by mouth daily with breakfast.   Discontinued Medications    COLCHICINE (COLCRYS) 0.6 MG TABLET    Take 1 tablet (0.6 mg total) by mouth once daily.  "      Allergies   Review of patient's allergies indicates:  No Known Allergies      Review of Systems (Pertinent positives)  Review of Systems   Constitutional:  Negative for unexpected weight change.   Cardiovascular:  Negative for chest pain and leg swelling.   Gastrointestinal:  Positive for constipation.   Genitourinary:  Negative for difficulty urinating, dysuria, hematuria and urgency.   Musculoskeletal:  Positive for arthralgias and back pain.   Neurological:  Negative for dizziness and light-headedness.     /78   Pulse 60   Temp 97.8 °F (36.6 °C) (Oral)   Ht 5' 10" (1.778 m)   Wt 103.1 kg (227 lb 4.7 oz)   SpO2 100%   BMI 32.61 kg/m²     Physical Exam  Vitals reviewed.   Constitutional:       General: He is not in acute distress.     Appearance: He is well-developed. He is obese.   HENT:      Head: Normocephalic and atraumatic.   Eyes:      Conjunctiva/sclera: Conjunctivae normal.   Cardiovascular:      Rate and Rhythm: Normal rate and regular rhythm.   Pulmonary:      Effort: Pulmonary effort is normal.   Musculoskeletal:      Right lower leg: No edema.      Left lower leg: No edema.      Comments: Wearing back brace   Skin:     General: Skin is warm and dry.   Neurological:      General: No focal deficit present.      Mental Status: He is alert and oriented to person, place, and time.   Psychiatric:         Mood and Affect: Mood normal.         Behavior: Behavior normal.         Assessment/Plan:  Noah Nichole is a 72 y.o. male who presents today for :        ICD-10-CM ICD-9-CM    1. Benign essential hypertension  I10 401.1 Hemoglobin A1C      Comprehensive Metabolic Panel      CBC Auto Differential      URIC ACID      Vitamin D      Lipid Panel      2. Prediabetes  R73.03 790.29 metFORMIN (GLUCOPHAGE-XR) 500 MG ER 24hr tablet      Hemoglobin A1C      Comprehensive Metabolic Panel      CBC Auto Differential      URIC ACID      Vitamin D      Lipid Panel      3. Iron deficiency anemia due to " chronic blood loss  D50.0 280.0       4. History of colon cancer, stage II  Z85.038 V10.05       5. Back pain, unspecified back location, unspecified back pain laterality, unspecified chronicity  M54.9 724.5       6. Leg weakness, bilateral  R29.898 729.89       7. Medication management  Z79.899 V58.69 Hemoglobin A1C      Comprehensive Metabolic Panel      CBC Auto Differential      URIC ACID      Vitamin D      Lipid Panel      8. Constipation, unspecified constipation type  K59.00 564.00       9. Elevated blood uric acid level  E79.0 790.6 allopurinoL (ZYLOPRIM) 300 MG tablet      10. Vitamin D deficiency  E55.9 268.9 Vitamin D        HYPERTENSION:  Continue attention to low-salt diet, weight management, Hyzaar 100/12.5, repeat labs and office visit in 4 months     PREDIABETES:  Urged to try to exercise more through working with therapy, resume Glucophage  consistently with breakfast, repeat A1c in 4 months     CHRONIC BACK PAIN EXACERBATED BY FALL SEPTEMBER 2022 WITH RESULTING COCCYGEAL INJURY AND SUBSEQUENT URINARY RETENTION ISSUES:  Still struggling with ongoing leg weakness, needs increased physical therapy, has back brace.  Urinary retention issues managed by Urology-had plan to have laser enucleation of the prostate but now stable on Flomax.  Current urinary studies do show concern for Klebsiella infection-message sent to Urology.      CONSTIPATION:  Advise he resume daily Citrucel, continue daily stool softener, use MiraLax more liberally.      HISTORY OF GOUT:  Had his medications backwards-counseled on taking allopurinol daily for uric acid control/gout prevention and then only taking colchicine as needed for flare.      HISTORY OF COLON CANCER:  Colonoscopies up-to-date, following with survivorship Clinic, iron levels normal.      RECENT HISTORY OF COVID-19, FORTUNATELY MILD CASE, CAN DEFER OMICRON BOOSTER AS HE JUST HAD COVID INFECTION.    Patient Instructions   CITRUCEL ORANGE POWDER (NOT SUGAR  FREE AS ARTIFICIAL SWEETENERS CAN WORSEN GAS/BLOATING):  MIX 1 HEAPING TABLESPOON WITH 10 OZ OF WATER AND DRINK NIGHTLY AFTER DINNER. ADD A SECOND DOSE AFTER BREAKFAST IF NEEDED.         Follow up in about 4 months (around 5/23/2023) for to follow up on lab results, return as needed for new concerns.

## 2023-01-24 ENCOUNTER — TELEPHONE (OUTPATIENT)
Dept: UROLOGY | Facility: CLINIC | Age: 73
End: 2023-01-24
Payer: MEDICARE

## 2023-01-24 NOTE — PROGRESS NOTES
Diagnoses and all orders for this visit:    Acute cystitis without hematuria  -     sulfamethoxazole-trimethoprim 800-160mg (BACTRIM DS) 800-160 mg Tab; Take 1 tablet by mouth 2 (two) times daily. for 10 days  -     Urine culture; Future    Repeat urine cx in 2 weeks after completion of antibiotic therapy.    Leonid Carmona NP

## 2023-01-24 NOTE — TELEPHONE ENCOUNTER
----- Message from Leonid Carmona NP sent at 1/23/2023 11:06 PM CST -----  Please inform patient via telephone that his urine cx came back positive for a UTI. Script for Bactrim DS sent to [facility]. Repeat urine cx in 2 weeks after completion of antibiotic therapy. Order placed.

## 2023-01-27 DIAGNOSIS — N47.1 PHIMOSIS: Primary | ICD-10-CM

## 2023-01-27 RX ORDER — SODIUM CHLORIDE 9 MG/ML
INJECTION, SOLUTION INTRAVENOUS CONTINUOUS
Status: CANCELLED | OUTPATIENT
Start: 2023-01-27

## 2023-01-27 RX ORDER — CEFAZOLIN SODIUM 2 G/50ML
2 SOLUTION INTRAVENOUS
Status: CANCELLED | OUTPATIENT
Start: 2023-01-27

## 2023-02-02 PROBLEM — Z87.438 HISTORY OF BALANITIS: Status: ACTIVE | Noted: 2023-02-02

## 2023-02-02 PROBLEM — N47.1 PHIMOSIS: Status: ACTIVE | Noted: 2023-02-02

## 2023-02-07 DIAGNOSIS — Z00.00 ENCOUNTER FOR MEDICARE ANNUAL WELLNESS EXAM: ICD-10-CM

## 2023-02-08 ENCOUNTER — TELEPHONE (OUTPATIENT)
Dept: UROLOGY | Facility: CLINIC | Age: 73
End: 2023-02-08
Payer: MEDICARE

## 2023-02-08 NOTE — TELEPHONE ENCOUNTER
Spoke to patient and notified him that his prescription for cipro has been prescribed since last thursday

## 2023-02-08 NOTE — TELEPHONE ENCOUNTER
----- Message from Amador Parnell sent at 2/8/2023 11:12 AM CST -----  .Type:  RX Refill Request    Who Called: pt  Refill or New Rx:refill  RX Name and Strength:ciprofloxacin HCl (CIPRO) 500 MG tablet  How is the patient currently taking it? (ex. 1XDay):Take 1 tablet (500 mg total) by mouth 2 (two) times daily. for 5 days   Is this a 30 day or 90 day RX:10 tablets  Preferred Pharmacy with phone number:[id number] - [place], [place] - [id number]  Local or Mail Order:local  Ordering Provider:Gold  Would the patient rather a call back or a response via [facility]? Call back  Best Call Back Number:[phone]  Additional Information:

## 2023-02-09 DIAGNOSIS — Z00.00 ENCOUNTER FOR MEDICARE ANNUAL WELLNESS EXAM: ICD-10-CM

## 2023-02-14 ENCOUNTER — TELEPHONE (OUTPATIENT)
Dept: FAMILY MEDICINE | Facility: CLINIC | Age: 73
End: 2023-02-14
Payer: MEDICARE

## 2023-02-14 NOTE — TELEPHONE ENCOUNTER
----- Message from Elsie Flanagan MA sent at 2/13/2023  6:49 AM CST -----  Contact: Pt    ----- Message -----  From: Jw Spencer  Sent: 2/9/2023  10:27 AM CST  To: Latia PISANO Staff    .Type:  Needs Medical Advice    Who Called: Pt  Symptoms (please be specific): Pt is requesting a shot in back, pt is talking about sugar.  Would the patient rather a call back or a response via [facility]? Call  Best Call Back Number: [phone]

## 2023-02-14 NOTE — TELEPHONE ENCOUNTER
Pt wants to know if it is ok to take a shot in his back. Pt is going to  [place] to get a shot in the back. He wants to know if he can have the shot since he has diabetes.

## 2023-02-14 NOTE — TELEPHONE ENCOUNTER
His blood sugar is controlled so getting the injections are fine, especially if that decreases his back pain then helps him to be more active and loose weight because that would also be very good for his sugar. He should also be sure to eat a low carb diet and stick with the Metformin daily.  I hope the shots help!

## 2023-02-15 NOTE — TELEPHONE ENCOUNTER
If he is having chest pain or acute cardiac concerns he needs to go to the ER, otherwise he needs to schedule with me so we can discuss what his concerns are and see  if I need to order any heart tests like a stress and then we can place the cardiology referral if needed.

## 2023-02-16 NOTE — TELEPHONE ENCOUNTER
Called pt and asked him if he was experiencing any chest pain. Pt stated no. That he had a ekg and they told him to go over the results with PCP and get a cardiology referral. I was able to get him an appointment for tomorrow.

## 2023-02-17 ENCOUNTER — OFFICE VISIT (OUTPATIENT)
Dept: FAMILY MEDICINE | Facility: CLINIC | Age: 73
End: 2023-02-17
Payer: MEDICARE

## 2023-02-17 VITALS
BODY MASS INDEX: 32.25 KG/M2 | TEMPERATURE: 99 F | SYSTOLIC BLOOD PRESSURE: 140 MMHG | OXYGEN SATURATION: 99 % | HEART RATE: 63 BPM | DIASTOLIC BLOOD PRESSURE: 72 MMHG | WEIGHT: 230.38 LBS | HEIGHT: 71 IN

## 2023-02-17 DIAGNOSIS — I10 BENIGN ESSENTIAL HYPERTENSION: ICD-10-CM

## 2023-02-17 DIAGNOSIS — R73.03 PREDIABETES: ICD-10-CM

## 2023-02-17 DIAGNOSIS — I49.9 IRREGULAR HEART BEAT: Primary | ICD-10-CM

## 2023-02-17 DIAGNOSIS — E66.1 CLASS 1 DRUG-INDUCED OBESITY WITH SERIOUS COMORBIDITY AND BODY MASS INDEX (BMI) OF 32.0 TO 32.9 IN ADULT: ICD-10-CM

## 2023-02-17 PROCEDURE — 3044F HG A1C LEVEL LT 7.0%: CPT | Mod: HCNC,CPTII,S$GLB, | Performed by: FAMILY MEDICINE

## 2023-02-17 PROCEDURE — 3078F PR MOST RECENT DIASTOLIC BLOOD PRESSURE < 80 MM HG: ICD-10-PCS | Mod: HCNC,CPTII,S$GLB, | Performed by: FAMILY MEDICINE

## 2023-02-17 PROCEDURE — 99999 PR PBB SHADOW E&M-EST. PATIENT-LVL IV: CPT | Mod: PBBFAC,HCNC,, | Performed by: FAMILY MEDICINE

## 2023-02-17 PROCEDURE — 3288F PR FALLS RISK ASSESSMENT DOCUMENTED: ICD-10-PCS | Mod: HCNC,CPTII,S$GLB, | Performed by: FAMILY MEDICINE

## 2023-02-17 PROCEDURE — 3078F DIAST BP <80 MM HG: CPT | Mod: HCNC,CPTII,S$GLB, | Performed by: FAMILY MEDICINE

## 2023-02-17 PROCEDURE — 1100F PTFALLS ASSESS-DOCD GE2>/YR: CPT | Mod: HCNC,CPTII,S$GLB, | Performed by: FAMILY MEDICINE

## 2023-02-17 PROCEDURE — 1160F PR REVIEW ALL MEDS BY PRESCRIBER/CLIN PHARMACIST DOCUMENTED: ICD-10-PCS | Mod: HCNC,CPTII,S$GLB, | Performed by: FAMILY MEDICINE

## 2023-02-17 PROCEDURE — 3288F FALL RISK ASSESSMENT DOCD: CPT | Mod: HCNC,CPTII,S$GLB, | Performed by: FAMILY MEDICINE

## 2023-02-17 PROCEDURE — 1160F RVW MEDS BY RX/DR IN RCRD: CPT | Mod: HCNC,CPTII,S$GLB, | Performed by: FAMILY MEDICINE

## 2023-02-17 PROCEDURE — 1126F AMNT PAIN NOTED NONE PRSNT: CPT | Mod: HCNC,CPTII,S$GLB, | Performed by: FAMILY MEDICINE

## 2023-02-17 PROCEDURE — 3044F PR MOST RECENT HEMOGLOBIN A1C LEVEL <7.0%: ICD-10-PCS | Mod: HCNC,CPTII,S$GLB, | Performed by: FAMILY MEDICINE

## 2023-02-17 PROCEDURE — 3008F BODY MASS INDEX DOCD: CPT | Mod: HCNC,CPTII,S$GLB, | Performed by: FAMILY MEDICINE

## 2023-02-17 PROCEDURE — 1126F PR PAIN SEVERITY QUANTIFIED, NO PAIN PRESENT: ICD-10-PCS | Mod: HCNC,CPTII,S$GLB, | Performed by: FAMILY MEDICINE

## 2023-02-17 PROCEDURE — 99214 OFFICE O/P EST MOD 30 MIN: CPT | Mod: HCNC,S$GLB,, | Performed by: FAMILY MEDICINE

## 2023-02-17 PROCEDURE — 99999 PR PBB SHADOW E&M-EST. PATIENT-LVL IV: ICD-10-PCS | Mod: PBBFAC,HCNC,, | Performed by: FAMILY MEDICINE

## 2023-02-17 PROCEDURE — 1159F PR MEDICATION LIST DOCUMENTED IN MEDICAL RECORD: ICD-10-PCS | Mod: HCNC,CPTII,S$GLB, | Performed by: FAMILY MEDICINE

## 2023-02-17 PROCEDURE — 1159F MED LIST DOCD IN RCRD: CPT | Mod: HCNC,CPTII,S$GLB, | Performed by: FAMILY MEDICINE

## 2023-02-17 PROCEDURE — 99214 PR OFFICE/OUTPT VISIT, EST, LEVL IV, 30-39 MIN: ICD-10-PCS | Mod: HCNC,S$GLB,, | Performed by: FAMILY MEDICINE

## 2023-02-17 PROCEDURE — 1100F PR PT FALLS ASSESS DOC 2+ FALLS/FALL W/INJURY/YR: ICD-10-PCS | Mod: HCNC,CPTII,S$GLB, | Performed by: FAMILY MEDICINE

## 2023-02-17 PROCEDURE — 3077F SYST BP >= 140 MM HG: CPT | Mod: HCNC,CPTII,S$GLB, | Performed by: FAMILY MEDICINE

## 2023-02-17 PROCEDURE — 3008F PR BODY MASS INDEX (BMI) DOCUMENTED: ICD-10-PCS | Mod: HCNC,CPTII,S$GLB, | Performed by: FAMILY MEDICINE

## 2023-02-17 PROCEDURE — 3077F PR MOST RECENT SYSTOLIC BLOOD PRESSURE >= 140 MM HG: ICD-10-PCS | Mod: HCNC,CPTII,S$GLB, | Performed by: FAMILY MEDICINE

## 2023-02-17 NOTE — PROGRESS NOTES
" Office Visit    Patient Name: Noah Nichole    : 1950  MRN: [id number]    Subjective:  Noah is a 72 y.o. male who presents today for:    Follow-up (Ekg follow up and possible cardiology referral)    Most recent office visit with me 2023   Labs 01/10/2023 with stable A1c of 5.9 on metformin 500 XR daily, normal liver/kidney function, prior lipids on 2022 with LDL of 94    Noah Nichole is a 72 y.o. male with colon cancer stage II s/p rt hemicolectomy , HTN, h/o iron def anemia secondary to chronic blood loss, urinary retention w/ prior chronic indwelling flores, recent treatment for Klebsiella UTI and circumcision procedure performed by Urology Dr. Malcolm 23, who presents today to discuss cardiac concerns.    He has really struggled since his fall that occurred 2022-has had debilitating tailbone and back pain with leg weakness and intermittent urinary retention issues since then.    Was scheduled for laser enucleation of the prostate but this procedure was canceled as his urination has improved on Flomax.  He is seeing pain management regarding his back pain and is receiving injections.     Today he reports concerns for irregular heart-- he was to follow-up with his primary care provider due to irregular beat concerns.  At his recent outpatient circumcision surgery there was a concern noted for irregular heart beat:   "Patient aaox4, no pain , no distress, patient having irregular heartbeat, EKG ordered."      Unremarkable EKG 2023 showing sinus bradycardia but otherwise no concerns    He has no chest pain, no leg swelling, no palpitations, no shortness of breath.       Has no history of heart conditions.  His biggest concern right now is weight gain related to inactivity due to back pain.  He hopes to be able to start physical therapy after receiving his series of back injections, however, his activity level is only limited by his back and not by any cardiopulmonary " "concerns.    PAST MEDICAL HISTORY, SURGICAL/SOCIAL/FAMILY HISTORY REVIEWED AS PER CHART, WITH PERTINENT FINDINGS INCLUDED IN HISTORY SECTION OF NOTE.     Current Medications    Medication List with Changes/Refills   Current Medications    ACETAMINOPHEN (TYLENOL) 325 MG TABLET    Take 2 tablets (650 mg total) by mouth every 6 (six) hours as needed for Pain.    ALLOPURINOL (ZYLOPRIM) 300 MG TABLET    Take 1 tablet (300 mg total) by mouth once daily. Take daily for gout prevention    DILTIAZEM HCL (DILTIAZEM 2% CREAM)    Apply topically 3 (three) times daily. Apply topically to anal area.    GABAPENTIN (NEURONTIN) 300 MG CAPSULE    Take 300 mg by mouth 3 (three) times daily.    HYDROCORTISONE (ANUSOL-HC) 2.5 % RECTAL CREAM    Place rectally 2 (two) times daily.    IBUPROFEN (ADVIL,MOTRIN) 800 MG TABLET    Take 1 tablet (800 mg total) by mouth 2 (two) times daily as needed for Pain (low back and pelvic pain). Take with food    LIDOCAINE (LIDODERM) 5 %    Place 1 patch onto the skin once daily. Remove & Discard patch within 12 hours or as directed by MD    LOSARTAN-HYDROCHLOROTHIAZIDE 100-12.5 MG (HYZAAR) 100-12.5 MG TAB    Take 1 tablet by mouth once daily.    METFORMIN (GLUCOPHAGE-XR) 500 MG ER 24HR TABLET    Take 1 tablet (500 mg total) by mouth daily with breakfast.    TAMSULOSIN (FLOMAX) 0.4 MG CAP    Take 1 capsule (0.4 mg total) by mouth once daily.       Allergies   Review of patient's allergies indicates:  No Known Allergies      Review of Systems (Pertinent positives)  Review of Systems   Constitutional:  Positive for unexpected weight change (weight gain).   Respiratory:  Negative for shortness of breath.    Cardiovascular:  Negative for chest pain, palpitations and leg swelling.   Musculoskeletal:  Positive for back pain.     BP (!) 140/72 (BP Location: Left arm, Patient Position: Sitting, BP Method: Medium (Manual))   Pulse 63   Temp 98.9 °F (37.2 °C) (Oral)   Ht 5' 11" (1.803 m)   Wt 104.5 kg (230 lb " 6.1 oz)   SpO2 99%   BMI 32.13 kg/m²     Physical Exam  Vitals reviewed.   Constitutional:       General: He is not in acute distress.     Appearance: He is well-developed.   HENT:      Head: Normocephalic and atraumatic.   Eyes:      Conjunctiva/sclera: Conjunctivae normal.   Cardiovascular:      Rate and Rhythm: Normal rate and regular rhythm.   Pulmonary:      Effort: Pulmonary effort is normal.      Breath sounds: Normal breath sounds.   Musculoskeletal:      Right lower leg: No edema.      Left lower leg: No edema.   Skin:     General: Skin is warm and dry.   Neurological:      General: No focal deficit present.      Mental Status: He is alert and oriented to person, place, and time.   Psychiatric:         Mood and Affect: Mood normal.         Behavior: Behavior normal.         Assessment/Plan:  Noah Nichole is a 72 y.o. male who presents today for :        ICD-10-CM ICD-9-CM    1. Irregular heart beat  I49.9 427.9       2. Class 1 drug-induced obesity with serious comorbidity and body mass index (BMI) of 32.0 to 32.9 in adult  E66.1 278.00     Z68.32 V85.32       3. Benign essential hypertension  I10 401.1       4. Prediabetes  R73.03 790.29         72-year-old patient of mine with prediabetes controlled on metformin, hypertension, recent weight gain related to inactivity from back pain, who presents today to discuss irregular heartbeat concerns.      During his recent outpatient surgery the nurse in recovery noted a concern for irregular heartbeat, so an EKG was therefore ordered-EKG reviewed and was overall unremarkable.  Showed sinus bradycardia but otherwise no concerns.      He does not note any palpitations or chest pain, shortness at breath.  No leg swelling and exercise tolerance is stable from a cardiopulmonary standpoint.      No further evaluation [id number] as scheduled for labs, routine office visit, sooner if concerns    There are no Patient Instructions on file for this  visit.      Follow up in about 3 months (around 5/17/2023) for to follow up on lab results, return as needed for new concerns.

## 2023-02-20 ENCOUNTER — PATIENT MESSAGE (OUTPATIENT)
Dept: UROLOGY | Facility: CLINIC | Age: 73
End: 2023-02-20
Payer: MEDICARE

## 2023-03-24 ENCOUNTER — OFFICE VISIT (OUTPATIENT)
Dept: UROLOGY | Facility: CLINIC | Age: 73
End: 2023-03-24
Payer: MEDICARE

## 2023-03-24 VITALS
BODY MASS INDEX: 32.17 KG/M2 | HEART RATE: 68 BPM | WEIGHT: 229.81 LBS | SYSTOLIC BLOOD PRESSURE: 154 MMHG | HEIGHT: 71 IN | DIASTOLIC BLOOD PRESSURE: 77 MMHG

## 2023-03-24 DIAGNOSIS — Z98.890 POST-OPERATIVE STATE: ICD-10-CM

## 2023-03-24 DIAGNOSIS — R35.1 NOCTURIA: ICD-10-CM

## 2023-03-24 DIAGNOSIS — R35.1 BENIGN PROSTATIC HYPERPLASIA WITH NOCTURIA: Primary | ICD-10-CM

## 2023-03-24 DIAGNOSIS — M54.50 BILATERAL LOW BACK PAIN WITHOUT SCIATICA, UNSPECIFIED CHRONICITY: ICD-10-CM

## 2023-03-24 DIAGNOSIS — N40.1 BENIGN PROSTATIC HYPERPLASIA WITH NOCTURIA: Primary | ICD-10-CM

## 2023-03-24 PROCEDURE — 1159F PR MEDICATION LIST DOCUMENTED IN MEDICAL RECORD: ICD-10-PCS | Mod: HCNC,CPTII,S$GLB, | Performed by: UROLOGY

## 2023-03-24 PROCEDURE — 1101F PR PT FALLS ASSESS DOC 0-1 FALLS W/OUT INJ PAST YR: ICD-10-PCS | Mod: HCNC,CPTII,S$GLB, | Performed by: UROLOGY

## 2023-03-24 PROCEDURE — 3008F BODY MASS INDEX DOCD: CPT | Mod: HCNC,CPTII,S$GLB, | Performed by: UROLOGY

## 2023-03-24 PROCEDURE — 3078F DIAST BP <80 MM HG: CPT | Mod: HCNC,CPTII,S$GLB, | Performed by: UROLOGY

## 2023-03-24 PROCEDURE — 3044F PR MOST RECENT HEMOGLOBIN A1C LEVEL <7.0%: ICD-10-PCS | Mod: HCNC,CPTII,S$GLB, | Performed by: UROLOGY

## 2023-03-24 PROCEDURE — 3077F PR MOST RECENT SYSTOLIC BLOOD PRESSURE >= 140 MM HG: ICD-10-PCS | Mod: HCNC,CPTII,S$GLB, | Performed by: UROLOGY

## 2023-03-24 PROCEDURE — 99999 PR PBB SHADOW E&M-EST. PATIENT-LVL III: CPT | Mod: PBBFAC,HCNC,, | Performed by: UROLOGY

## 2023-03-24 PROCEDURE — 1125F AMNT PAIN NOTED PAIN PRSNT: CPT | Mod: HCNC,CPTII,S$GLB, | Performed by: UROLOGY

## 2023-03-24 PROCEDURE — 99214 PR OFFICE/OUTPT VISIT, EST, LEVL IV, 30-39 MIN: ICD-10-PCS | Mod: HCNC,S$GLB,, | Performed by: UROLOGY

## 2023-03-24 PROCEDURE — 3077F SYST BP >= 140 MM HG: CPT | Mod: HCNC,CPTII,S$GLB, | Performed by: UROLOGY

## 2023-03-24 PROCEDURE — 99999 PR PBB SHADOW E&M-EST. PATIENT-LVL III: ICD-10-PCS | Mod: PBBFAC,HCNC,, | Performed by: UROLOGY

## 2023-03-24 PROCEDURE — 3008F PR BODY MASS INDEX (BMI) DOCUMENTED: ICD-10-PCS | Mod: HCNC,CPTII,S$GLB, | Performed by: UROLOGY

## 2023-03-24 PROCEDURE — 3288F FALL RISK ASSESSMENT DOCD: CPT | Mod: HCNC,CPTII,S$GLB, | Performed by: UROLOGY

## 2023-03-24 PROCEDURE — 1160F RVW MEDS BY RX/DR IN RCRD: CPT | Mod: HCNC,CPTII,S$GLB, | Performed by: UROLOGY

## 2023-03-24 PROCEDURE — 1160F PR REVIEW ALL MEDS BY PRESCRIBER/CLIN PHARMACIST DOCUMENTED: ICD-10-PCS | Mod: HCNC,CPTII,S$GLB, | Performed by: UROLOGY

## 2023-03-24 PROCEDURE — 3044F HG A1C LEVEL LT 7.0%: CPT | Mod: HCNC,CPTII,S$GLB, | Performed by: UROLOGY

## 2023-03-24 PROCEDURE — 1101F PT FALLS ASSESS-DOCD LE1/YR: CPT | Mod: HCNC,CPTII,S$GLB, | Performed by: UROLOGY

## 2023-03-24 PROCEDURE — 99214 OFFICE O/P EST MOD 30 MIN: CPT | Mod: HCNC,S$GLB,, | Performed by: UROLOGY

## 2023-03-24 PROCEDURE — 3078F PR MOST RECENT DIASTOLIC BLOOD PRESSURE < 80 MM HG: ICD-10-PCS | Mod: HCNC,CPTII,S$GLB, | Performed by: UROLOGY

## 2023-03-24 PROCEDURE — 3288F PR FALLS RISK ASSESSMENT DOCUMENTED: ICD-10-PCS | Mod: HCNC,CPTII,S$GLB, | Performed by: UROLOGY

## 2023-03-24 PROCEDURE — 1159F MED LIST DOCD IN RCRD: CPT | Mod: HCNC,CPTII,S$GLB, | Performed by: UROLOGY

## 2023-03-24 PROCEDURE — 1125F PR PAIN SEVERITY QUANTIFIED, PAIN PRESENT: ICD-10-PCS | Mod: HCNC,CPTII,S$GLB, | Performed by: UROLOGY

## 2023-03-24 RX ORDER — HYDROCODONE BITARTRATE AND ACETAMINOPHEN 5; 325 MG/1; MG/1
1 TABLET ORAL EVERY 6 HOURS PRN
Qty: 28 TABLET | Refills: 0 | Status: SHIPPED | OUTPATIENT
Start: 2023-03-24 | End: 2023-05-24 | Stop reason: ALTCHOICE

## 2023-03-24 NOTE — PROGRESS NOTES
Subjective:       Patient ID: Noah Nichole is a 73 y.o. male.    Chief Complaint: Establish Care    Mr. Nichole 73-year-old gentleman with a history of BPH and low back pain after a fall onto his tailbone.  The patient has developed bulging disc at L4-L5 level with congenital stenosis and multiple arthritic changes.  He is seen the pain management physician for injection therapy and is due to return to see him next month.  He has developed pain over the last few days and has been taking meloxicam but also had used 1 of his last hydrocodone from the initial injury.  I prescribed him 7 more days as necessary but told to follow-up with pain management.  The patient is urinating well with the Flomax and is no longer in urinary retention which also occurred after his accident.  The patient is 7 weeks status post circumcision and has healed well.  Here for follow-up.      Benign Prostatic Hypertrophy  This is a chronic problem. The current episode started more than 1 year ago. The problem has been gradually improving since onset. Irritative symptoms include frequency (x 4-5) and nocturia (x 2-3). Irritative symptoms do not include urgency. Obstructive symptoms include a slower stream. Obstructive symptoms do not include dribbling, incomplete emptying, an intermittent stream, straining or a weak stream. Pertinent negatives include no chills, dysuria, genital pain, hematuria, hesitancy, nausea or vomiting. AUA score is 0-7. He is sexually active. Nothing aggravates the symptoms. Past treatments include tamsulosin. The treatment provided significant relief. He has been using treatment for 1 to 6 months.   Review of Systems   Constitutional:  Negative for activity change, appetite change, chills, diaphoresis, fatigue, fever and unexpected weight change.   HENT:  Negative for congestion, hearing loss, sinus pressure and trouble swallowing.    Eyes:  Negative for photophobia, pain, discharge and visual disturbance.    Respiratory:  Negative for apnea, cough and shortness of breath.    Cardiovascular:  Negative for chest pain, palpitations and leg swelling.   Gastrointestinal:  Negative for abdominal distention, abdominal pain, anal bleeding, blood in stool, constipation, diarrhea, nausea, rectal pain and vomiting.   Endocrine: Negative for cold intolerance, heat intolerance, polydipsia, polyphagia and polyuria.   Genitourinary:  Positive for frequency (x 4-5) and nocturia (x 2-3). Negative for decreased urine volume, difficulty urinating, dysuria, enuresis, flank pain, genital sores, hematuria, hesitancy, incomplete emptying, penile discharge, penile pain, penile swelling, scrotal swelling, testicular pain and urgency.   Musculoskeletal:  Negative for arthralgias, back pain and myalgias.   Skin:  Negative for color change, pallor, rash and wound.   Allergic/Immunologic: Negative for environmental allergies, food allergies and immunocompromised state.   Neurological:  Negative for dizziness, seizures, weakness and headaches.   Hematological:  Negative for adenopathy. Does not bruise/bleed easily.   Psychiatric/Behavioral: Negative.       Objective:      Physical Exam  Vitals and nursing note reviewed.   Constitutional:       General: He is not in acute distress.     Appearance: Normal appearance. He is well-developed. He is obese. He is not ill-appearing, toxic-appearing or diaphoretic.   HENT:      Head: Normocephalic and atraumatic.      Right Ear: External ear normal.      Left Ear: External ear normal.      Nose: Nose normal.      Mouth/Throat:      Pharynx: No oropharyngeal exudate or posterior oropharyngeal erythema.   Eyes:      General: No scleral icterus.        Right eye: No discharge.         Left eye: No discharge.      Conjunctiva/sclera: Conjunctivae normal.      Pupils: Pupils are equal, round, and reactive to light.   Cardiovascular:      Rate and Rhythm: Normal rate and regular rhythm.      Heart sounds: Normal  heart sounds.   Pulmonary:      Effort: Pulmonary effort is normal.      Breath sounds: Normal breath sounds.   Abdominal:      General: Bowel sounds are normal.      Palpations: Abdomen is soft.      Tenderness: There is no right CVA tenderness or left CVA tenderness.   Genitourinary:     Penis: Normal.       Testes: Normal.   Musculoskeletal:         General: Normal range of motion.      Cervical back: Normal range of motion and neck supple.   Skin:     General: Skin is warm and dry.      Capillary Refill: Capillary refill takes less than 2 seconds.   Neurological:      Mental Status: He is alert and oriented to person, place, and time.      Gait: Gait normal.      Deep Tendon Reflexes: Reflexes are normal and symmetric. Reflexes normal.   Psychiatric:         Behavior: Behavior normal.         Thought Content: Thought content normal.         Judgment: Judgment normal.       Assessment:       1. Benign prostatic hyperplasia with nocturia    2. Post-operative state    3. Bilateral low back pain without sciatica, unspecified chronicity    4. Nocturia          Plan:       Patient Instructions   Continue Flomax,  Follow-up January 2024 with nurse practitioner Mathew for PSA test and follow-up for BPH.  Hydrocodone for back pain, follow-up with pain management and back doctor.

## 2023-03-24 NOTE — PATIENT INSTRUCTIONS
Continue Flomax,  Follow-up January 2024 with nurse practitioner Mathew for PSA test and follow-up for BPH.  Hydrocodone for back pain, follow-up with pain management and back doctor.

## 2023-04-26 PROBLEM — M25.69 DECREASED RANGE OF MOTION OF TRUNK AND BACK: Status: ACTIVE | Noted: 2023-04-26

## 2023-04-26 PROBLEM — Z74.09 IMPAIRED FUNCTIONAL MOBILITY, BALANCE, GAIT, AND ENDURANCE: Status: ACTIVE | Noted: 2023-04-26

## 2023-04-26 PROBLEM — R29.898 DECREASED STRENGTH OF TRUNK AND BACK: Status: ACTIVE | Noted: 2023-04-26

## 2023-05-19 ENCOUNTER — LAB VISIT (OUTPATIENT)
Dept: LAB | Facility: HOSPITAL | Age: 73
End: 2023-05-19
Attending: FAMILY MEDICINE
Payer: MEDICARE

## 2023-05-19 DIAGNOSIS — R73.03 PREDIABETES: ICD-10-CM

## 2023-05-19 DIAGNOSIS — Z85.038 HISTORY OF COLON CANCER, STAGE II: ICD-10-CM

## 2023-05-19 DIAGNOSIS — E55.9 VITAMIN D DEFICIENCY: ICD-10-CM

## 2023-05-19 DIAGNOSIS — I10 BENIGN ESSENTIAL HYPERTENSION: ICD-10-CM

## 2023-05-19 DIAGNOSIS — Z79.899 MEDICATION MANAGEMENT: ICD-10-CM

## 2023-05-19 LAB
25(OH)D3+25(OH)D2 SERPL-MCNC: 33 NG/ML (ref 30–96)
ALBUMIN SERPL BCP-MCNC: 3.7 G/DL (ref 3.5–5.2)
ALP SERPL-CCNC: 60 U/L (ref 55–135)
ALT SERPL W/O P-5'-P-CCNC: 19 U/L (ref 10–44)
ANION GAP SERPL CALC-SCNC: 9 MMOL/L (ref 8–16)
AST SERPL-CCNC: 21 U/L (ref 10–40)
BASOPHILS # BLD AUTO: 0.04 K/UL (ref 0–0.2)
BASOPHILS NFR BLD: 0.6 % (ref 0–1.9)
BILIRUB SERPL-MCNC: 0.5 MG/DL (ref 0.1–1)
BUN SERPL-MCNC: 20 MG/DL (ref 8–23)
CALCIUM SERPL-MCNC: 9.2 MG/DL (ref 8.7–10.5)
CEA SERPL-MCNC: 2.1 NG/ML (ref 0–5)
CHLORIDE SERPL-SCNC: 107 MMOL/L (ref 95–110)
CHOLEST SERPL-MCNC: 182 MG/DL (ref 120–199)
CHOLEST/HDLC SERPL: 4.7 {RATIO} (ref 2–5)
CO2 SERPL-SCNC: 23 MMOL/L (ref 23–29)
CREAT SERPL-MCNC: 1.2 MG/DL (ref 0.5–1.4)
DIFFERENTIAL METHOD: ABNORMAL
EOSINOPHIL # BLD AUTO: 0.3 K/UL (ref 0–0.5)
EOSINOPHIL NFR BLD: 4.9 % (ref 0–8)
ERYTHROCYTE [DISTWIDTH] IN BLOOD BY AUTOMATED COUNT: 16.6 % (ref 11.5–14.5)
EST. GFR  (NO RACE VARIABLE): >60 ML/MIN/1.73 M^2
ESTIMATED AVG GLUCOSE: 126 MG/DL (ref 68–131)
GLUCOSE SERPL-MCNC: 109 MG/DL (ref 70–110)
HBA1C MFR BLD: 6 % (ref 4–5.6)
HCT VFR BLD AUTO: 38.2 % (ref 40–54)
HDLC SERPL-MCNC: 39 MG/DL (ref 40–75)
HDLC SERPL: 21.4 % (ref 20–50)
HGB BLD-MCNC: 11.6 G/DL (ref 14–18)
IMM GRANULOCYTES # BLD AUTO: 0.02 K/UL (ref 0–0.04)
IMM GRANULOCYTES NFR BLD AUTO: 0.3 % (ref 0–0.5)
LDLC SERPL CALC-MCNC: 126.6 MG/DL (ref 63–159)
LYMPHOCYTES # BLD AUTO: 1.9 K/UL (ref 1–4.8)
LYMPHOCYTES NFR BLD: 29.4 % (ref 18–48)
MCH RBC QN AUTO: 25.9 PG (ref 27–31)
MCHC RBC AUTO-ENTMCNC: 30.4 G/DL (ref 32–36)
MCV RBC AUTO: 85 FL (ref 82–98)
MONOCYTES # BLD AUTO: 1.1 K/UL (ref 0.3–1)
MONOCYTES NFR BLD: 17.2 % (ref 4–15)
NEUTROPHILS # BLD AUTO: 3.1 K/UL (ref 1.8–7.7)
NEUTROPHILS NFR BLD: 47.6 % (ref 38–73)
NONHDLC SERPL-MCNC: 143 MG/DL
NRBC BLD-RTO: 0 /100 WBC
PLATELET # BLD AUTO: 306 K/UL (ref 150–450)
PMV BLD AUTO: 9 FL (ref 9.2–12.9)
POTASSIUM SERPL-SCNC: 3.8 MMOL/L (ref 3.5–5.1)
PROT SERPL-MCNC: 7.1 G/DL (ref 6–8.4)
RBC # BLD AUTO: 4.48 M/UL (ref 4.6–6.2)
SODIUM SERPL-SCNC: 139 MMOL/L (ref 136–145)
TRIGL SERPL-MCNC: 82 MG/DL (ref 30–150)
URATE SERPL-MCNC: 7.6 MG/DL (ref 3.4–7)
WBC # BLD AUTO: 6.57 K/UL (ref 3.9–12.7)

## 2023-05-19 PROCEDURE — 85025 COMPLETE CBC W/AUTO DIFF WBC: CPT | Performed by: FAMILY MEDICINE

## 2023-05-19 PROCEDURE — 80061 LIPID PANEL: CPT | Performed by: FAMILY MEDICINE

## 2023-05-19 PROCEDURE — 82378 CARCINOEMBRYONIC ANTIGEN: CPT | Performed by: NURSE PRACTITIONER

## 2023-05-19 PROCEDURE — 84550 ASSAY OF BLOOD/URIC ACID: CPT | Performed by: FAMILY MEDICINE

## 2023-05-19 PROCEDURE — 36415 COLL VENOUS BLD VENIPUNCTURE: CPT | Performed by: FAMILY MEDICINE

## 2023-05-19 PROCEDURE — 82306 VITAMIN D 25 HYDROXY: CPT | Performed by: FAMILY MEDICINE

## 2023-05-19 PROCEDURE — 80053 COMPREHEN METABOLIC PANEL: CPT | Performed by: FAMILY MEDICINE

## 2023-05-19 PROCEDURE — 83036 HEMOGLOBIN GLYCOSYLATED A1C: CPT | Performed by: FAMILY MEDICINE

## 2023-05-24 ENCOUNTER — OFFICE VISIT (OUTPATIENT)
Dept: FAMILY MEDICINE | Facility: CLINIC | Age: 73
End: 2023-05-24
Payer: MEDICARE

## 2023-05-24 VITALS
WEIGHT: 230.63 LBS | DIASTOLIC BLOOD PRESSURE: 74 MMHG | HEART RATE: 61 BPM | BODY MASS INDEX: 32.29 KG/M2 | TEMPERATURE: 98 F | HEIGHT: 71 IN | SYSTOLIC BLOOD PRESSURE: 132 MMHG | OXYGEN SATURATION: 99 %

## 2023-05-24 DIAGNOSIS — E79.0 ELEVATED BLOOD URIC ACID LEVEL: ICD-10-CM

## 2023-05-24 DIAGNOSIS — R73.03 PREDIABETES: ICD-10-CM

## 2023-05-24 DIAGNOSIS — E66.1 CLASS 1 DRUG-INDUCED OBESITY WITH SERIOUS COMORBIDITY AND BODY MASS INDEX (BMI) OF 32.0 TO 32.9 IN ADULT: ICD-10-CM

## 2023-05-24 DIAGNOSIS — E55.9 VITAMIN D DEFICIENCY: ICD-10-CM

## 2023-05-24 DIAGNOSIS — Z79.899 MEDICATION MANAGEMENT: ICD-10-CM

## 2023-05-24 DIAGNOSIS — D50.0 IRON DEFICIENCY ANEMIA DUE TO CHRONIC BLOOD LOSS: ICD-10-CM

## 2023-05-24 DIAGNOSIS — Z00.00 ROUTINE GENERAL MEDICAL EXAMINATION AT A HEALTH CARE FACILITY: Primary | ICD-10-CM

## 2023-05-24 DIAGNOSIS — K59.00 CONSTIPATION, UNSPECIFIED CONSTIPATION TYPE: ICD-10-CM

## 2023-05-24 DIAGNOSIS — M54.9 BACK PAIN, UNSPECIFIED BACK LOCATION, UNSPECIFIED BACK PAIN LATERALITY, UNSPECIFIED CHRONICITY: ICD-10-CM

## 2023-05-24 DIAGNOSIS — Z85.038 HISTORY OF COLON CANCER, STAGE II: ICD-10-CM

## 2023-05-24 DIAGNOSIS — I10 BENIGN ESSENTIAL HYPERTENSION: ICD-10-CM

## 2023-05-24 PROCEDURE — 1160F PR REVIEW ALL MEDS BY PRESCRIBER/CLIN PHARMACIST DOCUMENTED: ICD-10-PCS | Mod: CPTII,S$GLB,, | Performed by: FAMILY MEDICINE

## 2023-05-24 PROCEDURE — 3008F PR BODY MASS INDEX (BMI) DOCUMENTED: ICD-10-PCS | Mod: CPTII,S$GLB,, | Performed by: FAMILY MEDICINE

## 2023-05-24 PROCEDURE — 99397 PR PREVENTIVE VISIT,EST,65 & OVER: ICD-10-PCS | Mod: S$GLB,,, | Performed by: FAMILY MEDICINE

## 2023-05-24 PROCEDURE — 1101F PR PT FALLS ASSESS DOC 0-1 FALLS W/OUT INJ PAST YR: ICD-10-PCS | Mod: CPTII,S$GLB,, | Performed by: FAMILY MEDICINE

## 2023-05-24 PROCEDURE — 3078F PR MOST RECENT DIASTOLIC BLOOD PRESSURE < 80 MM HG: ICD-10-PCS | Mod: CPTII,S$GLB,, | Performed by: FAMILY MEDICINE

## 2023-05-24 PROCEDURE — 99397 PER PM REEVAL EST PAT 65+ YR: CPT | Mod: S$GLB,,, | Performed by: FAMILY MEDICINE

## 2023-05-24 PROCEDURE — 99999 PR PBB SHADOW E&M-EST. PATIENT-LVL IV: CPT | Mod: PBBFAC,,, | Performed by: FAMILY MEDICINE

## 2023-05-24 PROCEDURE — 1160F RVW MEDS BY RX/DR IN RCRD: CPT | Mod: CPTII,S$GLB,, | Performed by: FAMILY MEDICINE

## 2023-05-24 PROCEDURE — 1125F PR PAIN SEVERITY QUANTIFIED, PAIN PRESENT: ICD-10-PCS | Mod: CPTII,S$GLB,, | Performed by: FAMILY MEDICINE

## 2023-05-24 PROCEDURE — 1125F AMNT PAIN NOTED PAIN PRSNT: CPT | Mod: CPTII,S$GLB,, | Performed by: FAMILY MEDICINE

## 2023-05-24 PROCEDURE — 3044F HG A1C LEVEL LT 7.0%: CPT | Mod: CPTII,S$GLB,, | Performed by: FAMILY MEDICINE

## 2023-05-24 PROCEDURE — 3078F DIAST BP <80 MM HG: CPT | Mod: CPTII,S$GLB,, | Performed by: FAMILY MEDICINE

## 2023-05-24 PROCEDURE — 99999 PR PBB SHADOW E&M-EST. PATIENT-LVL IV: ICD-10-PCS | Mod: PBBFAC,,, | Performed by: FAMILY MEDICINE

## 2023-05-24 PROCEDURE — 3075F PR MOST RECENT SYSTOLIC BLOOD PRESS GE 130-139MM HG: ICD-10-PCS | Mod: CPTII,S$GLB,, | Performed by: FAMILY MEDICINE

## 2023-05-24 PROCEDURE — 1101F PT FALLS ASSESS-DOCD LE1/YR: CPT | Mod: CPTII,S$GLB,, | Performed by: FAMILY MEDICINE

## 2023-05-24 PROCEDURE — 3008F BODY MASS INDEX DOCD: CPT | Mod: CPTII,S$GLB,, | Performed by: FAMILY MEDICINE

## 2023-05-24 PROCEDURE — 3288F FALL RISK ASSESSMENT DOCD: CPT | Mod: CPTII,S$GLB,, | Performed by: FAMILY MEDICINE

## 2023-05-24 PROCEDURE — 1159F PR MEDICATION LIST DOCUMENTED IN MEDICAL RECORD: ICD-10-PCS | Mod: CPTII,S$GLB,, | Performed by: FAMILY MEDICINE

## 2023-05-24 PROCEDURE — 3075F SYST BP GE 130 - 139MM HG: CPT | Mod: CPTII,S$GLB,, | Performed by: FAMILY MEDICINE

## 2023-05-24 PROCEDURE — 3288F PR FALLS RISK ASSESSMENT DOCUMENTED: ICD-10-PCS | Mod: CPTII,S$GLB,, | Performed by: FAMILY MEDICINE

## 2023-05-24 PROCEDURE — 3044F PR MOST RECENT HEMOGLOBIN A1C LEVEL <7.0%: ICD-10-PCS | Mod: CPTII,S$GLB,, | Performed by: FAMILY MEDICINE

## 2023-05-24 PROCEDURE — 1159F MED LIST DOCD IN RCRD: CPT | Mod: CPTII,S$GLB,, | Performed by: FAMILY MEDICINE

## 2023-05-24 RX ORDER — ALLOPURINOL 300 MG/1
300 TABLET ORAL DAILY
Qty: 90 TABLET | Refills: 4 | Status: SHIPPED | OUTPATIENT
Start: 2023-05-24

## 2023-05-24 RX ORDER — METFORMIN HYDROCHLORIDE 500 MG/1
1000 TABLET, EXTENDED RELEASE ORAL
Qty: 180 TABLET | Refills: 4 | Status: SHIPPED | OUTPATIENT
Start: 2023-05-24

## 2023-05-24 RX ORDER — LOSARTAN POTASSIUM AND HYDROCHLOROTHIAZIDE 12.5; 1 MG/1; MG/1
1 TABLET ORAL DAILY
Qty: 90 TABLET | Refills: 3 | Status: SHIPPED | OUTPATIENT
Start: 2023-05-24 | End: 2024-05-23

## 2023-05-24 NOTE — PROGRESS NOTES
Office Visit    Patient Name: Noah Nichole    : 1950  MRN: [id number]    Subjective:  Noah is a 73 y.o. male who presents today for:    Follow-up (4 MONTH)    Most recent office visit with me 23   Labs prior to OV 23 w/ , vit D 33, uric acid 7.6, HCT 38.2, normal cmp and a1c of 6.0. CEA  2.1.   Labs 01/10/2023 with stable A1c of 5.9 on metformin 500 XR daily, normal liver/kidney function, prior lipids on 2022 with LDL of 94    Noah Nichole is a 73 y.o. male with colon cancer stage II s/p rt hemicolectomy , HTN, h/o iron def anemia secondary to chronic blood loss, urinary retention w/ prior chronic indwelling flores, recent treatment for Klebsiella UTI and circumcision procedure performed by Urology Dr. Malcolm 23, who presents today annual physical with lab review.      He has really struggled since his fall that occurred 2022-has had debilitating tailbone and back pain with leg weakness and intermittent urinary retention issues since then.    Was scheduled for laser enucleation of the prostate but this procedure was canceled as his urination has improved on Flomax.  He is seeing pain management regarding his back pain and is receiving injections.     Has been in regular physical therapy since receiving a trial of 2 sets of back injections through [facility]. The injections were not overly helpful but therapy is slowly helping.   Unremarkable EKG 2023 showing sinus bradycardia but otherwise no concerns    Today he has no acute complaints but he is eager for his back pain to improve.  Misses his prior activities, particularly riding his bike.  He is happy with his physical therapy and plans to stick with it.    GENERAL LIFESTYLE HABITS:  DIET: fair variety, drinks some softdrinks and sugary drink but also about a 1/2 gallon of water daily, monitors portions  EXERCISE: limited due to back pain, in physical therapy currently   SLEEP: interrupted by back  pain and stress, declines sleep aide   WEIGHT:  His weight has been about 5 lb increased from prior baseline since his fall likely due to decreased activity.  BMI is 32.     Immunizations: PREVNAR 13 10/16/2019, PNEUMOVAX 23 4/26/21,  advised on SHINGRIX  2/2 10/9/21 & Tdap 10/9/21, yearly high dose FLU SHOT 10/7/22, COVID-19 VACCINE COMPLETED 3/12/21 (Moderna)     Screening Tests: COLONOSCOPY 4/21/22: Repeat colonoscopy in 5 years for surveillance Based on personal history of colon cancer-- REFERRAL PLACED FOR [facility]IP [facility] APPOINTMENT. PSA 3.2 1/19/23 PER UROLOGY       Eye/Dental: due and advised to look into low-cost dental providers, REFERRAL FOR EYE EXAM PLACED    PAST MEDICAL HISTORY, SURGICAL/SOCIAL/FAMILY HISTORY REVIEWED AS PER CHART, WITH PERTINENT FINDINGS INCLUDED IN HISTORY SECTION OF NOTE.     Current Medications    Medication List with Changes/Refills   Current Medications    ACETAMINOPHEN (TYLENOL) 325 MG TABLET    Take 2 tablets (650 mg total) by mouth every 6 (six) hours as needed for Pain.    GABAPENTIN (NEURONTIN) 300 MG CAPSULE    Take 300 mg by mouth 3 (three) times daily.    LIDOCAINE (LIDODERM) 5 %    Place 1 patch onto the skin once daily. Remove & Discard patch within 12 hours or as directed by MD    TAMSULOSIN (FLOMAX) 0.4 MG CAP    Take 1 capsule (0.4 mg total) by mouth once daily.   Changed and/or Refilled Medications    Modified Medication Previous Medication    ALLOPURINOL (ZYLOPRIM) 300 MG TABLET allopurinoL (ZYLOPRIM) 300 MG tablet       Take 1 tablet (300 mg total) by mouth once daily. Take daily for gout prevention    Take 1 tablet (300 mg total) by mouth once daily. Take daily for gout prevention    LOSARTAN-HYDROCHLOROTHIAZIDE 100-12.5 MG (HYZAAR) 100-12.5 MG TAB losartan-hydrochlorothiazide 100-12.5 mg (HYZAAR) 100-12.5 mg Tab       Take 1 tablet by mouth once daily.    Take 1 tablet by mouth once daily.    METFORMIN (GLUCOPHAGE-XR) 500 MG ER 24HR TABLET metFORMIN  "(GLUCOPHAGE-XR) 500 MG ER 24hr tablet       Take 2 tablets (1,000 mg total) by mouth daily with breakfast.    Take 1 tablet (500 mg total) by mouth daily with breakfast.   Discontinued Medications    HYDROCODONE-ACETAMINOPHEN (NORCO) 5-325 MG PER TABLET    Take 1 tablet by mouth every 6 (six) hours as needed for Pain.       Allergies   Review of patient's allergies indicates:  No Known Allergies      Review of Systems (Pertinent positives)  Review of Systems   Constitutional:  Negative for unexpected weight change.   HENT:  Negative for dental problem.    Eyes:  Negative for visual disturbance.   Respiratory:  Negative for shortness of breath.    Cardiovascular:  Negative for chest pain and leg swelling.   Gastrointestinal:  Negative for blood in stool, constipation and diarrhea.   Genitourinary:  Negative for difficulty urinating and dysuria.   Musculoskeletal:  Positive for back pain.   Allergic/Immunologic: Negative for environmental allergies.   Neurological:  Negative for dizziness, light-headedness and headaches.   Psychiatric/Behavioral:  Positive for dysphoric mood and sleep disturbance.      /74 (BP Location: Left arm, Patient Position: Sitting, BP Method: Large (Manual))   Pulse 61   Temp 98.2 °F (36.8 °C) (Oral)   Ht 5' 11" (1.803 m)   Wt 104.6 kg (230 lb 9.6 oz)   SpO2 99%   BMI 32.16 kg/m²     Physical Exam  Vitals reviewed.   Constitutional:       General: He is not in acute distress.     Appearance: He is obese.   HENT:      Head: Normocephalic and atraumatic.      Right Ear: Tympanic membrane and external ear normal.      Left Ear: Tympanic membrane and external ear normal.      Nose: Nose normal.      Mouth/Throat:      Pharynx: No oropharyngeal exudate.   Eyes:      General: No scleral icterus.     Extraocular Movements: Extraocular movements intact.      Conjunctiva/sclera: Conjunctivae normal.   Cardiovascular:      Rate and Rhythm: Normal rate and regular rhythm.      Heart sounds: " Normal heart sounds.   Pulmonary:      Effort: Pulmonary effort is normal.      Breath sounds: Normal breath sounds.   Abdominal:      General: Bowel sounds are normal. There is no distension.      Palpations: Abdomen is soft.      Tenderness: There is no abdominal tenderness.   Musculoskeletal:      Cervical back: Normal range of motion.      Lumbar back: Tenderness present. Decreased range of motion.      Right lower leg: No edema.      Left lower leg: No edema.   Lymphadenopathy:      Cervical: No cervical adenopathy.   Skin:     General: Skin is warm and dry.      Findings: No rash.   Neurological:      General: No focal deficit present.      Mental Status: He is alert and oriented to person, place, and time.   Psychiatric:         Mood and Affect: Mood normal.         Behavior: Behavior normal.         Assessment/Plan:  Noah Nichole is a 73 y.o. male who presents today for :        ICD-10-CM ICD-9-CM    1. Routine general medical examination at a health care facility  Z00.00 V70.0       2. Class 1 drug-induced obesity with serious comorbidity and body mass index (BMI) of 32.0 to 32.9 in adult  E66.1 278.00     Z68.32 V85.32       3. Back pain, unspecified back location, unspecified back pain laterality, unspecified chronicity  M54.9 724.5       4. Benign essential hypertension  I10 401.1 losartan-hydrochlorothiazide 100-12.5 mg (HYZAAR) 100-12.5 mg Tab      Hemoglobin A1C      Comprehensive Metabolic Panel      CBC Auto Differential      Lipid Panel      TSH      Vitamin B12      Magnesium      Uric Acid      Ambulatory referral/consult to Optometry      5. Prediabetes  R73.03 790.29 metFORMIN (GLUCOPHAGE-XR) 500 MG ER 24hr tablet      Hemoglobin A1C      Comprehensive Metabolic Panel      CBC Auto Differential      Lipid Panel      TSH      Vitamin B12      Magnesium      Ambulatory referral/consult to Optometry      6. History of colon cancer, stage II  Z85.038 V10.05 Ambulatory referral/consult to  Hematology / Oncology      7. Constipation, unspecified constipation type  K59.00 564.00       8. Iron deficiency anemia due to chronic blood loss  D50.0 280.0 CBC Auto Differential      9. Vitamin D deficiency  E55.9 268.9       10. Medication management  Z79.899 V58.69 Hemoglobin A1C      Comprehensive Metabolic Panel      CBC Auto Differential      Lipid Panel      TSH      Vitamin B12      Magnesium      Uric Acid      11. Elevated blood uric acid level  E79.0 790.6 allopurinoL (ZYLOPRIM) 300 MG tablet      Uric Acid        ADVISED ON DIET/EXERCISE/SLEEP, ROUTINE EYE/DENTAL EXAMS, AND THE IMPORTANCE OF KEEPING UP WITH APPROPRIATE SCREENING TESTS BASED ON AGE AND RISK FACTORS.  HEALTH MAINTENANCE REVIEWED AND OVERALL UP-TO-DATE: ADVISED FALL OMICRON COVID-19 BOOSTER(had mild case of COVID January 2023), BUT OTHERWISE VACCINES UP-TO-DATE.    NEXT COLONOSCOPY DUE 04/20/2027, PSA IN JANUARY PER UROLOGY.    HYPERTENSION:  Continue attention to low-salt diet, weight management, Hyzaar 100/12.5, repeat labs and office visit in 4 months     PREDIABETES:  Exercise is limited but he is attending physical therapy for back pain, increase Glucophage XR to 1000 consistently with breakfast, repeat A1c in 4 months     CHRONIC BACK PAIN EXACERBATED BY FALL SEPTEMBER 2022 WITH RESULTING COCCYGEAL INJURY AND SUBSEQUENT URINARY RETENTION ISSUES:  Still struggling with ongoing leg weakness and back pain.  Making slow but steady progress through PT.  Pain is overall more manageable.   Urinary retention issues managed by Urology-had plan to have laser enucleation of the prostate but now stable on Flomax.  History of prior UTI treated by Urology, has yearly follow-up scheduled 1/24/24.     CONSTIPATION:  Improved with Citrucel, continue daily stool softener, use MiraLax PRN    HISTORY OF GOUT:  Has not been taking allopurinol regularly, uric acid is elevated on current labs, advised on the importance of taking this daily for gout  prevention which he will resume and will recheck uric acid with next blood drawn 4 months.    HISTORY OF COLON CANCER:  Colonoscopies up-to-date, following with survivorship Clinic, iron levels normal.  CEA drawn and within normal limits, referral placed for survivorship clinic appointment.    There are no Patient Instructions on file for this visit.      Follow up in about 4 months (around 9/24/2023) for to follow up on lab results, return as needed for new concerns.

## 2023-05-25 ENCOUNTER — TELEPHONE (OUTPATIENT)
Dept: OPTOMETRY | Facility: CLINIC | Age: 73
End: 2023-05-25
Payer: MEDICARE

## 2023-05-25 NOTE — TELEPHONE ENCOUNTER
----- Message from Char Norton sent at 5/25/2023  3:33 PM CDT -----  Good afternoon,    The patient has a referral from his primary doctor with a diagnosis of Benign essential hypertension/Prediabetes. Please assist with scheduling the patient.    Thank You

## 2023-06-01 ENCOUNTER — TELEPHONE (OUTPATIENT)
Dept: HEMATOLOGY/ONCOLOGY | Facility: CLINIC | Age: 73
End: 2023-06-01
Payer: MEDICARE

## 2023-06-01 DIAGNOSIS — Z85.038 HISTORY OF COLON CANCER, STAGE II: Primary | ICD-10-CM

## 2023-06-01 NOTE — TELEPHONE ENCOUNTER
----- Message from Hasmukh Chao RN sent at 5/31/2023  4:49 PM CDT -----  Regarding: colon cancer  Someone schedule this patient with PJ as new patient, can they please be scheduled with the appropriate provider.

## 2023-06-02 ENCOUNTER — TELEPHONE (OUTPATIENT)
Dept: HEMATOLOGY/ONCOLOGY | Facility: CLINIC | Age: 73
End: 2023-06-02
Payer: MEDICARE

## 2023-06-02 NOTE — TELEPHONE ENCOUNTER
Attempted to call pt & schedule CT scans & follow up with colorectal surgery, no answer, left voicemail.

## 2023-06-02 NOTE — TELEPHONE ENCOUNTER
----- Message from Gabino Arreola MD sent at 6/1/2023  5:50 PM CDT -----  Regarding: RE: colon cancer  Really he just needs a CT Chest, abdomen and pelvis and me - he is 5 years out so this would be the last he needs for surveillance, he had a CEA and he has had colonoscopies    Gabino HICKEY    ----- Message -----  From: Qian Quinones RN  Sent: 6/1/2023   4:42 PM CDT  To: Gabino Arreola MD  Subject: FW: colon cancer                                 Hey Dr. Arreola,  Quick question for you. This gentleman had surgery with Dr. Cheung in 2018, stage IIA colon cancer. He's seen you most recently. They placed a hem/onc referral but he never saw them. I was thinking he should follow with you instead??  Let me know, thanks  Magda  ----- Message -----  From: Hasmukh Chao RN  Sent: 5/31/2023   4:51 PM CDT  To: Haritha Pendleton NP, [facility]  Subject: colon cancer                                     Someone schedule this patient with PJ as new patient, can they please be scheduled with the appropriate provider.

## 2023-06-06 NOTE — TELEPHONE ENCOUNTER
Called & spoke with pt, scheduled CT scan & follow up with Dr. Arreola. Reviewed appt details & also mailed appt letters to pt.

## 2023-06-08 ENCOUNTER — HOSPITAL ENCOUNTER (OUTPATIENT)
Dept: RADIOLOGY | Facility: HOSPITAL | Age: 73
Discharge: HOME | End: 2023-06-08
Attending: STUDENT IN AN ORGANIZED HEALTH CARE EDUCATION/TRAINING PROGRAM
Payer: MEDICARE

## 2023-06-08 DIAGNOSIS — Z85.038 HISTORY OF COLON CANCER, STAGE II: ICD-10-CM

## 2023-06-08 PROCEDURE — 74178 CT ABD&PLV WO CNTR FLWD CNTR: CPT | Mod: 26,,, | Performed by: RADIOLOGY

## 2023-06-08 PROCEDURE — 71270 CT THORAX DX C-/C+: CPT | Mod: TC

## 2023-06-08 PROCEDURE — 74178 CT CHEST ABDOMEN PELVIS W W/O CONTRAST (XPD): ICD-10-PCS | Mod: 26,,, | Performed by: RADIOLOGY

## 2023-06-08 PROCEDURE — A9698 NON-RAD CONTRAST MATERIALNOC: HCPCS | Performed by: STUDENT IN AN ORGANIZED HEALTH CARE EDUCATION/TRAINING PROGRAM

## 2023-06-08 PROCEDURE — 71270 CT THORAX DX C-/C+: CPT | Mod: 26,,, | Performed by: RADIOLOGY

## 2023-06-08 PROCEDURE — 25500020 PHARM REV CODE 255: Performed by: STUDENT IN AN ORGANIZED HEALTH CARE EDUCATION/TRAINING PROGRAM

## 2023-06-08 PROCEDURE — 71270 CT CHEST ABDOMEN PELVIS W W/O CONTRAST (XPD): ICD-10-PCS | Mod: 26,,, | Performed by: RADIOLOGY

## 2023-06-08 RX ADMIN — IOHEXOL 100 ML: 350 INJECTION, SOLUTION INTRAVENOUS at 03:06

## 2023-06-08 RX ADMIN — IOHEXOL 1000 ML: 12 SOLUTION ORAL at 01:06

## 2023-06-16 ENCOUNTER — OFFICE VISIT (OUTPATIENT)
Dept: SURGERY | Facility: CLINIC | Age: 73
End: 2023-06-16
Payer: MEDICARE

## 2023-06-16 DIAGNOSIS — Z85.038 HISTORY OF COLON CANCER, STAGE II: Primary | ICD-10-CM

## 2023-06-16 PROCEDURE — 99213 OFFICE O/P EST LOW 20 MIN: CPT | Mod: S$GLB,,, | Performed by: STUDENT IN AN ORGANIZED HEALTH CARE EDUCATION/TRAINING PROGRAM

## 2023-06-16 PROCEDURE — 3044F HG A1C LEVEL LT 7.0%: CPT | Mod: CPTII,S$GLB,, | Performed by: STUDENT IN AN ORGANIZED HEALTH CARE EDUCATION/TRAINING PROGRAM

## 2023-06-16 PROCEDURE — 99213 PR OFFICE/OUTPT VISIT, EST, LEVL III, 20-29 MIN: ICD-10-PCS | Mod: S$GLB,,, | Performed by: STUDENT IN AN ORGANIZED HEALTH CARE EDUCATION/TRAINING PROGRAM

## 2023-06-16 PROCEDURE — 3044F PR MOST RECENT HEMOGLOBIN A1C LEVEL <7.0%: ICD-10-PCS | Mod: CPTII,S$GLB,, | Performed by: STUDENT IN AN ORGANIZED HEALTH CARE EDUCATION/TRAINING PROGRAM

## 2023-06-16 NOTE — PROGRESS NOTES
[facility]  Colon and Rectal Surgery    [address]  [place], [place]  Tel: [phone]  Fax: [phone]  [email or web address]   MD Adrian Godoy MD Brian Kann, MD W. Forrest Johnston, MD Matthew Giglia, MD Jennifer Paruch, MD William Kethman, MD Danielle Kay, MD     Patient name: Noah Nichole   YOB: 1950   MRN: [id number]  Date of visit: 06/16/2023    Dear Dr. Jeffery,    It was a pleasure seeing Mr. Nichole in the Colon and Rectal Surgery clinic here at [facility].     As you know, Mr. Nichole is a 72 year old man with a history of HTN, LISA, and Stage II colon cancer  who presents for evaluation of external thrombosed hemorrhoid . He underwent excision with Ms. Aguayo and again by Ms. Paz on 10/13/2022 here for follow-up. He did undergo an extended right hemicolectomy with Dr. Cheung in 9/2018 - T3N0 (0/15). He describes perianal discomfort - almost a pressure and spasm in his anus after undergoing recent anoscopy. He denies blood in his stool.     Last colonoscopy: 4/21/2022 - 5 year follow-up recommended  There was evidence of a prior functional end-to-end ileo-colonic anastomosis in the proximal transverse colon. This was patent and was characterized by healthy appearing mucosa.    Pathology  Adenocarcinoma, 6.7cm, moderately differentiated Margins all negative Fifteen benign lymph nodes Unremarkable appendix    6/21/2019 - CT AP  1. Postoperative changes from right hemicolectomy in this patient with history of colon cancer.  No evidence local disease recurrence or metastasis. There are no measurable lesions per RECIST criteria.  2. Bilateral simple renal cysts.  3. Additional findings as above.    12/13/2022  Here for re-evaluation of perianal discomfort after examination - managed with Diltiazem. Still having pressure - mostly internal. He does note some urinary frequency. He did try the Diltiazem but without relief. He  is still having some pelvic discomfort from recent fall.    6/16/2023  Here now for surveillance of remote colon cancer.    5/19/2023 - CEA - 2.1 from 2.4 from 2.7 from 2.5 from 3.2    CT CAP - 6/8/2023  Prior partial colon resection, no evidence of recurrence or metastases.     Bilateral renal cysts.     3 mm pleural base right middle lobe pulmonary nodule, follow-up according to oncologic surveillance.     There are no measurable lesions per RECIST criteria.    The patient was informed of the availability of a certified  without charge. A certified  was not necessary for this visit.    Review of Systems  See pertinent review of systems above    Past Medical History:   Diagnosis Date    Anemia     Benign essential hypertension 12/17/2014    Chronic indwelling Jackson catheter 9/25/2022    History of colon cancer, stage II 10/10/2018    Iron deficiency anemia due to chronic blood loss 10/16/2019     Past Surgical History:   Procedure Laterality Date    CIRCUMCISION N/A 2/2/2023    Procedure: CIRCUMCISION;  Surgeon: Jose Malcolm MD;  Location: [facility];  Service: Urology;  Laterality: N/A;    COLON SURGERY  09/18/2018    COLONOSCOPY N/A 09/14/2018    Procedure: COLONOSCOPY;  Surgeon: Adrian Cheung MD;  Location: [facility] ([address] [place] [address]);  Service: Endoscopy;  Laterality: N/A;  pt ate at 1:30 on 9/13/18-Dr Cheung informed and he stated ok to do colonoscopy.    COLONOSCOPY N/A 10/11/2019    Procedure: COLONOSCOPY;  Surgeon: Gabino Bonilla MD;  Location: [address]);  Service: Endoscopy;  Laterality: N/A;    COLONOSCOPY N/A 04/21/2022    Procedure: COLONOSCOPY;  Surgeon: Zeny Hinkle MD;  Location: [facility] ([address] [place] [address]);  Service: Colon and Rectal;  Laterality: N/A;  fully vaccinated, instructions sent to [facility] and emailed to [email or web address]  suprep    RIGHT HEMICOLECTOMY N/A 09/18/2018    Procedure: HEMICOLECTOMY, RIGHT, extended;   Surgeon: Adrian Cheung MD;  Location: [facility] OR [address] [place] [address];  Service: Colon and Rectal;  Laterality: N/A;     Family History   Problem Relation Age of Onset    Hypertension Mother     Cancer Father         stomach    Hypertension Father     Diabetes Sister     Hypertension Brother     Glaucoma Brother     Diabetes Brother     Cataracts Brother     Cancer Sister     Amblyopia Neg Hx     Blindness Neg Hx     Macular degeneration Neg Hx     Retinal detachment Neg Hx     Strabismus Neg Hx     Stroke Neg Hx     Thyroid disease Neg Hx      Social History     Tobacco Use    Smoking status: Never    Smokeless tobacco: Never   Substance Use Topics    Alcohol use: Never    Drug use: Never     Review of patient's allergies indicates:  No Known Allergies    Current Outpatient Medications on File Prior to Visit   Medication Sig Dispense Refill    acetaminophen (TYLENOL) 325 MG tablet Take 2 tablets (650 mg total) by mouth every 6 (six) hours as needed for Pain. 30 tablet 0    allopurinoL (ZYLOPRIM) 300 MG tablet Take 1 tablet (300 mg total) by mouth once daily. Take daily for gout prevention 90 tablet 4    gabapentin (NEURONTIN) 300 MG capsule Take 300 mg by mouth 3 (three) times daily.      LIDOcaine (LIDODERM) 5 % Place 1 patch onto the skin once daily. Remove & Discard patch within 12 hours or as directed by MD (Patient not taking: Reported on 5/24/2023) 15 patch 0    losartan-hydrochlorothiazide 100-12.5 mg (HYZAAR) 100-12.5 mg Tab Take 1 tablet by mouth once daily. 90 tablet 3    metFORMIN (GLUCOPHAGE-XR) 500 MG ER 24hr tablet Take 2 tablets (1,000 mg total) by mouth daily with breakfast. 180 tablet 4    tamsulosin (FLOMAX) 0.4 mg Cap Take 1 capsule (0.4 mg total) by mouth once daily. 90 capsule 3     Current Facility-Administered Medications on File Prior to Visit   Medication Dose Route Frequency Provider Last Rate Last Admin    electrolyte-S (pH 7.4) bolus SolP 250 mL 250 mL  250 mL Intravenous Once Leanne Santos MD         fentaNYL 50 mcg/mL injection 25 mcg  25 mcg Intravenous Q5 Min PRN Leanne Santos MD        ondansetron injection 4 mg  4 mg Intravenous Daily PRN Leanne Santos MD        sodium chloride 0.9% flush 10 mL  10 mL Intravenous PRN Leanne Santos MD         Physical Examination  There were no vitals taken for this visit.     A chaperone was present for the physical examination.    Constitutional: well developed, no cough, no dyspnea, alert, and no acute distress    Head: Normocephalic, no lesions, without obvious abnormality  Eye: Normal external eye, conjunctiva, and lids  Cardiovascular: regular rate and regular rhythm  Respiratory: normal air entry  Gastrointestinal: soft, non-tender  Musculoskeletal: full range of motion without pain  Neurologic: alert, oriented, normal speech, no focal findings or movement disorder noted  Psychiatric: appropriate, normal mood    Assessment and Plan of Care    Thank you again for referring Mr. Nichole to my care. In summary, Mr. Nichole is a 73 year old man presenting for colon cancer surveillance. We discussed treatment options and have provided the following recommendations:    We had a long discussion regarding the pathology and surveillance for colon cancer. For patients with Stage II-III disease, CEA and exam every 3-6 months for 2 years then every 6 months for a total of 5 years and a CT CAP every 6-12 months for a total of 5 years. Colonoscopy should be repeated at 1 year after surgery - if an advanced adenoma is detected then repeat exam should be performed at 1 year, otherwise repeat in 3 years and then every 5 years. If tumor was obstructing and a complete colonoscopy was unable to be performed, this should be performed 3-6 months or sooner after surgery. We discussed that he is now 5 years since diagnosis and that surveillance from now is essentially colonoscopic evaluation. Needs colonoscopy in 2027.  Discussed importance of screening for his  children  Follow-up with me as needed    Please do not hesitate to contact me if you have any questions.      Gabino Arreola MD - Staff Surgeon  Department of Colon & Rectal Surgery  [facility]

## 2023-06-29 ENCOUNTER — TELEPHONE (OUTPATIENT)
Dept: OPTOMETRY | Facility: CLINIC | Age: 73
End: 2023-06-29
Payer: MEDICARE

## 2023-06-29 NOTE — TELEPHONE ENCOUNTER
----- Message from Amelia Nichole sent at 6/29/2023  9:42 AM CDT -----  Type:  Sooner Apoointment Request    Caller is requesting a sooner appointment.  Caller declined first available appointment listed below.  Caller will not accept being placed on the waitlist and is requesting a message be sent to doctor.  Name of Caller: Pt  When is the first available appointment? 08/23  Symptoms:  Would the patient rather a call back or a response via [facility]? Call  Best Call Back Number: [phone]  Additional Information: Pt. would like a call back for a sooner appt.

## 2023-09-18 NOTE — ED NOTES
----- Message from Suki Collado Pt returned from Xray via wheelchair with xray tech

## 2023-10-09 ENCOUNTER — OFFICE VISIT (OUTPATIENT)
Dept: FAMILY MEDICINE | Facility: CLINIC | Age: 73
End: 2023-10-09
Payer: MEDICARE

## 2023-10-09 VITALS
WEIGHT: 222.88 LBS | TEMPERATURE: 98 F | SYSTOLIC BLOOD PRESSURE: 138 MMHG | HEIGHT: 71 IN | DIASTOLIC BLOOD PRESSURE: 74 MMHG | BODY MASS INDEX: 31.2 KG/M2 | HEART RATE: 66 BPM | OXYGEN SATURATION: 98 %

## 2023-10-09 DIAGNOSIS — R73.03 PREDIABETES: ICD-10-CM

## 2023-10-09 DIAGNOSIS — Z23 NEEDS FLU SHOT: ICD-10-CM

## 2023-10-09 DIAGNOSIS — E79.0 ELEVATED URIC ACID IN BLOOD: ICD-10-CM

## 2023-10-09 DIAGNOSIS — E55.9 VITAMIN D DEFICIENCY: ICD-10-CM

## 2023-10-09 DIAGNOSIS — M54.9 BACK PAIN, UNSPECIFIED BACK LOCATION, UNSPECIFIED BACK PAIN LATERALITY, UNSPECIFIED CHRONICITY: ICD-10-CM

## 2023-10-09 DIAGNOSIS — Z79.899 MEDICATION MANAGEMENT: ICD-10-CM

## 2023-10-09 DIAGNOSIS — N40.1 BENIGN PROSTATIC HYPERPLASIA WITH LOWER URINARY TRACT SYMPTOMS, SYMPTOM DETAILS UNSPECIFIED: ICD-10-CM

## 2023-10-09 DIAGNOSIS — I10 BENIGN ESSENTIAL HYPERTENSION: Primary | ICD-10-CM

## 2023-10-09 DIAGNOSIS — E66.1 CLASS 1 DRUG-INDUCED OBESITY WITH SERIOUS COMORBIDITY AND BODY MASS INDEX (BMI) OF 31.0 TO 31.9 IN ADULT: ICD-10-CM

## 2023-10-09 DIAGNOSIS — Z85.038 HISTORY OF COLON CANCER, STAGE II: ICD-10-CM

## 2023-10-09 PROCEDURE — 1159F MED LIST DOCD IN RCRD: CPT | Mod: HCNC,CPTII,S$GLB, | Performed by: FAMILY MEDICINE

## 2023-10-09 PROCEDURE — 90694 FLU VACCINE - QUADRIVALENT - ADJUVANTED: ICD-10-PCS | Mod: HCNC,S$GLB,, | Performed by: FAMILY MEDICINE

## 2023-10-09 PROCEDURE — 1101F PR PT FALLS ASSESS DOC 0-1 FALLS W/OUT INJ PAST YR: ICD-10-PCS | Mod: HCNC,CPTII,S$GLB, | Performed by: FAMILY MEDICINE

## 2023-10-09 PROCEDURE — 99214 PR OFFICE/OUTPT VISIT, EST, LEVL IV, 30-39 MIN: ICD-10-PCS | Mod: HCNC,S$GLB,, | Performed by: FAMILY MEDICINE

## 2023-10-09 PROCEDURE — 3044F HG A1C LEVEL LT 7.0%: CPT | Mod: HCNC,CPTII,S$GLB, | Performed by: FAMILY MEDICINE

## 2023-10-09 PROCEDURE — 1160F RVW MEDS BY RX/DR IN RCRD: CPT | Mod: HCNC,CPTII,S$GLB, | Performed by: FAMILY MEDICINE

## 2023-10-09 PROCEDURE — 3288F FALL RISK ASSESSMENT DOCD: CPT | Mod: HCNC,CPTII,S$GLB, | Performed by: FAMILY MEDICINE

## 2023-10-09 PROCEDURE — 1160F PR REVIEW ALL MEDS BY PRESCRIBER/CLIN PHARMACIST DOCUMENTED: ICD-10-PCS | Mod: HCNC,CPTII,S$GLB, | Performed by: FAMILY MEDICINE

## 2023-10-09 PROCEDURE — 1125F PR PAIN SEVERITY QUANTIFIED, PAIN PRESENT: ICD-10-PCS | Mod: HCNC,CPTII,S$GLB, | Performed by: FAMILY MEDICINE

## 2023-10-09 PROCEDURE — G0008 ADMIN INFLUENZA VIRUS VAC: HCPCS | Mod: HCNC,S$GLB,, | Performed by: FAMILY MEDICINE

## 2023-10-09 PROCEDURE — 1159F PR MEDICATION LIST DOCUMENTED IN MEDICAL RECORD: ICD-10-PCS | Mod: HCNC,CPTII,S$GLB, | Performed by: FAMILY MEDICINE

## 2023-10-09 PROCEDURE — 3288F PR FALLS RISK ASSESSMENT DOCUMENTED: ICD-10-PCS | Mod: HCNC,CPTII,S$GLB, | Performed by: FAMILY MEDICINE

## 2023-10-09 PROCEDURE — G0008 FLU VACCINE - QUADRIVALENT - ADJUVANTED: ICD-10-PCS | Mod: HCNC,S$GLB,, | Performed by: FAMILY MEDICINE

## 2023-10-09 PROCEDURE — 1125F AMNT PAIN NOTED PAIN PRSNT: CPT | Mod: HCNC,CPTII,S$GLB, | Performed by: FAMILY MEDICINE

## 2023-10-09 PROCEDURE — 3075F SYST BP GE 130 - 139MM HG: CPT | Mod: HCNC,CPTII,S$GLB, | Performed by: FAMILY MEDICINE

## 2023-10-09 PROCEDURE — 3078F DIAST BP <80 MM HG: CPT | Mod: HCNC,CPTII,S$GLB, | Performed by: FAMILY MEDICINE

## 2023-10-09 PROCEDURE — 1101F PT FALLS ASSESS-DOCD LE1/YR: CPT | Mod: HCNC,CPTII,S$GLB, | Performed by: FAMILY MEDICINE

## 2023-10-09 PROCEDURE — 90694 VACC AIIV4 NO PRSRV 0.5ML IM: CPT | Mod: HCNC,S$GLB,, | Performed by: FAMILY MEDICINE

## 2023-10-09 PROCEDURE — 3008F PR BODY MASS INDEX (BMI) DOCUMENTED: ICD-10-PCS | Mod: HCNC,CPTII,S$GLB, | Performed by: FAMILY MEDICINE

## 2023-10-09 PROCEDURE — 3075F PR MOST RECENT SYSTOLIC BLOOD PRESS GE 130-139MM HG: ICD-10-PCS | Mod: HCNC,CPTII,S$GLB, | Performed by: FAMILY MEDICINE

## 2023-10-09 PROCEDURE — 3008F BODY MASS INDEX DOCD: CPT | Mod: HCNC,CPTII,S$GLB, | Performed by: FAMILY MEDICINE

## 2023-10-09 PROCEDURE — 99999 PR PBB SHADOW E&M-EST. PATIENT-LVL IV: ICD-10-PCS | Mod: PBBFAC,HCNC,, | Performed by: FAMILY MEDICINE

## 2023-10-09 PROCEDURE — 3078F PR MOST RECENT DIASTOLIC BLOOD PRESSURE < 80 MM HG: ICD-10-PCS | Mod: HCNC,CPTII,S$GLB, | Performed by: FAMILY MEDICINE

## 2023-10-09 PROCEDURE — 3044F PR MOST RECENT HEMOGLOBIN A1C LEVEL <7.0%: ICD-10-PCS | Mod: HCNC,CPTII,S$GLB, | Performed by: FAMILY MEDICINE

## 2023-10-09 PROCEDURE — 99214 OFFICE O/P EST MOD 30 MIN: CPT | Mod: HCNC,S$GLB,, | Performed by: FAMILY MEDICINE

## 2023-10-09 PROCEDURE — 99999 PR PBB SHADOW E&M-EST. PATIENT-LVL IV: CPT | Mod: PBBFAC,HCNC,, | Performed by: FAMILY MEDICINE

## 2023-10-09 NOTE — PROGRESS NOTES
Office Visit    Patient Name: Noah Nichole    : 1950  MRN: [id number]    Subjective:  Noah is a 73 y.o. male who presents today for:    Follow-up (4 month) and Medication Refill (metFORMIN (GLUCOPHAGE-XR) 500 MG ER 24hr tablet/losartan-hydrochlorothiazide 100-12.5 mg (HYZAAR) 100-12.5 mg Tab)    ANNUAL PHYSICAL 2023-PRESENTS TODAY FOR LABS WITH 4-5-MONTH FOLLOW-UP.    HE FOLLOWED UP WITH COLORECTAL SURGERY 2023- H/O COLON CANCER  he is now 5 years since diagnosis and that surveillance from now is essentially colonoscopic evaluation. Needs colonoscopy in     Labs 23 w/ , vit D 33, uric acid 7.6, HCT 38.2, normal cmp and a1c of 6.0. CEA  2.1.   HE WAS SUPPOSED TO HAVE REPEAT LABS PRIOR TO TODAY'S VISIT BUT DID NOT.    Noah Nichole is a 73 y.o. male with h/o colon cancer stage II s/p rt hemicolectomy , HTN, h/o iron def anemia secondary to chronic blood loss, urinary retention w/ prior chronic indwelling flores, treatment for Klebsiella UTI and circumcision procedure performed by Urology Dr. Malcolm 23.    He has really struggled since his fall that occurred 2022-has had debilitating tailbone and back pain with leg weakness and intermittent urinary retention issues since then.    Was scheduled for laser enucleation of the prostate but this procedure was canceled as his urination has improved on Flomax.  He previously follow up with management regarding his back pain and is receiving injections.      Has been in regular physical therapy since receiving a trial of 2 sets of back injections through [facility]. The injections were not overly helpful but therapy is slowly helping.   Unremarkable EKG 2023 showing sinus bradycardia but otherwise no concerns     Today he has no acute complaints but he is eager for his back pain to improve.    Misses his prior activities, particularly riding his bike.  He is happy with his physical therapy and plans to stick  with it.     GENERAL LIFESTYLE HABITS:  DIET: fair variety, drinks some softdrinks and sugary drink but also about a 1/2 gallon of water daily, monitors portions  EXERCISE: completed PT, needs to incorporate HEP but he is active and back to riding bike and cutting grass  SLEEP: improving, feeling better rested  WEIGHT:  His weight has been about 5 lb increased from prior baseline since his fall likely due to decreased activity.  BMI is 32.      Immunizations: PREVNAR 13 10/16/2019, PNEUMOVAX 23 4/26/21,  SHINGRIX  2/2 10/9/21 & Tdap 10/9/21, yearly high dose FLU SHOT GIVEN 10/9/23, COVID-19 VACCINE COMPLETED 3/12/21 (Moderna) & UPDATED FALL 2023 COVID-19 VACCINE WITH RSV VACCINE ADVISED     Screening Tests: COLONOSCOPY 4/21/22: Repeat colonoscopy in 5 years for surveillance Based on personal history of colon cancer (due cscope 4/2027). PSA 3.2 1/19/23 PER UROLOGY       Eye/Dental: due and advised to look into low-cost dental providers, REFERRAL FOR EYE EXAM PLACED    PAST MEDICAL HISTORY, SURGICAL/SOCIAL/FAMILY HISTORY REVIEWED AS PER CHART, WITH PERTINENT FINDINGS INCLUDED IN HISTORY SECTION OF NOTE.     Current Medications    Medication List with Changes/Refills   Current Medications    ACETAMINOPHEN (TYLENOL) 325 MG TABLET    Take 2 tablets (650 mg total) by mouth every 6 (six) hours as needed for Pain.    ALLOPURINOL (ZYLOPRIM) 300 MG TABLET    Take 1 tablet (300 mg total) by mouth once daily. Take daily for gout prevention    GABAPENTIN (NEURONTIN) 300 MG CAPSULE    Take 300 mg by mouth 3 (three) times daily.    LIDOCAINE (LIDODERM) 5 %    Place 1 patch onto the skin once daily. Remove & Discard patch within 12 hours or as directed by MD    LOSARTAN-HYDROCHLOROTHIAZIDE 100-12.5 MG (HYZAAR) 100-12.5 MG TAB    Take 1 tablet by mouth once daily.    METFORMIN (GLUCOPHAGE-XR) 500 MG ER 24HR TABLET    Take 2 tablets (1,000 mg total) by mouth daily with breakfast.    TAMSULOSIN (FLOMAX) 0.4 MG CAP    Take 1 capsule  "(0.4 mg total) by mouth once daily.       Allergies   Review of patient's allergies indicates:  No Known Allergies      Review of Systems (Pertinent positives)  Review of Systems   Constitutional:  Negative for unexpected weight change.   Eyes:  Negative for visual disturbance.   Respiratory:  Negative for shortness of breath.    Cardiovascular:  Negative for chest pain and leg swelling.   Gastrointestinal:  Positive for constipation (mild).   Neurological:  Negative for dizziness, light-headedness and headaches.   Psychiatric/Behavioral:  Negative for sleep disturbance.        /74   Pulse 66   Temp 98.2 °F (36.8 °C) (Oral)   Ht 5' 11" (1.803 m)   Wt 101.1 kg (222 lb 14.2 oz)   SpO2 98%   BMI 31.09 kg/m²     Physical Exam  Vitals reviewed.   Constitutional:       General: He is not in acute distress.     Appearance: He is well-developed. He is obese.   HENT:      Head: Normocephalic and atraumatic.   Eyes:      Conjunctiva/sclera: Conjunctivae normal.   Cardiovascular:      Rate and Rhythm: Normal rate and regular rhythm.   Pulmonary:      Effort: Pulmonary effort is normal.      Breath sounds: Normal breath sounds.   Musculoskeletal:      Right lower leg: No edema.      Left lower leg: No edema.   Skin:     General: Skin is warm and dry.   Neurological:      General: No focal deficit present.      Mental Status: He is alert and oriented to person, place, and time.   Psychiatric:         Mood and Affect: Mood normal.         Behavior: Behavior normal.           Assessment/Plan:  Noah Nichole is a 73 y.o. male who presents today for :        ICD-10-CM ICD-9-CM    1. Benign essential hypertension  I10 401.1       2. Prediabetes  R73.03 790.29       3. Class 1 drug-induced obesity with serious comorbidity and body mass index (BMI) of 31.0 to 31.9 in adult  E66.1 278.00     Z68.31 V85.31       4. Vitamin D deficiency  E55.9 268.9       5. Back pain, unspecified back location, unspecified back pain " laterality, unspecified chronicity  M54.9 724.5       6. History of colon cancer, stage II  Z85.038 V10.05       7. Needs flu shot  Z23 V04.81 Influenza (FLUAD) - Quadrivalent (Adjuvanted) *Preferred* (65+) (PF)      8. Benign prostatic hyperplasia with lower urinary tract symptoms, symptom details unspecified  N40.1 600.01       9. Medication management  Z79.899 V58.69       10. Elevated uric acid in blood  E79.0 790.6         HEALTH MAINTENANCE REVIEWED AND OVERALL UP-TO-DATE:  FLU SHOT GIVEN AND UPDATED FALL 2023 COVID-19 VACCINE AND RSV VACCINE ADVISED.    NEXT COLONOSCOPY DUE 04/20/2027, PSA IN JANUARY PER UROLOGY.     HYPERTENSION:  Continue attention to low-salt diet, weight management, Hyzaar 100/12.5, repeat labs  with physical in about 6 months.    PREDIABETES:  Exercise is limited but he is attending physical therapy for back pain, continue Glucophage XR to 1000 consistently with breakfast, repeat A1c in about 6 months pending results today.    CHRONIC BACK PAIN EXACERBATED BY FALL SEPTEMBER 2022 WITH RESULTING COCCYGEAL INJURY AND SUBSEQUENT URINARY RETENTION ISSUES:  significant improvement s/p PT and now riding his bike and back to lawn care, etc. Needs to incorporate HEP for PT.   Urinary retention issues managed by Urology-had plan to have laser enucleation of the prostate but now stable on Flomax.  History of prior UTI treated by Urology, has yearly follow-up scheduled 1/24/24 w/ PSA.     CONSTIPATION:  Improved with Citrucel, continue daily stool softener, use MiraLax PRN    HISTORY OF GOUT:  Back on allopurinol 300 mg daily, no recent gout episodes, check uric acid with labs and advised based on results.    HISTORY OF COLON CANCER:  Colonoscopies up-to-date, following with survivorship Clinic, iron levels normal.  CEA drawn and within normal limits, referral placed for survivorship clinic appointment.       Patient Instructions   FLU SHOT GIVEN AND UPDATED FALL 2023 COVID-19 VACCINE AND RSV  VACCINE ADVISED.    NEXT COLONOSCOPY DUE 04/20/2027, PSA IN JANUARY PER UROLOGY.       ADVISE FOLLOW-UP BASED ON LABS-REPEAT LABS WITH PHYSICAL IN MAY 2024 IF ALL LOOKS GOOD ON UPDATED BLOOD WORK TODAY.      Follow up for to follow up on lab results, return as needed for new concerns.

## 2023-10-09 NOTE — PATIENT INSTRUCTIONS
FLU SHOT GIVEN AND UPDATED FALL 2023 COVID-19 VACCINE AND RSV VACCINE ADVISED.    NEXT COLONOSCOPY DUE 04/20/2027, PSA IN JANUARY PER UROLOGY.       ADVISE FOLLOW-UP BASED ON LABS-REPEAT LABS WITH PHYSICAL IN MAY 2024 IF ALL LOOKS GOOD ON UPDATED BLOOD WORK TODAY.

## 2023-10-12 ENCOUNTER — TELEPHONE (OUTPATIENT)
Dept: FAMILY MEDICINE | Facility: CLINIC | Age: 73
End: 2023-10-12
Payer: MEDICARE

## 2023-10-12 DIAGNOSIS — E79.0 ELEVATED URIC ACID IN BLOOD: ICD-10-CM

## 2023-10-12 DIAGNOSIS — D50.0 IRON DEFICIENCY ANEMIA DUE TO CHRONIC BLOOD LOSS: ICD-10-CM

## 2023-10-12 DIAGNOSIS — I10 BENIGN ESSENTIAL HYPERTENSION: ICD-10-CM

## 2023-10-12 DIAGNOSIS — R73.03 PREDIABETES: Primary | ICD-10-CM

## 2023-10-12 DIAGNOSIS — E55.9 VITAMIN D DEFICIENCY: ICD-10-CM

## 2023-10-12 DIAGNOSIS — Z79.899 MEDICATION MANAGEMENT: ICD-10-CM

## 2023-10-12 NOTE — TELEPHONE ENCOUNTER
Called pt and told him I scheduled his lab and annual/ pt also asked about his lab results. I  was able to read them to him.

## 2023-10-17 ENCOUNTER — TELEPHONE (OUTPATIENT)
Dept: FAMILY MEDICINE | Facility: CLINIC | Age: 73
End: 2023-10-17
Payer: MEDICARE

## 2023-10-17 DIAGNOSIS — R10.9 ABDOMINAL PAIN, UNSPECIFIED ABDOMINAL LOCATION: ICD-10-CM

## 2023-10-17 DIAGNOSIS — K59.04 CHRONIC IDIOPATHIC CONSTIPATION: ICD-10-CM

## 2023-10-17 DIAGNOSIS — K59.00 CONSTIPATION, UNSPECIFIED CONSTIPATION TYPE: Primary | ICD-10-CM

## 2023-10-17 NOTE — TELEPHONE ENCOUNTER
----- Message from Iliana Hartley MA sent at 10/16/2023  4:52 PM CDT -----  Regarding: FW: advice  Contact: [phone]    ----- Message -----  From: Mira Rob  Sent: 10/16/2023   7:12 AM CDT  To: [facility] Staff  Subject: advice                                           Type:  Needs Medical Advice    Who Called:  pt   Symptoms (please be specific):  pain around the lower right side of his ribs around to his back. Pt is constipated as well.   How long has patient had these symptoms:   3 days ago  Pharmacy name and phone #:     Would the patient rather a call back or a response via [facility]?  Call     Best Call Back Number: [phone]  Additional Information:  pt has a history with a mass on his colon and is requesting a MRI or test to confirm he is ok

## 2023-10-17 NOTE — TELEPHONE ENCOUNTER
Pain on right side by ribs goes around to the back as well. Pt stated he had a mass on his colon and was wondering if it could be back. Stated that he has been constipated and has lost weight which was all the things he had in the past when they found the mass. Pt is requesting to have an MRI done. Please advise

## 2023-10-17 NOTE — TELEPHONE ENCOUNTER
I am not worried about the mass being back because his colonoscopy is up to date and looked good plus his recent blood work looked good too.       We will start with an xray and treating his constipation.     He can take a daily fiber supplement like Citrucel or Benefiber 1 dose mixed with water after breakfast and dinner plus he can start the prescription for Linzess that I sent in for constipation as well.     I will get back to him with the xray results and he should try to be consistent with a bowel regimen to see if having more regular BMS helps his discomfort. Thanks

## 2023-10-18 ENCOUNTER — TELEPHONE (OUTPATIENT)
Dept: FAMILY MEDICINE | Facility: CLINIC | Age: 73
End: 2023-10-18
Payer: MEDICARE

## 2023-10-18 NOTE — TELEPHONE ENCOUNTER
----- Message from Kay Syed sent at 10/18/2023 11:59 AM CDT -----  Type:  Needs Medical Advice    Who Called: Pt   Symptoms (please be specific):    How long has patient had these symptoms:  weight loss   Pharmacy name and phone #:    Would the patient rather a call back or a response via [facility]? Call back   Best Call Back Number: [phone]  Additional Information: want to know what his weight on 10/09 at the appt... had xray today and his weight is 218

## 2023-10-18 NOTE — TELEPHONE ENCOUNTER
Hopefully his weight loss is due to improvement in his diet and also increase physical activity, that is what I think.  He has been more active since recovering from his injury.  I also have him on metformin to try to help him lose weight.  If he starts feeling badly or having any additional concerns he should let us know but otherwise we will see him in 6 months with the lab work prior, thank you

## 2023-10-18 NOTE — TELEPHONE ENCOUNTER
Called pt and informed him his weight was 222 on 10/9. Pt stated he weighted 218 now and is wondering if he should be concerned. He said he has been under a lot of stress as well as his eating habits have changed. He wonders if that can cause it and hopes it is nothing else

## 2023-10-19 NOTE — TELEPHONE ENCOUNTER
"Called pt and told him that Dr. Jeffery said:    "Hopefully his weight loss is due to improvement in his diet and also increase physical activity, that is what I think.  He has been more active since recovering from his injury.  I also have him on metformin to try to help him lose weight.  If he starts feeling badly or having any additional concerns he should let us know but otherwise we will see him in 6 months with the lab work prior, thank you"    Pt stated that he is feeling good and that the metformin must be working really well. He said he was concerned because of the previous mass but would monitor his health and let us know   "

## 2023-11-02 ENCOUNTER — TELEPHONE (OUTPATIENT)
Dept: FAMILY MEDICINE | Facility: CLINIC | Age: 73
End: 2023-11-02
Payer: MEDICARE

## 2023-11-02 NOTE — TELEPHONE ENCOUNTER
----- Message from Zeny Bernal sent at 11/2/2023 11:01 AM CDT -----  Type:  Needs Medical Advice    Who Called: pt  Symptoms (please be specific): pt is moving  around on right side wants an MRI done to see what is going on  pt don't believe the Xray showed everything needs MRI to get the whole picture cant get no relief needs to get it done as soon as possible   Would the patient rather a call back or a response via [facility]? call  Best Call Back Number: [phone]  Additional Information:

## 2023-11-02 NOTE — TELEPHONE ENCOUNTER
I am happy to see him about this concern but I am confident it is not his colon cancer having returned-- he has checked out great with his follow up cancer testing and is not yet due for repeat colonoscopy. Most likely his issue is constipation/gas vs a muscular issue-- I can see him in the office for an exam if it's bothering him, thanks

## 2023-11-02 NOTE — TELEPHONE ENCOUNTER
Right side has been hurting. Pt stated that it feels like the pain moves around on the right side. Its not consistent in one place. Pt is worried it could be the mass again and is requesting an MRI

## 2023-11-06 ENCOUNTER — OFFICE VISIT (OUTPATIENT)
Dept: FAMILY MEDICINE | Facility: CLINIC | Age: 73
End: 2023-11-06
Payer: MEDICARE

## 2023-11-06 VITALS
BODY MASS INDEX: 30.31 KG/M2 | DIASTOLIC BLOOD PRESSURE: 64 MMHG | OXYGEN SATURATION: 98 % | HEART RATE: 80 BPM | SYSTOLIC BLOOD PRESSURE: 136 MMHG | HEIGHT: 71 IN | WEIGHT: 216.5 LBS | TEMPERATURE: 99 F

## 2023-11-06 DIAGNOSIS — G89.29 CHRONIC RIGHT-SIDED BACK PAIN, UNSPECIFIED BACK LOCATION: ICD-10-CM

## 2023-11-06 DIAGNOSIS — R10.9 RIGHT FLANK PAIN: Primary | ICD-10-CM

## 2023-11-06 DIAGNOSIS — Z85.038 HISTORY OF COLON CANCER, STAGE II: ICD-10-CM

## 2023-11-06 DIAGNOSIS — R73.03 PREDIABETES: ICD-10-CM

## 2023-11-06 DIAGNOSIS — E66.1 CLASS 1 DRUG-INDUCED OBESITY WITH SERIOUS COMORBIDITY AND BODY MASS INDEX (BMI) OF 30.0 TO 30.9 IN ADULT: ICD-10-CM

## 2023-11-06 DIAGNOSIS — M54.9 CHRONIC RIGHT-SIDED BACK PAIN, UNSPECIFIED BACK LOCATION: ICD-10-CM

## 2023-11-06 DIAGNOSIS — I10 BENIGN ESSENTIAL HYPERTENSION: ICD-10-CM

## 2023-11-06 PROCEDURE — 1101F PR PT FALLS ASSESS DOC 0-1 FALLS W/OUT INJ PAST YR: ICD-10-PCS | Mod: HCNC,CPTII,S$GLB, | Performed by: FAMILY MEDICINE

## 2023-11-06 PROCEDURE — 99214 OFFICE O/P EST MOD 30 MIN: CPT | Mod: HCNC,S$GLB,, | Performed by: FAMILY MEDICINE

## 2023-11-06 PROCEDURE — 3044F HG A1C LEVEL LT 7.0%: CPT | Mod: HCNC,CPTII,S$GLB, | Performed by: FAMILY MEDICINE

## 2023-11-06 PROCEDURE — 3008F BODY MASS INDEX DOCD: CPT | Mod: HCNC,CPTII,S$GLB, | Performed by: FAMILY MEDICINE

## 2023-11-06 PROCEDURE — 3044F PR MOST RECENT HEMOGLOBIN A1C LEVEL <7.0%: ICD-10-PCS | Mod: HCNC,CPTII,S$GLB, | Performed by: FAMILY MEDICINE

## 2023-11-06 PROCEDURE — 3078F PR MOST RECENT DIASTOLIC BLOOD PRESSURE < 80 MM HG: ICD-10-PCS | Mod: HCNC,CPTII,S$GLB, | Performed by: FAMILY MEDICINE

## 2023-11-06 PROCEDURE — 99214 PR OFFICE/OUTPT VISIT, EST, LEVL IV, 30-39 MIN: ICD-10-PCS | Mod: HCNC,S$GLB,, | Performed by: FAMILY MEDICINE

## 2023-11-06 PROCEDURE — 3075F SYST BP GE 130 - 139MM HG: CPT | Mod: HCNC,CPTII,S$GLB, | Performed by: FAMILY MEDICINE

## 2023-11-06 PROCEDURE — 3075F PR MOST RECENT SYSTOLIC BLOOD PRESS GE 130-139MM HG: ICD-10-PCS | Mod: HCNC,CPTII,S$GLB, | Performed by: FAMILY MEDICINE

## 2023-11-06 PROCEDURE — 3078F DIAST BP <80 MM HG: CPT | Mod: HCNC,CPTII,S$GLB, | Performed by: FAMILY MEDICINE

## 2023-11-06 PROCEDURE — 1125F AMNT PAIN NOTED PAIN PRSNT: CPT | Mod: HCNC,CPTII,S$GLB, | Performed by: FAMILY MEDICINE

## 2023-11-06 PROCEDURE — 99999 PR PBB SHADOW E&M-EST. PATIENT-LVL IV: ICD-10-PCS | Mod: PBBFAC,HCNC,, | Performed by: FAMILY MEDICINE

## 2023-11-06 PROCEDURE — 1125F PR PAIN SEVERITY QUANTIFIED, PAIN PRESENT: ICD-10-PCS | Mod: HCNC,CPTII,S$GLB, | Performed by: FAMILY MEDICINE

## 2023-11-06 PROCEDURE — 1159F MED LIST DOCD IN RCRD: CPT | Mod: HCNC,CPTII,S$GLB, | Performed by: FAMILY MEDICINE

## 2023-11-06 PROCEDURE — 1159F PR MEDICATION LIST DOCUMENTED IN MEDICAL RECORD: ICD-10-PCS | Mod: HCNC,CPTII,S$GLB, | Performed by: FAMILY MEDICINE

## 2023-11-06 PROCEDURE — 1101F PT FALLS ASSESS-DOCD LE1/YR: CPT | Mod: HCNC,CPTII,S$GLB, | Performed by: FAMILY MEDICINE

## 2023-11-06 PROCEDURE — 3288F FALL RISK ASSESSMENT DOCD: CPT | Mod: HCNC,CPTII,S$GLB, | Performed by: FAMILY MEDICINE

## 2023-11-06 PROCEDURE — 99999 PR PBB SHADOW E&M-EST. PATIENT-LVL IV: CPT | Mod: PBBFAC,HCNC,, | Performed by: FAMILY MEDICINE

## 2023-11-06 PROCEDURE — 3008F PR BODY MASS INDEX (BMI) DOCUMENTED: ICD-10-PCS | Mod: HCNC,CPTII,S$GLB, | Performed by: FAMILY MEDICINE

## 2023-11-06 PROCEDURE — 3288F PR FALLS RISK ASSESSMENT DOCUMENTED: ICD-10-PCS | Mod: HCNC,CPTII,S$GLB, | Performed by: FAMILY MEDICINE

## 2023-11-06 RX ORDER — METHOCARBAMOL 750 MG/1
750 TABLET, FILM COATED ORAL 3 TIMES DAILY PRN
Qty: 90 TABLET | Refills: 11 | Status: SHIPPED | OUTPATIENT
Start: 2023-11-06 | End: 2024-10-31

## 2023-11-06 RX ORDER — LIDOCAINE 50 MG/G
1 PATCH TOPICAL DAILY
Qty: 30 PATCH | Refills: 11 | Status: SHIPPED | OUTPATIENT
Start: 2023-11-06

## 2023-11-06 NOTE — PROGRESS NOTES
Office Visit    Patient Name: Noah Nichole    : 1950  MRN: [id number]    Subjective:  Noah is a 73 y.o. male who presents today for:    Flank Pain (Right side)    ANNUAL PHYSICAL 2023  OFFICE VISIT 10/09/2023- ADVISE FOLLOW-UP IN 6 MONTHS WITH LABS PRIOR TO ANNUAL PHYSICAL.      LABS 10/10/2023 UNREMARKABLE WITH IMPROVEMENT IN POSTTRAUMATIC ANEMIA TO NEAR NORMAL HEMATOCRIT OF 38.8 (had had some posttraumatic anemia after a prior fall with significant injury several months ago), A1C IMPROVED TO 5.8, NORMAL LIVER/KIDNEY FUNCTION.    COLORECTAL SURGERY 2023- H/O COLON CANCER  he is now 5 years since diagnosis and that surveillance from now is essentially colonoscopic evaluation. Needs colonoscopy in      Labs 23 CEA  2.1.       Noah Nichole is a 73 y.o. male with h/o colon cancer stage II s/p rt hemicolectomy , HTN, h/o iron previous def anemia secondary to chronic blood loss, urinary retention w/ prior chronic indwelling flores, treatment for Klebsiella UTI and circumcision procedure performed by Urology Dr. Malcolm 23.  He had a debilitating fall 2022 there resulted in prolonged severe tailbone and back pain associated with leg weakness and intermittent urinary retention.  Fortunately after prolonged therapy and recovery he is doing much better in this regard.      He presents today for an urgent care visit to discuss right-sided back and flank pain.      He has ongoing fears about recurrence of his colon cancer and even though previously I have reassured him that he is up-to-date on all recommended colon cancer monitoring and that all results have looked good, he does have fears that this pain and some unintentional weight loss could represent a recurrence of his cancer.      He reports intermittent right upper lumbar and thoracic back pain that radiates around his right flank region.  He reports that the pain generally comes on when he has been in certain  positions or when he has been bending over to lift objects.  He feels that he needs to hunch over temporarily until he loosens up and then can stand straighter.  Once he gets up and moving the pain usually bothers him to a lesser degree.      He admits to being deconditioned still since his fall as per above.  He did do some physical therapy but he has since become much more active and is trying to resume activities like lawn care, etc..      Denies sciatic pain or leg weakness.  Bowel and bladder function have been good-he has been having regular bowel movements with increased fiber/hydration and a stool softener-has not needed Dulcolax as needed.  Bladder function has remained good off of Flomax-reports complete bladder emptying and no frequency/urgency concerns.               PAST MEDICAL HISTORY, SURGICAL/SOCIAL/FAMILY HISTORY REVIEWED AS PER CHART, WITH PERTINENT FINDINGS INCLUDED IN HISTORY SECTION OF NOTE.     Current Medications    Medication List with Changes/Refills   New Medications    METHOCARBAMOL (ROBAXIN) 750 MG TAB    Take 1 tablet (750 mg total) by mouth 3 (three) times daily as needed.   Current Medications    ACETAMINOPHEN (TYLENOL) 325 MG TABLET    Take 2 tablets (650 mg total) by mouth every 6 (six) hours as needed for Pain.    ALLOPURINOL (ZYLOPRIM) 300 MG TABLET    Take 1 tablet (300 mg total) by mouth once daily. Take daily for gout prevention    GABAPENTIN (NEURONTIN) 300 MG CAPSULE    Take 300 mg by mouth 3 (three) times daily.    LINACLOTIDE (LINZESS) 145 MCG CAP CAPSULE    Take 1 capsule (145 mcg total) by mouth before breakfast. Take with a full glass of water    LOSARTAN-HYDROCHLOROTHIAZIDE 100-12.5 MG (HYZAAR) 100-12.5 MG TAB    Take 1 tablet by mouth once daily.    METFORMIN (GLUCOPHAGE-XR) 500 MG ER 24HR TABLET    Take 2 tablets (1,000 mg total) by mouth daily with breakfast.   Changed and/or Refilled Medications    Modified Medication Previous Medication    LIDOCAINE (LIDODERM) 5 %  "LIDOcaine (LIDODERM) 5 %       Place 1 patch onto the skin once daily. Remove & Discard patch within 12 hours or as directed by MD    Place 1 patch onto the skin once daily. Remove & Discard patch within 12 hours or as directed by MD   Discontinued Medications    TAMSULOSIN (FLOMAX) 0.4 MG CAP    Take 1 capsule (0.4 mg total) by mouth once daily.       Allergies   Review of patient's allergies indicates:  No Known Allergies      Review of Systems (Pertinent positives)  Review of Systems   Constitutional:  Positive for unexpected weight change (mild weight loss).   Respiratory:  Negative for shortness of breath.    Cardiovascular:  Negative for chest pain and leg swelling.   Gastrointestinal:  Positive for constipation (improved). Negative for blood in stool.   Genitourinary:  Negative for difficulty urinating.   Musculoskeletal:  Positive for back pain.   Neurological:  Negative for weakness.       /64   Pulse 80   Temp 99.1 °F (37.3 °C) (Oral)   Ht 5' 11" (1.803 m)   Wt 98.2 kg (216 lb 7.9 oz)   SpO2 98%   BMI 30.19 kg/m²     Physical Exam  Vitals reviewed.   Constitutional:       General: He is not in acute distress.     Appearance: He is well-developed.   HENT:      Head: Normocephalic and atraumatic.   Eyes:      Conjunctiva/sclera: Conjunctivae normal.   Cardiovascular:      Rate and Rhythm: Normal rate and regular rhythm.   Pulmonary:      Effort: Pulmonary effort is normal.   Musculoskeletal:      Thoracic back: Spasms (right thoracic) and tenderness present. No bony tenderness. Decreased range of motion.      Lumbar back: Spasms present. No bony tenderness. Decreased range of motion.      Right lower leg: No edema.      Left lower leg: No edema.   Skin:     General: Skin is warm and dry.   Neurological:      General: No focal deficit present.      Mental Status: He is alert and oriented to person, place, and time.   Psychiatric:         Mood and Affect: Mood normal.         Behavior: Behavior " normal.           Assessment/Plan:  Noah Nichole is a 73 y.o. male who presents today for :        ICD-10-CM ICD-9-CM    1. Right flank pain  R10.9 789.09 LIDOcaine (LIDODERM) 5 %      Ambulatory referral/consult to Physical/Occupational Therapy      methocarbamoL (ROBAXIN) 750 MG Tab      2. Chronic right-sided back pain, unspecified back location  M54.9 724.5 LIDOcaine (LIDODERM) 5 %    G89.29 338.29 Ambulatory referral/consult to Physical/Occupational Therapy      methocarbamoL (ROBAXIN) 750 MG Tab      3. Benign essential hypertension  I10 401.1       4. Prediabetes  R73.03 790.29       5. Class 1 drug-induced obesity with serious comorbidity and body mass index (BMI) of 30.0 to 30.9 in adult  E66.1 278.00     Z68.30 V85.30         73-year-old male with history of colon cancer, prediabetes, controlled hypertension who presents today for urgent care visit to discuss right back and flank pain.      He has restricted range of motion, evidence of postural/core strength deficits and muscle spasms/tenderness on exam of the right thoracic and upper lumbar regions.    He does not have radicular symptoms or extremity weakness so will defer imaging in light of this and lack of axial tenderness on exam.      Would likely benefit from therapy to address postural and core strength deficits, reeducation in terms of more advanced movements involved with activities such as gardening/long hair, etc. which she is now back to given his recovery from prior injury.      Physical therapy orders placed, trial of topical Lidoderm patch and as needed Robaxin for muscle spasm, pain.      Already has appointments for labs/annual physical for monitoring of chronic conditions.      As per HPI his colon cancer surveillance is all up-to-date and has been reassuring-next colonoscopy not due until 2027 and bowel/ bladder function has been unremarkable.    There are no Patient Instructions on file for this visit.      Follow up in about 6  months (around 5/6/2024) for to follow up on lab results.

## 2023-12-21 ENCOUNTER — OCCUPATIONAL HEALTH (OUTPATIENT)
Dept: URGENT CARE | Facility: CLINIC | Age: 73
End: 2023-12-21

## 2023-12-21 DIAGNOSIS — Z02.83 ENCOUNTER FOR DRUG SCREENING: Primary | ICD-10-CM

## 2023-12-21 LAB
CTP QC/QA: YES
POC 5 PANEL DRUG SCREEN: NEGATIVE

## 2023-12-21 PROCEDURE — 80305 POCT RAPID DRUG SCREEN 5 PANEL: ICD-10-PCS | Mod: S$GLB,,, | Performed by: STUDENT IN AN ORGANIZED HEALTH CARE EDUCATION/TRAINING PROGRAM

## 2023-12-21 PROCEDURE — 80305 DRUG TEST PRSMV DIR OPT OBS: CPT | Mod: S$GLB,,, | Performed by: STUDENT IN AN ORGANIZED HEALTH CARE EDUCATION/TRAINING PROGRAM

## 2024-01-02 ENCOUNTER — TELEPHONE (OUTPATIENT)
Dept: ADMINISTRATIVE | Facility: CLINIC | Age: 74
End: 2024-01-02
Payer: MEDICARE

## 2024-01-04 ENCOUNTER — OFFICE VISIT (OUTPATIENT)
Dept: FAMILY MEDICINE | Facility: CLINIC | Age: 74
End: 2024-01-04
Payer: MEDICARE

## 2024-01-04 VITALS
OXYGEN SATURATION: 98 % | WEIGHT: 223.69 LBS | BODY MASS INDEX: 31.31 KG/M2 | HEART RATE: 68 BPM | HEIGHT: 71 IN | SYSTOLIC BLOOD PRESSURE: 148 MMHG | DIASTOLIC BLOOD PRESSURE: 70 MMHG

## 2024-01-04 DIAGNOSIS — M54.9 BACK PAIN, UNSPECIFIED BACK LOCATION, UNSPECIFIED BACK PAIN LATERALITY, UNSPECIFIED CHRONICITY: ICD-10-CM

## 2024-01-04 DIAGNOSIS — I10 BENIGN ESSENTIAL HYPERTENSION: ICD-10-CM

## 2024-01-04 DIAGNOSIS — K59.00 CONSTIPATION, UNSPECIFIED CONSTIPATION TYPE: ICD-10-CM

## 2024-01-04 DIAGNOSIS — N40.1 BENIGN PROSTATIC HYPERPLASIA WITH LOWER URINARY TRACT SYMPTOMS, SYMPTOM DETAILS UNSPECIFIED: ICD-10-CM

## 2024-01-04 DIAGNOSIS — Z85.038 HISTORY OF COLON CANCER, STAGE II: ICD-10-CM

## 2024-01-04 DIAGNOSIS — D50.0 IRON DEFICIENCY ANEMIA DUE TO CHRONIC BLOOD LOSS: ICD-10-CM

## 2024-01-04 DIAGNOSIS — Z00.00 ENCOUNTER FOR MEDICARE ANNUAL WELLNESS EXAM: ICD-10-CM

## 2024-01-04 DIAGNOSIS — Z00.00 ENCOUNTER FOR PREVENTIVE HEALTH EXAMINATION: Primary | ICD-10-CM

## 2024-01-04 DIAGNOSIS — E55.9 VITAMIN D DEFICIENCY: ICD-10-CM

## 2024-01-04 DIAGNOSIS — E66.1 CLASS 1 DRUG-INDUCED OBESITY WITH SERIOUS COMORBIDITY AND BODY MASS INDEX (BMI) OF 30.0 TO 30.9 IN ADULT: ICD-10-CM

## 2024-01-04 DIAGNOSIS — R73.03 PREDIABETES: ICD-10-CM

## 2024-01-04 DIAGNOSIS — D69.6 THROMBOCYTOPENIA: ICD-10-CM

## 2024-01-04 PROCEDURE — 1159F MED LIST DOCD IN RCRD: CPT | Mod: HCNC,CPTII,S$GLB,

## 2024-01-04 PROCEDURE — 1126F AMNT PAIN NOTED NONE PRSNT: CPT | Mod: HCNC,CPTII,S$GLB,

## 2024-01-04 PROCEDURE — 3288F FALL RISK ASSESSMENT DOCD: CPT | Mod: HCNC,CPTII,S$GLB,

## 2024-01-04 PROCEDURE — 1160F RVW MEDS BY RX/DR IN RCRD: CPT | Mod: HCNC,CPTII,S$GLB,

## 2024-01-04 PROCEDURE — 1101F PT FALLS ASSESS-DOCD LE1/YR: CPT | Mod: HCNC,CPTII,S$GLB,

## 2024-01-04 PROCEDURE — 99999 PR PBB SHADOW E&M-EST. PATIENT-LVL V: CPT | Mod: PBBFAC,HCNC,,

## 2024-01-04 PROCEDURE — 3077F SYST BP >= 140 MM HG: CPT | Mod: HCNC,CPTII,S$GLB,

## 2024-01-04 PROCEDURE — 3078F DIAST BP <80 MM HG: CPT | Mod: HCNC,CPTII,S$GLB,

## 2024-01-04 PROCEDURE — G0439 PPPS, SUBSEQ VISIT: HCPCS | Mod: HCNC,S$GLB,,

## 2024-01-04 NOTE — PROGRESS NOTES
"      Noah Nichole presented for a  Medicare AWV and comprehensive Health Risk Assessment today. The following components were reviewed and updated:    Medical history  Family History  Social history  Allergies and Current Medications  Health Risk Assessment  Health Maintenance  Care Team         ** See Completed Assessments for Annual Wellness Visit within the encounter summary.**         The following assessments were completed:  Living Situation  CAGE  Depression Screening  Timed Get Up and Go  Whisper Test  Cognitive Function Screening    Nutrition Screening  ADL Screening  PAQ Screening        Vitals:    01/04/24 1312 01/04/24 1351   BP: (!) 144/64 (!) 148/70   BP Location: Left arm Left arm   Patient Position: Sitting    BP Method: Large (Manual)    Pulse: 68    SpO2: 98%    Weight: 101.5 kg (223 lb 10.5 oz)    Height: 5' 11" (1.803 m)      Body mass index is 31.19 kg/m².  Physical Exam  Vitals reviewed.   Constitutional:       Appearance: Normal appearance. He is well-developed.   HENT:      Head: Normocephalic.   Cardiovascular:      Rate and Rhythm: Normal rate and regular rhythm.      Pulses: Normal pulses.      Heart sounds: Normal heart sounds. No murmur heard.  Pulmonary:      Effort: Pulmonary effort is normal. No respiratory distress.      Breath sounds: No wheezing, rhonchi or rales.   Skin:     General: Skin is warm.   Neurological:      General: No focal deficit present.      Mental Status: He is alert and oriented to person, place, and time.   Psychiatric:         Mood and Affect: Mood normal.         Behavior: Behavior normal. Behavior is cooperative.         Thought Content: Thought content normal.         Judgment: Judgment normal.               Diagnoses and health risks identified today and associated recommendations/orders:    1. Encounter for Medicare annual wellness exam  - Ambulatory Referral/Consult to Enhanced Annual Wellness Visit (eAWV)    2. Encounter for preventive health " examination  Screenings performed, as noted above. Personal preventative testing needs reviewed.     3. Thrombocytopenia  Chronic. Stable. Followed by PCP.     4. Benign essential hypertension  BP today in clinic >140 systolic with 2 readings. Patient agrees to digital medicine. Instructed patient to f./u with PCP in 2-4 weeks with BP logs and BP machine.   - Hypertension Digital Medicine (HDMP) Enrollment Order  - Hypertension Digital Medicine (HDM): Assign Onboarding Questionnaires    5. Benign prostatic hyperplasia with lower urinary tract symptoms, symptom details unspecified  Chronic. Stable. Followed by PCP.     6. History of colon cancer, stage II  Stable. Followed by CR[facility].     7. Iron deficiency anemia due to chronic blood loss  Chronic. Stable. Followed by PCP.     8. Vitamin D deficiency  Chronic. Stable. Followed by PCP.     9. Prediabetes  Chronic. Stable. Encouraged diet modification and exercise as tolerated. Followed by PCP.     10. Class 1 drug-induced obesity with serious comorbidity and body mass index (BMI) of 30.0 to 30.9 in adult  Work on diet modification and increase in physical activity as tolerated.   Followed by PCP.     11. Constipation, unspecified constipation type  Chronic; stable on medication. Followed by PCP.    12. Back pain, unspecified back location, unspecified back pain laterality, unspecified chronicity  Chronic; stable on medication. Followed by PCP.      Review for Opioid Screening: Patient does not have rx for Opioids.    Review for Substance Use Disorders: Patient does not use substance.    Provided Eucha with a 5-10 year written screening schedule and personal prevention plan. Recommendations were developed using the USPSTF age appropriate recommendations. Education, counseling, and referrals were provided as needed. After Visit Summary printed and given to patient which includes a list of additional screenings\tests needed.    Follow up in about 1 year (around 1/4/2025)  for your next annual wellness visit.    Nemo Lamar NP      Advance Care Planning     I offered to discuss advanced care planning, including how to pick a person who would make decisions for you if you were unable to make them for yourself, called a health care power of , and what kind of decisions you might make such as use of life sustaining treatments such as ventilators and tube feeding when faced with a life limiting illness recorded on a living will that they will need to know. (How you want to be cared for as you near the end of your natural life)     X Patient is interested in learning more about how to make advanced directives.  I provided them paperwork and offered to discuss this with them.

## 2024-01-04 NOTE — PATIENT INSTRUCTIONS
Follow up with PCP in 2-4 weeks  Keep a blood pressure log over the next 4 weeks. Your goal blood pressure is less than 140/90              -check it around the same time each day              -make sure you are resting for 5 minutes prior to checking              -be seated with your legs uncrossed   -make sure bladder is empty              -I prefer a upper arm cuff instead of a wrist cuff because it tends to be more accurate  Bring BP machine and log to the appointment      Counseling and Referral of Other Preventative  (Italic type indicates deductible and co-insurance are waived)    Patient Name: Noah Nichole  Today's Date: 1/4/2024    Health Maintenance         Date Due Completion Date    COVID-19 Vaccine (4 - 2023-24 season) 02/29/2024 (Originally 9/1/2023) 12/3/2021    RSV Vaccine (Age 60+ and Pregnant patients) (1 - 1-dose 60+ series) 02/29/2024 (Originally 3/5/2011) ---    Hemoglobin A1c (Prediabetes) 10/10/2024 10/10/2023    Colorectal Cancer Screening 04/21/2027 4/21/2022    Override on 2/15/2011: Done    Lipid Panel 10/10/2028 10/10/2023    TETANUS VACCINE 10/09/2031 10/9/2021    Override on 12/29/2016: Declined          No orders of the defined types were placed in this encounter.      The following information is provided to all patients.  This information is to help you find resources for any of the problems found today that may be affecting your health:                  Living healthy guide: www.[facility].[place].gov      Understanding Diabetes: www.diabetes.org      Eating healthy: www.cdc.gov/healthyweight      CDC home safety checklist: www.cdc.gov/steadi/patient.html      Agency on Aging: www.goea.[place].gov      Alcoholics anonymous (AA): www.aa.org      Physical Activity: www.nancy.nih.gov/vo7sgpw      Tobacco use: www.quitwithusla.org

## 2024-01-08 PROBLEM — R10.2 PELVIC PAIN: Status: RESOLVED | Noted: 2022-10-07 | Resolved: 2024-01-08

## 2024-01-08 PROBLEM — D69.6 THROMBOCYTOPENIA: Status: ACTIVE | Noted: 2024-01-08

## 2024-01-08 PROBLEM — Z86.16 HISTORY OF 2019 NOVEL CORONAVIRUS DISEASE (COVID-19): Status: RESOLVED | Noted: 2020-04-09 | Resolved: 2024-01-08

## 2024-01-17 ENCOUNTER — TELEPHONE (OUTPATIENT)
Dept: FAMILY MEDICINE | Facility: CLINIC | Age: 74
End: 2024-01-17
Payer: MEDICARE

## 2024-01-17 ENCOUNTER — PATIENT MESSAGE (OUTPATIENT)
Dept: UROLOGY | Facility: CLINIC | Age: 74
End: 2024-01-17
Payer: MEDICARE

## 2024-01-17 ENCOUNTER — OFFICE VISIT (OUTPATIENT)
Dept: FAMILY MEDICINE | Facility: CLINIC | Age: 74
End: 2024-01-17
Payer: MEDICARE

## 2024-01-17 VITALS
DIASTOLIC BLOOD PRESSURE: 70 MMHG | OXYGEN SATURATION: 98 % | HEART RATE: 73 BPM | SYSTOLIC BLOOD PRESSURE: 132 MMHG | BODY MASS INDEX: 31.24 KG/M2 | TEMPERATURE: 99 F | HEIGHT: 71 IN | WEIGHT: 223.13 LBS

## 2024-01-17 DIAGNOSIS — L72.3 SEBACEOUS CYST: Primary | ICD-10-CM

## 2024-01-17 DIAGNOSIS — I10 BENIGN ESSENTIAL HYPERTENSION: ICD-10-CM

## 2024-01-17 DIAGNOSIS — E66.1 CLASS 1 DRUG-INDUCED OBESITY WITH SERIOUS COMORBIDITY AND BODY MASS INDEX (BMI) OF 31.0 TO 31.9 IN ADULT: ICD-10-CM

## 2024-01-17 DIAGNOSIS — R73.03 PREDIABETES: ICD-10-CM

## 2024-01-17 DIAGNOSIS — Z85.038 HISTORY OF COLON CANCER, STAGE II: ICD-10-CM

## 2024-01-17 PROCEDURE — 3288F FALL RISK ASSESSMENT DOCD: CPT | Mod: HCNC,CPTII,S$GLB, | Performed by: FAMILY MEDICINE

## 2024-01-17 PROCEDURE — 99213 OFFICE O/P EST LOW 20 MIN: CPT | Mod: 25,HCNC,S$GLB, | Performed by: FAMILY MEDICINE

## 2024-01-17 PROCEDURE — 3075F SYST BP GE 130 - 139MM HG: CPT | Mod: HCNC,CPTII,S$GLB, | Performed by: FAMILY MEDICINE

## 2024-01-17 PROCEDURE — 10060 I&D ABSCESS SIMPLE/SINGLE: CPT | Mod: HCNC,S$GLB,, | Performed by: FAMILY MEDICINE

## 2024-01-17 PROCEDURE — 1126F AMNT PAIN NOTED NONE PRSNT: CPT | Mod: HCNC,CPTII,S$GLB, | Performed by: FAMILY MEDICINE

## 2024-01-17 PROCEDURE — 1101F PT FALLS ASSESS-DOCD LE1/YR: CPT | Mod: HCNC,CPTII,S$GLB, | Performed by: FAMILY MEDICINE

## 2024-01-17 PROCEDURE — 3078F DIAST BP <80 MM HG: CPT | Mod: HCNC,CPTII,S$GLB, | Performed by: FAMILY MEDICINE

## 2024-01-17 PROCEDURE — 1159F MED LIST DOCD IN RCRD: CPT | Mod: HCNC,CPTII,S$GLB, | Performed by: FAMILY MEDICINE

## 2024-01-17 PROCEDURE — 1160F RVW MEDS BY RX/DR IN RCRD: CPT | Mod: HCNC,CPTII,S$GLB, | Performed by: FAMILY MEDICINE

## 2024-01-17 PROCEDURE — 99999 PR PBB SHADOW E&M-EST. PATIENT-LVL III: CPT | Mod: PBBFAC,HCNC,, | Performed by: FAMILY MEDICINE

## 2024-01-17 PROCEDURE — 3008F BODY MASS INDEX DOCD: CPT | Mod: HCNC,CPTII,S$GLB, | Performed by: FAMILY MEDICINE

## 2024-01-17 NOTE — PROGRESS NOTES
Office Visit    Patient Name: Noah Nichole    : 1950  MRN: [id number]    Subjective:  Noah is a 73 y.o. male who presents today for:    Mass (On right side of neck)    73-year-old regular patient of mine with history of controlled hypertension, obesity, prediabetes, prior history of colon cancer who presents today with recurrence of swelling and pain of a sebaceous cyst of his right posterior neck.      I have drain the cyst for him before by simply applying pressure with successful extrusion of a large amount of sebaceous material through the cyst head, however, the cyst has continued to reoccur after simple drainage and he presents today for more definitive incision and drainage as he continues to be bothered when it fills up in today reports significant pressure and swelling.      No fevers/chills/nausea/vomiting.  No spontaneous drainage for redness.    PAST MEDICAL HISTORY, SURGICAL/SOCIAL/FAMILY HISTORY REVIEWED AS PER CHART, WITH PERTINENT FINDINGS INCLUDED IN HISTORY SECTION OF NOTE.     Current Medications    Medication List with Changes/Refills   Current Medications    ACETAMINOPHEN (TYLENOL) 325 MG TABLET    Take 2 tablets (650 mg total) by mouth every 6 (six) hours as needed for Pain.    ALLOPURINOL (ZYLOPRIM) 300 MG TABLET    Take 1 tablet (300 mg total) by mouth once daily. Take daily for gout prevention    GABAPENTIN (NEURONTIN) 300 MG CAPSULE    Take 300 mg by mouth 3 (three) times daily.    LIDOCAINE (LIDODERM) 5 %    Place 1 patch onto the skin once daily. Remove & Discard patch within 12 hours or as directed by MD    LINACLOTIDE (LINZESS) 145 MCG CAP CAPSULE    Take 1 capsule (145 mcg total) by mouth before breakfast. Take with a full glass of water    LOSARTAN-HYDROCHLOROTHIAZIDE 100-12.5 MG (HYZAAR) 100-12.5 MG TAB    Take 1 tablet by mouth once daily.    METFORMIN (GLUCOPHAGE-XR) 500 MG ER 24HR TABLET    Take 2 tablets (1,000 mg total) by mouth daily with breakfast.     "METHOCARBAMOL (ROBAXIN) 750 MG TAB    Take 1 tablet (750 mg total) by mouth 3 (three) times daily as needed.       Allergies   Review of patient's allergies indicates:  No Known Allergies      Review of Systems (Pertinent positives)  Review of Systems   Constitutional:  Negative for chills and fever.   Gastrointestinal:  Positive for constipation (improved). Negative for nausea and vomiting.   Skin:  Negative for color change. Wound: skin cyst.      /70 (BP Location: Left arm, Patient Position: Sitting, BP Method: Large (Manual))   Pulse 73   Temp 98.9 °F (37.2 °C)   Ht 5' 11" (1.803 m)   Wt 101.2 kg (223 lb 1.7 oz)   SpO2 98%   BMI 31.12 kg/m²     Physical Exam  Vitals reviewed.   Constitutional:       General: He is not in acute distress.     Appearance: He is well-developed.   HENT:      Head: Normocephalic and atraumatic.   Eyes:      Conjunctiva/sclera: Conjunctivae normal.   Pulmonary:      Effort: Pulmonary effort is normal.   Musculoskeletal:      Right lower leg: No edema.      Left lower leg: No edema.   Skin:     General: Skin is warm and dry.          Neurological:      General: No focal deficit present.      Mental Status: He is alert and oriented to person, place, and time.   Psychiatric:         Mood and Affect: Mood normal.         Behavior: Behavior normal.       PROCEDURE NOTE FOR INCISION DRAINAGE OF SEBACEOUS CYST OF NECK:  Area cleaned thoroughly with 3 complete passes of an alcohol swab.  Area numbed with 1 cc of lidocaine 1% with epinephrine.  Incision performed with 4 mm punch tool.  Extrusion of large amount of sebaceous material and subsequent removal of entire cyst wall using hemostats.  No excess bleeding or complications observed.  Small amount of silver nitrate applied and small wound covered with a Band-Aid.  Instructed to wash the area with warm soapy water on a daily basis and then to apply Vaseline and keep covered with a Band-Aid until the wound is fully " healed.    Assessment/Plan:  Noah Nichole is a 73 y.o. male who presents today for :        ICD-10-CM ICD-9-CM    1. Sebaceous cyst  L72.3 706.2       2. Prediabetes  R73.03 790.29       3. Benign essential hypertension  I10 401.1       4. Class 1 drug-induced obesity with serious comorbidity and body mass index (BMI) of 31.0 to 31.9 in adult  E66.1 278.00     Z68.31 V85.31       5. History of colon cancer, stage II  Z85.038 V10.05         Incision and drainage with removal of entire cyst wall for recurrent painful/swollen sebaceous cyst.  No indication for antibiotics as there was no evidence of associated infection.    Blood pressure controlled, has upcoming appointment for routine labs/monitoring of chronic conditions.    There are no Patient Instructions on file for this visit.      Follow up in about 4 months (around 5/17/2024) for to follow up on lab results, return as needed for new concerns.

## 2024-01-17 NOTE — TELEPHONE ENCOUNTER
----- Message from Karl Soto sent at 1/17/2024  7:33 AM CST -----  Contact: Pt  .Type:  Sooner Apoointment Request    Caller is requesting a sooner appointment.  Caller declined first available appointment listed below.  Caller will not accept being placed on the waitlist and is requesting a message be sent to doctor.  Name of Caller:pt  When is the first available appointment?  Symptoms: bump on neck   Would the patient rather a call back or a response via [facility]?  Call back  Best Call Back Number:[phone]  Additional Information: Provider usually bust the bump and drain fluid

## 2024-04-10 DIAGNOSIS — I10 BENIGN ESSENTIAL HYPERTENSION: ICD-10-CM

## 2024-04-10 RX ORDER — LOSARTAN POTASSIUM AND HYDROCHLOROTHIAZIDE 12.5; 1 MG/1; MG/1
1 TABLET ORAL DAILY
Qty: 90 TABLET | Refills: 3 | Status: SHIPPED | OUTPATIENT
Start: 2024-04-10 | End: 2025-04-10

## 2024-04-10 NOTE — TELEPHONE ENCOUNTER
Care Due:                  Date            Visit Type   Department     Provider  --------------------------------------------------------------------------------                                EP -                              [facility]  Last Visit: 01-      CARE ([facility])   KACEY Jeffery                              [facility]  Next Visit: 05-      CARE ([facility])   KACEY Jeffery                                                            Last  Test          Frequency    Reason                     Performed    Due Date  --------------------------------------------------------------------------------    HBA1C.......  6 months...  metFORMIN................  10-   04-    [facility] Embedded Care Due Messages. Reference number: [id number].   4/10/2024 5:36:49 AM CDT

## 2024-04-10 NOTE — TELEPHONE ENCOUNTER
----- Message from Amador Parnell sent at 4/9/2024  4:48 PM CDT -----  .Type:  RX Refill Request    Who Called: pt  Refill or New Rx:refill  RX Name and Strength:losartan-hydrochlorothiazide 100-12.5 mg (HYZAAR) 100-12.5 mg Tab  How is the patient currently taking it? (ex. 1XDay):: Take 1 tablet by mouth once daily. - Oral  Is this a 30 day or 90 day RX:90 tablet  Preferred Pharmacy with phone number:[id number] - [place], [place] - [id number]  Local or Mail Order:local  Ordering Provider:Kerri  Would the patient rather a call back or a response via [facility]? Call back  Best Call Back Number:[phone]  Additional Information:

## 2024-05-03 ENCOUNTER — LAB VISIT (OUTPATIENT)
Dept: LAB | Facility: HOSPITAL | Age: 74
End: 2024-05-03
Attending: FAMILY MEDICINE
Payer: MEDICARE

## 2024-05-03 DIAGNOSIS — D50.0 IRON DEFICIENCY ANEMIA DUE TO CHRONIC BLOOD LOSS: ICD-10-CM

## 2024-05-03 DIAGNOSIS — R73.03 PREDIABETES: ICD-10-CM

## 2024-05-03 DIAGNOSIS — E55.9 VITAMIN D DEFICIENCY: ICD-10-CM

## 2024-05-03 DIAGNOSIS — E79.0 ELEVATED URIC ACID IN BLOOD: ICD-10-CM

## 2024-05-03 DIAGNOSIS — I10 BENIGN ESSENTIAL HYPERTENSION: ICD-10-CM

## 2024-05-03 DIAGNOSIS — Z79.899 MEDICATION MANAGEMENT: ICD-10-CM

## 2024-05-03 LAB
25(OH)D3+25(OH)D2 SERPL-MCNC: 33 NG/ML (ref 30–96)
ALBUMIN SERPL BCP-MCNC: 3.6 G/DL (ref 3.5–5.2)
ALP SERPL-CCNC: 65 U/L (ref 55–135)
ALT SERPL W/O P-5'-P-CCNC: 24 U/L (ref 10–44)
ANION GAP SERPL CALC-SCNC: 9 MMOL/L (ref 8–16)
AST SERPL-CCNC: 21 U/L (ref 10–40)
BASOPHILS # BLD AUTO: 0.03 K/UL (ref 0–0.2)
BASOPHILS NFR BLD: 0.6 % (ref 0–1.9)
BILIRUB SERPL-MCNC: 0.4 MG/DL (ref 0.1–1)
BUN SERPL-MCNC: 26 MG/DL (ref 8–23)
CALCIUM SERPL-MCNC: 9.4 MG/DL (ref 8.7–10.5)
CHLORIDE SERPL-SCNC: 105 MMOL/L (ref 95–110)
CHOLEST SERPL-MCNC: 180 MG/DL (ref 120–199)
CHOLEST/HDLC SERPL: 3.8 {RATIO} (ref 2–5)
CO2 SERPL-SCNC: 25 MMOL/L (ref 23–29)
CREAT SERPL-MCNC: 1.2 MG/DL (ref 0.5–1.4)
DIFFERENTIAL METHOD BLD: ABNORMAL
EOSINOPHIL # BLD AUTO: 0.2 K/UL (ref 0–0.5)
EOSINOPHIL NFR BLD: 3 % (ref 0–8)
ERYTHROCYTE [DISTWIDTH] IN BLOOD BY AUTOMATED COUNT: 13.4 % (ref 11.5–14.5)
EST. GFR  (NO RACE VARIABLE): >60 ML/MIN/1.73 M^2
ESTIMATED AVG GLUCOSE: 123 MG/DL (ref 68–131)
GLUCOSE SERPL-MCNC: 104 MG/DL (ref 70–110)
HBA1C MFR BLD: 5.9 % (ref 4–5.6)
HCT VFR BLD AUTO: 38.6 % (ref 40–54)
HDLC SERPL-MCNC: 48 MG/DL (ref 40–75)
HDLC SERPL: 26.7 % (ref 20–50)
HGB BLD-MCNC: 12.7 G/DL (ref 14–18)
IMM GRANULOCYTES # BLD AUTO: 0.01 K/UL (ref 0–0.04)
IMM GRANULOCYTES NFR BLD AUTO: 0.2 % (ref 0–0.5)
LDLC SERPL CALC-MCNC: 117.4 MG/DL (ref 63–159)
LYMPHOCYTES # BLD AUTO: 1.9 K/UL (ref 1–4.8)
LYMPHOCYTES NFR BLD: 38.7 % (ref 18–48)
MCH RBC QN AUTO: 26.6 PG (ref 27–31)
MCHC RBC AUTO-ENTMCNC: 32.9 G/DL (ref 32–36)
MCV RBC AUTO: 81 FL (ref 82–98)
MONOCYTES # BLD AUTO: 0.5 K/UL (ref 0.3–1)
MONOCYTES NFR BLD: 9.6 % (ref 4–15)
NEUTROPHILS # BLD AUTO: 2.4 K/UL (ref 1.8–7.7)
NEUTROPHILS NFR BLD: 47.9 % (ref 38–73)
NONHDLC SERPL-MCNC: 132 MG/DL
NRBC BLD-RTO: 0 /100 WBC
PLATELET # BLD AUTO: 148 K/UL (ref 150–450)
PMV BLD AUTO: 10.5 FL (ref 9.2–12.9)
POTASSIUM SERPL-SCNC: 3.8 MMOL/L (ref 3.5–5.1)
PROT SERPL-MCNC: 7.1 G/DL (ref 6–8.4)
RBC # BLD AUTO: 4.77 M/UL (ref 4.6–6.2)
SODIUM SERPL-SCNC: 139 MMOL/L (ref 136–145)
TRIGL SERPL-MCNC: 73 MG/DL (ref 30–150)
TSH SERPL DL<=0.005 MIU/L-ACNC: 3.27 UIU/ML (ref 0.4–4)
URATE SERPL-MCNC: 6.7 MG/DL (ref 3.4–7)
WBC # BLD AUTO: 5.01 K/UL (ref 3.9–12.7)

## 2024-05-03 PROCEDURE — 80061 LIPID PANEL: CPT | Mod: HCNC | Performed by: FAMILY MEDICINE

## 2024-05-03 PROCEDURE — 82306 VITAMIN D 25 HYDROXY: CPT | Mod: HCNC | Performed by: FAMILY MEDICINE

## 2024-05-03 PROCEDURE — 84550 ASSAY OF BLOOD/URIC ACID: CPT | Mod: HCNC | Performed by: FAMILY MEDICINE

## 2024-05-03 PROCEDURE — 80053 COMPREHEN METABOLIC PANEL: CPT | Mod: HCNC | Performed by: FAMILY MEDICINE

## 2024-05-03 PROCEDURE — 83036 HEMOGLOBIN GLYCOSYLATED A1C: CPT | Mod: HCNC | Performed by: FAMILY MEDICINE

## 2024-05-03 PROCEDURE — 85025 COMPLETE CBC W/AUTO DIFF WBC: CPT | Mod: HCNC | Performed by: FAMILY MEDICINE

## 2024-05-03 PROCEDURE — 36415 COLL VENOUS BLD VENIPUNCTURE: CPT | Mod: HCNC | Performed by: FAMILY MEDICINE

## 2024-05-03 PROCEDURE — 84443 ASSAY THYROID STIM HORMONE: CPT | Mod: HCNC | Performed by: FAMILY MEDICINE

## 2024-05-13 ENCOUNTER — OFFICE VISIT (OUTPATIENT)
Dept: FAMILY MEDICINE | Facility: CLINIC | Age: 74
End: 2024-05-13
Payer: MEDICARE

## 2024-05-13 VITALS
HEART RATE: 55 BPM | WEIGHT: 220.25 LBS | DIASTOLIC BLOOD PRESSURE: 82 MMHG | HEIGHT: 71 IN | SYSTOLIC BLOOD PRESSURE: 138 MMHG | BODY MASS INDEX: 30.83 KG/M2 | OXYGEN SATURATION: 98 % | TEMPERATURE: 98 F

## 2024-05-13 DIAGNOSIS — I10 BENIGN ESSENTIAL HYPERTENSION: ICD-10-CM

## 2024-05-13 DIAGNOSIS — E66.1 CLASS 1 DRUG-INDUCED OBESITY WITH SERIOUS COMORBIDITY AND BODY MASS INDEX (BMI) OF 30.0 TO 30.9 IN ADULT: ICD-10-CM

## 2024-05-13 DIAGNOSIS — E79.0 ELEVATED BLOOD URIC ACID LEVEL: ICD-10-CM

## 2024-05-13 DIAGNOSIS — Z00.00 ROUTINE GENERAL MEDICAL EXAMINATION AT A HEALTH CARE FACILITY: Primary | ICD-10-CM

## 2024-05-13 DIAGNOSIS — Z79.899 MEDICATION MANAGEMENT: ICD-10-CM

## 2024-05-13 DIAGNOSIS — K59.00 CONSTIPATION, UNSPECIFIED CONSTIPATION TYPE: ICD-10-CM

## 2024-05-13 DIAGNOSIS — E55.9 VITAMIN D DEFICIENCY: ICD-10-CM

## 2024-05-13 DIAGNOSIS — D50.0 IRON DEFICIENCY ANEMIA DUE TO CHRONIC BLOOD LOSS: ICD-10-CM

## 2024-05-13 DIAGNOSIS — Z85.038 HISTORY OF COLON CANCER, STAGE II: ICD-10-CM

## 2024-05-13 DIAGNOSIS — R73.03 PREDIABETES: ICD-10-CM

## 2024-05-13 DIAGNOSIS — Z87.39 HISTORY OF GOUT: ICD-10-CM

## 2024-05-13 DIAGNOSIS — M54.9 CHRONIC BILATERAL BACK PAIN, UNSPECIFIED BACK LOCATION: ICD-10-CM

## 2024-05-13 DIAGNOSIS — G89.29 CHRONIC BILATERAL BACK PAIN, UNSPECIFIED BACK LOCATION: ICD-10-CM

## 2024-05-13 PROBLEM — N30.01 ACUTE CYSTITIS WITH HEMATURIA: Status: RESOLVED | Noted: 2022-09-25 | Resolved: 2024-05-13

## 2024-05-13 PROCEDURE — 99999 PR PBB SHADOW E&M-EST. PATIENT-LVL III: CPT | Mod: PBBFAC,HCNC,, | Performed by: FAMILY MEDICINE

## 2024-05-13 PROCEDURE — 99397 PER PM REEVAL EST PAT 65+ YR: CPT | Mod: HCNC,S$GLB,, | Performed by: FAMILY MEDICINE

## 2024-05-13 PROCEDURE — 3044F HG A1C LEVEL LT 7.0%: CPT | Mod: HCNC,CPTII,S$GLB, | Performed by: FAMILY MEDICINE

## 2024-05-13 PROCEDURE — 3079F DIAST BP 80-89 MM HG: CPT | Mod: HCNC,CPTII,S$GLB, | Performed by: FAMILY MEDICINE

## 2024-05-13 PROCEDURE — 3288F FALL RISK ASSESSMENT DOCD: CPT | Mod: HCNC,CPTII,S$GLB, | Performed by: FAMILY MEDICINE

## 2024-05-13 PROCEDURE — 1101F PT FALLS ASSESS-DOCD LE1/YR: CPT | Mod: HCNC,CPTII,S$GLB, | Performed by: FAMILY MEDICINE

## 2024-05-13 PROCEDURE — 3075F SYST BP GE 130 - 139MM HG: CPT | Mod: HCNC,CPTII,S$GLB, | Performed by: FAMILY MEDICINE

## 2024-05-13 PROCEDURE — 3008F BODY MASS INDEX DOCD: CPT | Mod: HCNC,CPTII,S$GLB, | Performed by: FAMILY MEDICINE

## 2024-05-13 PROCEDURE — 1159F MED LIST DOCD IN RCRD: CPT | Mod: HCNC,CPTII,S$GLB, | Performed by: FAMILY MEDICINE

## 2024-05-13 PROCEDURE — 1160F RVW MEDS BY RX/DR IN RCRD: CPT | Mod: HCNC,CPTII,S$GLB, | Performed by: FAMILY MEDICINE

## 2024-05-13 PROCEDURE — 1126F AMNT PAIN NOTED NONE PRSNT: CPT | Mod: HCNC,CPTII,S$GLB, | Performed by: FAMILY MEDICINE

## 2024-05-13 RX ORDER — METFORMIN HYDROCHLORIDE 500 MG/1
1000 TABLET, EXTENDED RELEASE ORAL
Qty: 180 TABLET | Refills: 4 | Status: SHIPPED | OUTPATIENT
Start: 2024-05-13

## 2024-05-13 RX ORDER — ALLOPURINOL 300 MG/1
300 TABLET ORAL DAILY
Qty: 90 TABLET | Refills: 4 | Status: SHIPPED | OUTPATIENT
Start: 2024-05-13

## 2024-05-13 RX ORDER — GABAPENTIN 300 MG/1
300 CAPSULE ORAL 3 TIMES DAILY PRN
Qty: 180 CAPSULE | Refills: 4 | Status: SHIPPED | OUTPATIENT
Start: 2024-05-13

## 2024-05-13 RX ORDER — LIDOCAINE 50 MG/G
1 PATCH TOPICAL DAILY
Qty: 30 PATCH | Refills: 11 | Status: SHIPPED | OUTPATIENT
Start: 2024-05-13

## 2024-05-13 NOTE — PATIENT INSTRUCTIONS
CONSIDER UPDATED COVID -19 VACCINE, OBTAIN RSV(Respiratory Syncytial Virus)  VACCINE THROUGH YOUR LOCAL PHARMACY.

## 2024-05-13 NOTE — PROGRESS NOTES
Office Visit    Patient Name: Noah Nichole    : 1950  MRN: [id number]    Subjective:  Noah is a 74 y.o. male who presents today for:     Annual     Most recent office visit with me 24 for a sebaceous cyst. Last annual 2023  Labs prior to OV 5/3/24 w/ , vit D 33, uric acid 6.7, HCT 38.6, normal cmp and a1c of 5.9.   Labs 23 CEA 2.1  On metformin 1000 XR daily, normal liver/kidney function.    Noah Nichole is a 74 y.o. male with h/o colon cancer stage II s/p rt hemicolectomy , HTN, h/o iron def anemia secondary to chronic blood loss, urinary retention w/ prior chronic indwelling flores, recent treatment for Klebsiella UTI and circumcision procedure performed by Urology Dr. Malcolm 23, who presents today annual physical with lab review.      He has struggled since his fall that occurred 2022-has had debilitating tailbone and back pain with leg weakness and intermittent urinary retention issues since then.    Was scheduled for laser enucleation of the prostate but this procedure was canceled as his urination has improved on Flomax.  He is seeing pain management regarding his back pain and is receiving injections.      Has been in regular physical therapy since receiving a trial of 2 sets of back injections through [facility]. The injections were not overly helpful but therapy is slowly helping.   Unremarkable EKG 2023 showing sinus bradycardia but otherwise no concerns     Today he has no acute complaints. He is happy with his physical therapy and plans to stick with it.     GENERAL LIFESTYLE HABITS:  DIET: fair variety, drinks some softdrinks and sugary drinks but also about a 1/2 gallon of water daily, monitors portions  EXERCISE: limited due to back pain, no longer seeing PT but continues home exercises. Works in his garden  SLEEP: interrupted by back pain and stress, declines sleep aide   WEIGHT:  down about 10 lbs in the last year with improved  physical activity with current BMI 30.     Immunizations: PREVNAR 13 10/16/2019, PNEUMOVAX 23 4/26/21, SHINGRIX  2/2 10/9/21 & Tdap 10/9/21, yearly high dose FLU SHOT 10/9/23, COVID-19 VACCINE COMPLETED 3/12/21 (Moderna). DUE FOR RSV AND UPDATED COVID VACCINE.      Screening Tests: COLONOSCOPY 4/21/22: Repeat colonoscopy in 5 years for surveillance(4/2027)  Based on personal history of colon cancer. . PSA 1.6 1/17/24 PER UROLOGY       Eye/Dental: due and advised to look into low-cost dental providers      PAST MEDICAL HISTORY, SURGICAL/SOCIAL/FAMILY HISTORY REVIEWED AS PER CHART, WITH PERTINENT FINDINGS INCLUDED IN HISTORY SECTION OF NOTE.     Current Medications    Medication List with Changes/Refills   Current Medications    LOSARTAN-HYDROCHLOROTHIAZIDE 100-12.5 MG (HYZAAR) 100-12.5 MG TAB    Take 1 tablet by mouth once daily.    METHOCARBAMOL (ROBAXIN) 750 MG TAB    Take 1 tablet (750 mg total) by mouth 3 (three) times daily as needed.   Changed and/or Refilled Medications    Modified Medication Previous Medication    ALLOPURINOL (ZYLOPRIM) 300 MG TABLET allopurinoL (ZYLOPRIM) 300 MG tablet       Take 1 tablet (300 mg total) by mouth once daily. Take daily for gout prevention    Take 1 tablet (300 mg total) by mouth once daily. Take daily for gout prevention    GABAPENTIN (NEURONTIN) 300 MG CAPSULE gabapentin (NEURONTIN) 300 MG capsule       Take 1 capsule (300 mg total) by mouth 3 (three) times daily as needed (back and nerve pain).    Take 300 mg by mouth 3 (three) times daily.    LIDOCAINE (LIDODERM) 5 % LIDOcaine (LIDODERM) 5 %       Place 1 patch onto the skin once daily. Remove & Discard patch within 12 hours or as directed by MD    Place 1 patch onto the skin once daily. Remove & Discard patch within 12 hours or as directed by MD    LINACLOTIDE (LINZESS) 145 MCG CAP CAPSULE linaCLOtide (LINZESS) 145 mcg Cap capsule       Take 1 capsule (145 mcg total) by mouth before breakfast. Take with a full glass of  "water    Take 1 capsule (145 mcg total) by mouth before breakfast. Take with a full glass of water    METFORMIN (GLUCOPHAGE-XR) 500 MG ER 24HR TABLET metFORMIN (GLUCOPHAGE-XR) 500 MG ER 24hr tablet       Take 2 tablets (1,000 mg total) by mouth daily with breakfast.    Take 2 tablets (1,000 mg total) by mouth daily with breakfast.   Discontinued Medications    ACETAMINOPHEN (TYLENOL) 325 MG TABLET    Take 2 tablets (650 mg total) by mouth every 6 (six) hours as needed for Pain.       Allergies   Review of patient's allergies indicates:  No Known Allergies      Review of Systems (Pertinent positives)  Review of Systems   Constitutional:  Negative for chills and fever.   HENT:  Negative for congestion and sore throat.    Respiratory:  Negative for cough, chest tightness and shortness of breath.    Cardiovascular:  Negative for chest pain and leg swelling.   Gastrointestinal:  Negative for abdominal pain, diarrhea and nausea.   Genitourinary:  Negative for dysuria and urgency.   Musculoskeletal:  Positive for back pain.   Neurological:  Negative for dizziness and light-headedness.   Psychiatric/Behavioral:  Negative for agitation.        /82 (BP Location: Left arm, Patient Position: Sitting, BP Method: Large (Manual))   Pulse (!) 55   Temp 98.2 °F (36.8 °C)   Ht 5' 11" (1.803 m)   Wt 99.9 kg (220 lb 3.8 oz)   SpO2 98%   BMI 30.72 kg/m²     Physical Exam  Constitutional:       Appearance: Normal appearance. He is obese.   HENT:      Head: Normocephalic and atraumatic.      Right Ear: Tympanic membrane, ear canal and external ear normal.      Left Ear: Tympanic membrane, ear canal and external ear normal.      Nose: Nose normal.   Eyes:      Pupils: Pupils are equal, round, and reactive to light.   Cardiovascular:      Rate and Rhythm: Normal rate and regular rhythm.   Pulmonary:      Effort: Pulmonary effort is normal.      Breath sounds: Normal breath sounds.   Abdominal:      General: Abdomen is flat.      " Palpations: Abdomen is soft.   Musculoskeletal:      Cervical back: Normal range of motion and neck supple.      Right lower leg: No edema.      Left lower leg: No edema.   Skin:     General: Skin is warm and dry.   Neurological:      General: No focal deficit present.      Mental Status: He is alert and oriented to person, place, and time.   Psychiatric:         Mood and Affect: Mood normal.         Behavior: Behavior normal.         Assessment/Plan:  Noah Nichole is a 74 y.o. male who presents today for :        ICD-10-CM ICD-9-CM    1. Routine general medical examination at a health care facility  Z00.00 V70.0       2. Class 1 drug-induced obesity with serious comorbidity and body mass index (BMI) of 30.0 to 30.9 in adult  E66.1 278.00     Z68.30 V85.30       3. Prediabetes  R73.03 790.29 Hemoglobin A1C      Comprehensive Metabolic Panel      CBC Auto Differential      Lipid Panel      Vitamin D      Vitamin B12      Magnesium      TSH      Ferritin      Iron and TIBC      URIC ACID      metFORMIN (GLUCOPHAGE-XR) 500 MG ER 24hr tablet      4. Benign essential hypertension  I10 401.1 Hemoglobin A1C      Comprehensive Metabolic Panel      CBC Auto Differential      Lipid Panel      Vitamin D      Vitamin B12      Magnesium      TSH      Ferritin      Iron and TIBC      URIC ACID      5. Chronic bilateral back pain, unspecified back location  M54.9 724.5 LIDOcaine (LIDODERM) 5 %    G89.29 338.29       6. History of colon cancer, stage II  Z85.038 V10.05 Hemoglobin A1C      Comprehensive Metabolic Panel      CBC Auto Differential      Lipid Panel      Vitamin D      Vitamin B12      Magnesium      TSH      Ferritin      Iron and TIBC      URIC ACID      7. Iron deficiency anemia due to chronic blood loss  D50.0 280.0 CBC Auto Differential      Ferritin      Iron and TIBC      8. Vitamin D deficiency  E55.9 268.9 Vitamin D      9. Constipation, unspecified constipation type  K59.00 564.00 linaCLOtide (LINZESS)  145 mcg Cap capsule      10. History of gout  Z87.39 V12.29 URIC ACID      11. Medication management  Z79.899 V58.69 Hemoglobin A1C      Comprehensive Metabolic Panel      CBC Auto Differential      Lipid Panel      Vitamin D      Vitamin B12      Magnesium      TSH      Ferritin      Iron and TIBC      URIC ACID      12. Elevated blood uric acid level  E79.0 790.6 allopurinoL (ZYLOPRIM) 300 MG tablet          ADVISED ON DIET/EXERCISE/SLEEP, ROUTINE EYE/DENTAL EXAMS, AND THE IMPORTANCE OF KEEPING UP WITH APPROPRIATE SCREENING TESTS BASED ON AGE AND RISK FACTORS.  HEALTH MAINTENANCE REVIEWED AND OVERALL UP-TO-DATE: ADVISED RSV VACCINE AND FALL OMICRON COVID-19 BOOSTER(had mild case of COVID January 2023), BUT OTHERWISE VACCINES UP-TO-DATE.    NEXT COLONOSCOPY DUE 04/20/2027, PSA IN JANUARY PER UROLOGY.     HYPERTENSION:  Continue attention to low-salt diet, weight management, Hyzaar 100/12.5, repeat labs and office visit in 6 months     PREDIABETES:  Exercise is limited but he is staying active in his garden. Glucophage XR 1000 consistently with breakfast, repeat A1c in 6 months     CHRONIC BACK PAIN EXACERBATED BY FALL SEPTEMBER 2022 WITH RESULTING COCCYGEAL INJURY AND SUBSEQUENT URINARY RETENTION ISSUES:  Still dealing with ongoing back pain. No longer seeing formal PT, but continues home exercises. Pain is overall more manageable. Uses Gabapentin 300 mg three times a day PRN as well as lidocaine patch and biofreeze PRN.  Urinary retention issues managed by Urology-had plan to have laser enucleation of the prostate but now stable on Flomax.  History of prior UTI treated by Urology, has yearly follow-up scheduled 1/2025.    CONSTIPATION: Currently well controlled. Uses Linzess and Miralax PRN    HISTORY OF GOUT AND ELEVATED URIC ACID LEVELS:  Has been taking allopurinol regularly, uric acid is currently stable, advised on the importance of taking this daily for gout prevention and will recheck uric acid with next  blood drawn 6 months.    IRON DEFICIENCY ANEMIA DUE TO CHRONIC BLOOD LOSS: Currently stable, follow up with Iron studies, vit D levels, and CBC in 6 months    HISTORY OF COLON CANCER:  Colonoscopies up-to-date (Due 2027), following with survivorship Clinic.      Colby Victor MS4  UQ-OCS    I hereby acknowledge that I am relying upon documentation authored by a medical student working under my supervision and further I hereby attest that I have verified the student documentation or findings by personally re-performing the physical exam and medical decision making activities of the Evaluation and Management service to be billed.  Grace Jeffery      Patient Instructions    CONSIDER UPDATED COVID -19 VACCINE, OBTAIN RSV(Respiratory Syncytial Virus)  VACCINE THROUGH YOUR LOCAL PHARMACY.          Follow up in about 6 months (around 11/13/2024) for to follow up on lab results, return as needed for new concerns.

## 2024-11-12 PROBLEM — Z86.2 HISTORY OF IRON DEFICIENCY ANEMIA: Status: ACTIVE | Noted: 2019-10-16

## 2024-11-12 PROBLEM — N47.1 PHIMOSIS: Status: RESOLVED | Noted: 2023-02-02 | Resolved: 2024-11-12

## 2024-11-12 NOTE — PROGRESS NOTES
Office Visit    Patient Name: Noah Nichole    : 1950  MRN: [id number]    Subjective:  Noah is a 74 y.o. male who presents today for:    Follow-up (6m)    Prior Annual 24  Labs prior to OV 24 with normal uric acid of 5.1 on allopurinol 300 mg daily.  A1c stable at 5.9 on Metformin 1000XR daily -- taking inconsistently  Normal CMP w/ EGFR > 60, BUN mildly elevated at 25  CBC w/ stable HCT of 38.4, normal Ferritin 98.  Lipid WNL w/   Vit D low at 29 on no current supplements.   TSH 3.67 w/ normal B12/ Mg    Labs 23 CEA 2.1      Noah Nichole is a 74 y.o. male with h/o colon cancer stage II s/p rt hemicolectomy , HTN, prediabetes, h/o iron def anemia secondary to chronic blood loss, h/o urinary retention w/ prior chronic indwelling flores and treatment for Klebsiella UTI w/ circumcision procedure performed by Urology Dr. Malcolm 23, who presents today for 6 month follow up of chronic conditions with lab review.      He has struggled since a fall that occurred 2022-has had debilitating tailbone and back pain with leg weakness and intermittent urinary retention issues since then.    Was scheduled for laser enucleation of the prostate but this procedure was canceled as his urination has improved on Flomax. Recently urination and bladder emptying had been fine off of Flomax.  He is seeing pain management regarding his back pain and is receiving injections.   Currently stable with Robaxin 750 TID PRN, Gabapentin 300 TID prn and Lidoderm patch prn.      Has been in regular physical therapy since receiving a trial of 2 sets of back injections through [facility]. The injections were not overly helpful but therapy did help with time and he has continued to keep up with some back exercises.   Unremarkable EKG 2023 showing sinus bradycardia but otherwise no concerns     Today he has no acute complaints. He is happy with how his back is doing and tries to stick with  some basic exercises-uses the gym equipment at the [employer] location where he works.    No BP concerns on Losartan 100/12.5.     Constipation managed with prn Linzess 145 mg-- this has been much improved with drinking lots of water and increased activity.   He is now a  for [employer]--it's keeping him active from 4PM-12:30AM  Living in [facility].      GENERAL LIFESTYLE HABITS:  DIET: fair variety, drinks some softdrinks and sugary drinks but also about a 1/2 gallon of water daily, monitors portions  EXERCISE: limited due to back pain, no longer seeing PT but continues home exercises. Works in his garden  SLEEP: overall ok--back pain no longer interfering, feels rested and with good energy  WEIGHT:  previously down about 10 lbs in the last year with improved physical activity with current BMI 30.     Immunizations: PREVNAR 13 10/16/2019, PNEUMOVAX 23 4/26/21, SHINGRIX  2/2 10/9/21 & TDaP 10/9/21, yearly high dose FLU SHOT 11/8/24, COVID-19 VACCINE COMPLETED 3/12/21 (Moderna). DUE FOR RSV AND UPDATED FALL 2024 COVID VACCINE.      Screening Tests: COLONOSCOPY 4/21/22: Repeat colonoscopy in 5 years for surveillance(4/2027) based on personal history of colon cancer. A 1.6 1/17/24 PER UROLOGY       Eye/Dental: due and advised to look into low-cost dental providers          PAST MEDICAL HISTORY, SURGICAL/SOCIAL/FAMILY HISTORY REVIEWED AS PER CHART, WITH PERTINENT FINDINGS INCLUDED IN HISTORY SECTION OF NOTE.     Current Medications    Medication List with Changes/Refills   New Medications    CHOLECALCIFEROL, VITAMIN D3, (VITAMIN D3) 50 MCG (2,000 UNIT) TAB    Take 1 tablet (2,000 Units total) by mouth once daily.   Current Medications    LIDOCAINE (LIDODERM) 5 %    Place 1 patch onto the skin once daily. Remove & Discard patch within 12 hours or as directed by MD   Changed and/or Refilled Medications    Modified Medication Previous Medication    ALLOPURINOL (ZYLOPRIM) 300 MG TABLET allopurinoL (ZYLOPRIM) 300  "MG tablet       Take 1 tablet (300 mg total) by mouth once daily. Take daily for gout prevention    Take 1 tablet (300 mg total) by mouth once daily. Take daily for gout prevention    GABAPENTIN (NEURONTIN) 300 MG CAPSULE gabapentin (NEURONTIN) 300 MG capsule       Take 1 capsule (300 mg total) by mouth 3 (three) times daily as needed (back and nerve pain).    Take 1 capsule (300 mg total) by mouth 3 (three) times daily as needed (back and nerve pain).    LINACLOTIDE (LINZESS) 145 MCG CAP CAPSULE linaCLOtide (LINZESS) 145 mcg Cap capsule       Take 1 capsule (145 mcg total) by mouth before breakfast. Take with a full glass of water    Take 1 capsule (145 mcg total) by mouth before breakfast. Take with a full glass of water    LOSARTAN-HYDROCHLOROTHIAZIDE 100-12.5 MG (HYZAAR) 100-12.5 MG TAB losartan-hydrochlorothiazide 100-12.5 mg (HYZAAR) 100-12.5 mg Tab       Take 1 tablet by mouth once daily.    Take 1 tablet by mouth once daily.    METFORMIN (GLUCOPHAGE-XR) 500 MG ER 24HR TABLET metFORMIN (GLUCOPHAGE-XR) 500 MG ER 24hr tablet       Take 2 tablets (1,000 mg total) by mouth daily with breakfast.    Take 2 tablets (1,000 mg total) by mouth daily with breakfast.       Allergies   Review of patient's allergies indicates:  No Known Allergies      Review of Systems (Pertinent positives)  Review of Systems   Constitutional:  Negative for unexpected weight change.   Eyes:  Negative for visual disturbance.   Respiratory:  Negative for shortness of breath.    Cardiovascular:  Negative for chest pain and leg swelling.   Gastrointestinal:  Negative for constipation.   Genitourinary:  Negative for difficulty urinating.   Musculoskeletal:  Positive for back pain.   Neurological:  Negative for dizziness, light-headedness and headaches.       /74 (BP Location: Left arm, Patient Position: Sitting)   Pulse 63   Temp 98 °F (36.7 °C)   Ht 5' 11" (1.803 m)   Wt 100.6 kg (221 lb 12.5 oz)   SpO2 99%   BMI 30.93 kg/m² "     Physical Exam  Vitals reviewed.   Constitutional:       General: He is not in acute distress.     Appearance: He is well-developed.   HENT:      Head: Normocephalic and atraumatic.   Eyes:      Conjunctiva/sclera: Conjunctivae normal.   Cardiovascular:      Rate and Rhythm: Normal rate and regular rhythm.   Pulmonary:      Effort: Pulmonary effort is normal.      Breath sounds: Normal breath sounds.   Musculoskeletal:      Right lower leg: No edema.      Left lower leg: No edema.   Skin:     General: Skin is warm and dry.   Neurological:      General: No focal deficit present.      Mental Status: He is alert and oriented to person, place, and time.   Psychiatric:         Mood and Affect: Mood normal.         Behavior: Behavior normal.           Assessment/Plan:  Noah Nichole is a 74 y.o. male who presents today for :        ICD-10-CM ICD-9-CM    1. Benign essential hypertension  I10 401.1 Hemoglobin A1C      Comprehensive Metabolic Panel      Lipid Panel      CBC Auto Differential      TSH      Vitamin D      losartan-hydrochlorothiazide 100-12.5 mg (HYZAAR) 100-12.5 mg Tab      2. Class 1 obesity due to excess calories with serious comorbidity and body mass index (BMI) of 30.0 to 30.9 in adult  E66.811 278.00     E66.09 V85.30     Z68.30        3. Prediabetes  R73.03 790.29 Hemoglobin A1C      Comprehensive Metabolic Panel      Lipid Panel      CBC Auto Differential      TSH      Vitamin D      metFORMIN (GLUCOPHAGE-XR) 500 MG ER 24hr tablet      4. History of colon cancer, stage II  Z85.038 V10.05       5. History of iron deficiency anemia  Z86.2 V12.3       6. Vitamin D deficiency  E55.9 268.9 Vitamin D      cholecalciferol, vitamin D3, (VITAMIN D3) 50 mcg (2,000 unit) Tab      7. Chronic bilateral back pain, unspecified back location  M54.9 724.5 gabapentin (NEURONTIN) 300 MG capsule    G89.29 338.29       8. Constipation, unspecified constipation type  K59.00 564.00 linaCLOtide (LINZESS) 145 mcg Cap  capsule      9. Benign prostatic hyperplasia with lower urinary tract symptoms, symptom details unspecified  N40.1 600.01       10. History of gout  Z87.39 V12.29       11. Medication management  Z79.899 V58.69 Hemoglobin A1C      Comprehensive Metabolic Panel      Lipid Panel      CBC Auto Differential      TSH      Vitamin D      12. Need for RSV vaccination  Z29.11 V04.82       13. Need for COVID-19 vaccine  Z23 V04.89       14. Elevated blood uric acid level  E79.0 790.6 allopurinoL (ZYLOPRIM) 300 MG tablet        HEALTH MAINTENANCE REVIEWED AND OVERALL UP-TO-DATE: ADVISED RSV VACCINE AND FALL 2024 COVID-19 BOOSTER(had mild case of COVID January 2023) .  SEASONAL FLU SHOT UP-TO-DATE.  NEXT COLONOSCOPY DUE 04/20/2027, PSA WNL JANUARY 2024 per UROLOGY.     HYPERTENSION:  Continue attention to low-salt diet, weight management, Controlled on Hyzaar 100/12.5. Ongoing monitoring and repeat labs and office visit in 6 months     PREDIABETES:  He is staying very active working as a  for [employer] in [place]  Tri to do some weight training/back exercises A1 stable at 5.9 on Glucophage XR 1000-- ENCOURAGED TO TAKE THIS MORE CONSISTENTLY with breakfast. Continue current regimen and repeat A1c in 6 months     CHRONIC BACK PAIN EXACERBATED BY FALL SEPTEMBER 2022 WITH RESULTING COCCYGEAL INJURY AND SUBSEQUENT URINARY RETENTION ISSUES:  Still dealing with ongoing back pain. No longer seeing formal PT, but continues home exercises. Pain is overall more manageable. Uses Robaxin 750 TID prn, Gabapentin 300 mg three times a day PRN, as well as lidocaine patch and biofreeze PRN.    Urinary retention issues managed by Urology-had plan to have laser enucleation of the prostate but now stable on Flomax, which recently he has been able to discontinue.  History of prior UTI treated by Urology, yearly follow-up advised 1/2025.  No current concerns and he will notify me if he wishes to resume the  Flomax    CONSTIPATION: Currently well controlled. Uses Linzess 145 mcg and Miralax PRN    HISTORY OF GOUT AND ELEVATED URIC ACID LEVELS:  Has been taking allopurinol regularly, uric acid is WNL.  advised on the importance of taking this daily for gout prevention and will continue periodic monitoring     IRON DEFICIENCY ANEMIA DUE TO CHRONIC BLOOD LOSS: Currently stable, Iron studies, recently WNL w/ Ferritin 98. Vit D low at 29- not on any supplements currently and he will start 2000 IU D3 daily.  Recheck Vit D, CBC in 6 months with physical     HISTORY OF COLON CANCER:  Colonoscopies up-to-date (Due 2027), following with [facility].    Patient Instructions   Take the pills you are prescribed consistently.     ADD VITAMIN D 2,000 IU daily supplement. Prescription or over the counter    Notify me if you need to go back on Flomax for bladder emptying.     OBTAIN THE RSV AND CONSIDER SEASONAL COVID VACCINE      Follow up in about 6 months (around 5/13/2025) for to follow up on lab results, return as needed for new concerns.

## 2024-11-13 ENCOUNTER — OFFICE VISIT (OUTPATIENT)
Dept: FAMILY MEDICINE | Facility: CLINIC | Age: 74
End: 2024-11-13
Payer: MEDICARE

## 2024-11-13 VITALS
HEART RATE: 63 BPM | SYSTOLIC BLOOD PRESSURE: 126 MMHG | DIASTOLIC BLOOD PRESSURE: 74 MMHG | BODY MASS INDEX: 31.05 KG/M2 | WEIGHT: 221.81 LBS | TEMPERATURE: 98 F | HEIGHT: 71 IN | OXYGEN SATURATION: 99 %

## 2024-11-13 DIAGNOSIS — E79.0 ELEVATED BLOOD URIC ACID LEVEL: ICD-10-CM

## 2024-11-13 DIAGNOSIS — Z87.39 HISTORY OF GOUT: ICD-10-CM

## 2024-11-13 DIAGNOSIS — Z29.11 NEED FOR RSV VACCINATION: ICD-10-CM

## 2024-11-13 DIAGNOSIS — Z79.899 MEDICATION MANAGEMENT: ICD-10-CM

## 2024-11-13 DIAGNOSIS — R73.03 PREDIABETES: ICD-10-CM

## 2024-11-13 DIAGNOSIS — E55.9 VITAMIN D DEFICIENCY: ICD-10-CM

## 2024-11-13 DIAGNOSIS — E66.09 CLASS 1 OBESITY DUE TO EXCESS CALORIES WITH SERIOUS COMORBIDITY AND BODY MASS INDEX (BMI) OF 30.0 TO 30.9 IN ADULT: ICD-10-CM

## 2024-11-13 DIAGNOSIS — E66.811 CLASS 1 OBESITY DUE TO EXCESS CALORIES WITH SERIOUS COMORBIDITY AND BODY MASS INDEX (BMI) OF 30.0 TO 30.9 IN ADULT: ICD-10-CM

## 2024-11-13 DIAGNOSIS — I10 BENIGN ESSENTIAL HYPERTENSION: Primary | ICD-10-CM

## 2024-11-13 DIAGNOSIS — K59.00 CONSTIPATION, UNSPECIFIED CONSTIPATION TYPE: ICD-10-CM

## 2024-11-13 DIAGNOSIS — M54.9 CHRONIC BILATERAL BACK PAIN, UNSPECIFIED BACK LOCATION: ICD-10-CM

## 2024-11-13 DIAGNOSIS — Z23 NEED FOR COVID-19 VACCINE: ICD-10-CM

## 2024-11-13 DIAGNOSIS — Z86.2 HISTORY OF IRON DEFICIENCY ANEMIA: ICD-10-CM

## 2024-11-13 DIAGNOSIS — G89.29 CHRONIC BILATERAL BACK PAIN, UNSPECIFIED BACK LOCATION: ICD-10-CM

## 2024-11-13 DIAGNOSIS — Z85.038 HISTORY OF COLON CANCER, STAGE II: ICD-10-CM

## 2024-11-13 DIAGNOSIS — N40.1 BENIGN PROSTATIC HYPERPLASIA WITH LOWER URINARY TRACT SYMPTOMS, SYMPTOM DETAILS UNSPECIFIED: ICD-10-CM

## 2024-11-13 PROCEDURE — 3078F DIAST BP <80 MM HG: CPT | Mod: CPTII,S$GLB,, | Performed by: FAMILY MEDICINE

## 2024-11-13 PROCEDURE — 1126F AMNT PAIN NOTED NONE PRSNT: CPT | Mod: CPTII,S$GLB,, | Performed by: FAMILY MEDICINE

## 2024-11-13 PROCEDURE — 3008F BODY MASS INDEX DOCD: CPT | Mod: CPTII,S$GLB,, | Performed by: FAMILY MEDICINE

## 2024-11-13 PROCEDURE — 99999 PR PBB SHADOW E&M-EST. PATIENT-LVL III: CPT | Mod: PBBFAC,,, | Performed by: FAMILY MEDICINE

## 2024-11-13 PROCEDURE — 99214 OFFICE O/P EST MOD 30 MIN: CPT | Mod: S$GLB,,, | Performed by: FAMILY MEDICINE

## 2024-11-13 PROCEDURE — 1159F MED LIST DOCD IN RCRD: CPT | Mod: CPTII,S$GLB,, | Performed by: FAMILY MEDICINE

## 2024-11-13 PROCEDURE — 1160F RVW MEDS BY RX/DR IN RCRD: CPT | Mod: CPTII,S$GLB,, | Performed by: FAMILY MEDICINE

## 2024-11-13 PROCEDURE — 3288F FALL RISK ASSESSMENT DOCD: CPT | Mod: CPTII,S$GLB,, | Performed by: FAMILY MEDICINE

## 2024-11-13 PROCEDURE — 3074F SYST BP LT 130 MM HG: CPT | Mod: CPTII,S$GLB,, | Performed by: FAMILY MEDICINE

## 2024-11-13 PROCEDURE — 3044F HG A1C LEVEL LT 7.0%: CPT | Mod: CPTII,S$GLB,, | Performed by: FAMILY MEDICINE

## 2024-11-13 PROCEDURE — 1101F PT FALLS ASSESS-DOCD LE1/YR: CPT | Mod: CPTII,S$GLB,, | Performed by: FAMILY MEDICINE

## 2024-11-13 RX ORDER — ALLOPURINOL 300 MG/1
300 TABLET ORAL DAILY
Qty: 90 TABLET | Refills: 4 | Status: SHIPPED | OUTPATIENT
Start: 2024-11-13

## 2024-11-13 RX ORDER — CHOLECALCIFEROL (VITAMIN D3) 50 MCG
1 TABLET ORAL DAILY
Qty: 90 TABLET | Refills: 3 | Status: SHIPPED | OUTPATIENT
Start: 2024-11-13

## 2024-11-13 RX ORDER — LOSARTAN POTASSIUM AND HYDROCHLOROTHIAZIDE 12.5; 1 MG/1; MG/1
1 TABLET ORAL DAILY
Qty: 90 TABLET | Refills: 3 | Status: SHIPPED | OUTPATIENT
Start: 2024-11-13 | End: 2025-11-13

## 2024-11-13 RX ORDER — METFORMIN HYDROCHLORIDE 500 MG/1
1000 TABLET, EXTENDED RELEASE ORAL
Qty: 180 TABLET | Refills: 4 | Status: SHIPPED | OUTPATIENT
Start: 2024-11-13

## 2024-11-13 RX ORDER — GABAPENTIN 300 MG/1
300 CAPSULE ORAL 3 TIMES DAILY PRN
Qty: 180 CAPSULE | Refills: 4 | Status: SHIPPED | OUTPATIENT
Start: 2024-11-13

## 2024-11-13 NOTE — PATIENT INSTRUCTIONS
Take the pills you are prescribed consistently.     ADD VITAMIN D 2,000 IU daily supplement. Prescription or over the counter    Notify me if you need to go back on Flomax for bladder emptying.     OBTAIN THE RSV AND CONSIDER SEASONAL COVID VACCINE

## 2024-12-16 ENCOUNTER — OFFICE VISIT (OUTPATIENT)
Dept: FAMILY MEDICINE | Facility: CLINIC | Age: 74
End: 2024-12-16
Payer: MEDICARE

## 2024-12-16 VITALS
OXYGEN SATURATION: 97 % | HEIGHT: 71 IN | HEART RATE: 66 BPM | SYSTOLIC BLOOD PRESSURE: 138 MMHG | DIASTOLIC BLOOD PRESSURE: 76 MMHG | WEIGHT: 224.19 LBS | BODY MASS INDEX: 31.39 KG/M2

## 2024-12-16 DIAGNOSIS — I83.90 VARICOSE VEINS OF LOWER EXTREMITY, UNSPECIFIED LATERALITY, UNSPECIFIED WHETHER COMPLICATED: Primary | ICD-10-CM

## 2024-12-16 DIAGNOSIS — E79.0 ELEVATED BLOOD URIC ACID LEVEL: ICD-10-CM

## 2024-12-16 PROCEDURE — 1101F PT FALLS ASSESS-DOCD LE1/YR: CPT | Mod: CPTII,S$GLB,, | Performed by: FAMILY MEDICINE

## 2024-12-16 PROCEDURE — 3288F FALL RISK ASSESSMENT DOCD: CPT | Mod: CPTII,S$GLB,, | Performed by: FAMILY MEDICINE

## 2024-12-16 PROCEDURE — 1125F AMNT PAIN NOTED PAIN PRSNT: CPT | Mod: CPTII,S$GLB,, | Performed by: FAMILY MEDICINE

## 2024-12-16 PROCEDURE — 3078F DIAST BP <80 MM HG: CPT | Mod: CPTII,S$GLB,, | Performed by: FAMILY MEDICINE

## 2024-12-16 PROCEDURE — 3044F HG A1C LEVEL LT 7.0%: CPT | Mod: CPTII,S$GLB,, | Performed by: FAMILY MEDICINE

## 2024-12-16 PROCEDURE — 3075F SYST BP GE 130 - 139MM HG: CPT | Mod: CPTII,S$GLB,, | Performed by: FAMILY MEDICINE

## 2024-12-16 PROCEDURE — 1160F RVW MEDS BY RX/DR IN RCRD: CPT | Mod: CPTII,S$GLB,, | Performed by: FAMILY MEDICINE

## 2024-12-16 PROCEDURE — 99999 PR PBB SHADOW E&M-EST. PATIENT-LVL III: CPT | Mod: PBBFAC,,, | Performed by: FAMILY MEDICINE

## 2024-12-16 PROCEDURE — 99213 OFFICE O/P EST LOW 20 MIN: CPT | Mod: S$GLB,,, | Performed by: FAMILY MEDICINE

## 2024-12-16 PROCEDURE — 3008F BODY MASS INDEX DOCD: CPT | Mod: CPTII,S$GLB,, | Performed by: FAMILY MEDICINE

## 2024-12-16 PROCEDURE — 1159F MED LIST DOCD IN RCRD: CPT | Mod: CPTII,S$GLB,, | Performed by: FAMILY MEDICINE

## 2024-12-16 RX ORDER — ALLOPURINOL 300 MG/1
300 TABLET ORAL DAILY
Qty: 90 TABLET | Refills: 4 | Status: SHIPPED | OUTPATIENT
Start: 2024-12-16

## 2024-12-16 NOTE — PROGRESS NOTES
Subjective:       Patient ID: Noah Nichole is a 74 y.o. male.    Chief Complaint: Leg Pain (Pt is here for right leg pain and left side abd pain as well )  73 y/o male with hx of HTN, DM well controlled here with c/o having right leg swelling , has hx of vericose veins,states has been wearing tight socks,  Startes no calf pain , after he changed the socks pain has reduced  Denies any SOB    o,  HPI  Review of Systems    Objective:      Physical Exam  Vitals and nursing note reviewed.   Constitutional:       General: He is not in acute distress.     Appearance: Normal appearance. He is well-developed. He is not diaphoretic.   HENT:      Head: Normocephalic and atraumatic.      Nose: Nose normal.   Eyes:      General: Lids are normal.      Conjunctiva/sclera: Conjunctivae normal.   Neck:      Trachea: Trachea and phonation normal.   Cardiovascular:      Rate and Rhythm: Normal rate and regular rhythm.      Pulses: Normal pulses.      Heart sounds: Normal heart sounds.   Pulmonary:      Effort: Pulmonary effort is normal.      Breath sounds: Normal breath sounds.   Abdominal:      General: Bowel sounds are normal. There is no abdominal bruit.      Palpations: Abdomen is soft. There is no mass or pulsatile mass.   Musculoskeletal:         General: No deformity.      Cervical back: Full passive range of motion without pain, normal range of motion and neck supple.      Comments: Right calf no swelling, warmth or tenderness  Old vericose veins noticed     Skin:     General: Skin is warm and dry.   Neurological:      Mental Status: He is alert and oriented to person, place, and time.      Sensory: No sensory deficit.      Deep Tendon Reflexes: Reflexes are normal and symmetric.   Psychiatric:         Speech: Speech normal.         Behavior: Behavior normal. Behavior is cooperative.         Thought Content: Thought content normal.         Judgment: Judgment normal.         Assessment:       1. Varicose veins of lower  extremity, unspecified laterality, unspecified whether complicated        Plan:        Noah was seen today for leg pain.    Diagnoses and all orders for this visit:    Varicose veins of lower extremity, unspecified laterality, unspecified whether complicated        Advised wearing loose socks,    Elevated leg    Reassurance

## 2025-05-06 ENCOUNTER — RESULTS FOLLOW-UP (OUTPATIENT)
Dept: INTERNAL MEDICINE | Facility: CLINIC | Age: 75
End: 2025-05-06

## 2025-05-08 ENCOUNTER — TELEPHONE (OUTPATIENT)
Dept: INTERNAL MEDICINE | Facility: CLINIC | Age: 75
End: 2025-05-08
Payer: OTHER MISCELLANEOUS

## 2025-05-08 NOTE — TELEPHONE ENCOUNTER
----- Message from Amador sent at 5/8/2025  8:39 AM CDT -----  .Type:  Needs Medical AdviceWho Called: ptWould the patient rather a call back or a response via [facility]? Call [facility] Call Back Number: [phone] Information: Pt stated he was told previous about doing a living will and would like a call back to get more information about it

## 2025-05-12 ENCOUNTER — TELEPHONE (OUTPATIENT)
Dept: INTERNAL MEDICINE | Facility: CLINIC | Age: 75
End: 2025-05-12
Payer: OTHER MISCELLANEOUS

## 2025-05-13 ENCOUNTER — OFFICE VISIT (OUTPATIENT)
Dept: INTERNAL MEDICINE | Facility: CLINIC | Age: 75
End: 2025-05-13
Payer: MEDICARE

## 2025-05-13 VITALS
DIASTOLIC BLOOD PRESSURE: 64 MMHG | TEMPERATURE: 98 F | OXYGEN SATURATION: 99 % | HEART RATE: 65 BPM | BODY MASS INDEX: 32.01 KG/M2 | WEIGHT: 228.63 LBS | SYSTOLIC BLOOD PRESSURE: 120 MMHG | HEIGHT: 71 IN

## 2025-05-13 DIAGNOSIS — M54.9 CHRONIC BILATERAL BACK PAIN, UNSPECIFIED BACK LOCATION: ICD-10-CM

## 2025-05-13 DIAGNOSIS — N40.1 BENIGN PROSTATIC HYPERPLASIA WITH LOWER URINARY TRACT SYMPTOMS, SYMPTOM DETAILS UNSPECIFIED: ICD-10-CM

## 2025-05-13 DIAGNOSIS — I83.93 ASYMPTOMATIC VARICOSE VEINS OF BOTH LOWER EXTREMITIES: ICD-10-CM

## 2025-05-13 DIAGNOSIS — Z87.39 HISTORY OF GOUT: ICD-10-CM

## 2025-05-13 DIAGNOSIS — E79.0 ELEVATED BLOOD URIC ACID LEVEL: ICD-10-CM

## 2025-05-13 DIAGNOSIS — Z79.899 MEDICATION MANAGEMENT: ICD-10-CM

## 2025-05-13 DIAGNOSIS — E66.09 CLASS 1 OBESITY DUE TO EXCESS CALORIES WITH SERIOUS COMORBIDITY AND BODY MASS INDEX (BMI) OF 31.0 TO 31.9 IN ADULT: ICD-10-CM

## 2025-05-13 DIAGNOSIS — E55.9 VITAMIN D DEFICIENCY: ICD-10-CM

## 2025-05-13 DIAGNOSIS — R73.03 PREDIABETES: ICD-10-CM

## 2025-05-13 DIAGNOSIS — Z86.2 HISTORY OF IRON DEFICIENCY ANEMIA: ICD-10-CM

## 2025-05-13 DIAGNOSIS — Z00.00 ROUTINE GENERAL MEDICAL EXAMINATION AT A HEALTH CARE FACILITY: Primary | ICD-10-CM

## 2025-05-13 DIAGNOSIS — Z85.038 HISTORY OF COLON CANCER, STAGE II: ICD-10-CM

## 2025-05-13 DIAGNOSIS — I10 BENIGN ESSENTIAL HYPERTENSION: ICD-10-CM

## 2025-05-13 DIAGNOSIS — E66.811 CLASS 1 OBESITY DUE TO EXCESS CALORIES WITH SERIOUS COMORBIDITY AND BODY MASS INDEX (BMI) OF 31.0 TO 31.9 IN ADULT: ICD-10-CM

## 2025-05-13 DIAGNOSIS — G89.29 CHRONIC BILATERAL BACK PAIN, UNSPECIFIED BACK LOCATION: ICD-10-CM

## 2025-05-13 DIAGNOSIS — Z29.11 NEED FOR RSV VACCINATION: ICD-10-CM

## 2025-05-13 PROCEDURE — 1159F MED LIST DOCD IN RCRD: CPT | Mod: CPTII,HCNC,S$GLB, | Performed by: FAMILY MEDICINE

## 2025-05-13 PROCEDURE — 1160F RVW MEDS BY RX/DR IN RCRD: CPT | Mod: CPTII,HCNC,S$GLB, | Performed by: FAMILY MEDICINE

## 2025-05-13 PROCEDURE — 3044F HG A1C LEVEL LT 7.0%: CPT | Mod: CPTII,HCNC,S$GLB, | Performed by: FAMILY MEDICINE

## 2025-05-13 PROCEDURE — 3078F DIAST BP <80 MM HG: CPT | Mod: CPTII,HCNC,S$GLB, | Performed by: FAMILY MEDICINE

## 2025-05-13 PROCEDURE — 3074F SYST BP LT 130 MM HG: CPT | Mod: CPTII,HCNC,S$GLB, | Performed by: FAMILY MEDICINE

## 2025-05-13 PROCEDURE — 3008F BODY MASS INDEX DOCD: CPT | Mod: CPTII,HCNC,S$GLB, | Performed by: FAMILY MEDICINE

## 2025-05-13 PROCEDURE — 1101F PT FALLS ASSESS-DOCD LE1/YR: CPT | Mod: CPTII,HCNC,S$GLB, | Performed by: FAMILY MEDICINE

## 2025-05-13 PROCEDURE — 3288F FALL RISK ASSESSMENT DOCD: CPT | Mod: CPTII,HCNC,S$GLB, | Performed by: FAMILY MEDICINE

## 2025-05-13 PROCEDURE — 1126F AMNT PAIN NOTED NONE PRSNT: CPT | Mod: CPTII,HCNC,S$GLB, | Performed by: FAMILY MEDICINE

## 2025-05-13 PROCEDURE — 99999 PR PBB SHADOW E&M-EST. PATIENT-LVL IV: CPT | Mod: PBBFAC,HCNC,, | Performed by: FAMILY MEDICINE

## 2025-05-13 PROCEDURE — 99397 PER PM REEVAL EST PAT 65+ YR: CPT | Mod: HCNC,S$GLB,, | Performed by: FAMILY MEDICINE

## 2025-05-13 RX ORDER — LIDOCAINE 50 MG/G
1 PATCH TOPICAL DAILY
Qty: 30 PATCH | Refills: 11 | Status: SHIPPED | OUTPATIENT
Start: 2025-05-13

## 2025-05-13 RX ORDER — LOSARTAN POTASSIUM AND HYDROCHLOROTHIAZIDE 12.5; 1 MG/1; MG/1
1 TABLET ORAL DAILY
Qty: 90 TABLET | Refills: 3 | Status: SHIPPED | OUTPATIENT
Start: 2025-05-13 | End: 2026-05-13

## 2025-05-13 RX ORDER — VIT C/E/ZN/COPPR/LUTEIN/ZEAXAN 250MG-90MG
5000 CAPSULE ORAL
Qty: 90 CAPSULE | Refills: 4 | Status: SHIPPED | OUTPATIENT
Start: 2025-05-13

## 2025-05-13 RX ORDER — ALLOPURINOL 300 MG/1
300 TABLET ORAL DAILY
Qty: 90 TABLET | Refills: 4 | Status: SHIPPED | OUTPATIENT
Start: 2025-05-13

## 2025-05-13 NOTE — PROGRESS NOTES
Office Visit    Patient Name: Noah Nichole    : 1950  MRN: [id number]    Subjective:  Noah is a 75 y.o. male who presents today for: Annual Exam    Last visit with Cardiology Dr. Moe 24 for management of varicose veins in the lower left extremities. Last visit with Dr. Jeffery on 24 for lab review and 6 month follow up.     Varicose Veins: Has been using compression stockings as needed to help with pain and swelling. Also discontinued wearing longer tight socks as that worsened the swelling. No current concerns    Prediabetes: HgbA1C most recent = 6.1%, currently on metformin 500mgXR. He has only been taking 1 tablet of the metformin and not the TWO tablets previously advised.     Hypertension: Taking Losartan-hctz 100mg-12.5mcg. Currently 120/64. Has not been recording at home, but has been good while in clinic.     Gout: Has history of gout. Has been on allopurinol 300mg. He is currently not taking the allopurinol because he feels he doesn't get gout anymore and no longer needs it.-- due for refills to continue if indicated.     Back pain, Urinary tract symptoms and BPH: 2022 had a fall and debilitating back and tail bone pain with leg weakness and intermittent urinary issues since then. He was scheduled for laser enucleation of the prostate but this procedure was canceled as his urination has improved on Flomax. From last visit on 24, he had stopped taking the flomax, and was doing fine with urination and bladder emptying. He continues to be okay with urinating. His back pain has been well controlled with gabapentin 300mg, which he uses sparingly as needed.      Constipation: Had been previously controlled with linzess 145mg and increased water consumption. He no longer takes the linzess, but does continue to have good water intake. He no longer has symptoms of constipation.     Vitamins: Vitamin C, Vitamin B12, Vitamin D (has been out of vitamin D over the last  month)    Health maintenance:  Exercise: working as a  at [employer] which keeps him active during the day. Is interested in getting back into biking to help lose weight--plans to start this week   Diet: Beans, grilled steak, chicken, turkey, crawfish, cornflakes. Will try to eat more vegetables. Says he is hydrating well overall.   Sleep: Sleeps about 5 hours, but naps intermittently during the day, and feels good about that  Immunizations: Covid 19 and RSV Vaccine Advised-  Agreed to get RSV vaccine at pharmacy today.  Screening: colon cancer screening next due 4/1/27 5 years, based on personal history of colon cancer    PAST MEDICAL HISTORY, SURGICAL/SOCIAL/FAMILY HISTORY REVIEWED AS PER CHART, WITH PERTINENT FINDINGS INCLUDED IN HISTORY SECTION OF NOTE.     Current Medications    Medication List with Changes/Refills   New Medications    CHOLECALCIFEROL, VITAMIN D3, 125 MCG (5,000 UNIT) CAPSULE    Take 1 capsule (5,000 Units total) by mouth daily with breakfast.   Current Medications    GABAPENTIN (NEURONTIN) 300 MG CAPSULE    Take 1 capsule (300 mg total) by mouth 3 (three) times daily as needed (back and nerve pain).    METFORMIN (GLUCOPHAGE-XR) 500 MG ER 24HR TABLET    Take 2 tablets (1,000 mg total) by mouth daily with breakfast.   Changed and/or Refilled Medications    Modified Medication Previous Medication    ALLOPURINOL (ZYLOPRIM) 300 MG TABLET allopurinoL (ZYLOPRIM) 300 MG tablet       Take 1 tablet (300 mg total) by mouth once daily. Take daily for gout prevention    Take 1 tablet (300 mg total) by mouth once daily. Take daily for gout prevention    LIDOCAINE (LIDODERM) 5 % LIDOcaine (LIDODERM) 5 %       Place 1 patch onto the skin once daily. Remove & Discard patch within 12 hours or as directed by MD    Place 1 patch onto the skin once daily. Remove & Discard patch within 12 hours or as directed by MD    LOSARTAN-HYDROCHLOROTHIAZIDE 100-12.5 MG (HYZAAR) 100-12.5 MG TAB  "losartan-hydrochlorothiazide 100-12.5 mg (HYZAAR) 100-12.5 mg Tab       Take 1 tablet by mouth once daily.    Take 1 tablet by mouth once daily.   Discontinued Medications    CHOLECALCIFEROL, VITAMIN D3, (VITAMIN D3) 50 MCG (2,000 UNIT) TAB    Take 1 tablet (2,000 Units total) by mouth once daily.    LINACLOTIDE (LINZESS) 145 MCG CAP CAPSULE    Take 1 capsule (145 mcg total) by mouth before breakfast. Take with a full glass of water       Allergies   Review of patient's allergies indicates:  No Known Allergies      Review of Systems (Pertinent positives)  Review of Systems   Constitutional:  Negative for chills, diaphoresis, fatigue and fever.   HENT:  Negative for nosebleeds, rhinorrhea, sneezing and sore throat.    Respiratory:  Negative for choking, chest tightness and shortness of breath.    Cardiovascular:  Negative for chest pain and leg swelling.   Gastrointestinal:  Negative for abdominal distention, abdominal pain, blood in stool, constipation, diarrhea, nausea and vomiting.   Musculoskeletal:  Negative for back pain.   Neurological:  Negative for dizziness, weakness, light-headedness and headaches.       /64 (BP Location: Right arm, Patient Position: Sitting)   Pulse 65   Temp 97.8 °F (36.6 °C)   Ht 5' 11" (1.803 m)   Wt 103.7 kg (228 lb 9.9 oz)   SpO2 99%   BMI 31.89 kg/m²     Physical Exam  Constitutional:       Appearance: Normal appearance. He is obese.   HENT:      Head: Normocephalic and atraumatic.      Right Ear: Tympanic membrane, ear canal and external ear normal.      Left Ear: Tympanic membrane, ear canal and external ear normal.      Nose: Nose normal. No congestion or rhinorrhea.      Mouth/Throat:      Pharynx: No oropharyngeal exudate or posterior oropharyngeal erythema.   Eyes:      Extraocular Movements: Extraocular movements intact.      Pupils: Pupils are equal, round, and reactive to light.   Cardiovascular:      Rate and Rhythm: Normal rate and regular rhythm.      Pulses: " Normal pulses.      Heart sounds: Normal heart sounds.   Pulmonary:      Effort: Pulmonary effort is normal.      Breath sounds: Normal breath sounds.   Abdominal:      General: Abdomen is flat. Bowel sounds are normal. There is no distension.      Palpations: Abdomen is soft. There is no mass.      Tenderness: There is no abdominal tenderness.   Musculoskeletal:      Cervical back: Normal range of motion. No tenderness.      Right lower leg: No edema.      Left lower leg: No edema.   Neurological:      General: No focal deficit present.      Mental Status: He is alert and oriented to person, place, and time.   Psychiatric:         Mood and Affect: Mood normal.         Behavior: Behavior normal.         Thought Content: Thought content normal.            Latest Reference Range & Units 05/06/25 07:30   WBC 3.90 - 12.70 K/uL 5.33   RBC 4.60 - 6.20 M/uL 4.75   Hemoglobin 14.0 - 18.0 gm/dL 12.9 (L)   Hematocrit 40.0 - 54.0 % 39.4 (L)   MCV 82 - 98 fL 83   MCH 27.0 - 31.0 pg 27.2   MCHC 32.0 - 36.0 g/dL 32.7   RDW 11.5 - 14.5 % 13.2   Platelet Count 150 - 450 K/uL 146 (L)   MPV 9.2 - 12.9 fL 9.9   Neut % 38 - 73 % 45.2   Lymph % 18 - 48 % 41.8   Mono % 4 - 15 % 9.9   Eos % <=8 % 2.3   Basophil % <=1.9 % 0.6   Immature Granulocytes 0.0 - 0.5 % 0.2   Gran # (ANC) 1.8 - 7.7 K/uL 2.41   Lymph # 1 - 4.8 K/uL 2.23   Mono # 0.3 - 1 K/uL 0.53   Eos # <=0.5 K/uL 0.12   Baso # <=0.2 K/uL 0.03   Immature Grans (Abs) 0.00 - 0.04 K/uL 0.01   nRBC <=0 /100 WBC 0   (L): Data is abnormally low     Latest Reference Range & Units 05/06/25 07:30   Vitamin D 30 - 96 ng/mL 22 (L)   Hemoglobin A1C External 4.0 - 5.6 % 6.1 (H)   Estimated Avg Glucose 68 - 131 mg/dL 128   TSH 0.400 - 4.000 uIU/mL 3.804   (L): Data is abnormally low  (H): Data is abnormally high  Assessment/Plan:  Noah Nichole is a 75 y.o. male who presents today for :        ICD-10-CM ICD-9-CM    1. Routine general medical examination at a health care facility  Z00.00  V70.0       2. Class 1 obesity due to excess calories with serious comorbidity and body mass index (BMI) of 31.0 to 31.9 in adult  E66.811 278.00     E66.09 V85.31     Z68.31        3. Prediabetes  R73.03 790.29 Hemoglobin A1C      Comprehensive Metabolic Panel      Lipid Panel      CBC Auto Differential      TSH      Magnesium      Vitamin B12      Vitamin D      Uric Acid      4. Benign essential hypertension  I10 401.1 Hemoglobin A1C      Comprehensive Metabolic Panel      Lipid Panel      CBC Auto Differential      TSH      Magnesium      Vitamin B12      Vitamin D      Uric Acid      losartan-hydrochlorothiazide 100-12.5 mg (HYZAAR) 100-12.5 mg Tab      5. History of colon cancer, stage II  Z85.038 V10.05       6. History of iron deficiency anemia  Z86.2 V12.3 Ferritin      7. Benign prostatic hyperplasia with lower urinary tract symptoms, symptom details unspecified  N40.1 600.01       8. Chronic bilateral back pain, unspecified back location  M54.9 724.5 LIDOcaine (LIDODERM) 5 %    G89.29 338.29       9. Vitamin D deficiency  E55.9 268.9 Vitamin D      cholecalciferol, vitamin D3, 125 mcg (5,000 unit) capsule      10. Medication management  Z79.899 V58.69 Hemoglobin A1C      Comprehensive Metabolic Panel      Lipid Panel      CBC Auto Differential      TSH      Magnesium      Vitamin B12      Vitamin D      Uric Acid      11. Need for RSV vaccination  Z29.11 V04.82       12. History of gout  Z87.39 V12.29 Uric Acid      allopurinoL (ZYLOPRIM) 300 MG tablet      13. Elevated blood uric acid level  E79.0 790.6 allopurinoL (ZYLOPRIM) 300 MG tablet      14. Asymptomatic varicose veins of both lower extremities  I83.93 454.9         Routine General Medical Examination  - Advised on the importance of following a healthy diet, exercise, and sleep routine.   - Advised on the importance of doing routine immunizations. Talked about RSV Vaccine, planning to go to pharmacy today.   - Up to date on colon cancer screening  with next colonoscopy due on 4/1/27.   - Talked about importance of taking all medication as instructed.  - Will order and follow the following labs: CMP, HgbA1C, CBC, TSH, Magnesium, Vitamin B12, Vitamin D, Uric Acid to be drawn in 6 months prior to follow-up, follow-up sooner if concerns.    RSV Vaccination, History of Colon Cancer, History of iron deficiency anemia, Vitamin D deficiency  Has history of anemia with most recent hgb at 12.9. Last labs 11/6/24 show ferritin at 98. Vitamin D levels at 22. Has not been taking Vitamin D pills for the past month.   - Will plan to get RSV Vaccination today at pharmacy  - Colon cancer screening up to date with next colonoscopy due on 4/1/27  - increased Vitamin D3 to 5000 units & recheck in 6 months    Class 1 Obesity, Prediabetes, Hypertension  BMI of 31.89. HgbA1c of 6.1%. Blood pressure today of 120/64. Has been taking metformin 500mgXR, but only 1 tablet instead of 2. Blood pressure controlled with losartan-hctz 100-12.5mg.    - Advised on importance of taking 2 doses of metformin 500mgXR   - Advised on importance of maintaining healthy lifestyle, including but not limited to diet, exercise, and sleep. Agreed on diversifying diet with more vegetables and increasing exercise by starting cycling again.  - Advised to continue losartan-hctz 100-12.5mg  Plan for recheck of blood pressure and blood work, weight in six-months      BPH w/ urinary tract symptoms, constipation, and back pain  He has been doing well managing his back pain with gabapentin 300mg prn and lidocaine patch. Not needed many doses. He has been urinating okay, with adequate flow. He does not endorse anymore constipation despite discontinuing linzess 145mcg.   - Advised on continuing to drink adequate fluids and maintaining fiber for constipation  - Continue Gabapentin 300mg and lidocaine patch as needed for back pain   - Will follow up and manage urinary symptoms or constipation if they recur-- previously  followed with Urology after his accident but did not move forward with prostate reduction/ressection surgery    Gout and elevated Uric Acid Level  Last uric acid was on 11/6/24. Which was normal at 5.1. However he notes that he has not been taking the allopurinol anymore.     - Advised to restart the allopurinol 300mg.   - Will repeat Uric Acid for 6 month follow up    Varicose Veins  Left leg pain, being managed with compression stockings as needed. Has adjusted with different socks, which has reduced his swelling.    Alok Neff, MS-4    I hereby acknowledge that I am relying upon documentation authored by a medical student working under my supervision and further I hereby attest that I have verified the student documentation or findings by personally re-performing the physical exam and medical decision making activities of the Evaluation and Management service to be billed.  Grace Jeffery      Patient Instructions   OBTAIN THE RSV VACCINE THROUGH THE PHARMACY TODAY       Follow up in about 6 months (around 11/13/2025) for to follow up on lab results, return as needed for new concerns.
